# Patient Record
Sex: FEMALE | Race: WHITE | Employment: OTHER | URBAN - METROPOLITAN AREA
[De-identification: names, ages, dates, MRNs, and addresses within clinical notes are randomized per-mention and may not be internally consistent; named-entity substitution may affect disease eponyms.]

---

## 2017-01-03 RX ORDER — SIMVASTATIN 40 MG
TABLET ORAL
Qty: 90 TABLET | Refills: 0 | Status: SHIPPED | OUTPATIENT
Start: 2017-01-03 | End: 2017-04-09

## 2017-01-06 ENCOUNTER — TELEPHONE (OUTPATIENT)
Dept: INTERNAL MEDICINE CLINIC | Facility: CLINIC | Age: 72
End: 2017-01-06

## 2017-01-06 NOTE — TELEPHONE ENCOUNTER
Advised if acute sxs Er eval which pt refused , advised appt next week and pt states will call back for appt

## 2017-01-18 ENCOUNTER — OFFICE VISIT (OUTPATIENT)
Dept: INTERNAL MEDICINE CLINIC | Facility: CLINIC | Age: 72
End: 2017-01-18

## 2017-01-18 VITALS
TEMPERATURE: 98 F | BODY MASS INDEX: 22.71 KG/M2 | HEIGHT: 64 IN | DIASTOLIC BLOOD PRESSURE: 70 MMHG | SYSTOLIC BLOOD PRESSURE: 118 MMHG | WEIGHT: 133 LBS | OXYGEN SATURATION: 100 % | HEART RATE: 85 BPM | RESPIRATION RATE: 16 BRPM

## 2017-01-18 DIAGNOSIS — R42 DIZZINESS: ICD-10-CM

## 2017-01-18 DIAGNOSIS — R19.7 DIARRHEA, UNSPECIFIED TYPE: ICD-10-CM

## 2017-01-18 DIAGNOSIS — I10 ESSENTIAL HYPERTENSION, BENIGN: Primary | ICD-10-CM

## 2017-01-18 DIAGNOSIS — IMO0001 UNCONTROLLED TYPE 2 DIABETES MELLITUS WITHOUT COMPLICATION, WITHOUT LONG-TERM CURRENT USE OF INSULIN: ICD-10-CM

## 2017-01-18 PROCEDURE — 99214 OFFICE O/P EST MOD 30 MIN: CPT | Performed by: FAMILY MEDICINE

## 2017-01-18 NOTE — PROGRESS NOTES
HPI:    Patient ID: Eleni Verduzco is a 66year old female. HPI  Eleni Verduzco is a 66year old female.   HPI:   Here for med ck   (did get email from pts daughter re: pt w occ dizziness, having loose stools, not eating prpperley as a DM pt    Lawanda tenderness  EXTREMITIES: no edema    ASSESSMENT AND PLAN:   Diarrhea:    Will ck stool studies    Discussed metformin w pt  States only uses daily anyway   Not willing to lower it     Needs colonoscopy but not interested    Dizziness    D/w pt   It could be

## 2017-01-25 ENCOUNTER — LAB ENCOUNTER (OUTPATIENT)
Dept: LAB | Age: 72
End: 2017-01-25
Attending: FAMILY MEDICINE
Payer: MEDICARE

## 2017-01-25 ENCOUNTER — TELEPHONE (OUTPATIENT)
Dept: INTERNAL MEDICINE CLINIC | Facility: CLINIC | Age: 72
End: 2017-01-25

## 2017-01-25 DIAGNOSIS — R19.7 DIARRHEA, UNSPECIFIED TYPE: ICD-10-CM

## 2017-01-26 PROCEDURE — 87427 SHIGA-LIKE TOXIN AG IA: CPT | Performed by: FAMILY MEDICINE

## 2017-01-26 PROCEDURE — 87209 SMEAR COMPLEX STAIN: CPT | Performed by: FAMILY MEDICINE

## 2017-01-26 PROCEDURE — 87269 GIARDIA AG IF: CPT | Performed by: FAMILY MEDICINE

## 2017-01-26 PROCEDURE — 87177 OVA AND PARASITES SMEARS: CPT | Performed by: FAMILY MEDICINE

## 2017-01-26 PROCEDURE — 87045 FECES CULTURE AEROBIC BACT: CPT | Performed by: FAMILY MEDICINE

## 2017-01-26 PROCEDURE — 87046 STOOL CULTR AEROBIC BACT EA: CPT | Performed by: FAMILY MEDICINE

## 2017-01-26 PROCEDURE — 87015 SPECIMEN INFECT AGNT CONCNTJ: CPT | Performed by: FAMILY MEDICINE

## 2017-01-30 LAB
OVA AND PARASITE, TRICHROME STAIN: NEGATIVE
OVA AND PARASITE, WET MOUNT: NEGATIVE

## 2017-04-10 RX ORDER — SIMVASTATIN 40 MG
TABLET ORAL
Qty: 90 TABLET | Refills: 0 | Status: SHIPPED | OUTPATIENT
Start: 2017-04-10 | End: 2017-07-16

## 2017-04-10 RX ORDER — LOSARTAN POTASSIUM AND HYDROCHLOROTHIAZIDE 25; 100 MG/1; MG/1
TABLET ORAL
Qty: 90 TABLET | Refills: 0 | Status: SHIPPED | OUTPATIENT
Start: 2017-04-10 | End: 2017-07-16

## 2017-07-17 RX ORDER — SIMVASTATIN 40 MG
TABLET ORAL
Qty: 90 TABLET | Refills: 0 | Status: SHIPPED | OUTPATIENT
Start: 2017-07-17 | End: 2017-09-22 | Stop reason: SDUPTHER

## 2017-07-17 RX ORDER — LOSARTAN POTASSIUM AND HYDROCHLOROTHIAZIDE 25; 100 MG/1; MG/1
TABLET ORAL
Qty: 90 TABLET | Refills: 0 | Status: SHIPPED | OUTPATIENT
Start: 2017-07-17 | End: 2017-09-22 | Stop reason: SDUPTHER

## 2017-09-13 ENCOUNTER — TELEPHONE (OUTPATIENT)
Dept: INTERNAL MEDICINE CLINIC | Facility: CLINIC | Age: 72
End: 2017-09-13

## 2017-09-14 ENCOUNTER — TELEPHONE (OUTPATIENT)
Dept: INTERNAL MEDICINE CLINIC | Facility: CLINIC | Age: 72
End: 2017-09-14

## 2017-09-22 ENCOUNTER — HOSPITAL ENCOUNTER (OUTPATIENT)
Age: 72
Discharge: HOME | End: 2017-09-22
Payer: MEDICARE

## 2017-09-22 VITALS
DIASTOLIC BLOOD PRESSURE: 71 MMHG | OXYGEN SATURATION: 99 % | HEART RATE: 87 BPM | RESPIRATION RATE: 16 BRPM | TEMPERATURE: 98 F | SYSTOLIC BLOOD PRESSURE: 151 MMHG

## 2017-09-22 DIAGNOSIS — Z76.0 ENCOUNTER FOR MEDICATION REFILL: Primary | ICD-10-CM

## 2017-09-22 PROCEDURE — 99214 OFFICE O/P EST MOD 30 MIN: CPT

## 2017-09-22 PROCEDURE — 99213 OFFICE O/P EST LOW 20 MIN: CPT

## 2017-09-22 RX ORDER — LOSARTAN POTASSIUM AND HYDROCHLOROTHIAZIDE 25; 100 MG/1; MG/1
1 TABLET ORAL DAILY
Qty: 30 TABLET | Refills: 0 | Status: SHIPPED | OUTPATIENT
Start: 2017-09-22 | End: 2017-11-22

## 2017-09-22 RX ORDER — SIMVASTATIN 40 MG
40 TABLET ORAL NIGHTLY
Qty: 30 TABLET | Refills: 0 | Status: SHIPPED | OUTPATIENT
Start: 2017-09-22 | End: 2017-11-30

## 2017-09-22 NOTE — ED PROVIDER NOTES
Patient Seen in: [facility]    History   Patient presents with:  Medication Administration    Stated Complaint: PRESCRIPTION REQUEST    19-year-old female presents today with need for refill for metformin.   Patient states at dis Normocephalic. Eyes: Pupils are equal, round, and reactive to light. Neck: Normal range of motion. Neck supple. Cardiovascular: Normal rate and regular rhythm.     Pulmonary/Chest: Effort normal and breath sounds normal.   Neurological: She is alert a found. Please discuss with provider.

## 2017-09-22 NOTE — ED INITIAL ASSESSMENT (HPI)
Pt here because she has been unable to get into see her doctor, and will be changing doctors.   Pt is a diabetic and needs metformin

## 2017-09-27 ENCOUNTER — TELEPHONE (OUTPATIENT)
Dept: INTERNAL MEDICINE CLINIC | Facility: CLINIC | Age: 72
End: 2017-09-27

## 2017-10-25 ENCOUNTER — OFFICE VISIT (OUTPATIENT)
Dept: INTERNAL MEDICINE CLINIC | Facility: CLINIC | Age: 72
End: 2017-10-25

## 2017-10-25 VITALS
RESPIRATION RATE: 23 BRPM | BODY MASS INDEX: 23.99 KG/M2 | SYSTOLIC BLOOD PRESSURE: 140 MMHG | TEMPERATURE: 98 F | WEIGHT: 140.5 LBS | HEART RATE: 93 BPM | HEIGHT: 64 IN | DIASTOLIC BLOOD PRESSURE: 88 MMHG

## 2017-10-25 DIAGNOSIS — IMO0001 UNCONTROLLED TYPE 2 DIABETES MELLITUS WITHOUT COMPLICATION, WITHOUT LONG-TERM CURRENT USE OF INSULIN: ICD-10-CM

## 2017-10-25 DIAGNOSIS — E78.00 PURE HYPERCHOLESTEROLEMIA: ICD-10-CM

## 2017-10-25 DIAGNOSIS — Z12.31 ENCOUNTER FOR SCREENING MAMMOGRAM FOR BREAST CANCER: ICD-10-CM

## 2017-10-25 DIAGNOSIS — Z00.00 MEDICARE ANNUAL WELLNESS VISIT, SUBSEQUENT: Primary | ICD-10-CM

## 2017-10-25 DIAGNOSIS — I10 ESSENTIAL HYPERTENSION, BENIGN: ICD-10-CM

## 2017-10-25 PROCEDURE — G0444 DEPRESSION SCREEN ANNUAL: HCPCS | Performed by: INTERNAL MEDICINE

## 2017-10-25 PROCEDURE — 99214 OFFICE O/P EST MOD 30 MIN: CPT | Performed by: INTERNAL MEDICINE

## 2017-10-25 PROCEDURE — G0439 PPPS, SUBSEQ VISIT: HCPCS | Performed by: INTERNAL MEDICINE

## 2017-10-25 RX ORDER — SIMVASTATIN 40 MG
TABLET ORAL
Qty: 90 TABLET | OUTPATIENT
Start: 2017-10-25

## 2017-10-25 RX ORDER — LOSARTAN POTASSIUM AND HYDROCHLOROTHIAZIDE 25; 100 MG/1; MG/1
TABLET ORAL
Qty: 90 TABLET | OUTPATIENT
Start: 2017-10-25

## 2017-10-25 NOTE — PATIENT INSTRUCTIONS
- Get blood work done when fasting (8 hours, water and medications only)  - Schedule your mammogram.    Recommended Websites for Advanced Directives    [email or web address]  An information packet, including necessary

## 2017-10-25 NOTE — PROGRESS NOTES
HPI:   Jana Renner is a 67year old female who presents for a Medicare Subsequent Annual Wellness visit (Pt already had Initial Annual Wellness) and for follow up on her chronic medical issues.   Her last annual assessment has been over 1 year: Annual Her family history is not on file. SOCIAL HISTORY:   She  reports that she has quit smoking. She has never used smokeless tobacco. She reports that she does not drink alcohol or use drugs.      REVIEW OF SYSTEMS:   GENERAL: feels well otherwise  SKIN: d appearance is normal.  Bilateral monofilament/sensation of both feet is normal.  Pulsation pedal pulse exam of both lower legs/feet is normal as well.   MS: Full ROM, no joint pain  PSYCH: pleasant, appropriate mood and affect  SUGGESTED VACCINATIONS - Infl lipid panel. Continue simvastatin 40 mg qhs. Ms. Mary Jane Hannah does not currently take aspirin. We discussed the risks and benefits of aspirin therapy.    Diamond Brink is unable to use daily aspirin therapy For the following reasons:   Patient decided No    Vision Problems? : No    Difficulty walking?: No (balance )    Difficulty dressing or bathing?: No    Problems with daily activities? : No    Memory Problems?: No      Fall/Risk Assessment          Have you fallen in the last 12 months?: 0-No Flex Sigmoidoscopy Screen every 10 years No results found for this or any previous visit. No flowsheet data found. Fecal Occult Blood Annually No results found for: FOB No flowsheet data found.     Glaucoma Screening      Ophthalmology Visit Annually This may be covered with your pharmacy  prescription benefits        SPECIFIC DISEASE MONITORING Internal Lab or Procedure External Lab or Procedure   Annual Monitoring of Persistent     Medications (ACE/ARB, digoxin diuretics, anticonvulsants.)    Raza

## 2017-11-24 RX ORDER — LOSARTAN POTASSIUM AND HYDROCHLOROTHIAZIDE 25; 100 MG/1; MG/1
TABLET ORAL
Qty: 90 TABLET | Refills: 0 | OUTPATIENT
Start: 2017-11-24

## 2017-11-24 RX ORDER — LOSARTAN POTASSIUM AND HYDROCHLOROTHIAZIDE 25; 100 MG/1; MG/1
TABLET ORAL
Qty: 30 TABLET | Refills: 0 | Status: SHIPPED | OUTPATIENT
Start: 2017-11-24 | End: 2017-12-22

## 2017-11-24 NOTE — TELEPHONE ENCOUNTER
Losartan hctz 9-22-17 30 with 0 refills   LOV 10-25-17   Follow up in 6 months   Labs 10-3-16   Pt has not gone for testing

## 2017-12-01 RX ORDER — SIMVASTATIN 40 MG
TABLET ORAL
Qty: 90 TABLET | Refills: 0 | OUTPATIENT
Start: 2017-12-01

## 2017-12-01 RX ORDER — SIMVASTATIN 40 MG
40 TABLET ORAL NIGHTLY
Qty: 30 TABLET | Refills: 0 | Status: SHIPPED | OUTPATIENT
Start: 2017-12-01 | End: 2017-12-22

## 2017-12-04 ENCOUNTER — APPOINTMENT (OUTPATIENT)
Dept: LAB | Age: 72
End: 2017-12-04
Attending: INTERNAL MEDICINE
Payer: MEDICARE

## 2017-12-04 DIAGNOSIS — E78.00 PURE HYPERCHOLESTEROLEMIA: ICD-10-CM

## 2017-12-04 DIAGNOSIS — I10 ESSENTIAL HYPERTENSION, BENIGN: ICD-10-CM

## 2017-12-04 DIAGNOSIS — IMO0001 UNCONTROLLED TYPE 2 DIABETES MELLITUS WITHOUT COMPLICATION, WITHOUT LONG-TERM CURRENT USE OF INSULIN: ICD-10-CM

## 2017-12-04 DIAGNOSIS — Z00.00 MEDICARE ANNUAL WELLNESS VISIT, SUBSEQUENT: ICD-10-CM

## 2017-12-04 PROCEDURE — 83036 HEMOGLOBIN GLYCOSYLATED A1C: CPT

## 2017-12-04 PROCEDURE — 80061 LIPID PANEL: CPT

## 2017-12-04 PROCEDURE — 36415 COLL VENOUS BLD VENIPUNCTURE: CPT

## 2017-12-04 PROCEDURE — 80053 COMPREHEN METABOLIC PANEL: CPT

## 2017-12-22 RX ORDER — LOSARTAN POTASSIUM AND HYDROCHLOROTHIAZIDE 25; 100 MG/1; MG/1
TABLET ORAL
Qty: 30 TABLET | Refills: 0 | Status: SHIPPED | OUTPATIENT
Start: 2017-12-22 | End: 2018-01-20

## 2017-12-22 RX ORDER — SIMVASTATIN 40 MG
TABLET ORAL
Qty: 30 TABLET | Refills: 0 | Status: SHIPPED | OUTPATIENT
Start: 2017-12-22 | End: 2018-01-20

## 2018-01-22 RX ORDER — SIMVASTATIN 40 MG
TABLET ORAL
Qty: 30 TABLET | Refills: 0 | Status: SHIPPED | OUTPATIENT
Start: 2018-01-22 | End: 2018-02-02

## 2018-01-22 RX ORDER — SIMVASTATIN 40 MG
TABLET ORAL
Qty: 30 TABLET | Refills: 0 | Status: SHIPPED | OUTPATIENT
Start: 2018-01-22 | End: 2018-02-20

## 2018-01-22 RX ORDER — LOSARTAN POTASSIUM AND HYDROCHLOROTHIAZIDE 25; 100 MG/1; MG/1
TABLET ORAL
Qty: 30 TABLET | Refills: 0 | Status: SHIPPED | OUTPATIENT
Start: 2018-01-22 | End: 2018-02-20

## 2018-02-01 ENCOUNTER — OFFICE VISIT (OUTPATIENT)
Dept: INTERNAL MEDICINE CLINIC | Facility: CLINIC | Age: 73
End: 2018-02-01

## 2018-02-01 VITALS
DIASTOLIC BLOOD PRESSURE: 80 MMHG | TEMPERATURE: 99 F | WEIGHT: 140 LBS | BODY MASS INDEX: 23.9 KG/M2 | SYSTOLIC BLOOD PRESSURE: 120 MMHG | HEART RATE: 84 BPM | HEIGHT: 64 IN | RESPIRATION RATE: 12 BRPM

## 2018-02-01 DIAGNOSIS — E78.00 PURE HYPERCHOLESTEROLEMIA: Primary | ICD-10-CM

## 2018-02-01 DIAGNOSIS — I10 ESSENTIAL HYPERTENSION, BENIGN: ICD-10-CM

## 2018-02-01 DIAGNOSIS — IMO0001 UNCONTROLLED TYPE 2 DIABETES MELLITUS WITHOUT COMPLICATION, WITHOUT LONG-TERM CURRENT USE OF INSULIN: ICD-10-CM

## 2018-02-01 PROCEDURE — 99214 OFFICE O/P EST MOD 30 MIN: CPT | Performed by: INTERNAL MEDICINE

## 2018-02-01 NOTE — PROGRESS NOTES
Prerna Varela is a 67year old female. HPI:   Patient presents with: Follow - Up: lab tests  Patient presents for follow up on chronic medical issues. On statin for hyperlipidemia, no myalgias. Diabetes well controlled.     Blood pressure stabl normal lid and conjunctiva  LUNGS: clear to auscultation bilaterally, no wheezing/rubs  CARDIO: RRR without murmurs. No clubbing, cyanosis or edema.   GI: soft non tender nondistended no hepatosplenomegaly, bowel sounds throughout  PSYCH: pleasant, appropr

## 2018-02-21 RX ORDER — SIMVASTATIN 40 MG
TABLET ORAL
Qty: 90 TABLET | Refills: 1 | Status: SHIPPED | OUTPATIENT
Start: 2018-02-21 | End: 2018-08-13

## 2018-02-21 RX ORDER — LOSARTAN POTASSIUM AND HYDROCHLOROTHIAZIDE 25; 100 MG/1; MG/1
TABLET ORAL
Qty: 90 TABLET | Refills: 1 | Status: SHIPPED | OUTPATIENT
Start: 2018-02-21 | End: 2018-08-13

## 2018-02-21 NOTE — TELEPHONE ENCOUNTER
Losartan hctz 100-25 mg 1 tab daily filled 1-22-18 30 with 0 refills    Simvastatin 40 mg 1 tab nightly filled 1-22-18 30 with 0 refills     LOV 2-1-18     Continue simvastatin 40 mg qhs for now. Continue losartan-HCTZ 100-25 mg qd.     return to clini

## 2018-02-23 ENCOUNTER — OFFICE VISIT (OUTPATIENT)
Dept: INTERNAL MEDICINE CLINIC | Facility: CLINIC | Age: 73
End: 2018-02-23

## 2018-02-23 VITALS
WEIGHT: 140 LBS | HEART RATE: 80 BPM | SYSTOLIC BLOOD PRESSURE: 120 MMHG | TEMPERATURE: 98 F | BODY MASS INDEX: 23.9 KG/M2 | HEIGHT: 64 IN | RESPIRATION RATE: 12 BRPM | DIASTOLIC BLOOD PRESSURE: 80 MMHG

## 2018-02-23 DIAGNOSIS — S63.501A SPRAIN OF RIGHT WRIST, INITIAL ENCOUNTER: Primary | ICD-10-CM

## 2018-02-23 PROCEDURE — 99213 OFFICE O/P EST LOW 20 MIN: CPT | Performed by: INTERNAL MEDICINE

## 2018-02-23 NOTE — PROGRESS NOTES
Janie Guillen is a 67year old female. HPI:   Patient presents with:  Pain: pain in right wrist and forearm - symptoms started months ago  Patient presents with an acute musculoskeletal complaint.   Patient has been dealing with pain in the right arm well developed, well nourished,in no apparent distress  HEENT: atraumatic, PERRLA, EOMI, normal lid and conjunctiva  LUNGS: clear to auscultation bilaterally, no wheezing/rubs  CARDIO: RRR without murmurs. No clubbing, cyanosis or edema.   MS: Full ROM, mi

## 2018-02-23 NOTE — PATIENT INSTRUCTIONS
- Try and avoid too much heavy lifting with your right hand  - I don't think you have carpal tunnel. Main symptoms from carpal tunnel are numbness and tingling in your fingers. - Do the home stretching exercises.   - Follow up in October for chronic med

## 2018-08-13 ENCOUNTER — OFFICE VISIT (OUTPATIENT)
Dept: INTERNAL MEDICINE CLINIC | Facility: CLINIC | Age: 73
End: 2018-08-13
Payer: MEDICARE

## 2018-08-13 VITALS
WEIGHT: 142 LBS | RESPIRATION RATE: 18 BRPM | HEART RATE: 78 BPM | HEIGHT: 64 IN | OXYGEN SATURATION: 97 % | BODY MASS INDEX: 24.24 KG/M2 | TEMPERATURE: 98 F | DIASTOLIC BLOOD PRESSURE: 80 MMHG | SYSTOLIC BLOOD PRESSURE: 148 MMHG

## 2018-08-13 DIAGNOSIS — I10 ESSENTIAL HYPERTENSION, BENIGN: ICD-10-CM

## 2018-08-13 DIAGNOSIS — R25.2 LEG CRAMPS: ICD-10-CM

## 2018-08-13 DIAGNOSIS — M79.604 PAIN IN RIGHT LEG: Primary | ICD-10-CM

## 2018-08-13 DIAGNOSIS — Z86.2 HISTORY OF ANEMIA: ICD-10-CM

## 2018-08-13 PROCEDURE — 99214 OFFICE O/P EST MOD 30 MIN: CPT | Performed by: INTERNAL MEDICINE

## 2018-08-13 RX ORDER — SIMVASTATIN 40 MG
40 TABLET ORAL NIGHTLY
Qty: 90 TABLET | Refills: 1 | Status: SHIPPED | OUTPATIENT
Start: 2018-08-13 | End: 2019-03-05

## 2018-08-13 RX ORDER — LOSARTAN POTASSIUM AND HYDROCHLOROTHIAZIDE 25; 100 MG/1; MG/1
1 TABLET ORAL DAILY
Qty: 90 TABLET | Refills: 1 | Status: SHIPPED | OUTPATIENT
Start: 2018-08-13 | End: 2019-03-05

## 2018-08-13 NOTE — PROGRESS NOTES
Stanley Min is a 75year old female. HPI:   Patient presents with:  Leg Pain: right leg pain   Patient presents with an acute musculoskeletal complaint. Patient has been dealing with pain in right leg. It has been going on for 2-3 weeks.   It is a : 142 lb  02/23/18 : 140 lb  02/01/18 : 140 lb  10/25/17 : 140 lb 8 oz  01/18/17 : 133 lb  10/24/16 : 140 lb    EXAM:   /80 (BP Location: Left arm, Patient Position: Sitting, Cuff Size: adult)   Pulse 78   Temp 98.2 °F (36.8 °C) (Oral)   Resp 18   Ht

## 2018-08-13 NOTE — PATIENT INSTRUCTIONS
- Take Aleve once daily (with breakfast) for the next 2-3 weeks  - Get blood work done (do not have to fast) sometime in the next couple of weeks. - Follow up with us for your chronic issues/Medicare wellness visit in 2 months (October).       It was a

## 2018-08-16 ENCOUNTER — TELEPHONE (OUTPATIENT)
Dept: INTERNAL MEDICINE CLINIC | Facility: CLINIC | Age: 73
End: 2018-08-16

## 2018-08-16 DIAGNOSIS — M79.604 RIGHT LEG PAIN: Primary | ICD-10-CM

## 2018-08-17 ENCOUNTER — HOSPITAL ENCOUNTER (OUTPATIENT)
Dept: GENERAL RADIOLOGY | Age: 73
Discharge: HOME | End: 2018-08-17
Attending: INTERNAL MEDICINE
Payer: MEDICARE

## 2018-08-17 DIAGNOSIS — M79.604 RIGHT LEG PAIN: ICD-10-CM

## 2018-08-17 PROCEDURE — 73590 X-RAY EXAM OF LOWER LEG: CPT | Performed by: INTERNAL MEDICINE

## 2018-09-24 ENCOUNTER — TELEPHONE (OUTPATIENT)
Dept: INTERNAL MEDICINE CLINIC | Facility: CLINIC | Age: 73
End: 2018-09-24

## 2018-09-28 ENCOUNTER — OFFICE VISIT (OUTPATIENT)
Dept: INTERNAL MEDICINE CLINIC | Facility: CLINIC | Age: 73
End: 2018-09-28
Payer: MEDICARE

## 2018-09-28 VITALS
BODY MASS INDEX: 24 KG/M2 | TEMPERATURE: 98 F | RESPIRATION RATE: 18 BRPM | SYSTOLIC BLOOD PRESSURE: 136 MMHG | DIASTOLIC BLOOD PRESSURE: 70 MMHG | HEART RATE: 97 BPM | WEIGHT: 142 LBS | OXYGEN SATURATION: 96 %

## 2018-09-28 DIAGNOSIS — M79.604 RIGHT LEG PAIN: Primary | ICD-10-CM

## 2018-09-28 PROCEDURE — 99213 OFFICE O/P EST LOW 20 MIN: CPT | Performed by: INTERNAL MEDICINE

## 2018-09-28 NOTE — PROGRESS NOTES
Prerna Varela is a 75year old female. HPI:   Patient presents with: Follow - Up  Patient presents for follow up on right leg pain. She has actually been asymptomatic for the past three days.   We did an x-ray tib-fib at last visit, showed some mild BMI 24.37 kg/m²   GENERAL: Alert and oriented, well developed, well nourished,in no apparent distress  HEENT: atraumatic, PERRLA, EOMI, normal lid and conjunctiva  LUNGS: clear to auscultation bilaterally, no wheezing/rubs  CARDIO: RRR without murmurs.   [facility]

## 2018-09-28 NOTE — PATIENT INSTRUCTIONS
- follow up in 1 month for Medicare Wellness examination. Make sure you are fasting so we can do blood work. - follow up with physical therapy if your leg pain comes back. It was a pleasure seeing you in the clinic today.   Thank you for choosing the

## 2018-10-19 ENCOUNTER — TELEPHONE (OUTPATIENT)
Dept: INTERNAL MEDICINE CLINIC | Facility: CLINIC | Age: 73
End: 2018-10-19

## 2018-10-19 NOTE — TELEPHONE ENCOUNTER
Pt would like a call back states that she has like a stomach flu is throwing up would like a call asap

## 2018-10-22 NOTE — TELEPHONE ENCOUNTER
Pt contacted and stated symptoms now resolved. No c/o nausea, vomiting, diarrhea, constipation, fever or chills. Pt instructed is s/s return to contact office or proceed to UC. Pt verbalizes understanding.

## 2018-11-12 ENCOUNTER — LAB ENCOUNTER (OUTPATIENT)
Dept: LAB | Age: 73
End: 2018-11-12
Attending: INTERNAL MEDICINE
Payer: MEDICARE

## 2018-11-12 DIAGNOSIS — R25.2 LEG CRAMPS: ICD-10-CM

## 2018-11-12 DIAGNOSIS — M79.604 PAIN IN RIGHT LEG: ICD-10-CM

## 2018-11-12 DIAGNOSIS — Z86.2 HISTORY OF ANEMIA: ICD-10-CM

## 2018-11-12 DIAGNOSIS — I10 ESSENTIAL HYPERTENSION, BENIGN: ICD-10-CM

## 2018-11-12 PROCEDURE — 36415 COLL VENOUS BLD VENIPUNCTURE: CPT

## 2018-11-12 PROCEDURE — 80053 COMPREHEN METABOLIC PANEL: CPT

## 2018-11-12 PROCEDURE — 85025 COMPLETE CBC W/AUTO DIFF WBC: CPT

## 2018-11-19 ENCOUNTER — TELEPHONE (OUTPATIENT)
Dept: INTERNAL MEDICINE CLINIC | Facility: CLINIC | Age: 73
End: 2018-11-19

## 2018-11-19 NOTE — TELEPHONE ENCOUNTER
Patient is calling to speak with a nurse to go over her recent lab results. Patient is upset that no one has called her back yet. Please advise.

## 2018-11-29 ENCOUNTER — OFFICE VISIT (OUTPATIENT)
Dept: INTERNAL MEDICINE CLINIC | Facility: CLINIC | Age: 73
End: 2018-11-29
Payer: MEDICARE

## 2018-11-29 VITALS
OXYGEN SATURATION: 98 % | BODY MASS INDEX: 24.45 KG/M2 | HEIGHT: 63 IN | HEART RATE: 91 BPM | SYSTOLIC BLOOD PRESSURE: 138 MMHG | DIASTOLIC BLOOD PRESSURE: 80 MMHG | WEIGHT: 138 LBS | TEMPERATURE: 98 F | RESPIRATION RATE: 16 BRPM

## 2018-11-29 DIAGNOSIS — I10 ESSENTIAL HYPERTENSION, BENIGN: ICD-10-CM

## 2018-11-29 DIAGNOSIS — Z12.11 ENCOUNTER FOR SCREENING COLONOSCOPY: ICD-10-CM

## 2018-11-29 DIAGNOSIS — Z13.820 SCREENING FOR OSTEOPOROSIS: ICD-10-CM

## 2018-11-29 DIAGNOSIS — Z78.0 POST-MENOPAUSAL: ICD-10-CM

## 2018-11-29 DIAGNOSIS — E11.9 CONTROLLED TYPE 2 DIABETES MELLITUS WITHOUT COMPLICATION, WITHOUT LONG-TERM CURRENT USE OF INSULIN (HCC): ICD-10-CM

## 2018-11-29 DIAGNOSIS — E78.00 PURE HYPERCHOLESTEROLEMIA: ICD-10-CM

## 2018-11-29 DIAGNOSIS — Z00.00 MEDICARE ANNUAL WELLNESS VISIT, SUBSEQUENT: Primary | ICD-10-CM

## 2018-11-29 PROCEDURE — G0439 PPPS, SUBSEQ VISIT: HCPCS | Performed by: INTERNAL MEDICINE

## 2018-11-29 PROCEDURE — 99214 OFFICE O/P EST MOD 30 MIN: CPT | Performed by: INTERNAL MEDICINE

## 2018-11-29 NOTE — PATIENT INSTRUCTIONS
- Continue all your medications  - Schedule the DEXA scan (bone density scan). [phone].  - Follow up with Dr. Trevor Gill. It was a pleasure seeing you in the clinic today.   Thank you for choosing the [place] [facility]

## 2018-11-29 NOTE — PROGRESS NOTES
HPI:   Barrett Farris is a 75year old female who presents for a Medicare Subsequent Annual Wellness visit (Pt already had Initial Annual Wellness).   Her last annual assessment has been over 1 year: Annual Physical due on 10/25/2018    Preventative heal with patient and Family/surrogate (if present), and forms available to patient in AVS       She does NOT have a Power of  for [employer] on file in [facility]    Advance care planning including the explanation and discussion of advance directives standa daily.   MetFORMIN HCl 1000 MG Oral Tab Take 1 tablet (1,000 mg total) by mouth 2 (two) times daily with meals. simvastatin 40 MG Oral Tab Take 1 tablet (40 mg total) by mouth nightly.       MEDICAL INFORMATION:   She  has a past medical history of Diabet clubbing, cyanosis or edema.   GI: soft non tender nondistended no hepatosplenomegaly, bowel sounds throughout  NEURO: CN II-XII intact, 5/5 strength all extremities  MS: Full ROM, no joint pain  PSYCH: pleasant, appropriate mood and affect  Vaccination His Health     In the past six months, have you lost more than 10 pounds without trying?: 2 - No  Has your appetite been poor?: No  How does the patient maintain a good energy level?: Daily Walks  How would you describe your daily physical activity?: Heavy  Ho Maintenance if applicable    Chlamydia  Annually if high risk No results found for: CHLAMYDIA No flowsheet data found.     Screening Mammogram      Mammogram Annually to 75, then as discussed Mammogram due on 05/25/1945 Update Health Maintenance if applicab Annually HGBA1C (%)   Date Value   02/28/2014 7.7 (H)     HgbA1C (%)   Date Value   12/04/2017 6.8 (H)       No flowsheet data found.     Creat/alb ratio  Annually No results found for: MICROALBCREA, MALBCRECALC     LDL  Annually LDL CHOLESTROL (mg/dL)   Da

## 2019-03-05 DIAGNOSIS — E78.00 PURE HYPERCHOLESTEROLEMIA: Primary | ICD-10-CM

## 2019-03-05 DIAGNOSIS — E11.9 CONTROLLED TYPE 2 DIABETES MELLITUS WITHOUT COMPLICATION, WITHOUT LONG-TERM CURRENT USE OF INSULIN (HCC): ICD-10-CM

## 2019-03-06 RX ORDER — SIMVASTATIN 40 MG
TABLET ORAL
Qty: 90 TABLET | Refills: 0 | Status: SHIPPED | OUTPATIENT
Start: 2019-03-06 | End: 2019-06-12

## 2019-03-06 RX ORDER — LOSARTAN POTASSIUM AND HYDROCHLOROTHIAZIDE 25; 100 MG/1; MG/1
1 TABLET ORAL DAILY
Qty: 90 TABLET | Refills: 0 | Status: SHIPPED | OUTPATIENT
Start: 2019-03-06 | End: 2019-06-06

## 2019-03-06 NOTE — TELEPHONE ENCOUNTER
Refilled x 90 days - patient should follow up with me in 3 months, get fasting blood work done at least 1-2 days before appointment.

## 2019-03-06 NOTE — TELEPHONE ENCOUNTER
Refill requested:   Requested Prescriptions     Pending Prescriptions Disp Refills   • SIMVASTATIN 40 MG Oral Tab [Pharmacy Med Name: SIMVASTATIN 40MG TABLETS] 90 tablet 0     Sig: TAKE 1 TABLET(40 MG) BY MOUTH EVERY NIGHT   • METFORMIN HCL 1000 MG Oral Ta

## 2019-03-06 NOTE — TELEPHONE ENCOUNTER
Spoke to pt and informed. Pt states she will call back to schedule an appointment. Advised to make sure she calls within the 90 days as she will be out of meds if she waits too long. Pt verbalized understanding.

## 2019-03-25 DIAGNOSIS — E78.00 PURE HYPERCHOLESTEROLEMIA: ICD-10-CM

## 2019-03-26 RX ORDER — SIMVASTATIN 40 MG
TABLET ORAL
Qty: 90 TABLET | Refills: 0 | OUTPATIENT
Start: 2019-03-26

## 2019-06-06 DIAGNOSIS — E11.9 CONTROLLED TYPE 2 DIABETES MELLITUS WITHOUT COMPLICATION, WITHOUT LONG-TERM CURRENT USE OF INSULIN (HCC): ICD-10-CM

## 2019-06-06 RX ORDER — LOSARTAN POTASSIUM AND HYDROCHLOROTHIAZIDE 25; 100 MG/1; MG/1
1 TABLET ORAL DAILY
Qty: 90 TABLET | Refills: 1 | Status: SHIPPED | OUTPATIENT
Start: 2019-06-06 | End: 2019-09-12 | Stop reason: ALTCHOICE

## 2019-06-06 NOTE — TELEPHONE ENCOUNTER
Failed protocol     Last refill:  3/6/2019 Metformin 1000 mg #180 NR  3/6/2019 Losartan HCTZ 100-25 mg #90 NR    LOV:   11/29/2018 Dr Sal Caldwell RTC 6 months  5. Essential hypertension, benign  At goal blood pressure. Normal creatinine and electrolytes this month. Continue losartan-HCTZ 100-25 mg qd.     7.  Controlled type 2 diabetes mellitus without complication, without long-term current use of insulin ([address]  Well controlled, last A1c was 6.8. Fasting glucose of 130 this month. Continue metformin 1000 mg BID. Due for foot exam, microalbumin. No diabetic retinopathy on eye exam in September 2018.       No FOV scheduled

## 2019-06-12 ENCOUNTER — APPOINTMENT (OUTPATIENT)
Dept: LAB | Age: 74
End: 2019-06-12
Attending: INTERNAL MEDICINE
Payer: MEDICARE

## 2019-06-12 ENCOUNTER — OFFICE VISIT (OUTPATIENT)
Dept: INTERNAL MEDICINE CLINIC | Facility: CLINIC | Age: 74
End: 2019-06-12
Payer: MEDICARE

## 2019-06-12 VITALS
SYSTOLIC BLOOD PRESSURE: 138 MMHG | TEMPERATURE: 99 F | RESPIRATION RATE: 16 BRPM | DIASTOLIC BLOOD PRESSURE: 80 MMHG | HEIGHT: 63 IN | BODY MASS INDEX: 25.16 KG/M2 | HEART RATE: 100 BPM | WEIGHT: 142 LBS

## 2019-06-12 DIAGNOSIS — E78.00 PURE HYPERCHOLESTEROLEMIA: ICD-10-CM

## 2019-06-12 DIAGNOSIS — E11.9 CONTROLLED TYPE 2 DIABETES MELLITUS WITHOUT COMPLICATION, WITHOUT LONG-TERM CURRENT USE OF INSULIN (HCC): ICD-10-CM

## 2019-06-12 DIAGNOSIS — I10 ESSENTIAL HYPERTENSION, BENIGN: Chronic | ICD-10-CM

## 2019-06-12 DIAGNOSIS — E78.00 PURE HYPERCHOLESTEROLEMIA: Chronic | ICD-10-CM

## 2019-06-12 DIAGNOSIS — E11.9 CONTROLLED TYPE 2 DIABETES MELLITUS WITHOUT COMPLICATION, WITHOUT LONG-TERM CURRENT USE OF INSULIN (HCC): Primary | Chronic | ICD-10-CM

## 2019-06-12 PROCEDURE — 80061 LIPID PANEL: CPT

## 2019-06-12 PROCEDURE — 36415 COLL VENOUS BLD VENIPUNCTURE: CPT

## 2019-06-12 PROCEDURE — 82043 UR ALBUMIN QUANTITATIVE: CPT

## 2019-06-12 PROCEDURE — 83036 HEMOGLOBIN GLYCOSYLATED A1C: CPT

## 2019-06-12 PROCEDURE — 80053 COMPREHEN METABOLIC PANEL: CPT

## 2019-06-12 PROCEDURE — 82570 ASSAY OF URINE CREATININE: CPT

## 2019-06-12 PROCEDURE — 99214 OFFICE O/P EST MOD 30 MIN: CPT | Performed by: INTERNAL MEDICINE

## 2019-06-12 RX ORDER — SIMVASTATIN 40 MG
40 TABLET ORAL NIGHTLY
Qty: 90 TABLET | Refills: 1 | Status: SHIPPED | OUTPATIENT
Start: 2019-06-12 | End: 2019-09-16

## 2019-06-12 NOTE — PATIENT INSTRUCTIONS
- Get blood work done today  - Schedule DEXA scan (bone density scan). You can do it at [facility].   - We will let you know when you need to follow up once we get your blood work back. It was a pleasure seeing you in the clinic today.   Thank

## 2019-06-12 NOTE — PROGRESS NOTES
Carmen Basilio is a 75year old female. HPI:   Patient presents with: Follow - Up: 6 month follow up    Patient presents for follow up on chronic medical issues. Diabetes - not the best diet of late. Eating more sweets.   Hypercholesterolemia - just alcohol or use drugs.   Wt Readings from Last 6 Encounters:  06/12/19 : 142 lb  11/29/18 : 138 lb  09/28/18 : 142 lb  08/13/18 : 142 lb  02/23/18 : 140 lb  02/01/18 : 140 lb    EXAM:   /80   Pulse 100   Temp 98.5 °F (36.9 °C) (Oral)   Resp 16   Ht 63\

## 2019-06-24 ENCOUNTER — TELEPHONE (OUTPATIENT)
Dept: INTERNAL MEDICINE CLINIC | Facility: CLINIC | Age: 74
End: 2019-06-24

## 2019-07-05 ENCOUNTER — TELEPHONE (OUTPATIENT)
Dept: INTERNAL MEDICINE CLINIC | Facility: CLINIC | Age: 74
End: 2019-07-05

## 2019-08-01 NOTE — TELEPHONE ENCOUNTER
Per te from 6/24/19 pt completed Dexa at [facility]. Dr Matt Galeana did you receive the results?

## 2019-08-06 NOTE — TELEPHONE ENCOUNTER
Request for report faxed to THE [facility] medical records dept  # [phone]. Confirmation received.

## 2019-08-07 PROBLEM — M85.852 OSTEOPENIA OF NECKS OF BOTH FEMURS: Status: ACTIVE | Noted: 2019-08-07

## 2019-08-07 PROBLEM — M85.851 OSTEOPENIA OF NECKS OF BOTH FEMURS: Status: ACTIVE | Noted: 2019-08-07

## 2019-08-07 NOTE — TELEPHONE ENCOUNTER
Reviewed results. Please inform patient:   She has osteopenia (pre-osteoporosis) in her leg bones. Normal in spine. She does not require osteoporosis prescription medication at this time.   I would recommend daily over the counter Vitamin D (800-1000 uni

## 2019-09-12 RX ORDER — HYDROCHLOROTHIAZIDE 25 MG/1
25 TABLET ORAL DAILY
Qty: 90 TABLET | Refills: 0 | Status: SHIPPED | OUTPATIENT
Start: 2019-09-12 | End: 2019-10-31

## 2019-09-12 RX ORDER — LOSARTAN POTASSIUM 100 MG/1
100 TABLET ORAL DAILY
Qty: 90 TABLET | Refills: 0 | Status: SHIPPED | OUTPATIENT
Start: 2019-09-12 | End: 2019-10-31

## 2019-09-16 ENCOUNTER — LAB ENCOUNTER (OUTPATIENT)
Dept: LAB | Age: 74
End: 2019-09-16
Attending: INTERNAL MEDICINE
Payer: MEDICARE

## 2019-09-16 ENCOUNTER — OFFICE VISIT (OUTPATIENT)
Dept: INTERNAL MEDICINE CLINIC | Facility: CLINIC | Age: 74
End: 2019-09-16
Payer: MEDICARE

## 2019-09-16 VITALS
SYSTOLIC BLOOD PRESSURE: 152 MMHG | HEIGHT: 63 IN | BODY MASS INDEX: 25.16 KG/M2 | DIASTOLIC BLOOD PRESSURE: 80 MMHG | WEIGHT: 142 LBS | TEMPERATURE: 99 F | HEART RATE: 88 BPM | RESPIRATION RATE: 16 BRPM

## 2019-09-16 DIAGNOSIS — Z00.00 PREVENTATIVE HEALTH CARE: ICD-10-CM

## 2019-09-16 DIAGNOSIS — E11.65 UNCONTROLLED TYPE 2 DIABETES MELLITUS WITH HYPERGLYCEMIA (HCC): Primary | ICD-10-CM

## 2019-09-16 DIAGNOSIS — E78.00 PURE HYPERCHOLESTEROLEMIA: Chronic | ICD-10-CM

## 2019-09-16 DIAGNOSIS — E11.65 UNCONTROLLED TYPE 2 DIABETES MELLITUS WITH HYPERGLYCEMIA (HCC): ICD-10-CM

## 2019-09-16 DIAGNOSIS — M85.851 OSTEOPENIA OF NECKS OF BOTH FEMURS: ICD-10-CM

## 2019-09-16 DIAGNOSIS — Z63.4 BEREAVEMENT: ICD-10-CM

## 2019-09-16 DIAGNOSIS — I10 ESSENTIAL HYPERTENSION, BENIGN: Chronic | ICD-10-CM

## 2019-09-16 DIAGNOSIS — M85.852 OSTEOPENIA OF NECKS OF BOTH FEMURS: ICD-10-CM

## 2019-09-16 LAB
ANION GAP SERPL CALC-SCNC: 9 MMOL/L (ref 0–18)
BASOPHILS # BLD AUTO: 0.05 X10(3) UL (ref 0–0.2)
BASOPHILS NFR BLD AUTO: 0.9 %
BUN BLD-MCNC: 16 MG/DL (ref 7–18)
BUN/CREAT SERPL: 20 (ref 10–20)
CALCIUM BLD-MCNC: 9.6 MG/DL (ref 8.5–10.1)
CHLORIDE SERPL-SCNC: 102 MMOL/L (ref 98–112)
CHOLEST SMN-MCNC: 200 MG/DL (ref ?–200)
CO2 SERPL-SCNC: 28 MMOL/L (ref 21–32)
CREAT BLD-MCNC: 0.8 MG/DL (ref 0.55–1.02)
DEPRECATED RDW RBC AUTO: 41.4 FL (ref 35.1–46.3)
EOSINOPHIL # BLD AUTO: 0.08 X10(3) UL (ref 0–0.7)
EOSINOPHIL NFR BLD AUTO: 1.4 %
ERYTHROCYTE [DISTWIDTH] IN BLOOD BY AUTOMATED COUNT: 12.5 % (ref 11–15)
EST. AVERAGE GLUCOSE BLD GHB EST-MCNC: 163 MG/DL (ref 68–126)
GLUCOSE BLD-MCNC: 142 MG/DL (ref 70–99)
HBA1C MFR BLD HPLC: 7.3 % (ref ?–5.7)
HCT VFR BLD AUTO: 41.5 % (ref 35–48)
HDLC SERPL-MCNC: 58 MG/DL (ref 40–59)
HGB BLD-MCNC: 13.3 G/DL (ref 12–16)
IMM GRANULOCYTES # BLD AUTO: 0.01 X10(3) UL (ref 0–1)
IMM GRANULOCYTES NFR BLD: 0.2 %
LDLC SERPL CALC-MCNC: 84 MG/DL (ref ?–100)
LYMPHOCYTES # BLD AUTO: 1.45 X10(3) UL (ref 1–4)
LYMPHOCYTES NFR BLD AUTO: 25.8 %
MCH RBC QN AUTO: 28.9 PG (ref 26–34)
MCHC RBC AUTO-ENTMCNC: 32 G/DL (ref 31–37)
MCV RBC AUTO: 90.2 FL (ref 80–100)
MONOCYTES # BLD AUTO: 0.47 X10(3) UL (ref 0.1–1)
MONOCYTES NFR BLD AUTO: 8.4 %
NEUTROPHILS # BLD AUTO: 3.55 X10 (3) UL (ref 1.5–7.7)
NEUTROPHILS # BLD AUTO: 3.55 X10(3) UL (ref 1.5–7.7)
NEUTROPHILS NFR BLD AUTO: 63.3 %
NONHDLC SERPL-MCNC: 142 MG/DL (ref ?–130)
OSMOLALITY SERPL CALC.SUM OF ELEC: 292 MOSM/KG (ref 275–295)
PLATELET # BLD AUTO: 267 10(3)UL (ref 150–450)
POTASSIUM SERPL-SCNC: 3.8 MMOL/L (ref 3.5–5.1)
RBC # BLD AUTO: 4.6 X10(6)UL (ref 3.8–5.3)
SODIUM SERPL-SCNC: 139 MMOL/L (ref 136–145)
TRIGL SERPL-MCNC: 290 MG/DL (ref 30–149)
TSI SER-ACNC: 1.74 MIU/ML (ref 0.36–3.74)
VLDLC SERPL CALC-MCNC: 58 MG/DL (ref 0–30)
WBC # BLD AUTO: 5.6 X10(3) UL (ref 4–11)

## 2019-09-16 PROCEDURE — 85025 COMPLETE CBC W/AUTO DIFF WBC: CPT

## 2019-09-16 PROCEDURE — 80048 BASIC METABOLIC PNL TOTAL CA: CPT

## 2019-09-16 PROCEDURE — 80061 LIPID PANEL: CPT

## 2019-09-16 PROCEDURE — 36415 COLL VENOUS BLD VENIPUNCTURE: CPT

## 2019-09-16 PROCEDURE — 83036 HEMOGLOBIN GLYCOSYLATED A1C: CPT

## 2019-09-16 PROCEDURE — 84443 ASSAY THYROID STIM HORMONE: CPT

## 2019-09-16 PROCEDURE — 99214 OFFICE O/P EST MOD 30 MIN: CPT | Performed by: INTERNAL MEDICINE

## 2019-09-16 RX ORDER — SIMVASTATIN 40 MG
40 TABLET ORAL NIGHTLY
Qty: 90 TABLET | Refills: 1 | Status: SHIPPED | OUTPATIENT
Start: 2019-09-16 | End: 2019-12-18

## 2019-09-16 RX ORDER — HYDROCHLOROTHIAZIDE 25 MG/1
25 TABLET ORAL DAILY
Qty: 90 TABLET | Refills: 1 | Status: CANCELLED | OUTPATIENT
Start: 2019-09-16

## 2019-09-16 RX ORDER — LOSARTAN POTASSIUM 100 MG/1
100 TABLET ORAL DAILY
Qty: 90 TABLET | Refills: 1 | Status: CANCELLED | OUTPATIENT
Start: 2019-09-16

## 2019-09-16 SDOH — SOCIAL STABILITY - SOCIAL INSECURITY: DISSAPEARANCE AND DEATH OF FAMILY MEMBER: Z63.4

## 2019-09-16 NOTE — PATIENT INSTRUCTIONS
- We will check blood work today  - Continue current medications for now. Blood pressure medications are at the [facility] on [address]; diabetes and cholesterol medications were sent to the [facility] on [address].   We will send all futur

## 2019-09-16 NOTE — PROGRESS NOTES
Prerna Varela is a 75year old female. HPI:   Patient presents with: Follow - Up    Patient presents to discuss multiple issues. Her  passed away recently. She has been grieving.   Trying to get out of the house more, visit her friends in [place] use drugs.   Wt Readings from Last 6 Encounters:  09/16/19 : 142 lb  06/12/19 : 142 lb  11/29/18 : 138 lb  09/28/18 : 142 lb  08/13/18 : 142 lb  02/23/18 : 140 lb    EXAM:   /80 (BP Location: Left arm, Patient Position: Sitting, Cuff Size: adult)   Pu I referral if needed; patient declines at this time. 6. Preventative health care  Screening labs ordered. - CBC WITH DIFFERENTIAL WITH PLATELET;  Future  - TSH W REFLEX TO FREE T4; Future    Patient Care Team:  Alec Merida MD as PCP - General (In

## 2019-10-30 RX ORDER — LOSARTAN POTASSIUM AND HYDROCHLOROTHIAZIDE 25; 100 MG/1; MG/1
1 TABLET ORAL DAILY
Qty: 90 TABLET | Refills: 0 | OUTPATIENT
Start: 2019-10-30

## 2019-10-30 NOTE — TELEPHONE ENCOUNTER
Declined - rx was sent on 9/12/2019 for #90     Passed protocol     Last refill:  9/12/2019 Losartan 100 mg #90 NR    LOV:   9/16/2019 Dr Bhavya Mullins RTC 6 months  3. Essential hypertension, benign  Elevated systolic blood pressure today. Monitor for now.   A

## 2019-11-01 RX ORDER — HYDROCHLOROTHIAZIDE 25 MG/1
TABLET ORAL
Qty: 90 TABLET | Refills: 0 | Status: SHIPPED | OUTPATIENT
Start: 2019-11-01 | End: 2019-12-18

## 2019-11-01 RX ORDER — LOSARTAN POTASSIUM 100 MG/1
TABLET ORAL
Qty: 90 TABLET | Refills: 0 | Status: SHIPPED | OUTPATIENT
Start: 2019-11-01 | End: 2019-12-18

## 2019-12-14 ENCOUNTER — TELEPHONE (OUTPATIENT)
Dept: INTERNAL MEDICINE CLINIC | Facility: CLINIC | Age: 74
End: 2019-12-14

## 2019-12-14 DIAGNOSIS — E78.00 PURE HYPERCHOLESTEROLEMIA: Chronic | ICD-10-CM

## 2019-12-16 RX ORDER — SIMVASTATIN 40 MG
TABLET ORAL
Qty: 90 TABLET | Refills: 0 | Status: SHIPPED | OUTPATIENT
Start: 2019-12-16 | End: 2019-12-18

## 2019-12-16 NOTE — TELEPHONE ENCOUNTER
Passed protocol     Last refill:  9/16/2019 Simvastatin 40 mg #90 1R    LOV:   9/16/2019 Dr Hensley Son RTC 6 months  2. Pure hypercholesterolemia  Will check lipid panel. Continue simvastatin 40 mg qhs.  - simvastatin 40 MG Oral Tab;  Take 1 tablet (40 mg tot

## 2019-12-18 ENCOUNTER — OFFICE VISIT (OUTPATIENT)
Dept: INTERNAL MEDICINE CLINIC | Facility: CLINIC | Age: 74
End: 2019-12-18
Payer: MEDICARE

## 2019-12-18 VITALS
BODY MASS INDEX: 25.52 KG/M2 | RESPIRATION RATE: 16 BRPM | TEMPERATURE: 99 F | DIASTOLIC BLOOD PRESSURE: 90 MMHG | HEART RATE: 88 BPM | SYSTOLIC BLOOD PRESSURE: 174 MMHG | WEIGHT: 144 LBS | HEIGHT: 63 IN

## 2019-12-18 DIAGNOSIS — E11.65 UNCONTROLLED TYPE 2 DIABETES MELLITUS WITH HYPERGLYCEMIA (HCC): Chronic | ICD-10-CM

## 2019-12-18 DIAGNOSIS — M85.851 OSTEOPENIA OF NECKS OF BOTH FEMURS: ICD-10-CM

## 2019-12-18 DIAGNOSIS — Z23 NEED FOR VACCINATION AGAINST STREPTOCOCCUS PNEUMONIAE: ICD-10-CM

## 2019-12-18 DIAGNOSIS — E78.00 PURE HYPERCHOLESTEROLEMIA: Chronic | ICD-10-CM

## 2019-12-18 DIAGNOSIS — I10 ESSENTIAL HYPERTENSION, BENIGN: Chronic | ICD-10-CM

## 2019-12-18 DIAGNOSIS — Z23 NEED FOR INFLUENZA VACCINATION: ICD-10-CM

## 2019-12-18 DIAGNOSIS — Z00.00 MEDICARE ANNUAL WELLNESS VISIT, SUBSEQUENT: Primary | ICD-10-CM

## 2019-12-18 DIAGNOSIS — M85.852 OSTEOPENIA OF NECKS OF BOTH FEMURS: ICD-10-CM

## 2019-12-18 PROCEDURE — 99214 OFFICE O/P EST MOD 30 MIN: CPT | Performed by: INTERNAL MEDICINE

## 2019-12-18 PROCEDURE — G0439 PPPS, SUBSEQ VISIT: HCPCS | Performed by: INTERNAL MEDICINE

## 2019-12-18 RX ORDER — LOSARTAN POTASSIUM 100 MG/1
100 TABLET ORAL DAILY
Qty: 90 TABLET | Refills: 1 | Status: SHIPPED | OUTPATIENT
Start: 2019-12-18 | End: 2020-06-24

## 2019-12-18 RX ORDER — ATORVASTATIN CALCIUM 20 MG/1
20 TABLET, FILM COATED ORAL NIGHTLY
Qty: 90 TABLET | Refills: 1 | Status: SHIPPED | OUTPATIENT
Start: 2019-12-18 | End: 2020-06-24

## 2019-12-18 RX ORDER — AMLODIPINE BESYLATE 10 MG/1
10 TABLET ORAL DAILY
Qty: 90 TABLET | Refills: 1 | Status: SHIPPED | OUTPATIENT
Start: 2019-12-18 | End: 2020-08-13

## 2019-12-18 NOTE — PROGRESS NOTES
HPI:   Edith Calhoun is a 75year old female who presents for a Medicare Subsequent Annual Wellness visit (Pt already had Initial Annual Wellness) and follow up. Her last annual assessment has been over 1 year: Annual Physical due on 11/29/2019.   Prev abuse and had a score of 0 so is at low risk.     Patient Care Team: Patient Care Team:  Didier Jett MD as PCP - General (Internal Medicine)  Didier Jett MD as PCP - [facility]    Patient Active Problem List:     Uncontrolled type 2 diabetes mellitus wi reports that she does not drink alcohol or use drugs.      REVIEW OF SYSTEMS:   GENERAL: feels well otherwise  SKIN: denies any unusual skin lesions  EYES: denies blurred vision or double vision  HEENT: denies nasal congestion, sinus pain or ST  LUNGS: anuel 06/12/2019, 12/18/2019   • Pneumovax 23 06/12/2019        ASSESSMENT AND OTHER RELEVANT CHRONIC CONDITIONS:   Barrett Farris is a 75year old female who presents for a Medicare Assessment.      PLAN SUMMARY:   1. Medicare annual wellness visit, everardo No  Has your appetite been poor?: No  How does the patient maintain a good energy level?: Daily Walks  How would you describe your daily physical activity?: Moderate  How would you describe your current health state?: Fair  How do you maintain positive men CHLAMYDIA No flowsheet data found. Screening Mammogram      Mammogram Annually to 75, then as discussed There are no preventive care reminders to display for this patient.  Update Health Maintenance if applicable     Immunizations (Update Immunization Ac (H)     HgbA1C (%)   Date Value   09/16/2019 7.3 (H)       No flowsheet data found.     Creat/alb ratio  Annually Malb/Cre Calc (ug/mg)   Date Value   06/12/2019 49.1 (H)        LDL  Annually LDL Cholesterol (mg/dL)   Date Value   09/16/2019 84     LDL CHOL

## 2019-12-18 NOTE — PATIENT INSTRUCTIONS
- Continue metformin for diabetes and losartan for blood pressure. - Stop hydrochlorothiazide (water pill).   - We will switch you to a different cholesterol medication (atorvastatin) so that it does not interact with other medications  - We will start you

## 2019-12-19 ENCOUNTER — TELEPHONE (OUTPATIENT)
Dept: INTERNAL MEDICINE CLINIC | Facility: CLINIC | Age: 74
End: 2019-12-19

## 2019-12-19 NOTE — TELEPHONE ENCOUNTER
Received fax from [facility] in [place]. Pharmacy states taking Amlodipine and Simvastatin 40 mg can increase plasma concentrations of Simvastatin. Doses should not exceed 20 mg of Simvastatin per package labeling. Please advise?

## 2019-12-22 PROBLEM — M85.851 OSTEOPENIA OF NECKS OF BOTH FEMURS: Chronic | Status: ACTIVE | Noted: 2019-08-07

## 2019-12-22 PROBLEM — M85.852 OSTEOPENIA OF NECKS OF BOTH FEMURS: Chronic | Status: ACTIVE | Noted: 2019-08-07

## 2019-12-23 NOTE — TELEPHONE ENCOUNTER
Pharmacist calling because patient is also on Simvastatin; is doctor aware when sending amlodipine?   Please call to clarify

## 2019-12-26 NOTE — TELEPHONE ENCOUNTER
Per Dr. Whaley Bias Patient was switched to ATORVASTATIN. This information was faxed to [address] [place] [address] ([facility] #[phone]).

## 2020-03-18 DIAGNOSIS — E11.65 UNCONTROLLED TYPE 2 DIABETES MELLITUS WITH HYPERGLYCEMIA (HCC): Chronic | ICD-10-CM

## 2020-03-18 NOTE — TELEPHONE ENCOUNTER
Failed protocol     Last refill:  12/18/2019 Metformin 1000 mg #180 1R    Last A1C - 9/16/2019     LOV:   12/18/2019 Dr Sandi Perez RTC 6 months  6. Uncontrolled type 2 diabetes mellitus with hyperglycemia (HCC)  Reasonable control, A1c of 7.3 in September.   C

## 2020-06-23 DIAGNOSIS — E78.00 PURE HYPERCHOLESTEROLEMIA: Chronic | ICD-10-CM

## 2020-06-23 DIAGNOSIS — I10 ESSENTIAL HYPERTENSION, BENIGN: Chronic | ICD-10-CM

## 2020-06-24 RX ORDER — LOSARTAN POTASSIUM 100 MG/1
100 TABLET ORAL DAILY
Qty: 90 TABLET | Refills: 1 | Status: SHIPPED | OUTPATIENT
Start: 2020-06-24

## 2020-06-24 RX ORDER — LOSARTAN POTASSIUM AND HYDROCHLOROTHIAZIDE 25; 100 MG/1; MG/1
1 TABLET ORAL DAILY
Qty: 90 TABLET | Refills: 1 | OUTPATIENT
Start: 2020-06-24

## 2020-06-24 RX ORDER — ATORVASTATIN CALCIUM 20 MG/1
TABLET, FILM COATED ORAL
Qty: 90 TABLET | Refills: 1 | Status: SHIPPED | OUTPATIENT
Start: 2020-06-24

## 2020-06-24 NOTE — TELEPHONE ENCOUNTER
Failed protocol     Last refill:  12/18/2019 Losartan 100 mg #90 1R  12/18/2019 Atorvastatin 20 mg #90 1R    LOV:   12/18/2019 Dr Eula Lowery RTC 6 months  - Stop hydrochlorothiazide (water pill).   No FOV scheduled

## 2020-08-10 ENCOUNTER — TELEPHONE (OUTPATIENT)
Dept: INTERNAL MEDICINE CLINIC | Facility: CLINIC | Age: 75
End: 2020-08-10

## 2020-08-10 RX ORDER — METRONIDAZOLE 500 MG/1
500 TABLET ORAL 2 TIMES DAILY
Qty: 14 TABLET | Refills: 0 | Status: SHIPPED | OUTPATIENT
Start: 2020-08-10 | End: 2020-08-17

## 2020-08-10 NOTE — TELEPHONE ENCOUNTER
If she is having more vaginal symptoms rather than UTI/urinary I would recommend metronidazole instead. Prescription sent to [facility].   If her symptoms are only urinary then it's ok to take cephalexin 500 mg BID (I can send in a formal prescription for

## 2020-08-10 NOTE — TELEPHONE ENCOUNTER
Pt c/o vaginal irritation, some burning with urination and urinary frequency. Tried otc bladder medication 1 week ago but relief was temporary. Pt states she thinks symptoms may be vaginal related.   No c/o abdominal pain, fever, chills, hematuria or vagi

## 2020-08-10 NOTE — TELEPHONE ENCOUNTER
Patient calling in requesting to speak with the Nurse. Pt stated she is having \"female issues\". Please call pt to discuss further.

## 2020-08-12 NOTE — TELEPHONE ENCOUNTER
Patient's Daughter calling in stated the home phone has not been working, as to why patient was not answering her home phone. She asked not to contact the Son, since he does not answer his phone.     Pt Daughter insisted on her Mother, Harsha Rausch, to come int

## 2020-08-13 ENCOUNTER — OFFICE VISIT (OUTPATIENT)
Dept: INTERNAL MEDICINE CLINIC | Facility: CLINIC | Age: 75
End: 2020-08-13
Payer: MEDICARE

## 2020-08-13 VITALS
TEMPERATURE: 99 F | OXYGEN SATURATION: 98 % | DIASTOLIC BLOOD PRESSURE: 92 MMHG | BODY MASS INDEX: 24.1 KG/M2 | RESPIRATION RATE: 16 BRPM | HEIGHT: 63 IN | HEART RATE: 91 BPM | SYSTOLIC BLOOD PRESSURE: 160 MMHG | WEIGHT: 136 LBS

## 2020-08-13 DIAGNOSIS — I10 ESSENTIAL HYPERTENSION, BENIGN: Chronic | ICD-10-CM

## 2020-08-13 DIAGNOSIS — Z00.00 PREVENTATIVE HEALTH CARE: ICD-10-CM

## 2020-08-13 DIAGNOSIS — R30.0 DYSURIA: Primary | ICD-10-CM

## 2020-08-13 DIAGNOSIS — E11.65 UNCONTROLLED TYPE 2 DIABETES MELLITUS WITH HYPERGLYCEMIA (HCC): Chronic | ICD-10-CM

## 2020-08-13 DIAGNOSIS — E78.00 PURE HYPERCHOLESTEROLEMIA: Chronic | ICD-10-CM

## 2020-08-13 LAB
BILIRUBIN: NEGATIVE
GLUCOSE (URINE DIPSTICK): NEGATIVE MG/DL
KETONES (URINE DIPSTICK): NEGATIVE MG/DL
MULTISTIX LOT#: ABNORMAL NUMERIC
NITRITE, URINE: NEGATIVE
OCCULT BLOOD: NEGATIVE
PH, URINE: 7 (ref 4.5–8)
SPECIFIC GRAVITY: 1.02 (ref 1–1.03)
URINE-COLOR: YELLOW
UROBILINOGEN,SEMI-QN: 1 MG/DL (ref 0–1.9)

## 2020-08-13 PROCEDURE — 81003 URINALYSIS AUTO W/O SCOPE: CPT | Performed by: INTERNAL MEDICINE

## 2020-08-13 PROCEDURE — 99214 OFFICE O/P EST MOD 30 MIN: CPT | Performed by: INTERNAL MEDICINE

## 2020-08-13 PROCEDURE — 87086 URINE CULTURE/COLONY COUNT: CPT | Performed by: INTERNAL MEDICINE

## 2020-08-13 RX ORDER — AMLODIPINE BESYLATE 10 MG/1
10 TABLET ORAL DAILY
Qty: 90 TABLET | Refills: 1 | Status: SHIPPED | OUTPATIENT
Start: 2020-08-13

## 2020-08-13 NOTE — PROGRESS NOTES
Galina Cates is a 67year old female. HPI:   Patient presents with:  UTI    Patient presents to discuss several issues. She has been dealing with some dysuria, polyuria. Going on for 3-5 days. No fevers/chills/night sweats.   No hematuria, no CVA quit smoking about 32 years ago. Her smoking use included cigarettes. She has never used smokeless tobacco. She reports that she does not drink alcohol or use drugs.   Wt Readings from Last 6 Encounters:  08/13/20 : 136 lb (61.7 kg)  12/18/19 : 144 lb (65.3 mg BID.  - COMP METABOLIC PANEL (14); Future  - HEMOGLOBIN A1C; Future    5. Preventative health care  Screening labs ordered. - CBC WITH DIFFERENTIAL WITH PLATELET;  Future  - TSH W REFLEX TO FREE T4; Future    Patient Care Team:  Didier Jett MD as Ofelia Caba

## 2020-08-13 NOTE — PATIENT INSTRUCTIONS
- We will check your urine tests to see if you have an infection.   The initial urine test in the office did not show an infection, but we will send out the the urine culture to the lab to make sure you have no infection.  - Get blood tests done when fastin

## 2020-09-04 ENCOUNTER — TELEPHONE (OUTPATIENT)
Dept: INTERNAL MEDICINE CLINIC | Facility: CLINIC | Age: 75
End: 2020-09-04

## 2020-09-04 NOTE — TELEPHONE ENCOUNTER
Pt's daughter Karl Everett in [facility]) called to inform pt currently admitted at [address] [place] [address] Rd., [address] since Monday due to \"severe uti\"    Pt's daughter requested for urine cx results from 8/13/20, results given.      Pt's daughter also requested the name of abx jaymie

## 2020-09-14 ENCOUNTER — TELEPHONE (OUTPATIENT)
Dept: INTERNAL MEDICINE CLINIC | Facility: CLINIC | Age: 75
End: 2020-09-14

## 2020-09-14 NOTE — TELEPHONE ENCOUNTER
Call placed to pt per daughter's request.  Pt answered and stated she is in need of no help today and will call the office back if she does. Pt then hung up. Call placed to pt's daughter Christel Fitch and informed her of pt's response.    She is requesting our

## 2020-09-14 NOTE — TELEPHONE ENCOUNTER
Pt daughter, Phuong London, called again with update and concerns for visit with RP today    Pt d/c from [facility] 9/11/20. Started Risperdal from hospital.  Pt with bilateral leg pain, describes as \"constantly aching\".   Daughter reports pt is barely able to walk d/

## 2020-09-14 NOTE — TELEPHONE ENCOUNTER
Ok, noted. Can see her in the office some time in next few weeks.   We requested records from [facility] but they stated they could not send any further records to us due to sensitivity of information (I am assuming this is in regards to her psych

## 2020-09-14 NOTE — TELEPHONE ENCOUNTER
Daughter Klarissa Agusto) called and cancelled her mom's appointment for today for a follow up. She stated that her brother went to go and pick her up and she refused to go to the appointment.  Daughter is concerned about her and would like a nurse to give her a

## 2020-10-17 ENCOUNTER — APPOINTMENT (OUTPATIENT)
Dept: CT IMAGING | Age: 75
End: 2020-10-17
Attending: EMERGENCY MEDICINE

## 2020-10-17 ENCOUNTER — HOSPITAL ENCOUNTER (EMERGENCY)
Age: 75
Discharge: NURSING, REHAB OR LONG-TERM CARE | End: 2020-10-17
Attending: EMERGENCY MEDICINE

## 2020-10-17 VITALS
TEMPERATURE: 98.2 F | BODY MASS INDEX: 23.26 KG/M2 | DIASTOLIC BLOOD PRESSURE: 72 MMHG | SYSTOLIC BLOOD PRESSURE: 138 MMHG | HEART RATE: 99 BPM | OXYGEN SATURATION: 97 % | RESPIRATION RATE: 16 BRPM | HEIGHT: 64 IN | WEIGHT: 136.24 LBS

## 2020-10-17 DIAGNOSIS — T83.511A URINARY TRACT INFECTION ASSOCIATED WITH INDWELLING URETHRAL CATHETER, INITIAL ENCOUNTER (CMD): Primary | ICD-10-CM

## 2020-10-17 DIAGNOSIS — S01.01XA LACERATION OF SCALP, INITIAL ENCOUNTER: ICD-10-CM

## 2020-10-17 DIAGNOSIS — N39.0 URINARY TRACT INFECTION ASSOCIATED WITH INDWELLING URETHRAL CATHETER, INITIAL ENCOUNTER (CMD): Primary | ICD-10-CM

## 2020-10-17 DIAGNOSIS — S09.8XXA BLUNT HEAD TRAUMA, INITIAL ENCOUNTER: ICD-10-CM

## 2020-10-17 LAB
APPEARANCE UR: ABNORMAL
BACTERIA #/AREA URNS HPF: ABNORMAL /HPF
BILIRUB UR QL STRIP: NEGATIVE
COLOR UR: YELLOW
GLUCOSE UR STRIP-MCNC: >500 MG/DL
HGB UR QL STRIP: ABNORMAL
HYALINE CASTS #/AREA URNS LPF: ABNORMAL /LPF (ref 0–5)
KETONES UR STRIP-MCNC: NEGATIVE MG/DL
LEUKOCYTE ESTERASE UR QL STRIP: ABNORMAL
MUCOUS THREADS URNS QL MICRO: PRESENT
NITRITE UR QL STRIP: NEGATIVE
PH UR STRIP: 5 UNITS (ref 5–7)
PROT UR STRIP-MCNC: 100 MG/DL
RBC #/AREA URNS HPF: >100 /HPF (ref 0–2)
SP GR UR STRIP: 1.03 (ref 1–1.03)
SPECIMEN SOURCE: ABNORMAL
SQUAMOUS #/AREA URNS HPF: ABNORMAL /HPF (ref 0–5)
UROBILINOGEN UR STRIP-MCNC: 0.2 MG/DL (ref 0–1)
WBC #/AREA URNS HPF: >100 /HPF (ref 0–5)

## 2020-10-17 PROCEDURE — 12001 RPR S/N/AX/GEN/TRNK 2.5CM/<: CPT | Performed by: EMERGENCY MEDICINE

## 2020-10-17 PROCEDURE — 10002803 HB RX 637: Performed by: EMERGENCY MEDICINE

## 2020-10-17 PROCEDURE — 81001 URINALYSIS AUTO W/SCOPE: CPT

## 2020-10-17 PROCEDURE — 10002800 HB RX 250 W HCPCS: Performed by: EMERGENCY MEDICINE

## 2020-10-17 PROCEDURE — 70450 CT HEAD/BRAIN W/O DYE: CPT

## 2020-10-17 PROCEDURE — 51702 INSERT TEMP BLADDER CATH: CPT

## 2020-10-17 PROCEDURE — 87186 SC STD MICRODIL/AGAR DIL: CPT

## 2020-10-17 PROCEDURE — 87088 URINE BACTERIA CULTURE: CPT

## 2020-10-17 PROCEDURE — 12002 RPR S/N/AX/GEN/TRNK2.6-7.5CM: CPT

## 2020-10-17 PROCEDURE — 90715 TDAP VACCINE 7 YRS/> IM: CPT | Performed by: EMERGENCY MEDICINE

## 2020-10-17 PROCEDURE — 99285 EMERGENCY DEPT VISIT HI MDM: CPT

## 2020-10-17 PROCEDURE — 90471 IMMUNIZATION ADMIN: CPT | Performed by: EMERGENCY MEDICINE

## 2020-10-17 PROCEDURE — 87086 URINE CULTURE/COLONY COUNT: CPT

## 2020-10-17 RX ORDER — CEFAZOLIN SODIUM/WATER 1 G/10 ML
1000 SYRINGE (ML) INTRAVENOUS ONCE
Status: DISCONTINUED | OUTPATIENT
Start: 2020-10-17 | End: 2020-10-17

## 2020-10-17 RX ORDER — CEPHALEXIN 500 MG/1
500 CAPSULE ORAL 2 TIMES DAILY
Qty: 14 CAPSULE | Refills: 0 | Status: SHIPPED | OUTPATIENT
Start: 2020-10-17 | End: 2020-10-24

## 2020-10-17 RX ORDER — CEPHALEXIN 250 MG/1
500 CAPSULE ORAL ONCE
Status: COMPLETED | OUTPATIENT
Start: 2020-10-17 | End: 2020-10-17

## 2020-10-17 RX ORDER — LIDOCAINE HYDROCHLORIDE 20 MG/ML
10 JELLY TOPICAL
Status: DISCONTINUED | OUTPATIENT
Start: 2020-10-17 | End: 2020-10-17

## 2020-10-17 RX ADMIN — CEPHALEXIN 500 MG: 250 CAPSULE ORAL at 21:09

## 2020-10-17 RX ADMIN — TETANUS TOXOID, REDUCED DIPHTHERIA TOXOID AND ACELLULAR PERTUSSIS VACCINE, ADSORBED 0.5 ML: 5; 2.5; 8; 8; 2.5 SUSPENSION INTRAMUSCULAR at 20:28

## 2020-10-17 ASSESSMENT — PAIN SCALES - GENERAL: PAINLEVEL_OUTOF10: 0

## 2020-10-19 LAB
BACTERIA UR CULT: ABNORMAL
BACTERIA UR CULT: ABNORMAL
ORGANISM: ABNORMAL
REPORT STATUS (RPT): ABNORMAL
SPECIMEN SOURCE: ABNORMAL

## 2020-10-26 NOTE — PROGRESS NOTES
Records reviewed for [facility].  She presented to the ED on 8/31/20 with hallucinations, delusions.   Admitted to the hospital.  CT head done, showed lacunar infarct in right basal ganglion, also patient status post left temporal craniotomy, like

## 2021-03-05 ENCOUNTER — HOSPITAL ENCOUNTER (OUTPATIENT)
Dept: CT IMAGING | Age: 76
Discharge: HOME | End: 2021-03-05
Attending: NURSE PRACTITIONER

## 2021-03-05 DIAGNOSIS — Z23 NEED FOR VACCINATION: ICD-10-CM

## 2021-03-05 DIAGNOSIS — I73.9 PERIPHERAL VASCULAR DISEASE, UNSPECIFIED (CMD): ICD-10-CM

## 2021-03-05 DIAGNOSIS — I71.40 ABDOMINAL AORTIC ANEURYSM WITHOUT RUPTURE (CMD): ICD-10-CM

## 2021-03-05 PROCEDURE — 10002805 HB CONTRAST AGENT

## 2021-03-05 PROCEDURE — 74174 CTA ABD&PLVS W/CONTRAST: CPT

## 2021-03-05 RX ADMIN — IOHEXOL 75 ML: 350 INJECTION, SOLUTION INTRAVENOUS at 11:45

## 2021-03-15 ENCOUNTER — APPOINTMENT (OUTPATIENT)
Dept: CT IMAGING | Age: 76
End: 2021-03-15
Attending: NURSE PRACTITIONER

## 2021-05-12 ENCOUNTER — TELEPHONE (OUTPATIENT)
Dept: INTERNAL MEDICINE CLINIC | Facility: CLINIC | Age: 76
End: 2021-05-12

## 2021-12-27 ENCOUNTER — APPOINTMENT (OUTPATIENT)
Dept: CT IMAGING | Age: 76
End: 2021-12-27
Attending: EMERGENCY MEDICINE

## 2021-12-27 ENCOUNTER — HOSPITAL ENCOUNTER (EMERGENCY)
Age: 76
Discharge: NURSING, REHAB OR LONG-TERM CARE | End: 2021-12-27
Attending: EMERGENCY MEDICINE

## 2021-12-27 VITALS
SYSTOLIC BLOOD PRESSURE: 135 MMHG | BODY MASS INDEX: 28.87 KG/M2 | RESPIRATION RATE: 18 BRPM | TEMPERATURE: 98.2 F | HEART RATE: 74 BPM | DIASTOLIC BLOOD PRESSURE: 74 MMHG | WEIGHT: 168.21 LBS | OXYGEN SATURATION: 98 %
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
## 2021-12-27 LAB
ATRIAL RATE (BPM): 72
GLUCOSE BLDC GLUCOMTR-MCNC: 233 MG/DL (ref 70–99)
P AXIS (DEGREES): 71
PR-INTERVAL (MSEC): 230
QRS-INTERVAL (MSEC): 88
QT-INTERVAL (MSEC): 402
QTC: 440
R AXIS (DEGREES): 58
REPORT TEXT: NORMAL
T AXIS (DEGREES): 70
VENTRICULAR RATE EKG/MIN (BPM): 72

## 2021-12-27 PROCEDURE — 93005 ELECTROCARDIOGRAM TRACING: CPT | Performed by: EMERGENCY MEDICINE

## 2021-12-27 PROCEDURE — 72125 CT NECK SPINE W/O DYE: CPT

## 2021-12-27 PROCEDURE — 99284 EMERGENCY DEPT VISIT MOD MDM: CPT

## 2021-12-27 PROCEDURE — 82962 GLUCOSE BLOOD TEST: CPT

## 2021-12-27 PROCEDURE — 70450 CT HEAD/BRAIN W/O DYE: CPT

## 2021-12-27 RX ORDER — TRAMADOL HYDROCHLORIDE 50 MG/1
TABLET ORAL
COMMUNITY
Start: 2021-12-12

## 2021-12-27 RX ORDER — FUROSEMIDE 20 MG/1
TABLET ORAL
COMMUNITY
Start: 2021-11-24

## 2021-12-27 RX ORDER — GLIPIZIDE 5 MG/1
TABLET ORAL
COMMUNITY
Start: 2021-12-06

## 2021-12-27 RX ORDER — LISINOPRIL 10 MG/1
TABLET ORAL
COMMUNITY
Start: 2021-12-17

## 2021-12-27 RX ORDER — RISPERIDONE 0.5 MG/1
TABLET ORAL
COMMUNITY
Start: 2021-12-20

## 2021-12-27 ASSESSMENT — ENCOUNTER SYMPTOMS
BACK PAIN: 0
DIARRHEA: 0
FEVER: 0
SHORTNESS OF BREATH: 0
ABDOMINAL PAIN: 0
NUMBNESS: 0
VOMITING: 0
DIZZINESS: 0
WOUND: 0
EYE PAIN: 0
SINUS PAIN: 0
CONFUSION: 0
COUGH: 0
WEAKNESS: 0
CHEST TIGHTNESS: 0

## 2021-12-27 ASSESSMENT — PAIN SCALES - GENERAL: PAINLEVEL_OUTOF10: 0

## 2022-04-03 ENCOUNTER — APPOINTMENT (OUTPATIENT)
Dept: CT IMAGING | Age: 77
End: 2022-04-03
Attending: EMERGENCY MEDICINE

## 2022-04-03 ENCOUNTER — HOSPITAL ENCOUNTER (EMERGENCY)
Age: 77
Discharge: NURSING, REHAB OR LONG-TERM CARE | End: 2022-04-03
Attending: EMERGENCY MEDICINE

## 2022-04-03 VITALS
RESPIRATION RATE: 16 BRPM | OXYGEN SATURATION: 96 % | WEIGHT: 149.47 LBS | TEMPERATURE: 97.7 F | HEART RATE: 72 BPM | DIASTOLIC BLOOD PRESSURE: 67 MMHG | BODY MASS INDEX: 25.66 KG/M2 | SYSTOLIC BLOOD PRESSURE: 142 MMHG
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
## 2022-04-03 LAB
ANION GAP SERPL CALC-SCNC: 11 MMOL/L (ref 10–20)
BASOPHILS # BLD: 0.1 K/MCL (ref 0–0.3)
BASOPHILS NFR BLD: 1 %
BUN SERPL-MCNC: 20 MG/DL (ref 6–20)
BUN/CREAT SERPL: 34 (ref 7–25)
CALCIUM SERPL-MCNC: 10 MG/DL (ref 8.4–10.2)
CHLORIDE SERPL-SCNC: 106 MMOL/L (ref 98–107)
CO2 SERPL-SCNC: 26 MMOL/L (ref 21–32)
CREAT SERPL-MCNC: 0.58 MG/DL (ref 0.51–0.95)
DEPRECATED RDW RBC: 44.1 FL (ref 39–50)
EOSINOPHIL # BLD: 0.1 K/MCL (ref 0–0.5)
EOSINOPHIL NFR BLD: 1 %
ERYTHROCYTE [DISTWIDTH] IN BLOOD: 13.5 % (ref 11–15)
FASTING DURATION TIME PATIENT: ABNORMAL H
GFR SERPLBLD BASED ON 1.73 SQ M-ARVRAT: 89 ML/MIN
GLUCOSE SERPL-MCNC: 137 MG/DL (ref 70–99)
HCT VFR BLD CALC: 44.8 % (ref 36–46.5)
HGB BLD-MCNC: 14 G/DL (ref 12–15.5)
IMM GRANULOCYTES # BLD AUTO: 0 K/MCL (ref 0–0.2)
IMM GRANULOCYTES # BLD: 0 %
LYMPHOCYTES # BLD: 1.2 K/MCL (ref 1–4)
LYMPHOCYTES NFR BLD: 19 %
MCH RBC QN AUTO: 28 PG (ref 26–34)
MCHC RBC AUTO-ENTMCNC: 31.3 G/DL (ref 32–36.5)
MCV RBC AUTO: 89.6 FL (ref 78–100)
MONOCYTES # BLD: 0.4 K/MCL (ref 0.3–0.9)
MONOCYTES NFR BLD: 7 %
NEUTROPHILS # BLD: 4.5 K/MCL (ref 1.8–7.7)
NEUTROPHILS NFR BLD: 72 %
NRBC BLD MANUAL-RTO: 0 /100 WBC
PLATELET # BLD AUTO: 187 K/MCL (ref 140–450)
POTASSIUM SERPL-SCNC: 4.6 MMOL/L (ref 3.4–5.1)
RAINBOW EXTRA TUBES HOLD SPECIMEN: NORMAL
RAINBOW EXTRA TUBES HOLD SPECIMEN: NORMAL
RBC # BLD: 5 MIL/MCL (ref 4–5.2)
SODIUM SERPL-SCNC: 138 MMOL/L (ref 135–145)
WBC # BLD: 6.3 K/MCL (ref 4.2–11)

## 2022-04-03 PROCEDURE — 72125 CT NECK SPINE W/O DYE: CPT

## 2022-04-03 PROCEDURE — 72128 CT CHEST SPINE W/O DYE: CPT

## 2022-04-03 PROCEDURE — 99284 EMERGENCY DEPT VISIT MOD MDM: CPT

## 2022-04-03 PROCEDURE — 70450 CT HEAD/BRAIN W/O DYE: CPT

## 2022-04-03 PROCEDURE — 93005 ELECTROCARDIOGRAM TRACING: CPT | Performed by: EMERGENCY MEDICINE

## 2022-04-03 PROCEDURE — 85025 COMPLETE CBC W/AUTO DIFF WBC: CPT | Performed by: EMERGENCY MEDICINE

## 2022-04-03 PROCEDURE — 80048 BASIC METABOLIC PNL TOTAL CA: CPT | Performed by: EMERGENCY MEDICINE

## 2022-04-03 PROCEDURE — 36415 COLL VENOUS BLD VENIPUNCTURE: CPT

## 2022-04-03 RX ORDER — MECLIZINE HYDROCHLORIDE 25 MG/1
25 TABLET ORAL 3 TIMES DAILY PRN
COMMUNITY

## 2022-04-03 RX ORDER — RISPERIDONE 0.25 MG/1
0.25 TABLET ORAL 2 TIMES DAILY PRN
COMMUNITY

## 2022-04-03 RX ORDER — ACETAMINOPHEN 325 MG/1
650 TABLET ORAL 3 TIMES DAILY PRN
COMMUNITY

## 2022-04-03 RX ORDER — GLIPIZIDE 2.5 MG/1
2.5 TABLET, EXTENDED RELEASE ORAL DAILY
COMMUNITY

## 2022-04-03 ASSESSMENT — ENCOUNTER SYMPTOMS
NUMBNESS: 0
VOMITING: 0
CHEST TIGHTNESS: 0
CONFUSION: 0
SINUS PAIN: 0
WEAKNESS: 0
ABDOMINAL PAIN: 0
FEVER: 0
HEADACHES: 1
SHORTNESS OF BREATH: 0
DIARRHEA: 0
WOUND: 0
COUGH: 0
EYE PAIN: 0
DIZZINESS: 0
BACK PAIN: 0

## 2022-04-04 LAB
ATRIAL RATE (BPM): 75
P AXIS (DEGREES): 75
PR-INTERVAL (MSEC): 246
QRS-INTERVAL (MSEC): 86
QT-INTERVAL (MSEC): 370
QTC: 413
R AXIS (DEGREES): 58
REPORT TEXT: NORMAL
T AXIS (DEGREES): 69
VENTRICULAR RATE EKG/MIN (BPM): 75

## 2022-04-05 ENCOUNTER — ADVANCED DIRECTIVES (OUTPATIENT)
Dept: HEALTH INFORMATION MANAGEMENT | Age: 77
End: 2022-04-05

## 2022-04-21 NOTE — LETTER
[address]  [address]  Authorization for Surgical Operation and Procedure     Date:___________                                                                                                         Time:__________  I hereby authorize Surgeon(s):  Librado Main MD, my physician and his/her assistants (if applicable), which may include medical students, residents, and/or fellows, to perform the following surgical operation/ procedure and administer such anesthesia as may be determined necessary by my physician:  Operation/Procedure name (s) Procedure(s):  FLEXIBLE SIGMOIDOSCOPY on Kierra A Gombash   2.   I recognize that during the surgical operation/procedure, unforeseen conditions may necessitate additional or different procedures than those listed above.  I, therefore, further authorize and request that the above-named surgeon, assistants, or designees perform such procedures as are, in their judgment, necessary and desirable.    3.   My surgeon/physician has discussed prior to my surgery the potential benefits, risks and side effects of this procedure; the likelihood of achieving goals; and potential problems that might occur during recuperation.  They also discussed reasonable alternatives to the procedure, including risks, benefits, and side effects related to the alternatives and risks related to not receiving this procedure.  I have had all my questions answered and I acknowledge that no guarantee has been made as to the result that may be obtained.    4.   Should the need arise during my operation/procedure, which includes change of level of care prior to discharge, I also consent to the administration of blood and/or blood products.  Further, I understand that despite careful testing and screening of blood or blood products by collecting agencies, I may still be subject to ill effects as a result of receiving a blood transfusion and/or blood products.  The following  are some, but not all, of the potential risks that can occur: fever and allergic reactions, hemolytic reactions, transmission of diseases such as Hepatitis, AIDS and Cytomegalovirus (CMV) and fluid overload.  In the event that I wish to have an autologous transfusion of my own blood, or a directed donor transfusion, I will discuss this with my physician.  Check only if Refusing Blood or Blood Products  I understand refusal of blood or blood products as deemed necessary by my physician may have serious consequences to my condition to include possible death. I hereby assume responsibility for my refusal and release the hospital, its personnel, and my physicians from any responsibility for the consequences of my refusal.          o  Refuse      5.   I authorize the use of any specimen, organs, tissues, body parts or foreign objects that may be removed from my body during the operation/procedure for diagnosis, research or teaching purposes and their subsequent disposal by hospital authorities.  I also authorize the release of specimen test results and/or written reports to my treating physician on the hospital medical staff or other referring or consulting physicians involved in my care, at the discretion of the Pathologist or my treating physician.    6.   I consent to the photographing or videotaping of the operations or procedures to be performed, including appropriate portions of my body for medical, scientific, or educational purposes, provided my identity is not revealed by the pictures or by descriptive texts accompanying them.  If the procedure has been photographed/videotaped, the surgeon will obtain the original picture, image, videotape or CD.  The hospital will not be responsible for storage, release or maintenance of the picture, image, tape or CD.    7.   I consent to the presence of a  or observers in the operating room as deemed necessary by my physician or their designees.    8.   I  recognize that in the event my procedure results in extended X-Ray/fluoroscopy time, I may develop a skin reaction.    9. If I have a Do Not Attempt Resuscitation (DNAR) order in place, that status will be suspended while in the operating room, procedural suite, and during the recovery period unless otherwise explicitly stated by me (or a person authorized to consent on my behalf). The surgeon or my attending physician will determine when the applicable recovery period ends for purposes of reinstating the DNAR order.  10. Patients having a sterilization procedure: I understand that if the procedure is successful the results will be permanent and it will therefore be impossible for me to inseminate, conceive, or bear children.  I also understand that the procedure is intended to result in sterility, although the result has not been guaranteed.   11. I acknowledge that my physician has explained sedation/analgesia administration to me including the risk and benefits I consent to the administration of sedation/analgesia as may be necessary or desirable in the judgment of my physician.    I CERTIFY THAT I HAVE READ AND FULLY UNDERSTAND THE ABOVE CONSENT TO OPERATION and/or OTHER PROCEDURE.    _________________________________________  __________________________________  Signature of Patient     Signature of Responsible Person         ___________________________________         Printed Name of Responsible Person           _________________________________                 Relationship to Patient  _________________________________________  ______________________________  Signature of Witness          Date  Time      Patient Name: Kierra Joshua     : 1945                 Printed: 2025     Medical Record #: [id number]                     Page 1 of [address] [place], [place] [address], [place]  [address]    Consent for Anesthesia    I, Kierra Joshua agree to be  cared for by an anesthesiologist, who is specially trained to monitor me and give me medicine to put me to sleep or keep me comfortable during my procedure    I understand that my anesthesiologist is not an employee or agent of [facility] or [employer]. He or she works for [email or web address].    As the patient asking for anesthesia services, I agree to:  Allow the anesthesiologist (anesthesia doctor) to give me medicine and do additional procedures as necessary. Some examples are: Starting or using an “IV” to give me medicine, fluids or blood during my procedure, and having a breathing tube placed to help me breathe when I’m asleep (intubation). In the event that my heart stops working properly, I understand that my anesthesiologist will make every effort to sustain my life, unless otherwise directed by [facility] Do Not Resuscitate documents.  Tell my anesthesia doctor before my procedure:  If I am pregnant.  The last time that I ate or drank.  All of the medicines I take (including prescriptions, herbal supplements, and pills I can buy without a prescription (including street drugs/illegal medications). Failure to inform my anesthesiologist about these medicines may increase my risk of anesthetic complications.  If I am allergic to anything or have had a reaction to anesthesia before.  I understand how the anesthesia medicine will help me (benefits).  I understand that with any type of anesthesia medicine there are risks:  The most common risks are: nausea, vomiting, sore throat, muscle soreness, damage to my eyes, mouth, or teeth (from breathing tube placement).  Rare risks include: remembering what happened during my procedure, allergic reactions to medications, injury to my airway, heart, lungs, vision, nerves, or muscles and in extremely rare instances death.  My doctor has explained to me other choices available to me for my care (alternatives).  Pregnant Patients  (“epidural”):  I understand that the risks of having an epidural (medicine given into my back to help control pain during labor), include itching, low blood pressure, difficulty urinating, headache or slowing of the baby’s heart. Very rare risks include infection, bleeding, seizure, irregular heart rhythms and nerve injury.  Regional Anesthesia (“spinal”, “epidural”, & “nerve blocks”):  I understand that rare but potential complications include headache, bleeding, infection, seizure, irregular heart rhythms, and nerve injury.    I can change my mind about having anesthesia services at any time before I get the medicine.    _____________________________________________________________________________  Patient (or Representative) Signature/Relationship to Patient  Date   Time    _____________________________________________________________________________   Name (if used)    Language/Organization   Time    _____________________________________________________________________________  Anesthesiologist Signature     Date   Time  I have discussed the procedure and information above with the patient (or patient’s representative) and answered their questions. The patient or their representative has agreed to have anesthesia services.    _____________________________________________________________________________  Witness        Date   Time  I have verified that the signature is that of the patient or patient’s representative, and that it was signed before the procedure  Patient Name: Kierra Joshua     : 1945                 Printed: 2025     Medical Record #: [id number]                     Page 2 of 2   Normal rate, regular rhythm.  Heart sounds S1, S2 Normal rate, regular rhythm.  Heart sounds S1, S2. midline LUE. IV LLE

## 2022-06-22 ENCOUNTER — TELEPHONE (OUTPATIENT)
Dept: INTERNAL MEDICINE CLINIC | Facility: CLINIC | Age: 77
End: 2022-06-22

## 2022-10-29 ENCOUNTER — APPOINTMENT (OUTPATIENT)
Dept: CT IMAGING | Facility: HOSPITAL | Age: 77
End: 2022-10-29
Attending: EMERGENCY MEDICINE
Payer: MEDICARE

## 2022-10-29 ENCOUNTER — HOSPITAL ENCOUNTER (EMERGENCY)
Facility: HOSPITAL | Age: 77
Discharge: HOME | End: 2022-10-29
Attending: EMERGENCY MEDICINE
Payer: MEDICARE

## 2022-10-29 VITALS
HEART RATE: 77 BPM | OXYGEN SATURATION: 99 % | BODY MASS INDEX: 27 KG/M2 | SYSTOLIC BLOOD PRESSURE: 151 MMHG | RESPIRATION RATE: 15 BRPM | DIASTOLIC BLOOD PRESSURE: 70 MMHG | TEMPERATURE: 98 F | WEIGHT: 152.63 LBS
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
## 2022-10-29 LAB
ALBUMIN SERPL-MCNC: 3.5 G/DL (ref 3.4–5)
ALBUMIN/GLOB SERPL: 1 {RATIO} (ref 1–2)
ALP LIVER SERPL-CCNC: 78 U/L
ALT SERPL-CCNC: 19 U/L
ANION GAP SERPL CALC-SCNC: 5 MMOL/L (ref 0–18)
AST SERPL-CCNC: 17 U/L (ref 15–37)
BASOPHILS # BLD AUTO: 0.04 X10(3) UL (ref 0–0.2)
BASOPHILS NFR BLD AUTO: 0.5 %
BILIRUB SERPL-MCNC: 0.3 MG/DL (ref 0.1–2)
BILIRUB UR QL STRIP.AUTO: NEGATIVE
BUN BLD-MCNC: 17 MG/DL (ref 7–18)
CALCIUM BLD-MCNC: 9.1 MG/DL (ref 8.5–10.1)
CHLORIDE SERPL-SCNC: 106 MMOL/L (ref 98–112)
CLARITY UR REFRACT.AUTO: CLEAR
CO2 SERPL-SCNC: 27 MMOL/L (ref 21–32)
COLOR UR AUTO: YELLOW
CREAT BLD-MCNC: 0.85 MG/DL
EOSINOPHIL # BLD AUTO: 0.05 X10(3) UL (ref 0–0.7)
EOSINOPHIL NFR BLD AUTO: 0.6 %
ERYTHROCYTE [DISTWIDTH] IN BLOOD BY AUTOMATED COUNT: 13.5 %
GFR SERPLBLD BASED ON 1.73 SQ M-ARVRAT: 70 ML/MIN/1.73M2 (ref 60–?)
GLOBULIN PLAS-MCNC: 3.5 G/DL (ref 2.8–4.4)
GLUCOSE BLD-MCNC: 179 MG/DL (ref 70–99)
GLUCOSE UR STRIP.AUTO-MCNC: NEGATIVE MG/DL
HCT VFR BLD AUTO: 41 %
HGB BLD-MCNC: 12.8 G/DL
IMM GRANULOCYTES # BLD AUTO: 0.05 X10(3) UL (ref 0–1)
IMM GRANULOCYTES NFR BLD: 0.6 %
KETONES UR STRIP.AUTO-MCNC: NEGATIVE MG/DL
LEUKOCYTE ESTERASE UR QL STRIP.AUTO: NEGATIVE
LYMPHOCYTES # BLD AUTO: 1.3 X10(3) UL (ref 1–4)
LYMPHOCYTES NFR BLD AUTO: 14.7 %
MCH RBC QN AUTO: 27.9 PG (ref 26–34)
MCHC RBC AUTO-ENTMCNC: 31.2 G/DL (ref 31–37)
MCV RBC AUTO: 89.3 FL
MONOCYTES # BLD AUTO: 0.54 X10(3) UL (ref 0.1–1)
MONOCYTES NFR BLD AUTO: 6.1 %
NEUTROPHILS # BLD AUTO: 6.88 X10 (3) UL (ref 1.5–7.7)
NEUTROPHILS # BLD AUTO: 6.88 X10(3) UL (ref 1.5–7.7)
NEUTROPHILS NFR BLD AUTO: 77.5 %
NITRITE UR QL STRIP.AUTO: NEGATIVE
OSMOLALITY SERPL CALC.SUM OF ELEC: 292 MOSM/KG (ref 275–295)
PH UR STRIP.AUTO: 6 [PH] (ref 5–8)
PLATELET # BLD AUTO: 256 10(3)UL (ref 150–450)
POTASSIUM SERPL-SCNC: 3.9 MMOL/L (ref 3.5–5.1)
PROT SERPL-MCNC: 7 G/DL (ref 6.4–8.2)
PROT UR STRIP.AUTO-MCNC: NEGATIVE MG/DL
RBC # BLD AUTO: 4.59 X10(6)UL
RBC UR QL AUTO: NEGATIVE
SODIUM SERPL-SCNC: 138 MMOL/L (ref 136–145)
SP GR UR STRIP.AUTO: 1.01 (ref 1–1.03)
UROBILINOGEN UR STRIP.AUTO-MCNC: <2 MG/DL
WBC # BLD AUTO: 8.9 X10(3) UL (ref 4–11)

## 2022-10-29 PROCEDURE — 70450 CT HEAD/BRAIN W/O DYE: CPT | Performed by: EMERGENCY MEDICINE

## 2022-10-29 PROCEDURE — 36415 COLL VENOUS BLD VENIPUNCTURE: CPT

## 2022-10-29 PROCEDURE — 85025 COMPLETE CBC W/AUTO DIFF WBC: CPT | Performed by: EMERGENCY MEDICINE

## 2022-10-29 PROCEDURE — 80053 COMPREHEN METABOLIC PANEL: CPT | Performed by: EMERGENCY MEDICINE

## 2022-10-29 PROCEDURE — 81003 URINALYSIS AUTO W/O SCOPE: CPT | Performed by: EMERGENCY MEDICINE

## 2022-10-29 PROCEDURE — 99284 EMERGENCY DEPT VISIT MOD MDM: CPT

## 2022-10-29 RX ORDER — TRAMADOL HYDROCHLORIDE 50 MG/1
TABLET ORAL
COMMUNITY
Start: 2022-06-02

## 2022-10-29 RX ORDER — GLIPIZIDE 5 MG/1
TABLET ORAL
COMMUNITY
Start: 2022-06-06

## 2022-10-29 RX ORDER — LISINOPRIL 10 MG/1
TABLET ORAL
COMMUNITY
Start: 2022-06-02

## 2022-10-29 RX ORDER — RISPERIDONE 0.25 MG/1
TABLET, FILM COATED ORAL
COMMUNITY
Start: 2022-06-16

## 2022-10-29 RX ORDER — POTASSIUM CHLORIDE 750 MG/1
TABLET, EXTENDED RELEASE ORAL
COMMUNITY
Start: 2022-05-30

## 2022-10-29 RX ORDER — FUROSEMIDE 20 MG/1
TABLET ORAL
COMMUNITY
Start: 2022-05-23

## 2022-10-29 NOTE — DISCHARGE INSTRUCTIONS
Follow-up with your primary care doctor within the next week for reassessment. Return for any concerning symptoms.

## 2022-10-29 NOTE — ED INITIAL ASSESSMENT (HPI)
Patient from SNF, hx of dementia, per EMS, staff at the [facility] reported an unwitnessed fall at approximately 46. Patient currently AOX 1-2. Hematoma to scalp. Unknown If patient is on blood thinners, EMS reported that staff noted patient may have recently been taking off blood thinners due to recent multiple falls.  Patient offers no c/o at this time

## 2022-11-11 ENCOUNTER — APPOINTMENT (OUTPATIENT)
Dept: CT IMAGING | Facility: HOSPITAL | Age: 77
End: 2022-11-11
Attending: EMERGENCY MEDICINE
Payer: MEDICARE

## 2022-11-11 ENCOUNTER — APPOINTMENT (OUTPATIENT)
Dept: GENERAL RADIOLOGY | Facility: HOSPITAL | Age: 77
End: 2022-11-11
Attending: EMERGENCY MEDICINE
Payer: MEDICARE

## 2022-11-11 ENCOUNTER — HOSPITAL ENCOUNTER (EMERGENCY)
Facility: HOSPITAL | Age: 77
Discharge: HOME | End: 2022-11-11
Attending: EMERGENCY MEDICINE
Payer: MEDICARE

## 2022-11-11 VITALS
TEMPERATURE: 97 F | RESPIRATION RATE: 12 BRPM | DIASTOLIC BLOOD PRESSURE: 54 MMHG | SYSTOLIC BLOOD PRESSURE: 146 MMHG | HEART RATE: 61 BPM | OXYGEN SATURATION: 97 %

## 2022-11-11 DIAGNOSIS — R29.6 UNWITNESSED FALL: Primary | ICD-10-CM

## 2022-11-11 DIAGNOSIS — F03.90 DEMENTIA, UNSPECIFIED DEMENTIA SEVERITY, UNSPECIFIED DEMENTIA TYPE, UNSPECIFIED WHETHER BEHAVIORAL, PSYCHOTIC, OR MOOD DISTURBANCE OR ANXIETY (HCC): ICD-10-CM

## 2022-11-11 LAB
ALBUMIN SERPL-MCNC: 3.4 G/DL (ref 3.4–5)
ALBUMIN/GLOB SERPL: 1 {RATIO} (ref 1–2)
ALP LIVER SERPL-CCNC: 88 U/L
ALT SERPL-CCNC: 16 U/L
ANION GAP SERPL CALC-SCNC: 2 MMOL/L (ref 0–18)
AST SERPL-CCNC: 17 U/L (ref 15–37)
ATRIAL RATE: 60 BPM
BASOPHILS # BLD AUTO: 0.04 X10(3) UL (ref 0–0.2)
BASOPHILS NFR BLD AUTO: 0.9 %
BILIRUB SERPL-MCNC: 0.5 MG/DL (ref 0.1–2)
BILIRUB UR QL STRIP.AUTO: NEGATIVE
BUN BLD-MCNC: 13 MG/DL (ref 7–18)
CALCIUM BLD-MCNC: 8.9 MG/DL (ref 8.5–10.1)
CHLORIDE SERPL-SCNC: 110 MMOL/L (ref 98–112)
CK SERPL-CCNC: 79 U/L
CLARITY UR REFRACT.AUTO: CLEAR
CO2 SERPL-SCNC: 26 MMOL/L (ref 21–32)
CREAT BLD-MCNC: 0.71 MG/DL
EOSINOPHIL # BLD AUTO: 0.05 X10(3) UL (ref 0–0.7)
EOSINOPHIL NFR BLD AUTO: 1.1 %
ERYTHROCYTE [DISTWIDTH] IN BLOOD BY AUTOMATED COUNT: 13.6 %
GFR SERPLBLD BASED ON 1.73 SQ M-ARVRAT: 87 ML/MIN/1.73M2 (ref 60–?)
GLOBULIN PLAS-MCNC: 3.3 G/DL (ref 2.8–4.4)
GLUCOSE BLD-MCNC: 95 MG/DL (ref 70–99)
GLUCOSE UR STRIP.AUTO-MCNC: NEGATIVE MG/DL
HCT VFR BLD AUTO: 40 %
HGB BLD-MCNC: 12.8 G/DL
IMM GRANULOCYTES # BLD AUTO: 0.01 X10(3) UL (ref 0–1)
IMM GRANULOCYTES NFR BLD: 0.2 %
KETONES UR STRIP.AUTO-MCNC: NEGATIVE MG/DL
LEUKOCYTE ESTERASE UR QL STRIP.AUTO: NEGATIVE
LYMPHOCYTES # BLD AUTO: 0.98 X10(3) UL (ref 1–4)
LYMPHOCYTES NFR BLD AUTO: 22.3 %
MCH RBC QN AUTO: 28.3 PG (ref 26–34)
MCHC RBC AUTO-ENTMCNC: 32 G/DL (ref 31–37)
MCV RBC AUTO: 88.5 FL
MONOCYTES # BLD AUTO: 0.38 X10(3) UL (ref 0.1–1)
MONOCYTES NFR BLD AUTO: 8.7 %
NEUTROPHILS # BLD AUTO: 2.93 X10 (3) UL (ref 1.5–7.7)
NEUTROPHILS # BLD AUTO: 2.93 X10(3) UL (ref 1.5–7.7)
NEUTROPHILS NFR BLD AUTO: 66.8 %
NITRITE UR QL STRIP.AUTO: NEGATIVE
OSMOLALITY SERPL CALC.SUM OF ELEC: 286 MOSM/KG (ref 275–295)
P AXIS: 59 DEGREES
P-R INTERVAL: 270 MS
PH UR STRIP.AUTO: 8 [PH] (ref 5–8)
PLATELET # BLD AUTO: 241 10(3)UL (ref 150–450)
POTASSIUM SERPL-SCNC: 4.3 MMOL/L (ref 3.5–5.1)
PROT SERPL-MCNC: 6.7 G/DL (ref 6.4–8.2)
PROT UR STRIP.AUTO-MCNC: NEGATIVE MG/DL
Q-T INTERVAL: 418 MS
QRS DURATION: 90 MS
QTC CALCULATION (BEZET): 418 MS
R AXIS: 30 DEGREES
RBC # BLD AUTO: 4.52 X10(6)UL
SARS-COV-2 RNA RESP QL NAA+PROBE: NOT DETECTED
SODIUM SERPL-SCNC: 138 MMOL/L (ref 136–145)
SP GR UR STRIP.AUTO: 1.01 (ref 1–1.03)
T AXIS: 66 DEGREES
UROBILINOGEN UR STRIP.AUTO-MCNC: <2 MG/DL
VENTRICULAR RATE: 60 BPM
WBC # BLD AUTO: 4.4 X10(3) UL (ref 4–11)

## 2022-11-11 PROCEDURE — 72125 CT NECK SPINE W/O DYE: CPT | Performed by: EMERGENCY MEDICINE

## 2022-11-11 PROCEDURE — 96361 HYDRATE IV INFUSION ADD-ON: CPT

## 2022-11-11 PROCEDURE — 99285 EMERGENCY DEPT VISIT HI MDM: CPT

## 2022-11-11 PROCEDURE — 81001 URINALYSIS AUTO W/SCOPE: CPT | Performed by: EMERGENCY MEDICINE

## 2022-11-11 PROCEDURE — 96360 HYDRATION IV INFUSION INIT: CPT

## 2022-11-11 PROCEDURE — 93005 ELECTROCARDIOGRAM TRACING: CPT

## 2022-11-11 PROCEDURE — 93010 ELECTROCARDIOGRAM REPORT: CPT

## 2022-11-11 PROCEDURE — 80053 COMPREHEN METABOLIC PANEL: CPT | Performed by: EMERGENCY MEDICINE

## 2022-11-11 PROCEDURE — 71045 X-RAY EXAM CHEST 1 VIEW: CPT | Performed by: EMERGENCY MEDICINE

## 2022-11-11 PROCEDURE — 82550 ASSAY OF CK (CPK): CPT | Performed by: EMERGENCY MEDICINE

## 2022-11-11 PROCEDURE — 70450 CT HEAD/BRAIN W/O DYE: CPT | Performed by: EMERGENCY MEDICINE

## 2022-11-11 PROCEDURE — 85025 COMPLETE CBC W/AUTO DIFF WBC: CPT | Performed by: EMERGENCY MEDICINE

## 2022-11-11 RX ORDER — GLIPIZIDE 5 MG/1
2.5 TABLET ORAL
COMMUNITY

## 2022-11-11 RX ORDER — RISPERIDONE 0.5 MG/1
1 TABLET, FILM COATED ORAL
COMMUNITY
Start: 2022-11-03

## 2022-11-11 RX ORDER — DOCUSATE SODIUM 100 MG/1
100 CAPSULE, LIQUID FILLED ORAL 2 TIMES DAILY
COMMUNITY
End: 2022-11-11

## 2022-11-11 RX ORDER — MECLIZINE HYDROCHLORIDE 25 MG/1
25 TABLET ORAL 3 TIMES DAILY PRN
COMMUNITY

## 2022-11-11 RX ORDER — RISPERIDONE 1 MG/1
1 TABLET, FILM COATED ORAL
Status: DISCONTINUED | OUTPATIENT
Start: 2022-11-11 | End: 2022-11-11

## 2022-11-11 RX ORDER — DOCUSATE SODIUM 100 MG/1
100 CAPSULE, LIQUID FILLED ORAL DAILY
COMMUNITY

## 2022-11-11 RX ORDER — POTASSIUM CHLORIDE 750 MG/1
10 TABLET, FILM COATED, EXTENDED RELEASE ORAL
COMMUNITY
Start: 2022-10-25

## 2022-11-11 RX ORDER — SODIUM CHLORIDE 9 MG/ML
125 INJECTION, SOLUTION INTRAVENOUS CONTINUOUS
Status: DISCONTINUED | OUTPATIENT
Start: 2022-11-11 | End: 2022-11-11

## 2022-11-11 RX ORDER — ACETAMINOPHEN 325 MG/1
650 TABLET ORAL EVERY 8 HOURS PRN
COMMUNITY

## 2022-11-11 NOTE — ED INITIAL ASSESSMENT (HPI)
PT PRESENTS TO ED AFTER SHE WAS FOUND ON THE FLOOR AT [address] [facility], STATES SHE THINKS SHE FELL. PT STATES SHE REMEMBERS FALLING, STATES SHE SLIPPED AND FELL.   PT DENIES PAIN, IN C COLLAR PLACED PTA, PT IS A&OX1, BASELINE HX OF DEMENTIA

## 2022-11-11 NOTE — ED QUICK NOTES
Pt's daughter called concerned about pt receiving her tramadol and risperidone. Per daughter, she was told that the pt did not receive her morning dose. Pharmacy med tech to review meds and update. [facility] aware.

## 2022-12-08 ENCOUNTER — APPOINTMENT (OUTPATIENT)
Dept: CT IMAGING | Facility: HOSPITAL | Age: 77
End: 2022-12-08
Attending: EMERGENCY MEDICINE
Payer: MEDICARE

## 2022-12-08 ENCOUNTER — HOSPITAL ENCOUNTER (EMERGENCY)
Facility: HOSPITAL | Age: 77
Discharge: HOME | End: 2022-12-08
Attending: EMERGENCY MEDICINE
Payer: MEDICARE

## 2022-12-08 VITALS
OXYGEN SATURATION: 96 % | SYSTOLIC BLOOD PRESSURE: 126 MMHG | TEMPERATURE: 97 F | BODY MASS INDEX: 27 KG/M2 | DIASTOLIC BLOOD PRESSURE: 77 MMHG | WEIGHT: 154.31 LBS | RESPIRATION RATE: 18 BRPM | HEART RATE: 66 BPM
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
## 2022-12-08 DIAGNOSIS — S09.90XA INJURY OF HEAD, INITIAL ENCOUNTER: ICD-10-CM

## 2022-12-08 DIAGNOSIS — S00.01XA ABRASION OF SCALP, INITIAL ENCOUNTER: ICD-10-CM

## 2022-12-08 LAB — GLUCOSE BLD-MCNC: 109 MG/DL (ref 70–99)

## 2022-12-08 PROCEDURE — 99284 EMERGENCY DEPT VISIT MOD MDM: CPT

## 2022-12-08 PROCEDURE — 72125 CT NECK SPINE W/O DYE: CPT | Performed by: EMERGENCY MEDICINE

## 2022-12-08 PROCEDURE — 70450 CT HEAD/BRAIN W/O DYE: CPT | Performed by: EMERGENCY MEDICINE

## 2022-12-08 PROCEDURE — 90471 IMMUNIZATION ADMIN: CPT

## 2022-12-08 PROCEDURE — 82962 GLUCOSE BLOOD TEST: CPT

## 2022-12-08 NOTE — ED INITIAL ASSESSMENT (HPI)
Pt presents to ed via ems from the [address] [place] [address] following an unwitnessed fall this evening. Per ems pt was found laying on her stomach in her room with laceration to back of head. Pt is a&ox2 per normal as stated by ems.  Pt in hard cervical collar on arrival.

## 2022-12-15 PROBLEM — F02.818 ALZHEIMER'S DEMENTIA WITH BEHAVIORAL DISTURBANCE (HCC): Status: ACTIVE | Noted: 2022-12-15

## 2022-12-15 PROBLEM — G30.9 ALZHEIMER'S DEMENTIA WITH BEHAVIORAL DISTURBANCE (HCC): Status: ACTIVE | Noted: 2022-12-15

## 2022-12-15 PROBLEM — F29 PSYCHOSIS (HCC): Status: ACTIVE | Noted: 2022-12-15

## 2023-01-02 ENCOUNTER — APPOINTMENT (OUTPATIENT)
Dept: ULTRASOUND IMAGING | Facility: HOSPITAL | Age: 78
End: 2023-01-02
Attending: EMERGENCY MEDICINE
Payer: MEDICARE

## 2023-01-02 ENCOUNTER — HOSPITAL ENCOUNTER (OUTPATIENT)
Facility: HOSPITAL | Age: 78
Setting detail: OBSERVATION
LOS: 1 days | Discharge: NURSING, REHAB OR LONG-TERM CARE | End: 2023-01-03
Attending: EMERGENCY MEDICINE | Admitting: HOSPITALIST
Payer: MEDICARE

## 2023-01-02 DIAGNOSIS — K80.20 GALLSTONES: Primary | ICD-10-CM

## 2023-01-02 DIAGNOSIS — K83.8 DILATED CBD, ACQUIRED: ICD-10-CM

## 2023-01-02 LAB
ALBUMIN SERPL-MCNC: 2.9 G/DL (ref 3.4–5)
ALBUMIN/GLOB SERPL: 0.9 {RATIO} (ref 1–2)
ALP LIVER SERPL-CCNC: 83 U/L
ALT SERPL-CCNC: 14 U/L
ANION GAP SERPL CALC-SCNC: 8 MMOL/L (ref 0–18)
AST SERPL-CCNC: 26 U/L (ref 15–37)
BASOPHILS # BLD AUTO: 0.06 X10(3) UL (ref 0–0.2)
BASOPHILS NFR BLD AUTO: 0.9 %
BILIRUB SERPL-MCNC: 0.3 MG/DL (ref 0.1–2)
BUN BLD-MCNC: 15 MG/DL (ref 7–18)
CALCIUM BLD-MCNC: 8.9 MG/DL (ref 8.5–10.1)
CHLORIDE SERPL-SCNC: 105 MMOL/L (ref 98–112)
CO2 SERPL-SCNC: 25 MMOL/L (ref 21–32)
CREAT BLD-MCNC: 0.72 MG/DL
EOSINOPHIL # BLD AUTO: 0.14 X10(3) UL (ref 0–0.7)
EOSINOPHIL NFR BLD AUTO: 2.2 %
ERYTHROCYTE [DISTWIDTH] IN BLOOD BY AUTOMATED COUNT: 13.7 %
GFR SERPLBLD BASED ON 1.73 SQ M-ARVRAT: 86 ML/MIN/1.73M2 (ref 60–?)
GLOBULIN PLAS-MCNC: 3.3 G/DL (ref 2.8–4.4)
GLUCOSE BLD-MCNC: 161 MG/DL (ref 70–99)
HCT VFR BLD AUTO: 36.7 %
HGB BLD-MCNC: 11.8 G/DL
IMM GRANULOCYTES # BLD AUTO: 0.02 X10(3) UL (ref 0–1)
IMM GRANULOCYTES NFR BLD: 0.3 %
LIPASE SERPL-CCNC: 117 U/L (ref 73–393)
LYMPHOCYTES # BLD AUTO: 1.4 X10(3) UL (ref 1–4)
LYMPHOCYTES NFR BLD AUTO: 21.7 %
MCH RBC QN AUTO: 28.5 PG (ref 26–34)
MCHC RBC AUTO-ENTMCNC: 32.2 G/DL (ref 31–37)
MCV RBC AUTO: 88.6 FL
MONOCYTES # BLD AUTO: 0.45 X10(3) UL (ref 0.1–1)
MONOCYTES NFR BLD AUTO: 7 %
NEUTROPHILS # BLD AUTO: 4.38 X10 (3) UL (ref 1.5–7.7)
NEUTROPHILS # BLD AUTO: 4.38 X10(3) UL (ref 1.5–7.7)
NEUTROPHILS NFR BLD AUTO: 67.9 %
OSMOLALITY SERPL CALC.SUM OF ELEC: 290 MOSM/KG (ref 275–295)
PLATELET # BLD AUTO: 286 10(3)UL (ref 150–450)
POTASSIUM SERPL-SCNC: 3.9 MMOL/L (ref 3.5–5.1)
PROT SERPL-MCNC: 6.2 G/DL (ref 6.4–8.2)
RBC # BLD AUTO: 4.14 X10(6)UL
SARS-COV-2 RNA RESP QL NAA+PROBE: NOT DETECTED
SODIUM SERPL-SCNC: 138 MMOL/L (ref 136–145)
WBC # BLD AUTO: 6.5 X10(3) UL (ref 4–11)

## 2023-01-02 PROCEDURE — 76700 US EXAM ABDOM COMPLETE: CPT | Performed by: EMERGENCY MEDICINE

## 2023-01-02 PROCEDURE — 99223 1ST HOSP IP/OBS HIGH 75: CPT | Performed by: HOSPITALIST

## 2023-01-02 RX ORDER — SENNOSIDES 8.6 MG
17.2 TABLET ORAL NIGHTLY PRN
Status: DISCONTINUED | OUTPATIENT
Start: 2023-01-02 | End: 2023-01-03

## 2023-01-02 RX ORDER — RISPERIDONE 0.25 MG/1
0.5 TABLET ORAL 2 TIMES DAILY
Status: DISCONTINUED | OUTPATIENT
Start: 2023-01-02 | End: 2023-01-03

## 2023-01-02 RX ORDER — ACETAMINOPHEN 325 MG/1
650 TABLET ORAL EVERY 8 HOURS PRN
Status: DISCONTINUED | OUTPATIENT
Start: 2023-01-02 | End: 2023-01-02

## 2023-01-02 RX ORDER — DOCUSATE SODIUM 100 MG/1
100 CAPSULE, LIQUID FILLED ORAL DAILY
Status: DISCONTINUED | OUTPATIENT
Start: 2023-01-03 | End: 2023-01-03

## 2023-01-02 RX ORDER — POLYETHYLENE GLYCOL 3350 17 G/17G
17 POWDER, FOR SOLUTION ORAL DAILY PRN
Status: DISCONTINUED | OUTPATIENT
Start: 2023-01-02 | End: 2023-01-03

## 2023-01-02 RX ORDER — SODIUM CHLORIDE 9 MG/ML
INJECTION, SOLUTION INTRAVENOUS CONTINUOUS
Status: DISCONTINUED | OUTPATIENT
Start: 2023-01-02 | End: 2023-01-03

## 2023-01-02 RX ORDER — BISACODYL 10 MG
10 SUPPOSITORY, RECTAL RECTAL
Status: DISCONTINUED | OUTPATIENT
Start: 2023-01-02 | End: 2023-01-03

## 2023-01-02 RX ORDER — RISPERIDONE 0.25 MG/1
1 TABLET ORAL
Status: DISCONTINUED | OUTPATIENT
Start: 2023-01-03 | End: 2023-01-03

## 2023-01-02 RX ORDER — ONDANSETRON 2 MG/ML
4 INJECTION INTRAMUSCULAR; INTRAVENOUS EVERY 6 HOURS PRN
Status: DISCONTINUED | OUTPATIENT
Start: 2023-01-02 | End: 2023-01-03

## 2023-01-02 RX ORDER — MORPHINE SULFATE 4 MG/ML
4 INJECTION, SOLUTION INTRAMUSCULAR; INTRAVENOUS EVERY 2 HOUR PRN
Status: DISCONTINUED | OUTPATIENT
Start: 2023-01-02 | End: 2023-01-03

## 2023-01-02 RX ORDER — MORPHINE SULFATE 2 MG/ML
1 INJECTION, SOLUTION INTRAMUSCULAR; INTRAVENOUS EVERY 2 HOUR PRN
Status: DISCONTINUED | OUTPATIENT
Start: 2023-01-02 | End: 2023-01-03

## 2023-01-02 RX ORDER — TRAMADOL HYDROCHLORIDE 50 MG/1
50 TABLET ORAL 2 TIMES DAILY
Status: DISCONTINUED | OUTPATIENT
Start: 2023-01-02 | End: 2023-01-03

## 2023-01-02 RX ORDER — LISINOPRIL 10 MG/1
10 TABLET ORAL DAILY
Status: DISCONTINUED | OUTPATIENT
Start: 2023-01-03 | End: 2023-01-03

## 2023-01-02 RX ORDER — METOCLOPRAMIDE HYDROCHLORIDE 5 MG/ML
10 INJECTION INTRAMUSCULAR; INTRAVENOUS EVERY 8 HOURS PRN
Status: DISCONTINUED | OUTPATIENT
Start: 2023-01-02 | End: 2023-01-02

## 2023-01-02 RX ORDER — MECLIZINE HCL 12.5 MG/1
25 TABLET ORAL 3 TIMES DAILY PRN
Status: DISCONTINUED | OUTPATIENT
Start: 2023-01-02 | End: 2023-01-03

## 2023-01-02 RX ORDER — SODIUM PHOSPHATE, DIBASIC AND SODIUM PHOSPHATE, MONOBASIC 7; 19 G/133ML; G/133ML
1 ENEMA RECTAL ONCE AS NEEDED
Status: DISCONTINUED | OUTPATIENT
Start: 2023-01-02 | End: 2023-01-03

## 2023-01-02 RX ORDER — ACETAMINOPHEN 500 MG
500 TABLET ORAL EVERY 4 HOURS PRN
Status: DISCONTINUED | OUTPATIENT
Start: 2023-01-02 | End: 2023-01-03

## 2023-01-02 RX ORDER — MORPHINE SULFATE 2 MG/ML
2 INJECTION, SOLUTION INTRAMUSCULAR; INTRAVENOUS EVERY 2 HOUR PRN
Status: DISCONTINUED | OUTPATIENT
Start: 2023-01-02 | End: 2023-01-03

## 2023-01-02 NOTE — ED INITIAL ASSESSMENT (HPI)
Patient received via [address] from The [address] [place] [address] for r/o gallstones. Per EMS patient had ultrasound of abdomen on 12/28/2022 which showed Cholelithiasis with dilated CBD. Patient denies pain upon arrival. Patient ate a couple bites of chicken sandwich prior to arrival to ED.

## 2023-01-03 VITALS
BODY MASS INDEX: 25.57 KG/M2 | OXYGEN SATURATION: 97 % | DIASTOLIC BLOOD PRESSURE: 76 MMHG | RESPIRATION RATE: 16 BRPM | TEMPERATURE: 98 F | WEIGHT: 144.31 LBS | HEIGHT: 63 IN | HEART RATE: 66 BPM | SYSTOLIC BLOOD PRESSURE: 160 MMHG

## 2023-01-03 LAB
GLUCOSE BLD-MCNC: 106 MG/DL (ref 70–99)
GLUCOSE BLD-MCNC: 99 MG/DL (ref 70–99)

## 2023-01-03 PROCEDURE — 99239 HOSP IP/OBS DSCHRG MGMT >30: CPT | Performed by: INTERNAL MEDICINE

## 2023-01-03 RX ORDER — NICOTINE POLACRILEX 4 MG
15 LOZENGE BUCCAL
Status: DISCONTINUED | OUTPATIENT
Start: 2023-01-03 | End: 2023-01-03

## 2023-01-03 RX ORDER — FLUTICASONE PROPIONATE 50 MCG
2 SPRAY, SUSPENSION (ML) NASAL DAILY
COMMUNITY

## 2023-01-03 RX ORDER — BISACODYL 10 MG
10 SUPPOSITORY, RECTAL RECTAL
COMMUNITY

## 2023-01-03 RX ORDER — NICOTINE POLACRILEX 4 MG
30 LOZENGE BUCCAL
Status: DISCONTINUED | OUTPATIENT
Start: 2023-01-03 | End: 2023-01-03

## 2023-01-03 RX ORDER — POLYETHYLENE GLYCOL 3350 17 G/17G
17 POWDER, FOR SOLUTION ORAL DAILY
Qty: 30 EACH | Refills: 0 | Status: SHIPPED | OUTPATIENT
Start: 2023-01-03

## 2023-01-03 RX ORDER — TRAMADOL HYDROCHLORIDE 50 MG/1
50 TABLET ORAL 2 TIMES DAILY
Qty: 15 TABLET | Refills: 0 | Status: SHIPPED | OUTPATIENT
Start: 2023-01-03

## 2023-01-03 RX ORDER — DEXTROSE MONOHYDRATE 25 G/50ML
50 INJECTION, SOLUTION INTRAVENOUS
Status: DISCONTINUED | OUTPATIENT
Start: 2023-01-03 | End: 2023-01-03

## 2023-01-03 RX ORDER — L.ACID,PARA/B.BIFIDUM/S.THERM 8B CELL
1 CAPSULE ORAL
COMMUNITY
Start: 2023-01-20

## 2023-01-03 NOTE — CM/SW NOTE
Patient failed inpatient criteria. Second level of review completed and supports observation. UR committee in agreement. Tracey COVINGTON discussed with Dr. Nathan Soulier who approves observation status. Observation letter given to the patient and order written.     Marci Felix RN, [address] [place] [address]  Extension [phone]

## 2023-01-03 NOTE — PROGRESS NOTES
NURSING DISCHARGE NOTE    Discharged Nursing home via Ambulance. Accompanied by Support staff  Belongings Taken by patient/family report called to [facility] . Answered all questions . Scripts given to patient's family .

## 2023-01-03 NOTE — PLAN OF CARE
A&Ox1. VSS. RA. . Denies chest pain and SOB. GI: Abdomen soft, nondistended. Passing gas. Denies nausea. : Voids, purewick in place to suction. Denies pain. Resting in bed. Diet: NPO after 0000. IVF running per order. All appropriate safety measures in place. All questions and concerns addressed. Call light in reach, bed in lowest position. GI to evaluate in the morning. Pts daughter was here to help with the admission. Problem: RISK FOR INFECTION - ADULT  Goal: Absence of fever/infection during anticipated neutropenic period  Description: INTERVENTIONS  - Monitor WBC  - Administer growth factors as ordered  - Implement neutropenic guidelines  Outcome: Progressing     Problem: SAFETY ADULT - FALL  Goal: Free from fall injury  Description: INTERVENTIONS:  - Assess pt frequently for physical needs  - Identify cognitive and physical deficits and behaviors that affect risk of falls.   - Levels fall precautions as indicated by assessment.  - Educate pt/family on patient safety including physical limitations  - Instruct pt to call for assistance with activity based on assessment  - Modify environment to reduce risk of injury  - Provide assistive devices as appropriate  - Consider OT/PT consult to assist with strengthening/mobility  - Encourage toileting schedule  Outcome: Progressing     Problem: DISCHARGE PLANNING  Goal: Discharge to home or other facility with appropriate resources  Description: INTERVENTIONS:  - Identify barriers to discharge w/pt and caregiver  - Include patient/family/discharge partner in discharge planning  - Arrange for needed discharge resources and transportation as appropriate  - Identify discharge learning needs (meds, wound care, etc)  - Arrange for interpreters to assist at discharge as needed  - Consider post-discharge preferences of patient/family/discharge partner  - Complete POLST form as appropriate  - Assess patient's ability to be responsible for managing their own health  - Refer to Case Management Department for coordinating discharge planning if the patient needs post-hospital services based on physician/LIP order or complex needs related to functional status, cognitive ability or social support system  Outcome: Progressing

## 2023-01-03 NOTE — ED QUICK NOTES
Orders for admission, patient is aware of plan and ready to go upstairs. Any questions, please call ED RN Ike Rojas at extension [phone].      Patient Covid vaccination status: Fully vaccinated     COVID Test Ordered in ED: Rapid SARS-CoV-2 by PCR    COVID Suspicion at Admission: Low clinical suspicion for COVID    Running Infusions:  None    Mental Status/LOC at time of transport: A&O x 2    Other pertinent information:   CIWA score: N/A   NIH score:  N/A

## 2023-01-03 NOTE — PLAN OF CARE
Problem: RISK FOR INFECTION - ADULT  Goal: Absence of fever/infection during anticipated neutropenic period  Description: INTERVENTIONS  - Monitor WBC  - Administer growth factors as ordered  - Implement neutropenic guidelines  Outcome: Adequate for Discharge     Problem: SAFETY ADULT - FALL  Goal: Free from fall injury  Description: INTERVENTIONS:  - Assess pt frequently for physical needs  - Identify cognitive and physical deficits and behaviors that affect risk of falls.   - [place] fall precautions as indicated by assessment.  - Educate pt/family on patient safety including physical limitations  - Instruct pt to call for assistance with activity based on assessment  - Modify environment to reduce risk of injury  - Provide assistive devices as appropriate  - Consider OT/PT consult to assist with strengthening/mobility  - Encourage toileting schedule  Outcome: Adequate for Discharge     Problem: DISCHARGE PLANNING  Goal: Discharge to home or other facility with appropriate resources  Description: INTERVENTIONS:  - Identify barriers to discharge w/pt and caregiver  - Include patient/family/discharge partner in discharge planning  - Arrange for needed discharge resources and transportation as appropriate  - Identify discharge learning needs (meds, wound care, etc)  - Arrange for interpreters to assist at discharge as needed  - Consider post-discharge preferences of patient/family/discharge partner  - Complete POLST form as appropriate  - Assess patient's ability to be responsible for managing their own health  - Refer to Case Management Department for coordinating discharge planning if the patient needs post-hospital services based on physician/LIP order or complex needs related to functional status, cognitive ability or social support system  Outcome: Adequate for Discharge   Alert to person , very sleepy , disoriented to place and time , follows command , started  regular diet , tolerated well , up to commode , voided x 1 , abdomen soft , bs present , passing gas . Plan of care discussed with family . answered all questions . Verbalized understanding .  Will continue to monitor

## 2023-01-03 NOTE — CM/SW NOTE
Expected Discharge Plan:    Destination: Subacute Rehab: [place] [address]  Call for report to: [phone]  Transportation provider-[facility]     DC Time: 3pm  Pt/Family aware of plan: Rn updated family who were at bedside.  MSW also spoke to family at bedside  [facility] aware of dc plan: Pt discussed with Rn

## 2023-03-09 NOTE — TELEPHONE ENCOUNTER
Passed protocol     Last refill:  9/12/2019 Losartan #90 NR  9/12/2019 HCTZ 25 mg #90 NR    LOV:   9/16/2019 Dr Anand Haider RTC 6 months  3. Essential hypertension, benign  Elevated systolic blood pressure today. Monitor for now. A lot of life stressors.   Co 58

## 2023-03-12 ENCOUNTER — HOSPITAL ENCOUNTER (INPATIENT)
Facility: HOSPITAL | Age: 78
LOS: 2 days | Discharge: HOME | End: 2023-03-15
Attending: EMERGENCY MEDICINE | Admitting: HOSPITALIST
Payer: MEDICARE

## 2023-03-12 ENCOUNTER — HOSPITAL ENCOUNTER (INPATIENT)
Facility: HOSPITAL | Age: 78
LOS: 2 days | Discharge: NURSING, REHAB OR LONG-TERM CARE | End: 2023-03-15
Attending: EMERGENCY MEDICINE | Admitting: HOSPITALIST
Payer: MEDICARE

## 2023-03-12 ENCOUNTER — APPOINTMENT (OUTPATIENT)
Dept: CT IMAGING | Facility: HOSPITAL | Age: 78
End: 2023-03-12
Attending: EMERGENCY MEDICINE
Payer: MEDICARE

## 2023-03-12 ENCOUNTER — APPOINTMENT (OUTPATIENT)
Dept: GENERAL RADIOLOGY | Facility: HOSPITAL | Age: 78
End: 2023-03-12
Attending: EMERGENCY MEDICINE
Payer: MEDICARE

## 2023-03-12 DIAGNOSIS — U07.1 COVID-19: Primary | ICD-10-CM

## 2023-03-12 LAB
ALBUMIN SERPL-MCNC: 3.3 G/DL (ref 3.4–5)
ALBUMIN/GLOB SERPL: 1 {RATIO} (ref 1–2)
ALP LIVER SERPL-CCNC: 76 U/L
ALT SERPL-CCNC: 16 U/L
ANION GAP SERPL CALC-SCNC: 7 MMOL/L (ref 0–18)
AST SERPL-CCNC: 14 U/L (ref 15–37)
BASOPHILS # BLD AUTO: 0.03 X10(3) UL (ref 0–0.2)
BASOPHILS NFR BLD AUTO: 0.4 %
BILIRUB SERPL-MCNC: 0.3 MG/DL (ref 0.1–2)
BILIRUB UR QL STRIP.AUTO: NEGATIVE
BUN BLD-MCNC: 17 MG/DL (ref 7–18)
CALCIUM BLD-MCNC: 9.1 MG/DL (ref 8.5–10.1)
CHLORIDE SERPL-SCNC: 106 MMOL/L (ref 98–112)
CK SERPL-CCNC: 84 U/L
CLARITY UR REFRACT.AUTO: CLEAR
CO2 SERPL-SCNC: 23 MMOL/L (ref 21–32)
COLOR UR AUTO: YELLOW
CREAT BLD-MCNC: 0.78 MG/DL
EOSINOPHIL # BLD AUTO: 0.01 X10(3) UL (ref 0–0.7)
EOSINOPHIL NFR BLD AUTO: 0.1 %
ERYTHROCYTE [DISTWIDTH] IN BLOOD BY AUTOMATED COUNT: 14 %
EST. AVERAGE GLUCOSE BLD GHB EST-MCNC: 123 MG/DL (ref 68–126)
GFR SERPLBLD BASED ON 1.73 SQ M-ARVRAT: 78 ML/MIN/1.73M2 (ref 60–?)
GLOBULIN PLAS-MCNC: 3.2 G/DL (ref 2.8–4.4)
GLUCOSE BLD-MCNC: 169 MG/DL (ref 70–99)
GLUCOSE BLD-MCNC: 207 MG/DL (ref 70–99)
GLUCOSE UR STRIP.AUTO-MCNC: NEGATIVE MG/DL
HBA1C MFR BLD: 5.9 % (ref ?–5.7)
HCT VFR BLD AUTO: 39.3 %
HGB BLD-MCNC: 12.3 G/DL
IMM GRANULOCYTES # BLD AUTO: 0.03 X10(3) UL (ref 0–1)
IMM GRANULOCYTES NFR BLD: 0.4 %
LYMPHOCYTES # BLD AUTO: 0.41 X10(3) UL (ref 1–4)
LYMPHOCYTES NFR BLD AUTO: 5 %
MCH RBC QN AUTO: 27.6 PG (ref 26–34)
MCHC RBC AUTO-ENTMCNC: 31.3 G/DL (ref 31–37)
MCV RBC AUTO: 88.1 FL
MONOCYTES # BLD AUTO: 0.56 X10(3) UL (ref 0.1–1)
MONOCYTES NFR BLD AUTO: 6.8 %
NEUTROPHILS # BLD AUTO: 7.19 X10 (3) UL (ref 1.5–7.7)
NEUTROPHILS # BLD AUTO: 7.19 X10(3) UL (ref 1.5–7.7)
NEUTROPHILS NFR BLD AUTO: 87.3 %
NITRITE UR QL STRIP.AUTO: NEGATIVE
NT-PROBNP SERPL-MCNC: 290 PG/ML (ref ?–450)
OSMOLALITY SERPL CALC.SUM OF ELEC: 290 MOSM/KG (ref 275–295)
PH UR STRIP.AUTO: 6 [PH] (ref 5–8)
PLATELET # BLD AUTO: 212 10(3)UL (ref 150–450)
POTASSIUM SERPL-SCNC: 4.1 MMOL/L (ref 3.5–5.1)
PROCALCITONIN SERPL-MCNC: <0.05 NG/ML (ref ?–0.16)
PROT SERPL-MCNC: 6.5 G/DL (ref 6.4–8.2)
PROT UR STRIP.AUTO-MCNC: NEGATIVE MG/DL
RBC # BLD AUTO: 4.46 X10(6)UL
RBC UR QL AUTO: NEGATIVE
SARS-COV-2 RNA RESP QL NAA+PROBE: DETECTED
SODIUM SERPL-SCNC: 136 MMOL/L (ref 136–145)
SP GR UR STRIP.AUTO: 1.01 (ref 1–1.03)
UROBILINOGEN UR STRIP.AUTO-MCNC: <2 MG/DL
WBC # BLD AUTO: 8.2 X10(3) UL (ref 4–11)

## 2023-03-12 PROCEDURE — XW033E5 INTRODUCTION OF REMDESIVIR ANTI-INFECTIVE INTO PERIPHERAL VEIN, PERCUTANEOUS APPROACH, NEW TECHNOLOGY GROUP 5: ICD-10-PCS | Performed by: EMERGENCY MEDICINE

## 2023-03-12 PROCEDURE — 71045 X-RAY EXAM CHEST 1 VIEW: CPT | Performed by: EMERGENCY MEDICINE

## 2023-03-12 PROCEDURE — 70450 CT HEAD/BRAIN W/O DYE: CPT | Performed by: EMERGENCY MEDICINE

## 2023-03-12 PROCEDURE — 99223 1ST HOSP IP/OBS HIGH 75: CPT | Performed by: HOSPITALIST

## 2023-03-12 RX ORDER — DEXTROSE MONOHYDRATE 25 G/50ML
50 INJECTION, SOLUTION INTRAVENOUS
Status: DISCONTINUED | OUTPATIENT
Start: 2023-03-12 | End: 2023-03-15

## 2023-03-12 RX ORDER — LISINOPRIL 10 MG/1
10 TABLET ORAL DAILY
Status: DISCONTINUED | OUTPATIENT
Start: 2023-03-13 | End: 2023-03-15

## 2023-03-12 RX ORDER — ENOXAPARIN SODIUM 100 MG/ML
40 INJECTION SUBCUTANEOUS DAILY
Status: DISCONTINUED | OUTPATIENT
Start: 2023-03-13 | End: 2023-03-15

## 2023-03-12 RX ORDER — FLUTICASONE PROPIONATE 50 MCG
2 SPRAY, SUSPENSION (ML) NASAL DAILY
Status: DISCONTINUED | OUTPATIENT
Start: 2023-03-12 | End: 2023-03-15

## 2023-03-12 RX ORDER — MECLIZINE HYDROCHLORIDE 25 MG/1
25 TABLET ORAL EVERY 8 HOURS PRN
Status: DISCONTINUED | OUTPATIENT
Start: 2023-03-12 | End: 2023-03-15

## 2023-03-12 RX ORDER — TRAMADOL HYDROCHLORIDE 50 MG/1
50 TABLET ORAL 2 TIMES DAILY
Status: DISCONTINUED | OUTPATIENT
Start: 2023-03-12 | End: 2023-03-15

## 2023-03-12 RX ORDER — ACETAMINOPHEN 500 MG
500 TABLET ORAL EVERY 4 HOURS PRN
Status: DISCONTINUED | OUTPATIENT
Start: 2023-03-12 | End: 2023-03-15

## 2023-03-12 RX ORDER — FUROSEMIDE 20 MG/1
20 TABLET ORAL
Status: DISCONTINUED | OUTPATIENT
Start: 2023-03-13 | End: 2023-03-15

## 2023-03-12 RX ORDER — RISPERIDONE 0.5 MG/1
0.5 TABLET ORAL
Status: DISCONTINUED | OUTPATIENT
Start: 2023-03-13 | End: 2023-03-12

## 2023-03-12 RX ORDER — NICOTINE POLACRILEX 4 MG
15 LOZENGE BUCCAL
Status: DISCONTINUED | OUTPATIENT
Start: 2023-03-12 | End: 2023-03-15

## 2023-03-12 RX ORDER — POLYETHYLENE GLYCOL 3350 17 G/17G
17 POWDER, FOR SOLUTION ORAL DAILY
Status: DISCONTINUED | OUTPATIENT
Start: 2023-03-12 | End: 2023-03-15

## 2023-03-12 RX ORDER — SODIUM CHLORIDE 9 MG/ML
125 INJECTION, SOLUTION INTRAVENOUS CONTINUOUS
Status: DISCONTINUED | OUTPATIENT
Start: 2023-03-12 | End: 2023-03-13

## 2023-03-12 RX ORDER — ONDANSETRON 2 MG/ML
4 INJECTION INTRAMUSCULAR; INTRAVENOUS EVERY 4 HOURS PRN
Status: DISCONTINUED | OUTPATIENT
Start: 2023-03-12 | End: 2023-03-12

## 2023-03-12 RX ORDER — GUAIFENESIN 600 MG/1
600 TABLET, EXTENDED RELEASE ORAL 2 TIMES DAILY
Status: DISCONTINUED | OUTPATIENT
Start: 2023-03-12 | End: 2023-03-15

## 2023-03-12 RX ORDER — ONDANSETRON 2 MG/ML
4 INJECTION INTRAMUSCULAR; INTRAVENOUS EVERY 6 HOURS PRN
Status: DISCONTINUED | OUTPATIENT
Start: 2023-03-12 | End: 2023-03-15

## 2023-03-12 RX ORDER — NICOTINE POLACRILEX 4 MG
30 LOZENGE BUCCAL
Status: DISCONTINUED | OUTPATIENT
Start: 2023-03-12 | End: 2023-03-15

## 2023-03-12 RX ORDER — BISACODYL 10 MG
10 SUPPOSITORY, RECTAL RECTAL
Status: DISCONTINUED | OUTPATIENT
Start: 2023-03-12 | End: 2023-03-15

## 2023-03-12 RX ORDER — METOCLOPRAMIDE HYDROCHLORIDE 5 MG/ML
10 INJECTION INTRAMUSCULAR; INTRAVENOUS EVERY 8 HOURS PRN
Status: DISCONTINUED | OUTPATIENT
Start: 2023-03-12 | End: 2023-03-15

## 2023-03-12 RX ORDER — ALBUTEROL SULFATE 90 UG/1
4 AEROSOL, METERED RESPIRATORY (INHALATION) EVERY 4 HOURS PRN
Status: DISCONTINUED | OUTPATIENT
Start: 2023-03-12 | End: 2023-03-15

## 2023-03-12 RX ORDER — ACETAMINOPHEN 500 MG
1000 TABLET ORAL ONCE
Status: DISCONTINUED | OUTPATIENT
Start: 2023-03-12 | End: 2023-03-15

## 2023-03-12 NOTE — PLAN OF CARE
Problem: Diabetes/Glucose Control  Goal: Glucose maintained within prescribed range  Description: INTERVENTIONS:  - Monitor Blood Glucose as ordered  - Assess for signs and symptoms of hyperglycemia and hypoglycemia  - Administer ordered medications to maintain glucose within target range  - Assess barriers to adequate nutritional intake and initiate nutrition consult as needed  - Instruct patient on self management of diabetes  Outcome: Progressing     Problem: Patient/Family Goals  Goal: Patient/Family Long Term Goal  Description: Patient's Long Term Goal: Discharge with adequate resources    Interventions:  - Identify barriers to discharge w/pt and caregiver  - Include patient/family/discharge partner in discharge planning  - Arrange for needed discharge resources and transportation as appropriate  - Identify discharge learning needs   - Consider post-discharge preferences of patient/family/discharge partner  - Assess patient's ability to be responsible for managing their own health  - Refer to Case Management Department for coordinating discharge planning if the patient needs post-hospital services    - See additional Care Plan goals for specific interventions  Outcome: Progressing  Goal: Patient/Family Short Term Goal  Description: Patient's Short Term Goal:   3/12: remain safe, maintain adequate oxygenation    Interventions:   - medication per [facility]  - video monitoring  - frequent rounding  - See additional Care Plan goals for specific interventions  Outcome: Progressing

## 2023-03-12 NOTE — ED INITIAL ASSESSMENT (HPI)
Pt to ED via ambulance from [address] with reports of increased weakness and AMS. Per EMS, daughter reported pt seeming not like herself, concerned for possible UTI. Pt also tested covid positive as of today per report. Hx of dementia.

## 2023-03-12 NOTE — PROGRESS NOTES
NURSING ADMISSION NOTE      Patient admitted via Cart  Oriented to room. Safety precautions initiated. Bed in low position. Call light in reach. Pt is alert to self. Room air, continuous pulse oxygen, O2 sating WNL. Tele-NSR. Redness on bilateral toes, heels, and leonor area. Mepilex on L heel, dressing C/D/I. Daughter reports that pt eats bite sized foods at nursing home. Dry cough. Daughter reports that pt is very anxious, impulsive, and has fallen several times at other  facilities. Purewick in place. Briefed. Admission navigator complete. MD paged. Pt and daughter updated on POC, all questions and concerns addressed, daughter verbalized understanding. Safety precautions in place, no further needs at this time.

## 2023-03-13 ENCOUNTER — APPOINTMENT (OUTPATIENT)
Dept: GENERAL RADIOLOGY | Facility: HOSPITAL | Age: 78
End: 2023-03-13
Attending: HOSPITALIST
Payer: MEDICARE

## 2023-03-13 LAB
CRP SERPL-MCNC: 1.63 MG/DL (ref ?–0.3)
D DIMER PPP FEU-MCNC: 1.34 UG/ML FEU (ref ?–0.78)
DEPRECATED HBV CORE AB SER IA-ACNC: 57 NG/ML
GLUCOSE BLD-MCNC: 102 MG/DL (ref 70–99)
GLUCOSE BLD-MCNC: 153 MG/DL (ref 70–99)
GLUCOSE BLD-MCNC: 204 MG/DL (ref 70–99)
GLUCOSE BLD-MCNC: 95 MG/DL (ref 70–99)
LDH SERPL L TO P-CCNC: 232 U/L

## 2023-03-13 PROCEDURE — 99233 SBSQ HOSP IP/OBS HIGH 50: CPT | Performed by: HOSPITALIST

## 2023-03-13 PROCEDURE — 74018 RADEX ABDOMEN 1 VIEW: CPT | Performed by: HOSPITALIST

## 2023-03-13 RX ORDER — BENZONATATE 100 MG/1
100 CAPSULE ORAL 3 TIMES DAILY PRN
Status: DISCONTINUED | OUTPATIENT
Start: 2023-03-13 | End: 2023-03-15

## 2023-03-13 NOTE — CM/SW NOTE
Department  notified of request for talia MCLEOD referrals started. Assigned CM/SW to follow up with pt/family on further discharge planning.     Radha Zavala  [facility]

## 2023-03-13 NOTE — CM/SW NOTE
Pt is a 66 yo female admitted for +Covid on 3/12. Pt is a long-term resident at The [place] [address]. Updates sent to [facility]. Pt will return to [facility] upon dc.   SW following.    03/13/23 1200   CM/SW Referral Data   Referral Source Physician   Reason for Referral Discharge planning   Patient [id number] [facility] Acute Care Provider Upon Admission   (The [address] [place] [address] [address])   Discharge Needs   Anticipated D/C needs Long term care facility

## 2023-03-13 NOTE — PLAN OF CARE
Pt is aox1-2, very drowsy. VSS, afebrile. RA, tele NSR. Pt is wheelchair bound. Pt had large BM this AM. Pt is incontinent, purewick in place. Caryn FRANCO with abd xray results. Lovenox. QID accucheck. IV Saline locked. Carb controlled diet 1:1 feed. Daughter has been updated on POC.        Problem: RESPIRATORY - ADULT  Goal: Achieves optimal ventilation and oxygenation  Description: INTERVENTIONS:  - Assess for changes in respiratory status  - Assess for changes in mentation and behavior  - Position to facilitate oxygenation and minimize respiratory effort  - Oxygen supplementation based on oxygen saturation or ABGs  - Provide Smoking Cessation handout, if applicable  - Encourage broncho-pulmonary hygiene including cough, deep breathe, Incentive Spirometry  - Assess the need for suctioning and perform as needed  - Assess and instruct to report SOB or any respiratory difficulty  - Respiratory Therapy support as indicated  - Manage/alleviate anxiety  - Monitor for signs/symptoms of CO2 retention  Outcome: Progressing     Problem: Diabetes/Glucose Control  Goal: Glucose maintained within prescribed range  Description: INTERVENTIONS:  - Monitor Blood Glucose as ordered  - Assess for signs and symptoms of hyperglycemia and hypoglycemia  - Administer ordered medications to maintain glucose within target range  - Assess barriers to adequate nutritional intake and initiate nutrition consult as needed  - Instruct patient on self management of diabetes  Outcome: Progressing

## 2023-03-14 LAB
CRP SERPL-MCNC: 6.19 MG/DL (ref ?–0.3)
D DIMER PPP FEU-MCNC: 1.16 UG/ML FEU (ref ?–0.78)
DEPRECATED HBV CORE AB SER IA-ACNC: 117.9 NG/ML
GLUCOSE BLD-MCNC: 102 MG/DL (ref 70–99)
GLUCOSE BLD-MCNC: 113 MG/DL (ref 70–99)
GLUCOSE BLD-MCNC: 181 MG/DL (ref 70–99)
GLUCOSE BLD-MCNC: 183 MG/DL (ref 70–99)
GLUCOSE BLD-MCNC: 200 MG/DL (ref 70–99)
LDH SERPL L TO P-CCNC: 249 U/L

## 2023-03-14 PROCEDURE — 99233 SBSQ HOSP IP/OBS HIGH 50: CPT | Performed by: HOSPITALIST

## 2023-03-14 RX ORDER — RISPERIDONE 0.5 MG/1
0.5 TABLET ORAL
Status: DISCONTINUED | OUTPATIENT
Start: 2023-03-15 | End: 2023-03-15

## 2023-03-14 NOTE — OCCUPATIONAL THERAPY NOTE
OT orders received and pt chart reviewed. Att'd to see pt this AM. Pt refusing therapy. Per conversation with [facility] staff, pt is total assist at baseline for all ADLs and requires some verbal cues during self feeding. Pt transfers from bed <> chair at baseline, but required MAX A for transfers at baseline. PT is following pt for transfers. OT will sign off at this time and defer to PT for transfer training. Please re-order OT as needed should pt express specific self-care concerns or experience change in functional status.

## 2023-03-14 NOTE — PLAN OF CARE
Pt is a&ox2, forgetful. Lethargic. RA sating >92%. Tele NSR. Hypotensive in the low 90's- MD aware. EP (cardiac). Lovenox. [place] monitor in place. Remdesivir day 3. Briefed/incontinent, utilizing purewick. WC bound is baseline. Tolerating a diabetic diet w/ QID accuchecks. Takes pills crushed in applesauce. No c/o pain, n/v/d. Safety precautions in place. Pt updated on poc. No further needs at this time.     Problem: Diabetes/Glucose Control  Goal: Glucose maintained within prescribed range  Description: INTERVENTIONS:  - Monitor Blood Glucose as ordered  - Assess for signs and symptoms of hyperglycemia and hypoglycemia  - Administer ordered medications to maintain glucose within target range  - Assess barriers to adequate nutritional intake and initiate nutrition consult as needed  - Instruct patient on self management of diabetes  Outcome: Progressing

## 2023-03-14 NOTE — PLAN OF CARE
Alert x1-2. Room air. NSR on tele. Voids per purewick. Denies any c/o pain. Wheelchair bound per baseline. Takes pills crushed with applesauce. No complaints at this time. All needs met. Call light within pt reach. Bed alarm on. Fall precautions in place. Problem: Diabetes/Glucose Control  Goal: Glucose maintained within prescribed range  Description: INTERVENTIONS:  - Monitor Blood Glucose as ordered  - Assess for signs and symptoms of hyperglycemia and hypoglycemia  - Administer ordered medications to maintain glucose within target range  - Assess barriers to adequate nutritional intake and initiate nutrition consult as needed  - Instruct patient on self management of diabetes  Outcome: Progressing     Problem: Patient/Family Goals  Goal: Patient/Family Long Term Goal  Description: Patient's Long Term Goal: Discharge with adequate resources    Interventions:  - Identify barriers to discharge w/pt and caregiver  - Include patient/family/discharge partner in discharge planning  - Arrange for needed discharge resources and transportation as appropriate  - Identify discharge learning needs   - Consider post-discharge preferences of patient/family/discharge partner  - Assess patient's ability to be responsible for managing their own health  - Refer to Case Management Department for coordinating discharge planning if the patient needs post-hospital services    - See additional Care Plan goals for specific interventions  Outcome: Progressing  Goal: Patient/Family Short Term Goal  Description: Patient's Short Term Goal:   3/12: remain safe, maintain adequate oxygenation  3/12 noc: Be afebrile.    3/13 noc: sleep through the night, more awake during daytime    Interventions:   - cluster care  - medication per [facility]  - video monitoring  - frequent rounding  - See additional Care Plan goals for specific interventions  Outcome: Progressing     Problem: RESPIRATORY - ADULT  Goal: Achieves optimal ventilation and oxygenation  Description: INTERVENTIONS:  - Assess for changes in respiratory status  - Assess for changes in mentation and behavior  - Position to facilitate oxygenation and minimize respiratory effort  - Oxygen supplementation based on oxygen saturation or ABGs  - Provide Smoking Cessation handout, if applicable  - Encourage broncho-pulmonary hygiene including cough, deep breathe, Incentive Spirometry  - Assess the need for suctioning and perform as needed  - Assess and instruct to report SOB or any respiratory difficulty  - Respiratory Therapy support as indicated  - Manage/alleviate anxiety  - Monitor for signs/symptoms of CO2 retention  Outcome: Progressing

## 2023-03-15 VITALS
HEART RATE: 64 BPM | SYSTOLIC BLOOD PRESSURE: 126 MMHG | DIASTOLIC BLOOD PRESSURE: 76 MMHG | WEIGHT: 145.5 LBS | OXYGEN SATURATION: 96 % | TEMPERATURE: 98 F | BODY MASS INDEX: 24.84 KG/M2 | HEIGHT: 64 IN | RESPIRATION RATE: 20 BRPM

## 2023-03-15 LAB
CRP SERPL-MCNC: 3.43 MG/DL (ref ?–0.3)
D DIMER PPP FEU-MCNC: 0.69 UG/ML FEU (ref ?–0.78)
DEPRECATED HBV CORE AB SER IA-ACNC: 94.1 NG/ML
GLUCOSE BLD-MCNC: 117 MG/DL (ref 70–99)
GLUCOSE BLD-MCNC: 147 MG/DL (ref 70–99)
GLUCOSE BLD-MCNC: 180 MG/DL (ref 70–99)
LDH SERPL L TO P-CCNC: 207 U/L

## 2023-03-15 PROCEDURE — 99239 HOSP IP/OBS DSCHRG MGMT >30: CPT | Performed by: HOSPITALIST

## 2023-03-15 RX ORDER — TRAMADOL HYDROCHLORIDE 50 MG/1
50 TABLET ORAL 2 TIMES DAILY
Qty: 15 TABLET | Refills: 0 | Status: SHIPPED | OUTPATIENT
Start: 2023-03-15

## 2023-03-15 NOTE — PROGRESS NOTES
NURSING DISCHARGE NOTE     Discharged to [facility] via ambulance. Accompanied by Support staff  Belongings Taken by patient/family. Patient is stable to discharge to The [address] [place] [address]. Medically cleared by all consults and hospitalist. Reviewed discharge instructions about medications and follow-up appointments with the patient. Patient verbalized understanding. Questions and concerns addressed. IV line dc'ed. Pressure and dressing applied. Clean/Dry/Intact. Discharge navigator completed. Tele box removed,cleaned and returned to [facility]. All belongings sent with patient. 1630: Called to give report- was told the RN would call back for report. Awaiting call back. 1700: Report given to [facility]. Per RN Gio French they do not carry tramadol- MD notified, rx printed and given to pt's dtr.

## 2023-03-15 NOTE — CM/SW NOTE
Per RN, pt cleared to dc to [facility]. Updates sent- message left- await return call on plan for return today.     Esmer Ritchie, ROLA  /Discharge Planner

## 2023-03-15 NOTE — CM/SW NOTE
03/15/23 1503   Discharge disposition   Expected discharge disposition [address] [facility] Provider   (The [facility])   Discharge transportation [address]       Pt cleared for [employer] today - [facility] accept . [facility] to transport at [facility]. RN to call report to [phone]. Daughter updated on dc plans.     Mely Ortiz LCSW  /Discharge Planner

## 2023-03-15 NOTE — PLAN OF CARE
Alert x2. RA sats WNL. NSR on tele. Briefed, Althia Bolster in place. Denies any c/o pain. Wheelchair bound per baseline. Takes pills crushed with applesauce. 3/3 Remdesivir given tonight. All needs met. Call light within pt reach. Bed alarm on. Fall precautions in place. Problem: Diabetes/Glucose Control  Goal: Glucose maintained within prescribed range  Description: INTERVENTIONS:  - Monitor Blood Glucose as ordered  - Assess for signs and symptoms of hyperglycemia and hypoglycemia  - Administer ordered medications to maintain glucose within target range  - Assess barriers to adequate nutritional intake and initiate nutrition consult as needed  - Instruct patient on self management of diabetes  Outcome: Progressing     Problem: Patient/Family Goals  Goal: Patient/Family Long Term Goal  Description: Patient's Long Term Goal: Discharge with adequate resources    Interventions:  - Identify barriers to discharge w/pt and caregiver  - Include patient/family/discharge partner in discharge planning  - Arrange for needed discharge resources and transportation as appropriate  - Identify discharge learning needs   - Consider post-discharge preferences of patient/family/discharge partner  - Assess patient's ability to be responsible for managing their own health  - Refer to Case Management Department for coordinating discharge planning if the patient needs post-hospital services    - See additional Care Plan goals for specific interventions  Outcome: Progressing  Goal: Patient/Family Short Term Goal  Description: Patient's Short Term Goal:   3/12: remain safe, maintain adequate oxygenation  3/12 noc: Be afebrile.    3/13 noc: sleep through the night, more awake during daytime  3/14 noc: remain safe    Interventions:   - cluster care  - fall precautions  - bed alarm  - medication per [facility]  - video monitoring  - frequent rounding  - See additional Care Plan goals for specific interventions  Outcome: Progressing     Problem: RESPIRATORY - ADULT  Goal: Achieves optimal ventilation and oxygenation  Description: INTERVENTIONS:  - Assess for changes in respiratory status  - Assess for changes in mentation and behavior  - Position to facilitate oxygenation and minimize respiratory effort  - Oxygen supplementation based on oxygen saturation or ABGs  - Provide Smoking Cessation handout, if applicable  - Encourage broncho-pulmonary hygiene including cough, deep breathe, Incentive Spirometry  - Assess the need for suctioning and perform as needed  - Assess and instruct to report SOB or any respiratory difficulty  - Respiratory Therapy support as indicated  - Manage/alleviate anxiety  - Monitor for signs/symptoms of CO2 retention  Outcome: Progressing

## 2023-06-12 ENCOUNTER — APPOINTMENT (OUTPATIENT)
Dept: CT IMAGING | Facility: HOSPITAL | Age: 78
End: 2023-06-12
Attending: EMERGENCY MEDICINE
Payer: MEDICARE

## 2023-06-12 ENCOUNTER — HOSPITAL ENCOUNTER (EMERGENCY)
Facility: HOSPITAL | Age: 78
Discharge: HOME | End: 2023-06-12
Attending: EMERGENCY MEDICINE
Payer: MEDICARE

## 2023-06-12 ENCOUNTER — HOSPITAL ENCOUNTER (EMERGENCY)
Facility: HOSPITAL | Age: 78
Discharge: STILL ADMITTED OR ADMINISTRATIVE | End: 2023-06-13
Payer: MEDICARE

## 2023-06-12 VITALS
RESPIRATION RATE: 18 BRPM | TEMPERATURE: 98 F | DIASTOLIC BLOOD PRESSURE: 64 MMHG | HEART RATE: 62 BPM | WEIGHT: 145.5 LBS | HEIGHT: 65.75 IN | OXYGEN SATURATION: 99 % | BODY MASS INDEX: 23.67 KG/M2 | SYSTOLIC BLOOD PRESSURE: 104 MMHG

## 2023-06-12 DIAGNOSIS — S09.8XXA BLUNT HEAD INJURY, INITIAL ENCOUNTER: Primary | ICD-10-CM

## 2023-06-12 DIAGNOSIS — S00.03XA CONTUSION OF SCALP, INITIAL ENCOUNTER: ICD-10-CM

## 2023-06-12 PROCEDURE — 99284 EMERGENCY DEPT VISIT MOD MDM: CPT

## 2023-06-12 PROCEDURE — 70450 CT HEAD/BRAIN W/O DYE: CPT | Performed by: EMERGENCY MEDICINE

## 2023-06-12 PROCEDURE — 72125 CT NECK SPINE W/O DYE: CPT | Performed by: EMERGENCY MEDICINE

## 2023-06-12 NOTE — ED INITIAL ASSESSMENT (HPI)
Pt was brought in by EMS from [facility] for an unwitnessed fall. Pt fell out of bed. Pt's baseline is A&Ox1. C-collar placed by medics. Pt has a hematoma to the posterior of her head. No blood thinners.

## 2023-12-27 ENCOUNTER — APPOINTMENT (OUTPATIENT)
Dept: GENERAL RADIOLOGY | Facility: HOSPITAL | Age: 78
DRG: 981 | End: 2023-12-27
Attending: STUDENT IN AN ORGANIZED HEALTH CARE EDUCATION/TRAINING PROGRAM
Payer: MEDICARE

## 2023-12-27 ENCOUNTER — HOSPITAL ENCOUNTER (INPATIENT)
Facility: HOSPITAL | Age: 78
LOS: 4 days | Discharge: NURSING, REHAB OR LONG-TERM CARE | DRG: 981 | End: 2023-12-31
Attending: STUDENT IN AN ORGANIZED HEALTH CARE EDUCATION/TRAINING PROGRAM | Admitting: INTERNAL MEDICINE
Payer: MEDICARE

## 2023-12-27 DIAGNOSIS — U07.1 COVID-19 VIRUS INFECTION: Primary | ICD-10-CM

## 2023-12-27 DIAGNOSIS — K92.2 GI BLEED: ICD-10-CM

## 2023-12-27 PROBLEM — J96.01 ACUTE RESPIRATORY FAILURE WITH HYPOXIA (HCC): Status: ACTIVE | Noted: 2023-12-27

## 2023-12-27 LAB
ALBUMIN SERPL-MCNC: 3 G/DL (ref 3.4–5)
ALBUMIN/GLOB SERPL: 0.9 {RATIO} (ref 1–2)
ALP LIVER SERPL-CCNC: 58 U/L
ALT SERPL-CCNC: 29 U/L
ANION GAP SERPL CALC-SCNC: 5 MMOL/L (ref 0–18)
AST SERPL-CCNC: 31 U/L (ref 15–37)
BASOPHILS # BLD AUTO: 0.01 X10(3) UL (ref 0–0.2)
BASOPHILS NFR BLD AUTO: 0.1 %
BILIRUB SERPL-MCNC: 0.6 MG/DL (ref 0.1–2)
BUN BLD-MCNC: 16 MG/DL (ref 9–23)
CALCIUM BLD-MCNC: 8.5 MG/DL (ref 8.5–10.1)
CHLORIDE SERPL-SCNC: 108 MMOL/L (ref 98–112)
CO2 SERPL-SCNC: 26 MMOL/L (ref 21–32)
CREAT BLD-MCNC: 0.62 MG/DL
EGFRCR SERPLBLD CKD-EPI 2021: 91 ML/MIN/1.73M2 (ref 60–?)
EOSINOPHIL # BLD AUTO: 0 X10(3) UL (ref 0–0.7)
EOSINOPHIL NFR BLD AUTO: 0 %
ERYTHROCYTE [DISTWIDTH] IN BLOOD BY AUTOMATED COUNT: 13.2 %
EST. AVERAGE GLUCOSE BLD GHB EST-MCNC: 114 MG/DL (ref 68–126)
GLOBULIN PLAS-MCNC: 3.2 G/DL (ref 2.8–4.4)
GLUCOSE BLD-MCNC: 133 MG/DL (ref 70–99)
GLUCOSE BLD-MCNC: 151 MG/DL (ref 70–99)
GLUCOSE BLD-MCNC: 156 MG/DL (ref 70–99)
GLUCOSE BLD-MCNC: 202 MG/DL (ref 70–99)
HBA1C MFR BLD: 5.6 % (ref ?–5.7)
HCT VFR BLD AUTO: 38.5 %
HGB BLD-MCNC: 12.7 G/DL
IMM GRANULOCYTES # BLD AUTO: 0.06 X10(3) UL (ref 0–1)
IMM GRANULOCYTES NFR BLD: 0.5 %
LYMPHOCYTES # BLD AUTO: 0.71 X10(3) UL (ref 1–4)
LYMPHOCYTES NFR BLD AUTO: 5.5 %
MCH RBC QN AUTO: 28.7 PG (ref 26–34)
MCHC RBC AUTO-ENTMCNC: 33 G/DL (ref 31–37)
MCV RBC AUTO: 86.9 FL
MONOCYTES # BLD AUTO: 0.76 X10(3) UL (ref 0.1–1)
MONOCYTES NFR BLD AUTO: 5.9 %
NEUTROPHILS # BLD AUTO: 11.37 X10 (3) UL (ref 1.5–7.7)
NEUTROPHILS # BLD AUTO: 11.37 X10(3) UL (ref 1.5–7.7)
NEUTROPHILS NFR BLD AUTO: 88 %
OSMOLALITY SERPL CALC.SUM OF ELEC: 292 MOSM/KG (ref 275–295)
PLATELET # BLD AUTO: 186 10(3)UL (ref 150–450)
POTASSIUM SERPL-SCNC: 3.1 MMOL/L (ref 3.5–5.1)
PROT SERPL-MCNC: 6.2 G/DL (ref 6.4–8.2)
RBC # BLD AUTO: 4.43 X10(6)UL
SARS-COV-2 RNA RESP QL NAA+PROBE: DETECTED
SODIUM SERPL-SCNC: 139 MMOL/L (ref 136–145)
WBC # BLD AUTO: 12.9 X10(3) UL (ref 4–11)

## 2023-12-27 PROCEDURE — 71045 X-RAY EXAM CHEST 1 VIEW: CPT | Performed by: STUDENT IN AN ORGANIZED HEALTH CARE EDUCATION/TRAINING PROGRAM

## 2023-12-27 PROCEDURE — XW033E5 INTRODUCTION OF REMDESIVIR ANTI-INFECTIVE INTO PERIPHERAL VEIN, PERCUTANEOUS APPROACH, NEW TECHNOLOGY GROUP 5: ICD-10-PCS | Performed by: INTERNAL MEDICINE

## 2023-12-27 PROCEDURE — 99223 1ST HOSP IP/OBS HIGH 75: CPT | Performed by: STUDENT IN AN ORGANIZED HEALTH CARE EDUCATION/TRAINING PROGRAM

## 2023-12-27 RX ORDER — NICOTINE POLACRILEX 4 MG
30 LOZENGE BUCCAL
Status: DISCONTINUED | OUTPATIENT
Start: 2023-12-27 | End: 2023-12-31

## 2023-12-27 RX ORDER — TRAMADOL HYDROCHLORIDE 50 MG/1
50 TABLET ORAL EVERY 6 HOURS PRN
Status: DISCONTINUED | OUTPATIENT
Start: 2023-12-27 | End: 2023-12-31

## 2023-12-27 RX ORDER — SENNOSIDES 8.6 MG
8.6 TABLET ORAL 2 TIMES DAILY PRN
Status: DISCONTINUED | OUTPATIENT
Start: 2023-12-27 | End: 2023-12-31

## 2023-12-27 RX ORDER — LISINOPRIL 10 MG/1
10 TABLET ORAL DAILY
Status: DISCONTINUED | OUTPATIENT
Start: 2023-12-28 | End: 2023-12-31

## 2023-12-27 RX ORDER — GUAIFENESIN 600 MG/1
600 TABLET, EXTENDED RELEASE ORAL 2 TIMES DAILY PRN
Status: DISCONTINUED | OUTPATIENT
Start: 2023-12-27 | End: 2023-12-31

## 2023-12-27 RX ORDER — POLYETHYLENE GLYCOL 3350 17 G/17G
17 POWDER, FOR SOLUTION ORAL DAILY PRN
Status: DISCONTINUED | OUTPATIENT
Start: 2023-12-27 | End: 2023-12-31

## 2023-12-27 RX ORDER — ECHINACEA PURPUREA EXTRACT 125 MG
1 TABLET ORAL
Status: DISCONTINUED | OUTPATIENT
Start: 2023-12-27 | End: 2023-12-31

## 2023-12-27 RX ORDER — MELATONIN
3 NIGHTLY PRN
Status: DISCONTINUED | OUTPATIENT
Start: 2023-12-27 | End: 2023-12-31

## 2023-12-27 RX ORDER — FUROSEMIDE 20 MG/1
20 TABLET ORAL
Status: DISCONTINUED | OUTPATIENT
Start: 2023-12-29 | End: 2023-12-31

## 2023-12-27 RX ORDER — ACETAMINOPHEN 500 MG
500 TABLET ORAL EVERY 4 HOURS PRN
Status: DISCONTINUED | OUTPATIENT
Start: 2023-12-27 | End: 2023-12-31

## 2023-12-27 RX ORDER — METOCLOPRAMIDE HYDROCHLORIDE 5 MG/ML
10 INJECTION INTRAMUSCULAR; INTRAVENOUS EVERY 8 HOURS PRN
Status: DISCONTINUED | OUTPATIENT
Start: 2023-12-27 | End: 2023-12-31

## 2023-12-27 RX ORDER — ENEMA 19; 7 G/133ML; G/133ML
1 ENEMA RECTAL ONCE AS NEEDED
Status: DISCONTINUED | OUTPATIENT
Start: 2023-12-27 | End: 2023-12-31

## 2023-12-27 RX ORDER — NICOTINE POLACRILEX 4 MG
15 LOZENGE BUCCAL
Status: DISCONTINUED | OUTPATIENT
Start: 2023-12-27 | End: 2023-12-31

## 2023-12-27 RX ORDER — BENZONATATE 200 MG/1
200 CAPSULE ORAL 3 TIMES DAILY PRN
Status: DISCONTINUED | OUTPATIENT
Start: 2023-12-27 | End: 2023-12-31

## 2023-12-27 RX ORDER — DEXTROSE MONOHYDRATE 25 G/50ML
50 INJECTION, SOLUTION INTRAVENOUS
Status: DISCONTINUED | OUTPATIENT
Start: 2023-12-27 | End: 2023-12-31

## 2023-12-27 RX ORDER — ENOXAPARIN SODIUM 100 MG/ML
40 INJECTION SUBCUTANEOUS DAILY
Status: DISCONTINUED | OUTPATIENT
Start: 2023-12-27 | End: 2023-12-31

## 2023-12-27 RX ORDER — BISACODYL 10 MG
10 SUPPOSITORY, RECTAL RECTAL
Status: DISCONTINUED | OUTPATIENT
Start: 2023-12-27 | End: 2023-12-31

## 2023-12-27 RX ORDER — ONDANSETRON 2 MG/ML
4 INJECTION INTRAMUSCULAR; INTRAVENOUS EVERY 6 HOURS PRN
Status: DISCONTINUED | OUTPATIENT
Start: 2023-12-27 | End: 2023-12-31

## 2023-12-27 RX ORDER — SENNOSIDES 8.6 MG
17.2 TABLET ORAL NIGHTLY PRN
Status: DISCONTINUED | OUTPATIENT
Start: 2023-12-27 | End: 2023-12-31

## 2023-12-27 RX ORDER — RISPERIDONE 0.5 MG/1
0.5 TABLET ORAL
Status: DISCONTINUED | OUTPATIENT
Start: 2023-12-28 | End: 2023-12-31

## 2023-12-27 NOTE — ED QUICK NOTES
Orders for admission, patient is aware of plan and ready to go upstairs. Any questions, please call ED RN es at extension [phone].      Patient Covid vaccination status: Fully vaccinated     COVID Test Ordered in ED: Rapid SARS-CoV-2 by PCR    COVID Suspicion at Admission: covid +    Running Infusions:  fluids/ K+    Mental Status/LOC at time of transport: a&ox1    Other pertinent information:   CIWA score: N/A   NIH score:  N/A

## 2023-12-27 NOTE — ED INITIAL ASSESSMENT (HPI)
Pt to ED from [facility], covid +, increased fatigue and fevers starting yesterday, last dose of tylenol 11 am, baseline A&Ox1-2, rousable to voice

## 2023-12-28 LAB
ALBUMIN SERPL-MCNC: 2.9 G/DL (ref 3.4–5)
ALBUMIN/GLOB SERPL: 0.9 {RATIO} (ref 1–2)
ALP LIVER SERPL-CCNC: 56 U/L
ALT SERPL-CCNC: 22 U/L
ANION GAP SERPL CALC-SCNC: 4 MMOL/L (ref 0–18)
AST SERPL-CCNC: 21 U/L (ref 15–37)
BASOPHILS # BLD AUTO: 0.03 X10(3) UL (ref 0–0.2)
BASOPHILS NFR BLD AUTO: 0.4 %
BILIRUB SERPL-MCNC: 0.3 MG/DL (ref 0.1–2)
BUN BLD-MCNC: 20 MG/DL (ref 9–23)
CALCIUM BLD-MCNC: 8.8 MG/DL (ref 8.5–10.1)
CHLORIDE SERPL-SCNC: 108 MMOL/L (ref 98–112)
CO2 SERPL-SCNC: 26 MMOL/L (ref 21–32)
CREAT BLD-MCNC: 0.59 MG/DL
EGFRCR SERPLBLD CKD-EPI 2021: 92 ML/MIN/1.73M2 (ref 60–?)
EOSINOPHIL # BLD AUTO: 0.03 X10(3) UL (ref 0–0.7)
EOSINOPHIL NFR BLD AUTO: 0.4 %
ERYTHROCYTE [DISTWIDTH] IN BLOOD BY AUTOMATED COUNT: 13.3 %
GLOBULIN PLAS-MCNC: 3.3 G/DL (ref 2.8–4.4)
GLUCOSE BLD-MCNC: 128 MG/DL (ref 70–99)
GLUCOSE BLD-MCNC: 141 MG/DL (ref 70–99)
GLUCOSE BLD-MCNC: 141 MG/DL (ref 70–99)
GLUCOSE BLD-MCNC: 161 MG/DL (ref 70–99)
GLUCOSE BLD-MCNC: 89 MG/DL (ref 70–99)
HCT VFR BLD AUTO: 36.8 %
HGB BLD-MCNC: 11.5 G/DL
IMM GRANULOCYTES # BLD AUTO: 0.01 X10(3) UL (ref 0–1)
IMM GRANULOCYTES NFR BLD: 0.1 %
LYMPHOCYTES # BLD AUTO: 0.74 X10(3) UL (ref 1–4)
LYMPHOCYTES NFR BLD AUTO: 10.3 %
MCH RBC QN AUTO: 28.3 PG (ref 26–34)
MCHC RBC AUTO-ENTMCNC: 31.3 G/DL (ref 31–37)
MCV RBC AUTO: 90.4 FL
MONOCYTES # BLD AUTO: 0.43 X10(3) UL (ref 0.1–1)
MONOCYTES NFR BLD AUTO: 6 %
NEUTROPHILS # BLD AUTO: 5.95 X10 (3) UL (ref 1.5–7.7)
NEUTROPHILS # BLD AUTO: 5.95 X10(3) UL (ref 1.5–7.7)
NEUTROPHILS NFR BLD AUTO: 82.8 %
OSMOLALITY SERPL CALC.SUM OF ELEC: 292 MOSM/KG (ref 275–295)
PLATELET # BLD AUTO: 160 10(3)UL (ref 150–450)
POTASSIUM SERPL-SCNC: 3.9 MMOL/L (ref 3.5–5.1)
PROT SERPL-MCNC: 6.2 G/DL (ref 6.4–8.2)
RBC # BLD AUTO: 4.07 X10(6)UL
SODIUM SERPL-SCNC: 138 MMOL/L (ref 136–145)
WBC # BLD AUTO: 7.2 X10(3) UL (ref 4–11)

## 2023-12-28 PROCEDURE — 99232 SBSQ HOSP IP/OBS MODERATE 35: CPT | Performed by: INTERNAL MEDICINE

## 2023-12-28 NOTE — PROGRESS NOTES
AO x1/2 which is bl. Hand tremors. Hx Dementia. RA. Tele NSR. Lovenox. Purewick. Incontinent. Brirefed. No reports of pain. Up x1 walker. QID accuchecks. IV SL. Regular diet 1 to 1 order set up. Takes pills crushed with applesauce. Pt resting in bed with call light in reach. No further needs at this moment.

## 2023-12-28 NOTE — PLAN OF CARE
Pt is a&ox2, forgetful. HEATHER braun WNL. NSR on tele. Remdesivir. Lovenox. Briefed/incont, utilizing purewick. Up x1 w/ walker. Tolerating a diabetic diet w/ QID accuchecks. Order setup, 1:1 feed. Safety precautions in place. No further needs at this time.     Problem: Diabetes/Glucose Control  Goal: Glucose maintained within prescribed range  Description: INTERVENTIONS:  - Monitor Blood Glucose as ordered  - Assess for signs and symptoms of hyperglycemia and hypoglycemia  - Administer ordered medications to maintain glucose within target range  - Assess barriers to adequate nutritional intake and initiate nutrition consult as needed  - Instruct patient on self management of diabetes  Outcome: Progressing

## 2023-12-28 NOTE — PROGRESS NOTES
NURSING ADMISSION NOTE      Patient admitted via Ambulance  Oriented to room. Safety precautions initiated. Bed in low position. Call light in reach. 12/27 AM: Pt is A/O x 1-2, alert to self and family, from the [facility]. Lung sounds are diminished, on room air. BP and HR are WNL, NSR on tele. BM yesterday, voids with purewick. Baseline: 1 with walker. Pills crushed with applesauce, 1 to 1 feed. QID Accucheck. Tramadol in AM and in Evening, with [place].  Remdesivir Day 1

## 2023-12-29 ENCOUNTER — APPOINTMENT (OUTPATIENT)
Dept: CT IMAGING | Facility: HOSPITAL | Age: 78
DRG: 981 | End: 2023-12-29
Attending: INTERNAL MEDICINE
Payer: MEDICARE

## 2023-12-29 ENCOUNTER — ANESTHESIA EVENT (OUTPATIENT)
Dept: ENDOSCOPY | Facility: HOSPITAL | Age: 78
DRG: 981 | End: 2023-12-29
Payer: MEDICARE

## 2023-12-29 ENCOUNTER — ANESTHESIA (OUTPATIENT)
Dept: ENDOSCOPY | Facility: HOSPITAL | Age: 78
DRG: 981 | End: 2023-12-29
Payer: MEDICARE

## 2023-12-29 ENCOUNTER — APPOINTMENT (OUTPATIENT)
Dept: GENERAL RADIOLOGY | Facility: HOSPITAL | Age: 78
DRG: 981 | End: 2023-12-29
Attending: INTERNAL MEDICINE
Payer: MEDICARE

## 2023-12-29 PROBLEM — J96.01 ACUTE RESPIRATORY FAILURE WITH HYPOXIA (HCC): Status: RESOLVED | Noted: 2023-12-27 | Resolved: 2023-12-29

## 2023-12-29 LAB
ANION GAP SERPL CALC-SCNC: 8 MMOL/L (ref 0–18)
BUN BLD-MCNC: 15 MG/DL (ref 9–23)
C DIFF TOX B STL QL: NEGATIVE
CALCIUM BLD-MCNC: 8.8 MG/DL (ref 8.5–10.1)
CHLORIDE SERPL-SCNC: 109 MMOL/L (ref 98–112)
CO2 SERPL-SCNC: 22 MMOL/L (ref 21–32)
CREAT BLD-MCNC: 0.44 MG/DL
EGFRCR SERPLBLD CKD-EPI 2021: 98 ML/MIN/1.73M2 (ref 60–?)
GLUCOSE BLD-MCNC: 179 MG/DL (ref 70–99)
GLUCOSE BLD-MCNC: 188 MG/DL (ref 70–99)
GLUCOSE BLD-MCNC: 190 MG/DL (ref 70–99)
GLUCOSE BLD-MCNC: 196 MG/DL (ref 70–99)
GLUCOSE BLD-MCNC: 218 MG/DL (ref 70–99)
OSMOLALITY SERPL CALC.SUM OF ELEC: 293 MOSM/KG (ref 275–295)
POTASSIUM SERPL-SCNC: 3.5 MMOL/L (ref 3.5–5.1)
SODIUM SERPL-SCNC: 139 MMOL/L (ref 136–145)

## 2023-12-29 PROCEDURE — 74177 CT ABD & PELVIS W/CONTRAST: CPT | Performed by: INTERNAL MEDICINE

## 2023-12-29 PROCEDURE — 74018 RADEX ABDOMEN 1 VIEW: CPT | Performed by: INTERNAL MEDICINE

## 2023-12-29 PROCEDURE — 0D9M8ZZ DRAINAGE OF DESCENDING COLON, VIA NATURAL OR ARTIFICIAL OPENING ENDOSCOPIC: ICD-10-PCS | Performed by: STUDENT IN AN ORGANIZED HEALTH CARE EDUCATION/TRAINING PROGRAM

## 2023-12-29 PROCEDURE — 99233 SBSQ HOSP IP/OBS HIGH 50: CPT | Performed by: INTERNAL MEDICINE

## 2023-12-29 RX ORDER — NALOXONE HYDROCHLORIDE 0.4 MG/ML
0.08 INJECTION, SOLUTION INTRAMUSCULAR; INTRAVENOUS; SUBCUTANEOUS AS NEEDED
Status: DISCONTINUED | OUTPATIENT
Start: 2023-12-29 | End: 2023-12-29 | Stop reason: HOSPADM

## 2023-12-29 RX ORDER — DEXAMETHASONE SODIUM PHOSPHATE 4 MG/ML
VIAL (ML) INJECTION AS NEEDED
Status: DISCONTINUED | OUTPATIENT
Start: 2023-12-29 | End: 2023-12-29 | Stop reason: SURG

## 2023-12-29 RX ORDER — SIMETHICONE 80 MG
160 TABLET,CHEWABLE ORAL 4 TIMES DAILY PRN
Status: DISCONTINUED | OUTPATIENT
Start: 2023-12-29 | End: 2023-12-31

## 2023-12-29 RX ORDER — SODIUM CHLORIDE 9 MG/ML
INJECTION, SOLUTION INTRAVENOUS CONTINUOUS PRN
Status: DISCONTINUED | OUTPATIENT
Start: 2023-12-29 | End: 2023-12-29 | Stop reason: SURG

## 2023-12-29 RX ORDER — ONDANSETRON 2 MG/ML
INJECTION INTRAMUSCULAR; INTRAVENOUS AS NEEDED
Status: DISCONTINUED | OUTPATIENT
Start: 2023-12-29 | End: 2023-12-29 | Stop reason: SURG

## 2023-12-29 RX ORDER — ROCURONIUM BROMIDE 10 MG/ML
INJECTION, SOLUTION INTRAVENOUS AS NEEDED
Status: DISCONTINUED | OUTPATIENT
Start: 2023-12-29 | End: 2023-12-29 | Stop reason: SURG

## 2023-12-29 RX ORDER — HYDRALAZINE HYDROCHLORIDE 20 MG/ML
10 INJECTION INTRAMUSCULAR; INTRAVENOUS EVERY 6 HOURS PRN
Status: DISCONTINUED | OUTPATIENT
Start: 2023-12-29 | End: 2023-12-31

## 2023-12-29 RX ORDER — SODIUM CHLORIDE, SODIUM LACTATE, POTASSIUM CHLORIDE, CALCIUM CHLORIDE 600; 310; 30; 20 MG/100ML; MG/100ML; MG/100ML; MG/100ML
INJECTION, SOLUTION INTRAVENOUS CONTINUOUS
Status: DISCONTINUED | OUTPATIENT
Start: 2023-12-29 | End: 2023-12-29 | Stop reason: HOSPADM

## 2023-12-29 RX ORDER — EPHEDRINE SULFATE 50 MG/ML
INJECTION INTRAVENOUS AS NEEDED
Status: DISCONTINUED | OUTPATIENT
Start: 2023-12-29 | End: 2023-12-29 | Stop reason: SURG

## 2023-12-29 RX ORDER — LIDOCAINE HYDROCHLORIDE 10 MG/ML
INJECTION, SOLUTION EPIDURAL; INFILTRATION; INTRACAUDAL; PERINEURAL AS NEEDED
Status: DISCONTINUED | OUTPATIENT
Start: 2023-12-29 | End: 2023-12-29 | Stop reason: SURG

## 2023-12-29 RX ADMIN — ONDANSETRON 4 MG: 2 INJECTION INTRAMUSCULAR; INTRAVENOUS at 20:15:00

## 2023-12-29 RX ADMIN — LIDOCAINE HYDROCHLORIDE 50 MG: 10 INJECTION, SOLUTION EPIDURAL; INFILTRATION; INTRACAUDAL; PERINEURAL at 20:10:00

## 2023-12-29 RX ADMIN — SODIUM CHLORIDE: 9 INJECTION, SOLUTION INTRAVENOUS at 20:07:00

## 2023-12-29 RX ADMIN — DEXAMETHASONE SODIUM PHOSPHATE 4 MG: 4 MG/ML VIAL (ML) INJECTION at 20:15:00

## 2023-12-29 RX ADMIN — ROCURONIUM BROMIDE 10 MG: 10 INJECTION, SOLUTION INTRAVENOUS at 20:15:00

## 2023-12-29 RX ADMIN — EPHEDRINE SULFATE 10 MG: 50 INJECTION INTRAVENOUS at 20:28:00

## 2023-12-29 NOTE — PLAN OF CARE
Received pt A&Ox1. Confused.  on RA. Receiving remdesivir for covid treatment. NSR on tele. HTN noted throughout shift, MD notified - PRN hydralazine ordered with parameter. Lovenox for VTE prophylaxis. Tolerating diet, accuchecks QID. Voids via purwick. Up with assistance and walker. Mepilex to sacrum - blanchable redness. Denies Pain. Plan of care continues, no further needs at this time.      Problem: Diabetes/Glucose Control  Goal: Glucose maintained within prescribed range  Description: INTERVENTIONS:  - Monitor Blood Glucose as ordered  - Assess for signs and symptoms of hyperglycemia and hypoglycemia  - Administer ordered medications to maintain glucose within target range  - Assess barriers to adequate nutritional intake and initiate nutrition consult as needed  - Instruct patient on self management of diabetes  Outcome: Progressing     Problem: Patient/Family Goals  Goal: Patient/Family Long Term Goal  Description: Patient's Long Term Goal: discharge with appropriate resources    Interventions:  - See additional Care Plan goals for specific interventions  Outcome: Progressing  Goal: Patient/Family Short Term Goal  Description: Patient's Short Term Goal: 12/28 NOC: remain comfortable, sleep    Interventions:   - See additional Care Plan goals for specific interventions  Outcome: Progressing

## 2023-12-29 NOTE — PLAN OF CARE
Pt is a&ox2, forgetful at times. HEATHER braun WNL. NSR on tele. Remdesivir. Lovenox. Briefed/incont, utilizing purewick. Up x1 w/ walker. Tolerating a diabetic diet w/ QID accuchecks. Order setup, 1:1 feed. Safety precautions in place. No further needs at this time. Gi consulted. Pt will be NPO for possible scope pending CT results.     Problem: Diabetes/Glucose Control  Goal: Glucose maintained within prescribed range  Description: INTERVENTIONS:  - Monitor Blood Glucose as ordered  - Assess for signs and symptoms of hyperglycemia and hypoglycemia  - Administer ordered medications to maintain glucose within target range  - Assess barriers to adequate nutritional intake and initiate nutrition consult as needed  - Instruct patient on self management of diabetes  Outcome: Progressing

## 2023-12-30 ENCOUNTER — APPOINTMENT (OUTPATIENT)
Dept: GENERAL RADIOLOGY | Facility: HOSPITAL | Age: 78
DRG: 981 | End: 2023-12-30
Attending: STUDENT IN AN ORGANIZED HEALTH CARE EDUCATION/TRAINING PROGRAM
Payer: MEDICARE

## 2023-12-30 LAB
GLUCOSE BLD-MCNC: 184 MG/DL (ref 70–99)
GLUCOSE BLD-MCNC: 197 MG/DL (ref 70–99)
GLUCOSE BLD-MCNC: 202 MG/DL (ref 70–99)
GLUCOSE BLD-MCNC: 203 MG/DL (ref 70–99)

## 2023-12-30 PROCEDURE — 74019 RADEX ABDOMEN 2 VIEWS: CPT | Performed by: STUDENT IN AN ORGANIZED HEALTH CARE EDUCATION/TRAINING PROGRAM

## 2023-12-30 PROCEDURE — 99232 SBSQ HOSP IP/OBS MODERATE 35: CPT | Performed by: INTERNAL MEDICINE

## 2023-12-30 NOTE — PLAN OF CARE
Pt A&Ox2, hx dementia. Aspiration precautions in place. Room air, . Covid+, finished 3 doses Remdesivir. Tele, NSR. Afebrile Lovenox. Voids, up x1 w/ walker. Purewick and briefed. Emergent colonoscopy w/ decompression tonight. Abdomen is round, non-tender and soft after procedure with bowel sounds present. Cdiff(-). No c/o of pain, sob, n/v/d. PT to eval. NPO for abdominal XR w/ obstructive series in am. QID accuchecks, 1 to1 feed. Pt and pt's daughter updated on poc. No further needs at this time. Safety/fall precautions in place. Call light within reach.        Problem: Diabetes/Glucose Control  Goal: Glucose maintained within prescribed range  Description: INTERVENTIONS:  - Monitor Blood Glucose as ordered  - Assess for signs and symptoms of hyperglycemia and hypoglycemia  - Administer ordered medications to maintain glucose within target range  - Assess barriers to adequate nutritional intake and initiate nutrition consult as needed  - Instruct patient on self management of diabetes  Outcome: Progressing     Problem: Patient/Family Goals  Goal: Patient/Family Long Term Goal  Description: Patient's Long Term Goal: discharge with appropriate resources    Interventions:  - See additional Care Plan goals for specific interventions  Outcome: Progressing  Goal: Patient/Family Short Term Goal  Description: Patient's Short Term Goal: 12/28 NOC: remain comfortable, sleep  12/29 NOC: sleep well, remain free of abdominal pain/distension     Interventions:   - See additional Care Plan goals for specific interventions  Outcome: Progressing     Problem: PAIN - ADULT  Goal: Verbalizes/displays adequate comfort level or patient's stated pain goal  Description: INTERVENTIONS:  - Encourage pt to monitor pain and request assistance  - Assess pain using appropriate pain scale  - Administer analgesics based on type and severity of pain and evaluate response  - Implement non-pharmacological measures as appropriate and evaluate response  - Consider cultural and social influences on pain and pain management  - Manage/alleviate anxiety  - Utilize distraction and/or relaxation techniques  - Monitor for opioid side effects  - Notify MD/LIP if interventions unsuccessful or patient reports new pain  - Anticipate increased pain with activity and pre-medicate as appropriate  Outcome: Progressing     Problem: RISK FOR INFECTION - ADULT  Goal: Absence of fever/infection during anticipated neutropenic period  Description: INTERVENTIONS  - Monitor WBC  - Administer growth factors as ordered  - Implement neutropenic guidelines  Outcome: Progressing     Problem: SAFETY ADULT - FALL  Goal: Free from fall injury  Description: INTERVENTIONS:  - Assess pt frequently for physical needs  - Identify cognitive and physical deficits and behaviors that affect risk of falls.   - [place] fall precautions as indicated by assessment.  - Educate pt/family on patient safety including physical limitations  - Instruct pt to call for assistance with activity based on assessment  - Modify environment to reduce risk of injury  - Provide assistive devices as appropriate  - Consider OT/PT consult to assist with strengthening/mobility  - Encourage toileting schedule  Outcome: Progressing     Problem: DISCHARGE PLANNING  Goal: Discharge to home or other facility with appropriate resources  Description: INTERVENTIONS:  - Identify barriers to discharge w/pt and caregiver  - Include patient/family/discharge partner in discharge planning  - Arrange for needed discharge resources and transportation as appropriate  - Identify discharge learning needs (meds, wound care, etc)  - Arrange for interpreters to assist at discharge as needed  - Consider post-discharge preferences of patient/family/discharge partner  - Complete POLST form as appropriate  - Assess patient's ability to be responsible for managing their own health  - Refer to Case Management Department for coordinating discharge planning if the patient needs post-hospital services based on physician/LIP order or complex needs related to functional status, cognitive ability or social support system  Outcome: Progressing     Problem: GASTROINTESTINAL - ADULT  Goal: Maintains or returns to baseline bowel function  Description: INTERVENTIONS:  - Assess bowel function  - Maintain adequate hydration with IV or PO as ordered and tolerated  - Evaluate effectiveness of GI medications  - Encourage mobilization and activity  - Obtain nutritional consult as needed  - Establish a toileting routine/schedule  - Consider collaborating with pharmacy to review patient's medication profile  Outcome: Progressing

## 2023-12-30 NOTE — ANESTHESIA POSTPROCEDURE EVALUATION
[facility] Patient Status:  Inpatient   Age/Gender 77year old female MRN [id number]   Location [address] Attending Grace Jama MD   [facility] Day # 2 PCP Samreen Dominique       Anesthesia Post-op Note    UNPREPPED  COLONOSCOPY    Procedure Summary       Date: 12/29/23 Room / Location: [facility] ENDOSCOPY 02 / [facility] ENDOSCOPY    Anesthesia Start: 2007 Anesthesia Stop: 2059    Procedure: UNPREPPED  COLONOSCOPY Diagnosis:       GI bleed      (colonic pseudo obstruction)    Surgeons: Vasile Jules MD Anesthesiologist: Babar Fry MD    Anesthesia Type: general ASA Status: 2            Anesthesia Type: general    Vitals Value Taken Time   /61 12/29/23 2059   Temp  12/29/23 2059   Pulse 81 12/29/23 2059   Resp 20 12/29/23 2059   SpO2 99 12/29/23 2059       Patient Location: PACU    Anesthesia Type: general    Airway Patency: patent    Postop Pain Control: adequate    Mental Status: preanesthetic baseline    Nausea/Vomiting: none    Cardiopulmonary/Hydration status: stable euvolemic    Complications: no apparent anesthesia related complications    Postop vital signs: stable    Dental Exam: Unchanged from Preop    Patient to be discharged from PACU when criteria met.

## 2023-12-30 NOTE — OPERATIVE REPORT
Colon operative report  Patient Name: Ava Sunshine  Procedure: Colonoscopy with decompression  Indication: pseudo-obstruction  Post-Op Dx: pseudo-obstruction  Attending: Brent Barrientos M.D. Consent: The risks, benefits, and alternatives were discussed with the patient / POA. Risks included, but were not limited to, bleeding, perforation, medication effects, cardiac arrhythmias, missed polyps, and aspiration. After all questions were answered to their satisfaction, a signed, informed, and witnessed consent was obtained. Sedation: general anesthesia   Monitoring: Pulsoximetry, pulse, respirations, and blood pressure were monitored throughout the entire procedure    Preparation Quality: unprepped. Procedure: After achieving adequate sedation, and placing the patient in the left lateral decubitus position, a digital rectal examination was performed. The lubricated tip of the pediatric colonoscope was then introduced into the rectum and advanced to the right colon. Decompression performed (see below). The endoscope was then removed from the patient. The patient tolerated the procedure without apparent procedural complications. The patient left the procedure room in stable condition for recovery. Findings:    Dilated rectum with copious amounts of solid stool scattered throughout. Suction performed to decompress and the scope was advanced further. Solid stool was noted in the sigmoid colon and the scope was carefully advanced into the descending colon, where more distention of the colonic lumen was identified. The air was suctioned and removed and the scope was advanced further into the right colon, though no clear landmark (IC valve or appendiceal orifice) were seen. The air was suctioned to the point that only solid stool was encountered. The scope was then removed from the colon. Impression: Findings as above.     Recommendations:   NPO  Repeat XR abdomen obs series in AM  Up to chair and ambulate around the room with assistance as much as possible  Call GI service with any clinical updates or worsening of symptoms    Whitney Hartley MD

## 2023-12-30 NOTE — PLAN OF CARE
12/30 very sleppy, easily arousable , kept npo except meds. For abdominal series today. Abdomen soft. SR on tele. On RA sat=96%. Call light within reach.      Problem: Diabetes/Glucose Control  Goal: Glucose maintained within prescribed range  Description: INTERVENTIONS:  - Monitor Blood Glucose as ordered  - Assess for signs and symptoms of hyperglycemia and hypoglycemia  - Administer ordered medications to maintain glucose within target range  - Assess barriers to adequate nutritional intake and initiate nutrition consult as needed  - Instruct patient on self management of diabetes  Outcome: Progressing     Problem: Patient/Family Goals  Goal: Patient/Family Long Term Goal  Description: Patient's Long Term Goal: discharge with appropriate resources    Interventions:  - See additional Care Plan goals for specific interventions  Outcome: Progressing  Goal: Patient/Family Short Term Goal  Description: Patient's Short Term Goal: 12/28 NOC: remain comfortable, sleep  12/29 NOC: sleep well, remain free of abdominal pain/distension     Interventions:   - See additional Care Plan goals for specific interventions  Outcome: Progressing     Problem: PAIN - ADULT  Goal: Verbalizes/displays adequate comfort level or patient's stated pain goal  Description: INTERVENTIONS:  - Encourage pt to monitor pain and request assistance  - Assess pain using appropriate pain scale  - Administer analgesics based on type and severity of pain and evaluate response  - Implement non-pharmacological measures as appropriate and evaluate response  - Consider cultural and social influences on pain and pain management  - Manage/alleviate anxiety  - Utilize distraction and/or relaxation techniques  - Monitor for opioid side effects  - Notify MD/LIP if interventions unsuccessful or patient reports new pain  - Anticipate increased pain with activity and pre-medicate as appropriate  Outcome: Progressing     Problem: RISK FOR INFECTION - ADULT  Goal: Absence of fever/infection during anticipated neutropenic period  Description: INTERVENTIONS  - Monitor WBC  - Administer growth factors as ordered  - Implement neutropenic guidelines  Outcome: Progressing     Problem: SAFETY ADULT - FALL  Goal: Free from fall injury  Description: INTERVENTIONS:  - Assess pt frequently for physical needs  - Identify cognitive and physical deficits and behaviors that affect risk of falls.   - [place] fall precautions as indicated by assessment.  - Educate pt/family on patient safety including physical limitations  - Instruct pt to call for assistance with activity based on assessment  - Modify environment to reduce risk of injury  - Provide assistive devices as appropriate  - Consider OT/PT consult to assist with strengthening/mobility  - Encourage toileting schedule  Outcome: Progressing     Problem: DISCHARGE PLANNING  Goal: Discharge to home or other facility with appropriate resources  Description: INTERVENTIONS:  - Identify barriers to discharge w/pt and caregiver  - Include patient/family/discharge partner in discharge planning  - Arrange for needed discharge resources and transportation as appropriate  - Identify discharge learning needs (meds, wound care, etc)  - Arrange for interpreters to assist at discharge as needed  - Consider post-discharge preferences of patient/family/discharge partner  - Complete POLST form as appropriate  - Assess patient's ability to be responsible for managing their own health  - Refer to Case Management Department for coordinating discharge planning if the patient needs post-hospital services based on physician/LIP order or complex needs related to functional status, cognitive ability or social support system  Outcome: Progressing     Problem: GASTROINTESTINAL - ADULT  Goal: Maintains or returns to baseline bowel function  Description: INTERVENTIONS:  - Assess bowel function  - Maintain adequate hydration with IV or PO as ordered and tolerated  - Evaluate effectiveness of GI medications  - Encourage mobilization and activity  - Obtain nutritional consult as needed  - Establish a toileting routine/schedule  - Consider collaborating with pharmacy to review patient's medication profile  Outcome: Progressing

## 2023-12-30 NOTE — PHYSICAL THERAPY NOTE
PHYSICAL THERAPY EVALUATION - INPATIENT     Room Number: 524/524-A  Evaluation Date: 12/30/2023  Type of Evaluation: Initial  Physician Order: PT Eval and Treat    Presenting Problem: + COVID 19  Co-Morbidities : LTC resident at the [facility], [id number] 19 3/23, CVA, dementia  Reason for Therapy: Mobility Dysfunction and Discharge Planning    History related to current admission: Patient is a 77year old female admitted on 12/27/2023 from [address] [place] [address] as a LTC resident for lethargy and weakness. Pt diagnosed with + COVID 19. ASSESSMENT   In this PT evaluation, the patient presents with the following impairments . Decrease cognition due to dementia and overall, decrease in functional skills and task tolerance affecting her bed mobilities, transfers and gt requiring increase time, assistance and use of an A.D. for safety and task completion. Pt is a high risk of fall. Requires significant amount of cueing with task as well as redirection for completion  The patient is below baseline and would benefit from skilled inpatient PT to address the above deficits to assist patient in returning to prior to level of function. Functional outcome measures completed include [facility]. The -PAC '6-Clicks' Inpatient Basic Mobility Short Form was completed and this patient is demonstrating a Approx Degree of Impairment: 76.75%  degree of impairment in mobility. Research supports that patients with this level of impairment may benefit from PT eval and treat at [facility] family that pt is a resident. DISCHARGE RECOMMENDATIONS  PT Discharge Recommendations:  (PT eval and treat the SNF)    PLAN  PT Treatment Plan: Bed mobility; Body mechanics; Endurance; Energy conservation;Patient education; Family education;Gait training;Range of motion;Strengthening;Transfer training;Balance training  Rehab Potential : Good  Frequency (Obs): 3-5x/week  Number of Visits to Meet Established Goals: 5      CURRENT GOALS    Goal #1 Patient is able to demonstrate supine - sit EOB @ level: moderate assistance     Goal #2 Patient is able to demonstrate transfers Sit to/from Stand at assistance level: minimum assistance     Goal #3 Patient is able to ambulate 50 feet with assist device: walker - rolling at assistance level: minimum assistance     Goal #4    Goal #5    Goal #6    Goal Comments: Goals established on 2023    HOME SITUATION  Type of Home: Skilled nursing facility                    Lives With: Staff 24 hours     Patient Owned Equipment: Rolling walker (w/c)       Prior Level of Comfort: per daughter pt was amb (with her assistance) with RW as support. She follows pt with the w/c as the pat walks in the NH halls. Daughter comes on a daily basis to assist mother    SUBJECTIVE  I visit her at least 30 hrs /wk      OBJECTIVE     Fall Risk: High fall risk    WEIGHT BEARING RESTRICTION  Weight Bearing Restriction: None                PAIN ASSESSMENT  Ratin          COGNITION  Orientation Level:  oriented to person  Following Commands:  follows one step commands with repetition and follows multistep commands with repetition  Motor Planning: decrease    RANGE OF MOTION AND STRENGTH ASSESSMENT  Upper extremity ROM and strength are within functional limits LOM on B shoulder, does not want to be touich in the shoulder area    Lower extremity ROM is within functional limits     Lower extremity strength is within functional limits       BALANCE  Static Sitting: Fair  Dynamic Sitting: Fair -  Static Standing: Fair -  Dynamic Standing: Poor      ACTIVITY TOLERANCE           AM-PAC '6-Clicks' INPATIENT SHORT FORM - BASIC MOBILITY  How much difficulty does the patient currently have. ..   Patient Difficulty: Turning over in bed (including adjusting bedclothes, sheets and blankets)?: A Lot   Patient Difficulty: Sitting down on and standing up from a chair with arms (e.g., wheelchair, bedside commode, etc.): A Lot   Patient Difficulty: Moving from lying on back to sitting on the side of the bed?: A Lot   How much help from another person does the patient currently need. .. Help from Another: Moving to and from a bed to a chair (including a wheelchair)?: A Lot   Help from Another: Need to walk in hospital room?: Total   Help from Another: Climbing 3-5 steps with a railing?: Total       AM-PAC Score:  Raw Score: 10   Approx Degree of Impairment: 76.75%   Standardized Score (AM-PAC Scale): 32.29   CMS Modifier (G-Code): CL    FUNCTIONAL ABILITY STATUS  Gait Assessment   Functional Mobility/Gait Assessment  Gait Assistance: Maximum assistance  Distance (ft): 3  Assistive Device: Rolling walker  Pattern:  (posterior lean and difficulty initiang steps pinky on the R LE, limited by c/o fatigue)    Skilled Therapy Provided     Bed Mobility:  Rolling: NT  Supine to sit: NT   Sit to supine: NT     Transfer Mobility:  Sit to stand: mod A   Stand to sit: mod A  Gait = initial posterior lean with difficulty in initiation on the R LE. Gt is limited with c/o fatigue and weakness     Therapist's Comments:   Daughter at   Stated that pt walks prior to admission and has been in bed for 3 days and is concerned of weakness  Pt is able to follow simple commands and need verba, contact as well as visual cueing for task. Difficulty stepping backwards thus asked daughter to bring recliner closer to pt so she can rest  Discussed with daughter the role of PT while in hosp setting   Plan to return to [facility]  Left on the recliner and assisted nursing in checking diaper while standing RW as support while working on balance and posturing        Exercise/Education Provided: Body mechanics  Functional activity tolerated  Gait training  Neuromuscular re-educate  Posture  ROM  Transfer training    Patient End of Session: Up in chair;Needs met;Call light within reach;RN aware of session/findings;Bracing education provided per handout; All patient questions and concerns addressed; Alarm set; Family present      Patient Evaluation Complexity Level:  History Moderate - 1 or 2 personal factors and/or co-morbidities   Examination of body systems Moderate - addressing a total of 3 or more elements   Clinical Presentation Moderate - Evolving   Clinical Decision Making Moderate - Evolving       PT Session Time: 30 minutes  Gait Trainin minutes  Therapeutic Activity: 10 minutes  Neuromuscular Re-education: 5 minutes  Therapeutic Exercise: 5 minutes

## 2023-12-30 NOTE — PROGRESS NOTES
Result of abdominal xray in, dr. Loja Parents aware. [id number] [address] for sips of clear liquid. Need to increase activity. PT notified. , pt. Up on chair at this  time . Mod. Assist with gait belt and walker. Needs a lot of cuing . Chair alarm on. Daughter at bedside.

## 2023-12-30 NOTE — ANESTHESIA PROCEDURE NOTES
Airway  Date/Time: 12/29/2023 8:10 PM  Urgency: elective      General Information and Staff    Patient location during procedure: OR  Anesthesiologist: Andreina Marcus MD  Performed: anesthesiologist   Performed by: Andreina Marcus MD  Authorized by: Andreina Marcus MD      Indications and Patient Condition  Indications for airway management: anesthesia  Sedation level: deep  Preoxygenated: yes  Patient position: sniffing  Mask difficulty assessment: 0 - not attempted    Final Airway Details  Final airway type: endotracheal airway      Successful airway: ETT  Cuffed: yes   Successful intubation technique: direct laryngoscopy  Facilitating devices/methods: rapid sequence intubation  Endotracheal tube insertion site: oral  Blade: GlideScope  Blade size: #3  ETT size (mm): 7.0    Cormack-Lehane Classification: grade I - full view of glottis  Placement verified by: capnometry   Measured from: lips  ETT to lips (cm): 22  Number of attempts at approach: 1

## 2023-12-31 VITALS
RESPIRATION RATE: 21 BRPM | OXYGEN SATURATION: 96 % | WEIGHT: 145 LBS | TEMPERATURE: 98 F | DIASTOLIC BLOOD PRESSURE: 64 MMHG | BODY MASS INDEX: 24 KG/M2 | SYSTOLIC BLOOD PRESSURE: 157 MMHG | HEART RATE: 79 BPM

## 2023-12-31 LAB
ANION GAP SERPL CALC-SCNC: 6 MMOL/L (ref 0–18)
BUN BLD-MCNC: 13 MG/DL (ref 9–23)
CALCIUM BLD-MCNC: 8.9 MG/DL (ref 8.5–10.1)
CHLORIDE SERPL-SCNC: 106 MMOL/L (ref 98–112)
CO2 SERPL-SCNC: 28 MMOL/L (ref 21–32)
CREAT BLD-MCNC: 0.48 MG/DL
EGFRCR SERPLBLD CKD-EPI 2021: 96 ML/MIN/1.73M2 (ref 60–?)
ERYTHROCYTE [DISTWIDTH] IN BLOOD BY AUTOMATED COUNT: 13.1 %
GLUCOSE BLD-MCNC: 188 MG/DL (ref 70–99)
GLUCOSE BLD-MCNC: 191 MG/DL (ref 70–99)
GLUCOSE BLD-MCNC: 194 MG/DL (ref 70–99)
HCT VFR BLD AUTO: 36.7 %
HGB BLD-MCNC: 11.6 G/DL
MCH RBC QN AUTO: 27.9 PG (ref 26–34)
MCHC RBC AUTO-ENTMCNC: 31.6 G/DL (ref 31–37)
MCV RBC AUTO: 88.2 FL
OSMOLALITY SERPL CALC.SUM OF ELEC: 295 MOSM/KG (ref 275–295)
PLATELET # BLD AUTO: 211 10(3)UL (ref 150–450)
POTASSIUM SERPL-SCNC: 3.6 MMOL/L (ref 3.5–5.1)
RBC # BLD AUTO: 4.16 X10(6)UL
SODIUM SERPL-SCNC: 140 MMOL/L (ref 136–145)
WBC # BLD AUTO: 5.6 X10(3) UL (ref 4–11)

## 2023-12-31 PROCEDURE — 99239 HOSP IP/OBS DSCHRG MGMT >30: CPT | Performed by: INTERNAL MEDICINE

## 2023-12-31 NOTE — CM/SW NOTE
12/28/23 1000   CM/SW Referral Data   Referral Source Social Work (self-referral)   Reason for Referral Discharge planning   Informant EMR   [facility] [address] [facility] [facility]   Post Acute Care Provider Upon Admission   (The [address])   Discharge Needs   Anticipated D/C needs Long term care facility;Transportation services     Patient is a 76 y/o female who admitted with COVID-19 virus infection, with history of Alzheimer's Dementia. Per chart review, patient resides at the [facility] for LTC. Sent referral in aidin. SW/CM to remain available for support and/or discharge planning.     [facility], [facility]  Discharge Planner  [phone]
12/29/23 1222   Discharge disposition   Expected discharge disposition [facility] [address] Provider   (The [facility])   Discharge transportation [employer]     Informed by RN that patient is medically cleared for discharge. Spoke with Mateo Maher from the [address] [place] [address] who confirmed patient able to return today. Spoke with [facility] ambulance and scheduled  for 6:30pm today. PCS form completed and available for RN to print. Spoke with Lovely Carvalho who is agreeable with discharge back to the [facility]. Informed her of tentative ambulance scheduled for 6:30pm. SW will remain available.       [facility] [place] [facility]    [facility]  [phone] or [facility]  Discharge Planner  [phone]
12/31/23 1300   Discharge disposition   Expected discharge disposition [facility] [address] Provider   ([address])   Discharge transportation [facility] by RN pt is medically cleared to discharge. [facility] arranged for 3pm. Updated RN and Jerry Lan at the [address] RN to update pt's dtr/guardian. PCS updated.      The [place] [facility]  [facility]  Discharge Planner
Chart reviewed and noted patient went for emergent colonoscopy with decompression last night therefore discharge was cancelled yesterday. Sent op report and updates to [address]. Await patient to be medically cleared for discharge. KINGA/KEV to remain available for support and/or discharge planning.     LASHAY Flores  Discharge Planner  [phone]
runs approx 2.5 miles/day

## 2023-12-31 NOTE — PLAN OF CARE
Pt A&Ox2, hx dementia. Aspiration precautions in place. Room air, . Covid+, finished 3 doses Remdesivir. Tele, NSR. Afebrile Lovenox. Voids, up x1,2 w/ walker. Purewick and briefed. Abdomen is round/soft with slight tenderness to RLQ. Pt is passing flatulence. Cdiff(-). No c/o of sob, n/v/d. Clear liquid diet per GI. QID accuchecks, 1 to1 feed. Pt updated on poc. No further needs at this time. Safety/fall precautions in place. Call light within reach.      Problem: Diabetes/Glucose Control  Goal: Glucose maintained within prescribed range  Description: INTERVENTIONS:  - Monitor Blood Glucose as ordered  - Assess for signs and symptoms of hyperglycemia and hypoglycemia  - Administer ordered medications to maintain glucose within target range  - Assess barriers to adequate nutritional intake and initiate nutrition consult as needed  - Instruct patient on self management of diabetes  Outcome: Progressing     Problem: Patient/Family Goals  Goal: Patient/Family Long Term Goal  Description: Patient's Long Term Goal: discharge with appropriate resources    Interventions:  - See additional Care Plan goals for specific interventions  Outcome: Progressing  Goal: Patient/Family Short Term Goal  Description: Patient's Short Term Goal: 12/28 NOC: remain comfortable, sleep  12/29 NOC: sleep well, remain free of abdominal pain/distension   12/30 NOC: sleep well   Interventions:   - See additional Care Plan goals for specific interventions  Outcome: Progressing     Problem: PAIN - ADULT  Goal: Verbalizes/displays adequate comfort level or patient's stated pain goal  Description: INTERVENTIONS:  - Encourage pt to monitor pain and request assistance  - Assess pain using appropriate pain scale  - Administer analgesics based on type and severity of pain and evaluate response  - Implement non-pharmacological measures as appropriate and evaluate response  - Consider cultural and social influences on pain and pain management  - Manage/alleviate anxiety  - Utilize distraction and/or relaxation techniques  - Monitor for opioid side effects  - Notify MD/LIP if interventions unsuccessful or patient reports new pain  - Anticipate increased pain with activity and pre-medicate as appropriate  Outcome: Progressing     Problem: RISK FOR INFECTION - ADULT  Goal: Absence of fever/infection during anticipated neutropenic period  Description: INTERVENTIONS  - Monitor WBC  - Administer growth factors as ordered  - Implement neutropenic guidelines  Outcome: Progressing     Problem: SAFETY ADULT - FALL  Goal: Free from fall injury  Description: INTERVENTIONS:  - Assess pt frequently for physical needs  - Identify cognitive and physical deficits and behaviors that affect risk of falls.   - [place] fall precautions as indicated by assessment.  - Educate pt/family on patient safety including physical limitations  - Instruct pt to call for assistance with activity based on assessment  - Modify environment to reduce risk of injury  - Provide assistive devices as appropriate  - Consider OT/PT consult to assist with strengthening/mobility  - Encourage toileting schedule  Outcome: Progressing     Problem: DISCHARGE PLANNING  Goal: Discharge to home or other facility with appropriate resources  Description: INTERVENTIONS:  - Identify barriers to discharge w/pt and caregiver  - Include patient/family/discharge partner in discharge planning  - Arrange for needed discharge resources and transportation as appropriate  - Identify discharge learning needs (meds, wound care, etc)  - Arrange for interpreters to assist at discharge as needed  - Consider post-discharge preferences of patient/family/discharge partner  - Complete POLST form as appropriate  - Assess patient's ability to be responsible for managing their own health  - Refer to Case Management Department for coordinating discharge planning if the patient needs post-hospital services based on physician/LIP order or complex needs related to functional status, cognitive ability or social support system  Outcome: Progressing     Problem: GASTROINTESTINAL - ADULT  Goal: Maintains or returns to baseline bowel function  Description: INTERVENTIONS:  - Assess bowel function  - Maintain adequate hydration with IV or PO as ordered and tolerated  - Evaluate effectiveness of GI medications  - Encourage mobilization and activity  - Obtain nutritional consult as needed  - Establish a toileting routine/schedule  - Consider collaborating with pharmacy to review patient's medication profile  Outcome: Progressing

## 2023-12-31 NOTE — PROGRESS NOTES
12/31 report given to rn at [facility], ambulance here to transport pt. Daughter was notified, left meassage to her phone voice mail . All belongings taken by ambulance crew with her.

## 2023-12-31 NOTE — PLAN OF CARE
12/31 alert, oriented to person, slow to respond. With periods of agitation. Up on chair for breakfast and lunch. Mod assist x2 to get up, stand pivot to chair. Bowel sounds positive, able to pass gas. 1;1 feed, fed by this rn for breakfast, unable to swallow soft easy chew diet, pockets food to right cheek. SLP consulted. Able to swallow thin liquid with straw. Tolerated eating soft pureed diet. Daughter at bedside. Seen by dr. Sami Rodriguez ok to HI back to [place]. GI ok to HI .     Problem: Diabetes/Glucose Control  Goal: Glucose maintained within prescribed range  Description: INTERVENTIONS:  - Monitor Blood Glucose as ordered  - Assess for signs and symptoms of hyperglycemia and hypoglycemia  - Administer ordered medications to maintain glucose within target range  - Assess barriers to adequate nutritional intake and initiate nutrition consult as needed  - Instruct patient on self management of diabetes  Outcome: Adequate for Discharge     Problem: Patient/Family Goals  Goal: Patient/Family Long Term Goal  Description: Patient's Long Term Goal: discharge with appropriate resources    Interventions:  - See additional Care Plan goals for specific interventions  Outcome: Adequate for Discharge  Goal: Patient/Family Short Term Goal  Description: Patient's Short Term Goal: 12/28 NOC: remain comfortable, sleep  12/29 NOC: sleep well, remain free of abdominal pain/distension   12/30 NOC: sleep well   Interventions:   - See additional Care Plan goals for specific interventions  Outcome: Adequate for Discharge     Problem: PAIN - ADULT  Goal: Verbalizes/displays adequate comfort level or patient's stated pain goal  Description: INTERVENTIONS:  - Encourage pt to monitor pain and request assistance  - Assess pain using appropriate pain scale  - Administer analgesics based on type and severity of pain and evaluate response  - Implement non-pharmacological measures as appropriate and evaluate response  - Consider cultural and social influences on pain and pain management  - Manage/alleviate anxiety  - Utilize distraction and/or relaxation techniques  - Monitor for opioid side effects  - Notify MD/LIP if interventions unsuccessful or patient reports new pain  - Anticipate increased pain with activity and pre-medicate as appropriate  Outcome: Adequate for Discharge     Problem: RISK FOR INFECTION - ADULT  Goal: Absence of fever/infection during anticipated neutropenic period  Description: INTERVENTIONS  - Monitor WBC  - Administer growth factors as ordered  - Implement neutropenic guidelines  Outcome: Adequate for Discharge     Problem: SAFETY ADULT - FALL  Goal: Free from fall injury  Description: INTERVENTIONS:  - Assess pt frequently for physical needs  - Identify cognitive and physical deficits and behaviors that affect risk of falls.   - [place] fall precautions as indicated by assessment.  - Educate pt/family on patient safety including physical limitations  - Instruct pt to call for assistance with activity based on assessment  - Modify environment to reduce risk of injury  - Provide assistive devices as appropriate  - Consider OT/PT consult to assist with strengthening/mobility  - Encourage toileting schedule  Outcome: Adequate for Discharge

## 2024-02-20 ENCOUNTER — ANESTHESIA (OUTPATIENT)
Dept: EMERGENCY DEPT | Facility: HOSPITAL | Age: 79
End: 2024-02-20
Payer: MEDICARE

## 2024-02-20 ENCOUNTER — HOSPITAL ENCOUNTER (INPATIENT)
Facility: HOSPITAL | Age: 79
LOS: 8 days | Discharge: NURSING, REHAB OR LONG-TERM CARE | End: 2024-02-28
Attending: EMERGENCY MEDICINE | Admitting: HOSPITALIST
Payer: MEDICARE

## 2024-02-20 ENCOUNTER — APPOINTMENT (OUTPATIENT)
Dept: GENERAL RADIOLOGY | Facility: HOSPITAL | Age: 79
End: 2024-02-20
Payer: MEDICARE

## 2024-02-20 ENCOUNTER — APPOINTMENT (OUTPATIENT)
Dept: GENERAL RADIOLOGY | Facility: HOSPITAL | Age: 79
End: 2024-02-20
Attending: EMERGENCY MEDICINE
Payer: MEDICARE

## 2024-02-20 ENCOUNTER — ANESTHESIA EVENT (OUTPATIENT)
Dept: EMERGENCY DEPT | Facility: HOSPITAL | Age: 79
End: 2024-02-20
Payer: MEDICARE

## 2024-02-20 ENCOUNTER — APPOINTMENT (OUTPATIENT)
Dept: CT IMAGING | Facility: HOSPITAL | Age: 79
End: 2024-02-20
Attending: EMERGENCY MEDICINE
Payer: MEDICARE

## 2024-02-20 DIAGNOSIS — S72.001A CLOSED FRACTURE OF RIGHT HIP, INITIAL ENCOUNTER (HCC): Primary | ICD-10-CM

## 2024-02-20 LAB
ALBUMIN SERPL-MCNC: 3 G/DL (ref 3.4–5)
ALBUMIN/GLOB SERPL: 1.1 {RATIO} (ref 1–2)
ALP LIVER SERPL-CCNC: 60 U/L
ALT SERPL-CCNC: 14 U/L
ANION GAP SERPL CALC-SCNC: 7 MMOL/L (ref 0–18)
APTT PPP: 23.9 SECONDS (ref 23.3–35.6)
AST SERPL-CCNC: 16 U/L (ref 15–37)
BASOPHILS # BLD AUTO: 0.02 X10(3) UL (ref 0–0.2)
BASOPHILS NFR BLD AUTO: 0.2 %
BILIRUB SERPL-MCNC: 0.3 MG/DL (ref 0.1–2)
BUN BLD-MCNC: 23 MG/DL (ref 9–23)
CALCIUM BLD-MCNC: 9 MG/DL (ref 8.5–10.1)
CHLORIDE SERPL-SCNC: 105 MMOL/L (ref 98–112)
CO2 SERPL-SCNC: 24 MMOL/L (ref 21–32)
CREAT BLD-MCNC: 0.68 MG/DL
EGFRCR SERPLBLD CKD-EPI 2021: 89 ML/MIN/1.73M2 (ref 60–?)
EOSINOPHIL # BLD AUTO: 0.01 X10(3) UL (ref 0–0.7)
EOSINOPHIL NFR BLD AUTO: 0.1 %
ERYTHROCYTE [DISTWIDTH] IN BLOOD BY AUTOMATED COUNT: 13.7 %
GLOBULIN PLAS-MCNC: 2.7 G/DL (ref 2.8–4.4)
GLUCOSE BLD-MCNC: 262 MG/DL (ref 70–99)
GLUCOSE BLD-MCNC: 282 MG/DL (ref 70–99)
HCT VFR BLD AUTO: 34 %
HGB BLD-MCNC: 10.7 G/DL
IMM GRANULOCYTES # BLD AUTO: 0.04 X10(3) UL (ref 0–1)
IMM GRANULOCYTES NFR BLD: 0.4 %
INR BLD: 1.06 (ref 0.8–1.2)
LYMPHOCYTES # BLD AUTO: 0.48 X10(3) UL (ref 1–4)
LYMPHOCYTES NFR BLD AUTO: 4.6 %
MCH RBC QN AUTO: 28.4 PG (ref 26–34)
MCHC RBC AUTO-ENTMCNC: 31.5 G/DL (ref 31–37)
MCV RBC AUTO: 90.2 FL
MONOCYTES # BLD AUTO: 0.54 X10(3) UL (ref 0.1–1)
MONOCYTES NFR BLD AUTO: 5.1 %
NEUTROPHILS # BLD AUTO: 9.43 X10 (3) UL (ref 1.5–7.7)
NEUTROPHILS # BLD AUTO: 9.43 X10(3) UL (ref 1.5–7.7)
NEUTROPHILS NFR BLD AUTO: 89.6 %
OSMOLALITY SERPL CALC.SUM OF ELEC: 295 MOSM/KG (ref 275–295)
PLATELET # BLD AUTO: 189 10(3)UL (ref 150–450)
POTASSIUM SERPL-SCNC: 4.4 MMOL/L (ref 3.5–5.1)
PROT SERPL-MCNC: 5.7 G/DL (ref 6.4–8.2)
PROTHROMBIN TIME: 13.8 SECONDS (ref 11.6–14.8)
RBC # BLD AUTO: 3.77 X10(6)UL
SODIUM SERPL-SCNC: 136 MMOL/L (ref 136–145)
WBC # BLD AUTO: 10.5 X10(3) UL (ref 4–11)

## 2024-02-20 PROCEDURE — 3E0T3BZ INTRODUCTION OF ANESTHETIC AGENT INTO PERIPHERAL NERVES AND PLEXI, PERCUTANEOUS APPROACH: ICD-10-PCS | Performed by: STUDENT IN AN ORGANIZED HEALTH CARE EDUCATION/TRAINING PROGRAM

## 2024-02-20 PROCEDURE — 76942 ECHO GUIDE FOR BIOPSY: CPT | Performed by: STUDENT IN AN ORGANIZED HEALTH CARE EDUCATION/TRAINING PROGRAM

## 2024-02-20 PROCEDURE — 73502 X-RAY EXAM HIP UNI 2-3 VIEWS: CPT | Performed by: EMERGENCY MEDICINE

## 2024-02-20 PROCEDURE — 73552 X-RAY EXAM OF FEMUR 2/>: CPT | Performed by: EMERGENCY MEDICINE

## 2024-02-20 PROCEDURE — 71045 X-RAY EXAM CHEST 1 VIEW: CPT | Performed by: EMERGENCY MEDICINE

## 2024-02-20 PROCEDURE — 99223 1ST HOSP IP/OBS HIGH 75: CPT | Performed by: INTERNAL MEDICINE

## 2024-02-20 PROCEDURE — 70450 CT HEAD/BRAIN W/O DYE: CPT | Performed by: EMERGENCY MEDICINE

## 2024-02-20 RX ORDER — NICOTINE POLACRILEX 4 MG
30 LOZENGE BUCCAL
Status: DISCONTINUED | OUTPATIENT
Start: 2024-02-20 | End: 2024-02-28

## 2024-02-20 RX ORDER — MORPHINE SULFATE 2 MG/ML
1 INJECTION, SOLUTION INTRAMUSCULAR; INTRAVENOUS EVERY 2 HOUR PRN
Status: DISCONTINUED | OUTPATIENT
Start: 2024-02-20 | End: 2024-02-28

## 2024-02-20 RX ORDER — SENNOSIDES 8.6 MG
17.2 TABLET ORAL NIGHTLY PRN
Status: DISCONTINUED | OUTPATIENT
Start: 2024-02-20 | End: 2024-02-28

## 2024-02-20 RX ORDER — TRAMADOL HYDROCHLORIDE 50 MG/1
50 TABLET ORAL 2 TIMES DAILY
Status: DISCONTINUED | OUTPATIENT
Start: 2024-02-20 | End: 2024-02-24

## 2024-02-20 RX ORDER — MORPHINE SULFATE 2 MG/ML
2 INJECTION, SOLUTION INTRAMUSCULAR; INTRAVENOUS EVERY 2 HOUR PRN
Status: DISCONTINUED | OUTPATIENT
Start: 2024-02-20 | End: 2024-02-28

## 2024-02-20 RX ORDER — MULTIVIT-MIN/IRON FUM/FOLIC AC 7.5 MG-4
1 TABLET ORAL DAILY
COMMUNITY

## 2024-02-20 RX ORDER — SODIUM PHOSPHATE,MONO-DIBASIC 19G-7G/118
ENEMA (ML) RECTAL
COMMUNITY
Start: 2023-11-17

## 2024-02-20 RX ORDER — NICOTINE POLACRILEX 4 MG
15 LOZENGE BUCCAL
Status: DISCONTINUED | OUTPATIENT
Start: 2024-02-20 | End: 2024-02-28

## 2024-02-20 RX ORDER — ONDANSETRON 2 MG/ML
4 INJECTION INTRAMUSCULAR; INTRAVENOUS EVERY 4 HOURS PRN
Status: DISCONTINUED | OUTPATIENT
Start: 2024-02-20 | End: 2024-02-20

## 2024-02-20 RX ORDER — ROPIVACAINE HYDROCHLORIDE 5 MG/ML
INJECTION, SOLUTION EPIDURAL; INFILTRATION; PERINEURAL AS NEEDED
Status: DISCONTINUED | OUTPATIENT
Start: 2024-02-20 | End: 2024-02-20

## 2024-02-20 RX ORDER — ACETAMINOPHEN 500 MG
500 TABLET ORAL EVERY 4 HOURS PRN
Status: DISCONTINUED | OUTPATIENT
Start: 2024-02-20 | End: 2024-02-24

## 2024-02-20 RX ORDER — MELATONIN
3 NIGHTLY PRN
Status: DISCONTINUED | OUTPATIENT
Start: 2024-02-20 | End: 2024-02-28

## 2024-02-20 RX ORDER — ONDANSETRON 2 MG/ML
4 INJECTION INTRAMUSCULAR; INTRAVENOUS EVERY 6 HOURS PRN
Status: DISCONTINUED | OUTPATIENT
Start: 2024-02-20 | End: 2024-02-28

## 2024-02-20 RX ORDER — RISPERIDONE 0.25 MG/1
0.75 TABLET ORAL 2 TIMES DAILY
Status: DISCONTINUED | OUTPATIENT
Start: 2024-02-20 | End: 2024-02-26

## 2024-02-20 RX ORDER — RISPERIDONE 0.25 MG/1
0.5 TABLET ORAL
Status: DISCONTINUED | OUTPATIENT
Start: 2024-02-21 | End: 2024-02-26

## 2024-02-20 RX ORDER — DEXTROSE MONOHYDRATE 25 G/50ML
50 INJECTION, SOLUTION INTRAVENOUS
Status: DISCONTINUED | OUTPATIENT
Start: 2024-02-20 | End: 2024-02-28

## 2024-02-20 RX ORDER — ENEMA 19; 7 G/133ML; G/133ML
1 ENEMA RECTAL ONCE AS NEEDED
Status: DISCONTINUED | OUTPATIENT
Start: 2024-02-20 | End: 2024-02-28

## 2024-02-20 RX ORDER — ECHINACEA PURPUREA EXTRACT 125 MG
1 TABLET ORAL
Status: DISCONTINUED | OUTPATIENT
Start: 2024-02-20 | End: 2024-02-28

## 2024-02-20 RX ORDER — BENZONATATE 100 MG/1
200 CAPSULE ORAL 3 TIMES DAILY PRN
Status: DISCONTINUED | OUTPATIENT
Start: 2024-02-20 | End: 2024-02-28

## 2024-02-20 RX ORDER — CRANBERRY FRUIT EXTRACT 425 MG
CAPSULE ORAL
COMMUNITY

## 2024-02-20 RX ORDER — BISACODYL 10 MG
10 SUPPOSITORY, RECTAL RECTAL
Status: DISCONTINUED | OUTPATIENT
Start: 2024-02-20 | End: 2024-02-28

## 2024-02-20 RX ORDER — MORPHINE SULFATE 2 MG/ML
0.5 INJECTION, SOLUTION INTRAMUSCULAR; INTRAVENOUS EVERY 2 HOUR PRN
Status: DISCONTINUED | OUTPATIENT
Start: 2024-02-20 | End: 2024-02-28

## 2024-02-20 RX ORDER — POLYETHYLENE GLYCOL 3350 17 G/17G
17 POWDER, FOR SOLUTION ORAL DAILY PRN
Status: DISCONTINUED | OUTPATIENT
Start: 2024-02-20 | End: 2024-02-21

## 2024-02-20 RX ORDER — LIDOCAINE HYDROCHLORIDE 10 MG/ML
INJECTION, SOLUTION EPIDURAL; INFILTRATION; INTRACAUDAL; PERINEURAL AS NEEDED
Status: DISCONTINUED | OUTPATIENT
Start: 2024-02-20 | End: 2024-02-20

## 2024-02-20 RX ADMIN — ROPIVACAINE HYDROCHLORIDE 20 ML: 5 INJECTION, SOLUTION EPIDURAL; INFILTRATION; PERINEURAL at 21:15:00

## 2024-02-20 RX ADMIN — LIDOCAINE HYDROCHLORIDE 3 ML: 10 INJECTION, SOLUTION EPIDURAL; INFILTRATION; INTRACAUDAL; PERINEURAL at 21:13:00

## 2024-02-21 ENCOUNTER — ANESTHESIA EVENT (OUTPATIENT)
Dept: SURGERY | Facility: HOSPITAL | Age: 79
End: 2024-02-21
Payer: MEDICARE

## 2024-02-21 ENCOUNTER — ANESTHESIA (OUTPATIENT)
Dept: SURGERY | Facility: HOSPITAL | Age: 79
End: 2024-02-21
Payer: MEDICARE

## 2024-02-21 ENCOUNTER — APPOINTMENT (OUTPATIENT)
Dept: GENERAL RADIOLOGY | Facility: HOSPITAL | Age: 79
End: 2024-02-21
Attending: ORTHOPAEDIC SURGERY
Payer: MEDICARE

## 2024-02-21 LAB
ANION GAP SERPL CALC-SCNC: 6 MMOL/L (ref 0–18)
BASOPHILS # BLD AUTO: 0.02 X10(3) UL (ref 0–0.2)
BASOPHILS NFR BLD AUTO: 0.2 %
BUN BLD-MCNC: 23 MG/DL (ref 9–23)
CALCIUM BLD-MCNC: 8.9 MG/DL (ref 8.5–10.1)
CHLORIDE SERPL-SCNC: 108 MMOL/L (ref 98–112)
CO2 SERPL-SCNC: 24 MMOL/L (ref 21–32)
CREAT BLD-MCNC: 0.97 MG/DL
EGFRCR SERPLBLD CKD-EPI 2021: 59 ML/MIN/1.73M2 (ref 60–?)
EOSINOPHIL # BLD AUTO: 0.02 X10(3) UL (ref 0–0.7)
EOSINOPHIL NFR BLD AUTO: 0.2 %
ERYTHROCYTE [DISTWIDTH] IN BLOOD BY AUTOMATED COUNT: 14 %
GLUCOSE BLD-MCNC: 150 MG/DL (ref 70–99)
GLUCOSE BLD-MCNC: 159 MG/DL (ref 70–99)
GLUCOSE BLD-MCNC: 159 MG/DL (ref 70–99)
GLUCOSE BLD-MCNC: 194 MG/DL (ref 70–99)
GLUCOSE BLD-MCNC: 222 MG/DL (ref 70–99)
GLUCOSE BLD-MCNC: 280 MG/DL (ref 70–99)
HCT VFR BLD AUTO: 30.2 %
HGB BLD-MCNC: 10 G/DL
IMM GRANULOCYTES # BLD AUTO: 0.03 X10(3) UL (ref 0–1)
IMM GRANULOCYTES NFR BLD: 0.4 %
LYMPHOCYTES # BLD AUTO: 1.14 X10(3) UL (ref 1–4)
LYMPHOCYTES NFR BLD AUTO: 13.4 %
MCH RBC QN AUTO: 29.2 PG (ref 26–34)
MCHC RBC AUTO-ENTMCNC: 33.1 G/DL (ref 31–37)
MCV RBC AUTO: 88 FL
MONOCYTES # BLD AUTO: 0.85 X10(3) UL (ref 0.1–1)
MONOCYTES NFR BLD AUTO: 10 %
NEUTROPHILS # BLD AUTO: 6.44 X10 (3) UL (ref 1.5–7.7)
NEUTROPHILS # BLD AUTO: 6.44 X10(3) UL (ref 1.5–7.7)
NEUTROPHILS NFR BLD AUTO: 75.8 %
OSMOLALITY SERPL CALC.SUM OF ELEC: 293 MOSM/KG (ref 275–295)
PLATELET # BLD AUTO: 204 10(3)UL (ref 150–450)
POTASSIUM SERPL-SCNC: 4.5 MMOL/L (ref 3.5–5.1)
RBC # BLD AUTO: 3.43 X10(6)UL
SODIUM SERPL-SCNC: 138 MMOL/L (ref 136–145)
WBC # BLD AUTO: 8.5 X10(3) UL (ref 4–11)

## 2024-02-21 PROCEDURE — 99233 SBSQ HOSP IP/OBS HIGH 50: CPT | Performed by: HOSPITALIST

## 2024-02-21 PROCEDURE — 73552 X-RAY EXAM OF FEMUR 2/>: CPT | Performed by: ORTHOPAEDIC SURGERY

## 2024-02-21 PROCEDURE — 0QS636Z REPOSITION RIGHT UPPER FEMUR WITH INTRAMEDULLARY INTERNAL FIXATION DEVICE, PERCUTANEOUS APPROACH: ICD-10-PCS | Performed by: ORTHOPAEDIC SURGERY

## 2024-02-21 PROCEDURE — 76000 FLUOROSCOPY <1 HR PHYS/QHP: CPT | Performed by: ORTHOPAEDIC SURGERY

## 2024-02-21 DEVICE — IMPLANTABLE DEVICE: Type: IMPLANTABLE DEVICE | Site: HIP | Status: FUNCTIONAL

## 2024-02-21 RX ORDER — NALOXONE HYDROCHLORIDE 0.4 MG/ML
80 INJECTION, SOLUTION INTRAMUSCULAR; INTRAVENOUS; SUBCUTANEOUS AS NEEDED
Status: DISCONTINUED | OUTPATIENT
Start: 2024-02-21 | End: 2024-02-21 | Stop reason: HOSPADM

## 2024-02-21 RX ORDER — SODIUM CHLORIDE 9 MG/ML
INJECTION, SOLUTION INTRAVENOUS CONTINUOUS
Status: DISCONTINUED | OUTPATIENT
Start: 2024-02-21 | End: 2024-02-23

## 2024-02-21 RX ORDER — TRAMADOL HYDROCHLORIDE 50 MG/1
50 TABLET ORAL EVERY 6 HOURS PRN
Status: DISCONTINUED | OUTPATIENT
Start: 2024-02-21 | End: 2024-02-28

## 2024-02-21 RX ORDER — PHENYLEPHRINE HCL 10 MG/ML
VIAL (ML) INJECTION AS NEEDED
Status: DISCONTINUED | OUTPATIENT
Start: 2024-02-21 | End: 2024-02-21 | Stop reason: SURG

## 2024-02-21 RX ORDER — OXYCODONE HYDROCHLORIDE 5 MG/1
2.5 TABLET ORAL EVERY 4 HOURS PRN
Status: DISCONTINUED | OUTPATIENT
Start: 2024-02-21 | End: 2024-02-28

## 2024-02-21 RX ORDER — LIDOCAINE HYDROCHLORIDE 10 MG/ML
INJECTION, SOLUTION EPIDURAL; INFILTRATION; INTRACAUDAL; PERINEURAL AS NEEDED
Status: DISCONTINUED | OUTPATIENT
Start: 2024-02-21 | End: 2024-02-21 | Stop reason: SURG

## 2024-02-21 RX ORDER — INSULIN ASPART 100 [IU]/ML
2 INJECTION, SOLUTION INTRAVENOUS; SUBCUTANEOUS ONCE
Status: COMPLETED | OUTPATIENT
Start: 2024-02-21 | End: 2024-02-21

## 2024-02-21 RX ORDER — SODIUM CHLORIDE, SODIUM LACTATE, POTASSIUM CHLORIDE, CALCIUM CHLORIDE 600; 310; 30; 20 MG/100ML; MG/100ML; MG/100ML; MG/100ML
INJECTION, SOLUTION INTRAVENOUS CONTINUOUS PRN
Status: DISCONTINUED | OUTPATIENT
Start: 2024-02-21 | End: 2024-02-21 | Stop reason: SURG

## 2024-02-21 RX ORDER — ACETAMINOPHEN 500 MG
1000 TABLET ORAL ONCE AS NEEDED
Status: DISCONTINUED | OUTPATIENT
Start: 2024-02-21 | End: 2024-02-21 | Stop reason: HOSPADM

## 2024-02-21 RX ORDER — ROCURONIUM BROMIDE 10 MG/ML
INJECTION, SOLUTION INTRAVENOUS AS NEEDED
Status: DISCONTINUED | OUTPATIENT
Start: 2024-02-21 | End: 2024-02-21 | Stop reason: SURG

## 2024-02-21 RX ORDER — ENOXAPARIN SODIUM 100 MG/ML
40 INJECTION SUBCUTANEOUS DAILY
Status: DISCONTINUED | OUTPATIENT
Start: 2024-02-22 | End: 2024-02-28

## 2024-02-21 RX ORDER — DOCUSATE SODIUM 100 MG/1
100 CAPSULE, LIQUID FILLED ORAL 2 TIMES DAILY
Status: DISCONTINUED | OUTPATIENT
Start: 2024-02-21 | End: 2024-02-28

## 2024-02-21 RX ORDER — KETOROLAC TROMETHAMINE 30 MG/ML
INJECTION, SOLUTION INTRAMUSCULAR; INTRAVENOUS AS NEEDED
Status: DISCONTINUED | OUTPATIENT
Start: 2024-02-21 | End: 2024-02-21 | Stop reason: SURG

## 2024-02-21 RX ORDER — ACETAMINOPHEN 325 MG/1
650 TABLET ORAL
Status: DISCONTINUED | OUTPATIENT
Start: 2024-02-21 | End: 2024-02-24

## 2024-02-21 RX ORDER — HYDROMORPHONE HYDROCHLORIDE 1 MG/ML
0.4 INJECTION, SOLUTION INTRAMUSCULAR; INTRAVENOUS; SUBCUTANEOUS EVERY 5 MIN PRN
Status: DISCONTINUED | OUTPATIENT
Start: 2024-02-21 | End: 2024-02-21 | Stop reason: HOSPADM

## 2024-02-21 RX ORDER — CEFAZOLIN SODIUM 1 G/3ML
INJECTION, POWDER, FOR SOLUTION INTRAMUSCULAR; INTRAVENOUS AS NEEDED
Status: DISCONTINUED | OUTPATIENT
Start: 2024-02-21 | End: 2024-02-21 | Stop reason: SURG

## 2024-02-21 RX ORDER — CEFAZOLIN SODIUM/WATER 2 G/20 ML
2 SYRINGE (ML) INTRAVENOUS EVERY 8 HOURS
Qty: 40 ML | Refills: 0 | Status: COMPLETED | OUTPATIENT
Start: 2024-02-22 | End: 2024-02-22

## 2024-02-21 RX ORDER — HYDROMORPHONE HYDROCHLORIDE 1 MG/ML
0.2 INJECTION, SOLUTION INTRAMUSCULAR; INTRAVENOUS; SUBCUTANEOUS EVERY 5 MIN PRN
Status: DISCONTINUED | OUTPATIENT
Start: 2024-02-21 | End: 2024-02-21 | Stop reason: HOSPADM

## 2024-02-21 RX ORDER — SODIUM CHLORIDE, SODIUM LACTATE, POTASSIUM CHLORIDE, CALCIUM CHLORIDE 600; 310; 30; 20 MG/100ML; MG/100ML; MG/100ML; MG/100ML
INJECTION, SOLUTION INTRAVENOUS CONTINUOUS
Status: DISCONTINUED | OUTPATIENT
Start: 2024-02-21 | End: 2024-02-21 | Stop reason: HOSPADM

## 2024-02-21 RX ORDER — HYDROMORPHONE HYDROCHLORIDE 1 MG/ML
0.6 INJECTION, SOLUTION INTRAMUSCULAR; INTRAVENOUS; SUBCUTANEOUS EVERY 5 MIN PRN
Status: DISCONTINUED | OUTPATIENT
Start: 2024-02-21 | End: 2024-02-21 | Stop reason: HOSPADM

## 2024-02-21 RX ORDER — POLYETHYLENE GLYCOL 3350 17 G/17G
17 POWDER, FOR SOLUTION ORAL DAILY
Status: DISCONTINUED | OUTPATIENT
Start: 2024-02-22 | End: 2024-02-28

## 2024-02-21 RX ORDER — HYDROMORPHONE HYDROCHLORIDE 1 MG/ML
0.2 INJECTION, SOLUTION INTRAMUSCULAR; INTRAVENOUS; SUBCUTANEOUS EVERY 2 HOUR PRN
Status: DISCONTINUED | OUTPATIENT
Start: 2024-02-21 | End: 2024-02-28

## 2024-02-21 RX ORDER — OXYCODONE HYDROCHLORIDE 5 MG/1
5 TABLET ORAL EVERY 4 HOURS PRN
Status: DISCONTINUED | OUTPATIENT
Start: 2024-02-21 | End: 2024-02-28

## 2024-02-21 RX ORDER — HYDROMORPHONE HYDROCHLORIDE 1 MG/ML
0.4 INJECTION, SOLUTION INTRAMUSCULAR; INTRAVENOUS; SUBCUTANEOUS EVERY 2 HOUR PRN
Status: DISCONTINUED | OUTPATIENT
Start: 2024-02-21 | End: 2024-02-28

## 2024-02-21 RX ADMIN — ROCURONIUM BROMIDE 40 MG: 10 INJECTION, SOLUTION INTRAVENOUS at 18:50:00

## 2024-02-21 RX ADMIN — CEFAZOLIN SODIUM 2 G: 1 INJECTION, POWDER, FOR SOLUTION INTRAMUSCULAR; INTRAVENOUS at 18:50:00

## 2024-02-21 RX ADMIN — PHENYLEPHRINE HCL 50 MCG: 10 MG/ML VIAL (ML) INJECTION at 19:13:00

## 2024-02-21 RX ADMIN — KETOROLAC TROMETHAMINE 15 MG: 30 INJECTION, SOLUTION INTRAMUSCULAR; INTRAVENOUS at 20:40:00

## 2024-02-21 RX ADMIN — SODIUM CHLORIDE, SODIUM LACTATE, POTASSIUM CHLORIDE, CALCIUM CHLORIDE: 600; 310; 30; 20 INJECTION, SOLUTION INTRAVENOUS at 21:16:00

## 2024-02-21 RX ADMIN — PHENYLEPHRINE HCL 50 MCG: 10 MG/ML VIAL (ML) INJECTION at 20:02:00

## 2024-02-21 RX ADMIN — SODIUM CHLORIDE, SODIUM LACTATE, POTASSIUM CHLORIDE, CALCIUM CHLORIDE: 600; 310; 30; 20 INJECTION, SOLUTION INTRAVENOUS at 18:43:00

## 2024-02-21 RX ADMIN — LIDOCAINE HYDROCHLORIDE 50 MG: 10 INJECTION, SOLUTION EPIDURAL; INFILTRATION; INTRACAUDAL; PERINEURAL at 18:48:00

## 2024-02-21 RX ADMIN — PHENYLEPHRINE HCL 50 MCG: 10 MG/ML VIAL (ML) INJECTION at 19:06:00

## 2024-02-21 NOTE — CM/SW NOTE
02/21/24 1700   CM/SW Referral Data   Referral Source Physician   Reason for Referral Discharge planning   Informant EMR;Clinical Staff Member     Pt admitted from [facility])  following a fall.  Will need hip surgery.  DSC sent updates in aidin.    Marie Lackey LCSW  /Discharge Planner

## 2024-02-21 NOTE — PROGRESS NOTES
[facility]     Hospitalist Progress Note     Kierra Joshua Patient Status:  Inpatient    1945 MRN [id number]   Location [facility] 3SW-A Attending Shree Bowman MD   Hosp Day # 1 PCP Tate Tovar     Chief Complaint: Fx    Subjective:   Patient admits to leg pain. No chest pain or shortness of breath.    Current medications:   traMADol  50 mg Oral BID    risperiDONE  0.5 mg Oral Noon    risperiDONE  0.75 mg Oral BID    insulin aspart  1-10 Units Subcutaneous TID AC and HS       Objective:    Review of Systems:   Limited d/t patient fact\ors.    Vital signs:  Temp:  [97.6 °F (36.4 °C)-99.6 °F (37.6 °C)] 98.1 °F (36.7 °C)  Pulse:  [68-87] 68  Resp:  [12-18] 16  BP: ()/(41-60) 110/41  SpO2:  [97 %-100 %] 97 %  Patient Weight for the past 72 hrs:   Weight   24 1817 143 lb 4.8 oz (65 kg)   24 2247 143 lb (64.9 kg)     Physical Exam:    General: No acute distress.   Respiratory: Clear to auscultation bilaterally.  Cardiovascular: S1, S2. Regular rate and rhythm.   Abdomen: Soft, nontender, nondistended.  Positive bowel sounds.   Extremities: No edema.    Diagnostic Data:    Labs:  Recent Labs   Lab 24  0518   WBC 10.5 8.5   HGB 10.7* 10.0*   MCV 90.2 88.0   .0 204.0   INR 1.06  --        Recent Labs   Lab 24  0518   * 159*   BUN 23 23   CREATSERUM 0.68 0.97   CA 9.0 8.9   ALB 3.0*  --     138   K 4.4 4.5    108   CO2 24.0 24.0   ALKPHO 60  --    AST 16  --    ALT 14  --    BILT 0.3  --    TP 5.7*  --        Estimated Creatinine Clearance: 43.6 mL/min (based on SCr of 0.97 mg/dL).    Recent Labs   Lab 24   PTP 13.8   INR 1.06            COVID-19 Lab Results    COVID-19  Lab Results   Component Value Date    COVID19 Detected (A) 2023    COVID19 Detected (A) 2023    COVID19 Not Detected 2023       Pro-Calcitonin  No results for input(s): \"PCT\" in the last 168 hours.    Cardiac  No  results for input(s): \"TROP\", \"PBNP\" in the last 168 hours.    Creatinine Kinase  No results for input(s): \"CK\" in the last 168 hours.    Inflammatory Markers  No results for input(s): \"CRP\", \"SHAHIDA\", \"LDH\", \"DDIMER\" in the last 168 hours.    No results for input(s): \"TROP\", \"TROPHS\", \"CK\" in the last 168 hours.    Imaging: Imaging data reviewed in Epic.    Medications:    traMADol  50 mg Oral BID    risperiDONE  0.5 mg Oral Noon    risperiDONE  0.75 mg Oral BID    insulin aspart  1-10 Units Subcutaneous TID AC and HS       Assessment & Plan:    Complete impacted fracture right proximal femoral shaft  NPO  IVF  Pain control  Ortho consult  Constipation  Bowel care   Dementia  Risperidone  Essential hypertension  Dyslipidemia  PTA meds on hold  Monitor hemodynamics  Diabetes mellitus  Correctional scale 1:40    Supplementary Documentation:   Quality:  DVT Prophylaxis: SCDs    At this point Ms. Joshua is expected to be discharge to: TBD    Plan of care discussed with patient and RN.    Shree Bowman MD

## 2024-02-21 NOTE — OCCUPATIONAL THERAPY NOTE
OT evaluation order - Ortho consultation pending. Possible surgery for hip fracture.  If surgery, please place new order post-op.

## 2024-02-21 NOTE — PROGRESS NOTES
Anesthesia Pain Service    POD# 1 s/p Other: Femoral block pain control hip fracture.    Block type: Lower extremity: Femoral      Post op review: No evidence of immediate block related complications    Daughter at bedside.  Block has worn off and requriring pain medications.  Surgical care to be scheduled.    Kashif Muñoz MD [facility]  Mobile   Pager [phone]  long range page

## 2024-02-21 NOTE — SLP NOTE
Order received. Note patient NPO for possible surgery later today. Will hold and continue to follow, completing evaluation as clinically appropriate.    Contreras Dos Santos MS CCC-SLP  Pager [phone]

## 2024-02-21 NOTE — PHYSICAL THERAPY NOTE
PT order received and chart reviewed. Spoke with RN. Ortho consult pending. If surgery, please place new orders post-op. Thank you.

## 2024-02-21 NOTE — H&P
[facility]     Kierra Joshua Patient Status:  Inpatient    1945 MRN [id number]   Location [facility] 3SW-A Attending Nathaniel Cardenas,    Hosp Day # 0 PCP Tate Tovar     Chief Complaint: Right hip pain    Subjective:    History of Present Illness:   Kierra Joshua is a 78 year old female with PMHx Alzheimer's dementia with behavioral disturbances, anxiety, hypertension, diabetes mellitus type 2, dyslipidemia who presents to the hospital for evaluation of right hip pain.  Patient lives at the [facility] and previously was at memory care assisted living.  Daughter is at bedside assisting with history and visits the patient 7 times a week and is with her approximately 30 hours every week.  She walked the patient  night as well as Monday night and she was at her baseline.  No fever, chills, chest pain, shortness of breath, nausea, vomiting, diarrhea, dysuria or abdominal pain.  Today patient complained of pain in her right hip.  Daughter talked to the overnight tech and there were no issues.  Her leg was noted to be shorter and externally rotated compared to her left.  There was no witnessed fall or no idea how this came to be.  Daughter reports an investigation is ongoing at the nursing home.  X-ray in the ER shows complete impacted fracture of the right proximal femoral shaft.  She received nerve block in the ER and is now more comfortable.    History/Other:    Past Medical History:  Past Medical History:   Diagnosis Date    Alzheimer's dementia with behavioral disturbance (HCC)     Anxiety state     Bipolar affective (HCC)     Cataract     Dementia (HCC)     Diabetes (HCC)     Elevated fasting lipid profile 2012    Essential hypertension     Essential hypertension, benign 2013    Falls     Gallstones and inflammation of gallbladder without obstruction     High blood pressure     High cholesterol     Muscle weakness     Neuropathy      Osteoarthritis     Psychosis, unspecified psychosis type (HCC)     Shortness of breath      Past Surgical History:   Past Surgical History:   Procedure Laterality Date    BIOPSY OF BREAST, INCISIONAL      left breast bx during surgery    COLONOSCOPY N/A 12/29/2023    Procedure: UNPREPPED  COLONOSCOPY;  Surgeon: Fabrice Trejo MD;  Location:  ENDOSCOPY    OTHER SURGICAL HISTORY  2001    aneurysm clipped x2      Family History:   No family history on file.  Social History:    reports that she quit smoking about 23 years ago. Her smoking use included cigarettes. She smoked an average of 1 pack per day. She has never used smokeless tobacco. She reports that she does not drink alcohol and does not use drugs.     Allergies: No Active Allergies    Medications:    Current Facility-Administered Medications on File Prior to Encounter   Medication Dose Route Frequency Provider Last Rate Last Admin    [COMPLETED] iopamidol 76% (ISOVUE-370) injection for power injector  80 mL Intravenous ONCE PRN Jesica Dahl MD   80 mL at 12/29/23 1814    [COMPLETED] sodium chloride 0.9 % IV bolus 1,000 mL  1,000 mL Intravenous Once Heike Monique MD 1,000 mL/hr at 12/27/23 1345 1,000 mL at 12/27/23 1345    [COMPLETED] potassium chloride 40 mEq in 250mL sodium chloride 0.9% IVPB premix  40 mEq Intravenous Once Heike Monique MD 62.5 mL/hr at 12/27/23 1403 40 mEq at 12/27/23 1403    [COMPLETED] remdesivir (Veklury) 200 mg in sodium chloride 0.9% 100 mL IVPB  200 mg Intravenous Once Duglas Cardenas  mL/hr at 12/27/23 1921 200 mg at 12/27/23 1921     Current Outpatient Medications on File Prior to Encounter   Medication Sig Dispense Refill    FLEET ENEMA 7-19 GM/118ML Rectal Enema       Cranberry 425 MG Oral Cap Take by mouth.      Melatonin 3 MG Oral Cap Take by mouth.      magnesium hydroxide (MILK OF MAGNESIA) 400 MG/5ML Oral Suspension Take by mouth daily as needed for constipation.      Multiple Vitamins-Minerals  (MULTI-VITAMIN/MINERALS) Oral Tab Take 1 tablet by mouth daily.      sennosides 17.2 MG Oral Tab Take 1 tablet (17.2 mg total) by mouth daily. 30 tablet 0    traMADol 50 MG Oral Tab Take 1 tablet (50 mg total) by mouth in the morning and 1 tablet (50 mg total) before bedtime. 15 tablet 0    bisacodyl 10 MG Rectal Suppos Place 1 suppository (10 mg total) rectally Q24H PRN.      polyethylene glycol, PEG 3350, 17 g Oral Powd Pack Take 17 g by mouth daily. 30 each 0    glipiZIDE 5 MG Oral Tab Take 0.5 tablets (2.5 mg total) by mouth daily with lunch.      Potassium Chloride ER 10 MEQ Oral Tab CR Take 1 tablet (10 mEq total) by mouth Every Monday, Wednesday, and Friday.      risperiDONE 0.25 MG Oral Tab Take 2 tablets (0.5 mg total) by mouth Noon.      furosemide 20 MG Oral Tab Take 1 tablet (20 mg total) by mouth Every Monday, Wednesday, and Friday.      glipiZIDE 5 MG Oral Tab Take 1 tablet (5 mg total) by mouth every morning before breakfast.      lisinopril 10 MG Oral Tab Take 1 tablet (10 mg total) by mouth daily.      risperiDONE 0.25 MG Oral Tab Take 3 tablets (0.75 mg total) by mouth 2 (two) times daily.      fluticasone propionate 50 MCG/ACT Nasal Suspension 2 sprays by Each Nare route daily.      bismuth subsalicylate (PEPTO-BISMOL) 262 MG/15ML Oral Suspension Take 30 mL (524 mg total) by mouth every 6 (six) hours as needed for Indigestion.      meclizine 25 MG Oral Tab Take 1 tablet (25 mg total) by mouth every 8 (eight) hours as needed.       Review of Systems:   A comprehensive review of systems was completed.    Pertinent positives and negatives noted in the HPI.    Objective:   Physical Exam:    /49   Pulse 87   Temp 99.6 °F (37.6 °C) (Temporal)   Resp 17   Wt 143 lb 4.8 oz (65 kg)   SpO2 100%   BMI 23.31 kg/m²   General: No acute distress, awake and alert but with underlying cognitive impairment  Respiratory: No rhonchi, no wheezes  Cardiovascular: S1, S2. Regular rate and rhythm  Abdomen:  Soft, Non-tender, non-distended  Extremities: No LE pitting edema.  RLE shorter and externally rotated.    Results:    Labs:      Labs Last 24 Hours:    Recent Labs   Lab 02/20/24 1930   RBC 3.77*   HGB 10.7*   HCT 34.0*   MCV 90.2   MCH 28.4   MCHC 31.5   RDW 13.7   NEPRELIM 9.43*   WBC 10.5   .0     Recent Labs   Lab 02/20/24 1930   *   BUN 23   CREATSERUM 0.68   EGFRCR 89   CA 9.0   ALB 3.0*      K 4.4      CO2 24.0   ALKPHO 60   AST 16   ALT 14   BILT 0.3   TP 5.7*     Lab Results   Component Value Date    INR 1.06 02/20/2024     No results for input(s): \"TROP\", \"TROPHS\", \"CK\" in the last 168 hours.    No results for input(s): \"TROP\", \"PBNP\" in the last 168 hours.    No results for input(s): \"PCT\" in the last 168 hours.    Imaging: Imaging data reviewed in Epic.    Assessment & Plan:      #Right proximal femoral shaft fracture  -Nerve block done in ER  -Pain control  -Orthopedic surgery consult  -PT/OT    #Hypertension  -Hold lasix and lisinopril    #Diabetes mellitus type 2 with A1c 5.6  -ISS    #Dyslipidemia  -Not on medications    #Alzheimer's dementia with behavioral disturbances  #Anxiety  -Continue risperidone    #Chronic normocytic anemia  -Near baseline      Plan of care discussed with patient and daughter.    Nathaniel Cardenas DO    Supplementary Documentation:     The 21st Century Cures Act makes medical notes like these available to patients in the interest of transparency. Please be advised this is a medical document. Medical documents are intended to carry relevant information, facts as evident, and the clinical opinion of the practitioner. The medical note is intended as peer to peer communication and may appear blunt or direct. It is written in medical language and may contain abbreviations or verbiage that are unfamiliar.               **Certification      PHYSICIAN Certification of Need for Inpatient Hospitalization - Initial Certification    Patient will require  inpatient services that will reasonably be expected to span two midnight's based on the clinical documentation in H+P.   Based on patients current state of illness, I anticipate that, after discharge, patient will require TBD.

## 2024-02-21 NOTE — ED PROVIDER NOTES
Patient Seen in: [facility] Emergency Department      History     Chief Complaint   Patient presents with    Pain     Right leg, shortened rotated out and thigh is hard to the touch     Stated Complaint: right leg pain    Subjective:   HPI    78-year-old female presents today for evaluation.  Patient's daughter visits her every day.  She was with her yesterday evening around 6 PM and the patient was ambulating with a walker.  Daughter received a call from the facility today around 1:00 this afternoon that the patient was complaining of right leg pain.  She went to go check on her mother several hours later and noted the swelling in her right thigh.  She also noted a skin tear in her right wrist.  There is no report of any falls or known trauma.  Patient is unable to provide any history.    Objective:   Past Medical History:   Diagnosis Date    Alzheimer's dementia with behavioral disturbance (HCC)     Anxiety state     Bipolar affective (HCC)     Cataract     Dementia (HCC)     Diabetes (HCC)     Elevated fasting lipid profile 2012    Essential hypertension     Essential hypertension, benign 2013    Falls     Gallstones and inflammation of gallbladder without obstruction     High blood pressure     High cholesterol     Muscle weakness     Neuropathy     Osteoarthritis     Psychosis, unspecified psychosis type (HCC)     Shortness of breath               Past Surgical History:   Procedure Laterality Date    BIOPSY OF BREAST, INCISIONAL      left breast bx during surgery    COLONOSCOPY N/A 2023    Procedure: UNPREPPED  COLONOSCOPY;  Surgeon: Fabrice Trejo MD;  Location:  ENDOSCOPY    OTHER SURGICAL HISTORY      aneurysm clipped x2                Social History     Socioeconomic History    Marital status:    Tobacco Use    Smoking status: Former     Packs/day: 1     Types: Cigarettes     Quit date:      Years since quittin.1    Smokeless tobacco: Never   Vaping Use     Vaping Use: Never used   Substance and Sexual Activity    Alcohol use: No     Alcohol/week: 0.0 standard drinks of alcohol    Drug use: No   Other Topics Concern    Caffeine Concern Yes    Exercise No     Social Determinants of Health     Food Insecurity: No Food Insecurity (2/20/2024)    Food Insecurity     Food Insecurity: Never true   Transportation Needs: No Transportation Needs (2/20/2024)    Transportation Needs     Lack of Transportation: No   Housing Stability: Low Risk  (2/20/2024)    Housing Stability     Housing Instability: No              Review of Systems    Positive for stated complaint: right leg pain  Other systems are as noted in HPI.  Constitutional and vital signs reviewed.      All other systems reviewed and negative except as noted above.    Physical Exam     ED Triage Vitals [02/20/24 1817]   /57   Pulse 69   Resp 18   Temp 99.6 °F (37.6 °C)   Temp src Temporal   SpO2 97 %   O2 Device None (Room air)       Current:/41 (BP Location: Right arm)   Pulse 75   Temp 98.6 °F (37 °C) (Oral)   Resp 18   Wt 64.9 kg   SpO2 97%   BMI 23.26 kg/m²         Physical Exam  Vitals and nursing note reviewed.   Constitutional:       Appearance: Normal appearance.   HENT:      Head: Normocephalic.      Nose: Nose normal.      Mouth/Throat:      Mouth: Mucous membranes are moist.   Eyes:      Extraocular Movements: Extraocular movements intact.   Cardiovascular:      Rate and Rhythm: Normal rate and regular rhythm.   Pulmonary:      Effort: Pulmonary effort is normal.      Breath sounds: Normal breath sounds.   Abdominal:      General: Abdomen is flat.      Tenderness: There is no abdominal tenderness. There is no guarding or rebound.   Musculoskeletal:         General: Normal range of motion.      Comments: Swelling noted to the right thigh.  Skin is intact.  Leg is rotated.   Skin:     General: Skin is warm.      Comments: Skin tear noted to the right distal wrist   Neurological:      General:  No focal deficit present.      Mental Status: She is alert.      Cranial Nerves: No cranial nerve deficit.   Psychiatric:         Mood and Affect: Mood normal.           ED Course     Labs Reviewed   COMP METABOLIC PANEL (14) - Abnormal; Notable for the following components:       Result Value    Glucose 262 (*)     Total Protein 5.7 (*)     Albumin 3.0 (*)     Globulin  2.7 (*)     All other components within normal limits   POCT GLUCOSE - Abnormal; Notable for the following components:    POC Glucose 282 (*)     All other components within normal limits   CBC W/ DIFFERENTIAL - Abnormal; Notable for the following components:    RBC 3.77 (*)     HGB 10.7 (*)     HCT 34.0 (*)     Neutrophil Absolute Prelim 9.43 (*)     Neutrophil Absolute 9.43 (*)     Lymphocyte Absolute 0.48 (*)     All other components within normal limits   PTT, ACTIVATED - Normal   PROTHROMBIN TIME (PT) - Normal   CBC WITH DIFFERENTIAL WITH PLATELET    Narrative:     The following orders were created for panel order CBC With Differential With Platelet.  Procedure                               Abnormality         Status                     ---------                               -----------         ------                     CBC W/ DIFFERENTIAL[[id number]]          Abnormal            Final result                 Please view results for these tests on the individual orders.   URINALYSIS, ROUTINE   BASIC METABOLIC PANEL (8)   CBC WITH DIFFERENTIAL WITH PLATELET   MRSA/SA SCRN BY PCR:[facility] ORTHO SURG ONLY             CT BRAIN OR HEAD ([id number])    Result Date: 2/20/2024  PROCEDURE:  CT BRAIN OR HEAD (48856)  COMPARISON:  [facility] , CT, CT BRAIN OR HEAD ([id number]), 3/12/2023, 3:32 PM.  [facility] , CT, CT BRAIN OR HEAD ([id number]), 12/08/2022, 1:46 AM.  [facility] , CT, CT BRAIN OR HEAD ([id number]), 11/11/2022, 9:10 AM.  [facility] , CT, CT BRAIN OR HEAD ([id number]), 6/12/2023, 9:05 AM.  INDICATIONS:  right leg pain, weakness  TECHNIQUE:  Noncontrast CT scanning is performed through  the brain. Dose reduction techniques were used. Dose information is transmitted to the ACR (American College of Radiology) NRDR (National Radiology Data Registry) which includes the Dose Index Registry.  PATIENT STATED HISTORY: (As transcribed by Technologist)  Fall with right leg pain and weakness.    FINDINGS:  VENTRICLES/SULCI:  Stable mild to moderate atrophy without overt hydronephrosis. 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  These findings are unchanged.  There are superimposed moderate chronic deep white matter ischemic changes in the cerebrum which are stable.  The basal cisterns are patent.  There are punctate calcifications within the basal ganglia unchanged.  No extra-axial fluid collections.  No midline shift. SINUSES:           No sign of acute sinusitis.  MASTOIDS:          No sign of acute inflammation. SKULL:             No fracture.  Status post left-sided craniotomy unchanged. OTHER:             None.            CONCLUSION:  Stable chronic changes.  No acute disease.    LOCATION:  [facility]   Dictated by (CST): Geovany Snowden MD on 2/20/2024 at 9:28 PM     Finalized by (CST): Geovany Snowden MD on 2/20/2024 at 9:32 PM       XR CHEST AP/PA (1 VIEW) (CPT=71045)    Result Date: 2/20/2024  PROCEDURE:  XR CHEST AP/PA (1 VIEW) (CPT=71045)  TECHNIQUE:  AP chest radiograph was obtained.  COMPARISON:  [facility] , XR, XR CHEST AP PORTABLE  (CPT=71045), 12/27/2023, 1:35 PM.  INDICATIONS:  right leg pain  PATIENT STATED HISTORY: (As transcribed by Technologist)  Pt. with right hip  fx.    FINDINGS:  No focal consolidation, pleural effusion, or pneumothorax.            CONCLUSION:  No focal consolidation.   LOCATION:  [facility]      Dictated by (CST):  Lawanda Thrasher MD on 2/20/2024 at 8:37 PM     Finalized by (CST): Lawanda Thrasher MD on 2/20/2024 at 8:39 PM       XR FEMUR MIN 2 VIEWS RIGHT (CPT=73552)    Result Date: 2/20/2024  PROCEDURE:  XR FEMUR MIN 2 VIEWS RIGHT (CPT=73552)  TECHNIQUE:  AP and lateral views were obtained.  COMPARISON:  [facility] , XR, XR HIP W OR WO PELVIS 2 OR 3 VIEWS, RIGHT (CPT=73502), 2/20/2024, 6:32 PM.  INDICATIONS:  right leg pain  PATIENT STATED HISTORY: (As transcribed by Technologist)  Patient offered no additional history at this time.    FINDINGS:  BONES:  Complete fracture involves the right proximal femoral shaft with approximately 10 cm of overlap of the fracture fragments.  No dislocation of the right hip.  Mild degenerative change involves the right knee.  Marked atherosclerosis is present.            CONCLUSION:  1. Complete impacted fracture involves the right proximal femoral shaft.   LOCATION:  [facility]   Dictated by (CST): Lawanda Thrasher MD on 2/20/2024 at 8:34 PM     Finalized by (CST): Lawanda Thrasher MD on 2/20/2024 at 8:35 PM       XR HIP W OR WO PELVIS 2 OR 3 VIEWS, RIGHT (CPT=73502)    Result Date: 2/20/2024  PROCEDURE:  XR HIP W OR WO PELVIS 2 OR 3 VIEWS, RIGHT ( CPT=73502)  TECHNIQUE:  Unilateral 2 to 3 views of the hip and pelvis if performed.  COMPARISON:  [facility] , XR, XR FEMUR MIN 2 VIEWS RIGHT (CPT=73552), 2/20/2024, 6:33 PM.  INDICATIONS:  right leg pain  PATIENT STATED HISTORY: (As transcribed by Technologist)  Patient offered no additional history at this time.    FINDINGS:  BONES:  Complete fracture involves the right proximal femoral shaft with approximately 10 cm of impaction.  No dislocation of the right hip.  Degenerative change involves the hips bilaterally as well as visualized lower lumbar sacral spine.            CONCLUSION:  1. Complete impacted fracture involves the right proximal femoral shaft.   LOCATION:  [facility]   Dictated by (CST): Lawanda Thrasher MD on 2/20/2024 at 8:32 PM     Finalized by (CST): Lawanda Thrasher MD on  2/20/2024 at 8:34 PM               [facility]      Differential Diagnosis  78-year-old female presents today for evaluation of right leg pain.  There is no report of any injury.  Her leg does appear shortened and rotated.  High concern for hip fracture.  The extremity is neurovascularly intact.  Contusion is within the differential as well.  Plan for x-ray.  Will observe.    7:34 pm  Patient's x-ray demonstrates a right hip fracture.  There is no report of any trauma so is unclear how the hip fracture was obtained.  Will add CT of the head to assess for intracranial hemorrhage.  She has no upper extremity discomfort with range of motion.  She has no left lower extremity discomfort with range of motion.  She has no bruising on her abdominal wall or chest wall.  She has no neck pain or tenderness.  Will observe.    9:42 pm  CT head negative, will admit to the hospital for further care.  I spoke with the hospitalist in regards admission.    History from [facility]  Daughter provides history    Discussions of Management  I spoke with the hospitalist in regards to admission    Independent Interpretation  I independently interpreted the hip x-ray, fracture is noted  Admission disposition: 2/20/2024  9:42 PM                                     Medical Decision Making      Disposition and Plan     Clinical Impression:  1. Closed fracture of right hip, initial encounter ([facility])         Disposition:  Admit  2/20/2024  9:42 pm    Follow-up:  No follow-up provider specified.        Medications Prescribed:  Current Discharge Medication List                            Hospital Problems       Present on Admission  Date Reviewed: 3/12/2023            ICD-10-CM Noted POA    * (Principal) Closed fracture of right hip, initial encounter ([facility]) S72.001A 2/20/2024 Unknown

## 2024-02-21 NOTE — PROGRESS NOTES
Ortho Brief Note    Called regarding patient with right proximal femur/hip fracture. Images reviewed and confirm.       Admit to hospitalist, appreciate risk stratification and medical optimization  Patient would benefit from operative intervention  NWB RLE  Npo, hold anticoagulation for possible OR today   Plan for right hip ORIF and IMN  Will see and assess today and discuss treatment plan.    Electronically Signed By: Brian Guidry MD, 2/21/2024, 3:00 PM

## 2024-02-21 NOTE — ED QUICK NOTES
Anesthesia at bedside to perform femoral block. Daughter at bedside consenting for patient to have procedure done.

## 2024-02-21 NOTE — ED QUICK NOTES
Orders for admission, patient is aware of plan and ready to go upstairs. Any questions, please call ED RN Angela at extension [phone].     Patient Covid vaccination status: Fully vaccinated     COVID Test Ordered in ED: None    COVID Suspicion at Admission: N/A    Running Infusions:  None    Mental Status/LOC at time of transport: a&o X1    Other pertinent information: FEM BLOCK PERFORMED IN ER. NPO after midnight per er md BOATENG score: N/A   NIH score:  N/A

## 2024-02-21 NOTE — PROGRESS NOTES
NURSING ADMISSION NOTE      Patient admitted via Cart  Oriented to room.  Safety precautions initiated.  Bed in low position.  Call light in reach.    Pt A&Ox1-2 on room air, received a femoral nerve block in the ED, pt is from [facility]. Daughter helped with admission questions, is her POA. Daughter states that pt normally is on a mechanical soft diet, has trouble swallowing sometimes. Takes pills crushed in apple sauce. SLP ordered per protocol. Pt npo at midnight for possible surgery tomorrow. Daughter requesting to be called when plan is established for patient. VSS. Call light within reach, frequent checks made, needs met.

## 2024-02-21 NOTE — CM/SW NOTE
Department  notified of request for talia [facility] referrals started. Assigned CM/SW to follow up with pt/family on further discharge planning.     Janet Canales, DSC

## 2024-02-21 NOTE — ED INITIAL ASSESSMENT (HPI)
Patient brought in by Helder from [facility] for suspected right hip fx. Patient ambulatory last night with her daughter. This morning around 11a the staff noticed right leg was shorter than the left leg, rotated outward and thigh was hard. No reports of trauma. Unrelated patient has not had a bowel movement for four days.

## 2024-02-21 NOTE — ANESTHESIA PROCEDURE NOTES
Regional Block    Date/Time: 2/20/2024 9:10 PM    Performed by: Seferino Garcia MD  Authorized by: Seferino Garcia MD      General Information and Staff    Start Time:  2/20/2024 9:10 PM  End Time:  2/20/2024 9:20 PM  Anesthesiologist:  Seferino Garcia MD  Performed by:  Anesthesiologist  Patient Location:  ED      Site Identification: real time ultrasound guided and image stored and retrievable    Block site/laterality marked before start: site marked  Reason for Block: procedure for pain and at request of ED Physician    Preanesthetic Checklist: 2 patient identifers, IV checked, site marked, risks and benefits discussed, monitors and equipment checked, pre-op evaluation, timeout performed, anesthesia consent, sterile technique used, no prohibitive neurological deficits and no local skin infection at insertion site      Procedure Details    Patient Position:  Supine  Prep: ChloraPrep    Monitoring:  Cardiac monitor, continuous pulse ox and blood pressure cuff  Block Type:  Femoral  Laterality:  Right  Injection Technique:  Single-shot    Needle    Needle Type:  Short-bevel and echogenic  Needle Gauge:  21 G  Needle Length:  100 mm  Needle Localization:  Ultrasound guidance  Reason for Ultrasound Use: appropriate spread of the medication was noted in real time and no ultrasound evidence of intravascular and/or intraneural injection            Assessment    Injection Assessment:  Good spread noted, negative aspiration for heme, negative resistance, no pain on injection, incremental injection, local visualized surrounding nerve on ultrasound and low pressure  Paresthesia Pain:  None  Heart Rate Change: No    - Patient tolerated block procedure well without evidence of immediate block related complications.     Medications  2/20/2024 9:10 PM      Additional Comments    Medication:  Ropivacaine 0.5% 20mL

## 2024-02-22 LAB
ANION GAP SERPL CALC-SCNC: 3 MMOL/L (ref 0–18)
ANTIBODY SCREEN: NEGATIVE
BASOPHILS # BLD AUTO: 0.01 X10(3) UL (ref 0–0.2)
BASOPHILS NFR BLD AUTO: 0.1 %
BUN BLD-MCNC: 23 MG/DL (ref 9–23)
CALCIUM BLD-MCNC: 8.4 MG/DL (ref 8.5–10.1)
CHLORIDE SERPL-SCNC: 108 MMOL/L (ref 98–112)
CO2 SERPL-SCNC: 25 MMOL/L (ref 21–32)
CREAT BLD-MCNC: 0.96 MG/DL
EGFRCR SERPLBLD CKD-EPI 2021: 60 ML/MIN/1.73M2 (ref 60–?)
EOSINOPHIL # BLD AUTO: 0 X10(3) UL (ref 0–0.7)
EOSINOPHIL NFR BLD AUTO: 0 %
ERYTHROCYTE [DISTWIDTH] IN BLOOD BY AUTOMATED COUNT: 13.9 %
GLUCOSE BLD-MCNC: 184 MG/DL (ref 70–99)
GLUCOSE BLD-MCNC: 191 MG/DL (ref 70–99)
GLUCOSE BLD-MCNC: 204 MG/DL (ref 70–99)
GLUCOSE BLD-MCNC: 223 MG/DL (ref 70–99)
GLUCOSE BLD-MCNC: 231 MG/DL (ref 70–99)
HCT VFR BLD AUTO: 22.2 %
HGB BLD-MCNC: 6.2 G/DL
HGB BLD-MCNC: 7 G/DL
HGB BLD-MCNC: 7.1 G/DL
IMM GRANULOCYTES # BLD AUTO: 0.02 X10(3) UL (ref 0–1)
IMM GRANULOCYTES NFR BLD: 0.2 %
LYMPHOCYTES # BLD AUTO: 0.68 X10(3) UL (ref 1–4)
LYMPHOCYTES NFR BLD AUTO: 8.4 %
MCH RBC QN AUTO: 28.6 PG (ref 26–34)
MCHC RBC AUTO-ENTMCNC: 32 G/DL (ref 31–37)
MCV RBC AUTO: 89.5 FL
MONOCYTES # BLD AUTO: 0.84 X10(3) UL (ref 0.1–1)
MONOCYTES NFR BLD AUTO: 10.4 %
NEUTROPHILS # BLD AUTO: 6.51 X10 (3) UL (ref 1.5–7.7)
NEUTROPHILS # BLD AUTO: 6.51 X10(3) UL (ref 1.5–7.7)
NEUTROPHILS NFR BLD AUTO: 80.9 %
OSMOLALITY SERPL CALC.SUM OF ELEC: 292 MOSM/KG (ref 275–295)
PLATELET # BLD AUTO: 158 10(3)UL (ref 150–450)
POTASSIUM SERPL-SCNC: 4.9 MMOL/L (ref 3.5–5.1)
RBC # BLD AUTO: 2.48 X10(6)UL
RH BLOOD TYPE: POSITIVE
SODIUM SERPL-SCNC: 136 MMOL/L (ref 136–145)
WBC # BLD AUTO: 8.1 X10(3) UL (ref 4–11)

## 2024-02-22 PROCEDURE — 99233 SBSQ HOSP IP/OBS HIGH 50: CPT | Performed by: HOSPITALIST

## 2024-02-22 PROCEDURE — 30233N1 TRANSFUSION OF NONAUTOLOGOUS RED BLOOD CELLS INTO PERIPHERAL VEIN, PERCUTANEOUS APPROACH: ICD-10-PCS | Performed by: HOSPITALIST

## 2024-02-22 RX ORDER — SODIUM CHLORIDE 9 MG/ML
INJECTION, SOLUTION INTRAVENOUS ONCE
Status: COMPLETED | OUTPATIENT
Start: 2024-02-22 | End: 2024-02-22

## 2024-02-22 NOTE — OPERATIVE REPORT
OPERATIVE REPORT    Facility:  [facility]    Patient Name:  Kierra Joshua    Age/Gender:  78 year old female  :  1945    MRN:  [id number]    Date of Operation:  2024    Preoperative Diagnosis:  RIGHT SUBTROCHANTERIC FEMUR FRACTURE    Postoperative Diagnosis:  RIGHT SUBTROCHANTERIC FEMUR FRACTURE    Procedure Performed:  RIGHT FEMUR INTRAMEDULLARY NAILING    Surgeon:  RADHA THOMAS M.D.    Anesthesia:  GENERAL    Estimated Blood Loss:  150cc    Drain:  NONE    Complications:  NONE    Implants:   1.  [place] Natural cephalomedullary nail, 130 degree, 10 mm x 36 cm   2.  Lag screw 10.5 mm x 90 mm    Indications for Procedure:  Kierra Joshua is a 78 year old female with a right Subtrochanteric femur fracture.  Medical clearance was obtained and surgery was scheduled.    Description of Procedure:  The patient was taken to the operating room after the correct surgical site was marked in the preop holding area.  The patient was placed under anesthesia and placed in a supine position on the [place] orthopaedic table.  Preoperative antibiotics were given.  All bony prominences were padded.  A reduction was obtained on the orthopaedic table verified by flouroscopy.  The right hip was prepped and draped in usual sterile surgical fashion.  An incision was made proximal to the greater trochanter and an awl was used to locate the tip of the trochanter. A hole was created and a ball tipped guidewire was passed across the fracture site and down the intramedullary canal. A lateral approach to the femur was made to help maintain more adequate reduction at the fracture site. With and anterior and posterior fredy holding the fracture reduced a colinear reduction clamp was placed to maintain reduction throughout reaming and placement of the nail. The opening reamer was used to open the proximal femur and flexible reamers were used to ream the femoral medullary canal.  The measuring tool was used to determine  length and the canal reamed to 1.5 mm greater than proposed nail diameter.  The nail was passed over the guidewire and the guidewire was removed.  A guidepin was placed into the femoral head using the insertion handle guide and verified with flouroscopy.  Measurements were taken from the guidewire and a lag screw was placed after reaming. Position of the screw was verified on AP ans lateral views.  The locking screw was placed into the top of the nail and the insertion handle removed.  Using fluoroscopy and perfect [place] technique the distal locking screw was placed. Flouroscopy verified reduction and position of the implants.  Wounds were irrigated and closed with 0 vicryl, 2-0 vicryl and staples. No drain used.    The patient left the operating room in stable condition.  There were no complications.    mobilize with PT, WBAT on operative extremity with walker  pain controlled with meds  Lovenox for DVT prophylaxis for 2 weeks, then transition to ECASA 81mg BID for 4 weeks   24 hours abx    RADHA THOMAS M.D.

## 2024-02-22 NOTE — ANESTHESIA PREPROCEDURE EVALUATION
PRE-OP EVALUATION    Patient Name: Kierra Joshua    Admit Diagnosis: Closed fracture of right hip, initial encounter ([facility]) [S72.001A]    Pre-op Diagnosis: S/P right hip fracture [Z87.81]    RIGHT HIP INTRAMEDULLARY NAIL    Anesthesia Procedure: RIGHT HIP INTRAMEDULLARY NAIL    Surgeon(s) and Role:     * Brian Guidry MD - Primary    Pre-op vitals reviewed.  Temp: 98.5 °F (36.9 °C)  Pulse: 84  Resp: 16  BP: 122/51  SpO2: 98 %  Body mass index is 23.26 kg/m².    Current medications reviewed.  Hospital Medications:   [MAR Hold] docusate sodium (Colace) cap 100 mg  100 mg Oral BID    [MAR Hold] polyethylene glycol (PEG 3350) (Miralax) 17 g oral packet 17 g  17 g Oral Daily    sodium chloride 0.9% infusion   Intravenous Continuous    [START ON 2/22/2024] clonidine/epinephrine/ropivacaine/ketorolac in 0.9% NaCl 60 mL pain cocktail syringe for hip arthroplasty   Infiltration Once (Intra-Op)    [MAR Hold] traMADol (Ultram) tab 50 mg  50 mg Oral BID    [MAR Hold] risperiDONE (RisperDAL) tab 0.5 mg  0.5 mg Oral Noon    [MAR Hold] risperiDONE (RisperDAL) tab 0.75 mg  0.75 mg Oral BID    [MAR Hold] acetaminophen (Tylenol Extra Strength) tab 500 mg  500 mg Oral Q4H PRN    [MAR Hold] morphINE PF 2 MG/ML injection 0.5 mg  0.5 mg Intravenous Q2H PRN    Or    [MAR Hold] morphINE PF 2 MG/ML injection 1 mg  1 mg Intravenous Q2H PRN    Or    [MAR Hold] morphINE PF 2 MG/ML injection 2 mg  2 mg Intravenous Q2H PRN    [MAR Hold] melatonin tab 3 mg  3 mg Oral Nightly PRN    [MAR Hold] sennosides (Senokot) tab 17.2 mg  17.2 mg Oral Nightly PRN    [MAR Hold] bisacodyl (Dulcolax) 10 MG rectal suppository 10 mg  10 mg Rectal Daily PRN    [MAR Hold] fleet enema (Fleet) 7-19 GM/118ML rectal enema 133 mL  1 enema Rectal Once PRN    [MAR Hold] ondansetron (Zofran) 4 MG/2ML injection 4 mg  4 mg Intravenous Q6H PRN    [MAR Hold] benzonatate (Tessalon) cap 200 mg  200 mg Oral TID PRN    [MAR Hold] guaiFENesin (Robitussin) 100 MG/5 ML oral  liquid 200 mg  200 mg Oral Q4H PRN    [MAR Hold] glycerin-hypromellose- (Artifical Tears) 0.2-0.2-1 % ophthalmic solution 1 drop  1 drop Both Eyes QID PRN    [MAR Hold] sodium chloride (Saline Mist) 0.65 % nasal solution 1 spray  1 spray Each Nare Q3H PRN    [MAR Hold] glucose (Dex4) 15 GM/59ML oral liquid 15 g  15 g Oral Q15 Min PRN    Or    [MAR Hold] glucose (Glutose) 40% oral gel 15 g  15 g Oral Q15 Min PRN    Or    [MAR Hold] glucose-vitamin C (Dex-4) chewable tab 4 tablet  4 tablet Oral Q15 Min PRN    Or    [MAR Hold] dextrose 50% injection 50 mL  50 mL Intravenous Q15 Min PRN    Or    [MAR Hold] glucose (Dex4) 15 GM/59ML oral liquid 30 g  30 g Oral Q15 Min PRN    Or    [MAR Hold] glucose (Glutose) 40% oral gel 30 g  30 g Oral Q15 Min PRN    Or    [MAR Hold] glucose-vitamin C (Dex-4) chewable tab 8 tablet  8 tablet Oral Q15 Min PRN    [MAR Hold] insulin aspart (NovoLOG) 100 Units/mL FlexPen 1-10 Units  1-10 Units Subcutaneous TID AC and HS       Outpatient Medications:     Medications Prior to Admission   Medication Sig Dispense Refill Last Dose    FLEET ENEMA 7-19 GM/118ML Rectal Enema    Past Month    Cranberry 425 MG Oral Cap Take by mouth.   2/20/2024    Melatonin 3 MG Oral Cap Take by mouth.   2/19/2024    magnesium hydroxide (MILK OF MAGNESIA) 400 MG/5ML Oral Suspension Take by mouth daily as needed for constipation.   Past Month    Multiple Vitamins-Minerals (MULTI-VITAMIN/MINERALS) Oral Tab Take 1 tablet by mouth daily.   2/20/2024    sennosides 17.2 MG Oral Tab Take 1 tablet (17.2 mg total) by mouth daily. 30 tablet 0 2/20/2024    traMADol 50 MG Oral Tab Take 1 tablet (50 mg total) by mouth in the morning and 1 tablet (50 mg total) before bedtime. 15 tablet 0 2/20/2024    bisacodyl 10 MG Rectal Suppos Place 1 suppository (10 mg total) rectally Q24H PRN.   Past Month    polyethylene glycol, PEG 3350, 17 g Oral Powd Pack Take 17 g by mouth daily. 30 each 0 2/20/2024    glipiZIDE 5 MG Oral Tab  Take 0.5 tablets (2.5 mg total) by mouth daily with lunch.   2/20/2024    Potassium Chloride ER 10 MEQ Oral Tab CR Take 1 tablet (10 mEq total) by mouth Every Monday, Wednesday, and Friday.   2/20/2024    risperiDONE 0.25 MG Oral Tab Take 2 tablets (0.5 mg total) by mouth Noon.   2/20/2024    furosemide 20 MG Oral Tab Take 1 tablet (20 mg total) by mouth Every Monday, Wednesday, and Friday.   Past Week    glipiZIDE 5 MG Oral Tab Take 1 tablet (5 mg total) by mouth every morning before breakfast.   2/20/2024    lisinopril 10 MG Oral Tab Take 1 tablet (10 mg total) by mouth daily.   2/20/2024    risperiDONE 0.25 MG Oral Tab Take 3 tablets (0.75 mg total) by mouth 2 (two) times daily.   2/20/2024    fluticasone propionate 50 MCG/ACT Nasal Suspension 2 sprays by Each Nare route daily.   Unknown    bismuth subsalicylate (PEPTO-BISMOL) 262 MG/15ML Oral Suspension Take 30 mL (524 mg total) by mouth every 6 (six) hours as needed for Indigestion.   Unknown    meclizine 25 MG Oral Tab Take 1 tablet (25 mg total) by mouth every 8 (eight) hours as needed.   Unknown       Allergies: Patient has no active allergies.      Anesthesia Evaluation    Patient summary reviewed.    Anesthetic Complications  (-) history of anesthetic complications         GI/Hepatic/Renal  Comment: Sigmoid volvulus                                Cardiovascular                  (+) hypertension                                     Endo/Other      (+) diabetes                            Pulmonary  Comment: COVID +               (+) recent URI          Neuro/Psych  Comment: alzheimers                                    Past Surgical History:   Procedure Laterality Date    BIOPSY OF BREAST, INCISIONAL      left breast bx during surgery    COLONOSCOPY N/A 12/29/2023    Procedure: UNPREPPED  COLONOSCOPY;  Surgeon: Fabrice Trejo MD;  Location:  ENDOSCOPY    OTHER SURGICAL HISTORY  2001    aneurysm clipped x2     Social History     Socioeconomic History     Marital status:    Tobacco Use    Smoking status: Former     Packs/day: 1     Types: Cigarettes     Quit date:      Years since quittin.1    Smokeless tobacco: Never   Vaping Use    Vaping Use: Never used   Substance and Sexual Activity    Alcohol use: No     Alcohol/week: 0.0 standard drinks of alcohol    Drug use: No   Other Topics Concern    Caffeine Concern Yes    Exercise No     History   Drug Use No     Available pre-op labs reviewed.  Lab Results   Component Value Date    WBC 8.5 2024    RBC 3.43 (L) 2024    HGB 10.0 (L) 2024    HCT 30.2 (L) 2024    MCV 88.0 2024    MCH 29.2 2024    MCHC 33.1 2024    RDW 14.0 2024    .0 2024     Lab Results   Component Value Date     2024    K 4.5 2024     2024    CO2 24.0 2024    BUN 23 2024    CREATSERUM 0.97 2024     (H) 2024    CA 8.9 2024     Lab Results   Component Value Date    INR 1.06 2024         Airway      Mallampati: III  Mouth opening: <3 FB  TM distance: 4 - 6 cm  Neck ROM: full Cardiovascular    Cardiovascular exam normal.         Dental             Pulmonary    Pulmonary exam normal.                 Other findings              ASA: 3   Plan: general  NPO status verified and patient meets guidelines.        Comment: We discussed general anesthesia as the primary form of anesthesia for this procedure. General anesthesia involves the use a breathing tube to help the patient breathe and deliver anesthetic gasses. Several intravenous medications will be used to help keep the patient safe and comfortable.  Common risks with general anesthesia include nausea, vomiting, scratched eye, sore throat and dental damage. Rare but serious risks can occur. Some of these serious complications include brain injury, nerve injury, blindness, and death. Risks of these serious injury are small, but never zero. I asked the patient if  they had any questions about the consent form and what was written on it. I talked with the patient and her daughter.                  Present on Admission:  **None**

## 2024-02-22 NOTE — PLAN OF CARE
Patient is drowsy after surgery. Alert & oriented to self. Vitals stable on room air. SCD's on BLE. Aquacel x3 c/d/I. Patient denies numbness & tingling. Patient denies pain. Purewick in place, DTV 0430. IV zofran given for nausea with relief. Patient on mechanical soft/puree diet. Speech eval to see. PT/OT. Plan of care discussed with family.

## 2024-02-22 NOTE — PROGRESS NOTES
Patient admitted via bed from PACU. Oriented to room. Safety precautions initiated. Call light in reach.

## 2024-02-22 NOTE — ANESTHESIA PROCEDURE NOTES
Airway  Date/Time: 2/21/2024 6:51 PM  Urgency: elective      General Information and Staff    Patient location during procedure: OR  Anesthesiologist: Hanny Hayden MD  Performed: anesthesiologist   Performed by: Hanny Hayden MD  Authorized by: Hanny Hayden MD      Indications and Patient Condition  Indications for airway management: anesthesia  Sedation level: deep  Preoxygenated: yes  Patient position: sniffing  Mask difficulty assessment: 1 - vent by mask    Final Airway Details  Final airway type: endotracheal airway      Successful airway: ETT  Cuffed: yes   Successful intubation technique: direct laryngoscopy  Facilitating devices/methods: intubating stylet  Endotracheal tube insertion site: oral  Blade: GlideScope  Blade size: #3  ETT size (mm): 7.5    Cormack-Lehane Classification: grade IIA - partial view of glottis  Placement verified by: capnometry   Measured from: lips  Number of attempts at approach: 1

## 2024-02-22 NOTE — SLP NOTE
ADULT SWALLOWING EVALUATION    ASSESSMENT    ASSESSMENT/OVERALL IMPRESSION:  Patient seen for swallowing evaluation per protocol as patient has history of modified diet consistency. Per paperwork from transferring facility, order for diet written as follows \"mechanical soft texture, Regular (Thin) consistency, PUREE SIDES; patient allowed to have grilled cheese, hamburgers, and hot dogs IF fully alert and cut into small pieces; 1:1 feeding (Encourage patient to feed self however offer assistance when needed)\". Patient contacted up in bed, reports she is not interested in eating but denied any pain or nausea. She was agreeable to po trials for purposes of this assessment.    Oral mechanism exam unremarkable.  Patient with intact oral retrieval and containment with SLP presented solid and thin liquid trials by straw. Patient accepted thin liquids in single sips and consecutive swallows. Oral prep and transit functional with thin liquids. Mastication of solids was prolonged and inefficient. Bolus formation and propulsion appeared impaired though may have been at least partially related to dry nature of solid trial. Liquid wash was effective in clearing oral residue. Laryngeal excursion judged to be of adequate strength and timeliness to palpation. There were no overt signs of aspiration.    Given history and above performance, recommend advance to soft and bite size solids and thin liquids with observation of aspiration precautions as well as 1:1 assistance/encouragement to self feed and providing assistance as needed consistent with order from transferring facility.          RECOMMENDATIONS   Diet Recommendations - Solids: Mechanical soft chopped/ Soft & Bite Sized (1:1 feeding assist per previous order from SNF)  Diet Recommendations - Liquids: Thin Liquids                        Compensatory Strategies Recommended: Slow rate;Small bites and sips;Alternate consistencies (1:1 feeding assist and encouragement per  documentation from SNF)  Aspiration Precautions: Upright position;Slow rate;Small bites and sips  Medication Administration Recommendations: One pill at a time (as tolerated with thin liquid vs puree, whole)  Treatment Plan/Recommendations: Aspiration precautions;Dysphagia therapy    HISTORY   MEDICAL HISTORY  Reason for Referral: Altered diet consistency    Problem List  Principal Problem:    Closed fracture of right hip, initial encounter ([facility])      Past Medical History  Past Medical History:   Diagnosis Date    Alzheimer's dementia with behavioral disturbance ([facility])     Anxiety state     Bipolar affective ([facility])     Cataract     Dementia ([facility])     Diabetes ([facility])     Elevated fasting lipid profile 02/09/2012    Essential hypertension     Essential hypertension, benign 05/24/2013    Falls     Gallstones and inflammation of gallbladder without obstruction     High blood pressure     High cholesterol     Muscle weakness     Neuropathy     Osteoarthritis     Psychosis, unspecified psychosis type ([facility])     Shortness of breath        Prior Living Situation: Skilled nursing facility  Diet Prior to Admission: Thin liquids (mechanical soft with puree sides but may have grilled cheese, hot dogs and hamburgers IF fully alert and cut into small pieces with 1:1 feeding assistance)  Precautions: Aspiration    Patient/Family Goals: none stated    SWALLOWING HISTORY  Current Diet Consistency: Puree;Thin liquids  Dysphagia History: as above  Imaging Results:   CXR from 2/20/24 revealed:  CONCLUSION:  No focal consolidation.        LOCATION:  [facility]                 Dictated by (CST): Lawanda Thrasher MD on 2/20/2024 at 8:37 PM      Finalized by (CST): Lawanda Thrasher MD on 2/20/2024 at 8:39 PM      Brain CT from 2/20/24 revealed:  Impression   CONCLUSION:  Stable chronic changes.  No acute disease.           LOCATION:  [facility]        Dictated by (CST): Geovany Snowden MD on 2/20/2024 at 9:28 PM      Finalized by (CST): Geovany Snowden MD on  2/20/2024 at 9:32 PM       SUBJECTIVE       OBJECTIVE   ORAL MOTOR EXAMINATION  Dentition: Functional  Symmetry: Within Functional Limits  Strength: Within Functional Limits  Tone: Within Functional Limits  Range of Motion: Within Functional Limits  Rate of Motion: Within Functional Limits    Voice Quality: Clear  Respiratory Status: Unlabored  Consistencies Trialed: Thin liquids;Hard solid  Method of Presentation: Staff/Clinician assistance;Straw;Single sips;Consecutive swallows  Patient Positioning: Midline (slightly reclined in bed)    Oral Phase of Swallow: Impaired  Bolus Retrieval: Intact  Bilabial Seal: Intact  Bolus Formation: Impaired  Bolus Propulsion: Impaired  Mastication:  (prolonged and inefficient)  Retention: Impaired (cleared with liquid wash)    Pharyngeal Phase of Swallow: Within Functional Limits           (Please note: Silent aspiration cannot be evaluated clinically. Videofluoroscopic Swallow Study is required to rule-out silent aspiration.)    Esophageal Phase of Swallow: No complaints consistent with possible esophageal involvement              GOALS  Goal #1 The patient will tolerate soft and bite size solid consistency and thin liquids without overt signs or symptoms of aspiration with 95 % accuracy over 1-2 session(s).  In Progress   Goal #2 The patient/family/caregiver will demonstrate understanding and implementation of aspiration precautions and swallow strategies independently over 1-2 session(s).    In Progress     FOLLOW UP  Treatment Plan/Recommendations: Aspiration precautions;Dysphagia therapy  Number of Visits to Meet Established Goals: 1  Follow Up Needed (Documentation Required): Yes  SLP Follow-up Date: 02/23/24    Thank you for your referral.   If you have any questions, please contact Contreras Dos Santos MS CCC-SLP  Pager [phone]

## 2024-02-22 NOTE — PROGRESS NOTES
Recent Labs   Lab 02/20/24  1930 02/21/24  0518 02/22/24  0535 02/22/24  1249   RBC 3.77* 3.43* 2.48*  --    HGB 10.7* 10.0* 7.1* 6.2*   HCT 34.0* 30.2* 22.2*  --    MCV 90.2 88.0 89.5  --    MCH 28.4 29.2 28.6  --    MCHC 31.5 33.1 32.0  --    RDW 13.7 14.0 13.9  --    NEPRELIM 9.43* 6.44 6.51  --    WBC 10.5 8.5 8.1  --    .0 204.0 158.0  --          Plan:  PRBC x 1  Monitor H&H  Elisa FRANCO

## 2024-02-22 NOTE — PROGRESS NOTES
Progress Note    Patient: Kierra Joshua  Medical record #: [id number]    Kierra Joshua is a 78 year old y/o female who is POD #1 IM nailing for right subtrochanteric femur fracture.  The patient's main complaint today is surgical pain at the operative site.  The pain is controlled.  The patient denies chest pain or shortness of breath.    Hospital Problem List:  Principal Problem:    Closed fracture of right hip, initial encounter ([facility])      Current Medications:   docusate sodium (Colace) cap 100 mg  100 mg Oral BID    polyethylene glycol (PEG 3350) (Miralax) 17 g oral packet 17 g  17 g Oral Daily    sodium chloride 0.9% infusion   Intravenous Continuous    [COMPLETED] clonidine/epinephrine/ropivacaine/ketorolac in 0.9% NaCl 60 mL pain cocktail syringe for hip arthroplasty   Infiltration Once (Intra-Op)    [] sodium chloride 0.9 % IV bolus 500 mL  500 mL Intravenous Once PRN    enoxaparin (Lovenox) 40 MG/0.4ML SUBQ injection 40 mg  40 mg Subcutaneous Daily    oxyCODONE immediate release tab 2.5 mg  2.5 mg Oral Q4H PRN    Or    oxyCODONE immediate release tab 5 mg  5 mg Oral Q4H PRN    HYDROmorphone (Dilaudid) 1 MG/ML injection 0.2 mg  0.2 mg Intravenous Q2H PRN    Or    HYDROmorphone (Dilaudid) 1 MG/ML injection 0.4 mg  0.4 mg Intravenous Q2H PRN    traMADol (Ultram) tab 50 mg  50 mg Oral Q6H PRN    acetaminophen (Tylenol) tab 650 mg  650 mg Oral Q4H While awake    ceFAZolin (Ancef) 2 g in 20mL IV syringe premix  2 g Intravenous Q8H    [COMPLETED] insulin aspart (NovoLOG) 100 Units/mL vial 2 Units  2 Units Subcutaneous Once    traMADol (Ultram) tab 50 mg  50 mg Oral BID    risperiDONE (RisperDAL) tab 0.5 mg  0.5 mg Oral Noon    risperiDONE (RisperDAL) tab 0.75 mg  0.75 mg Oral BID    acetaminophen (Tylenol Extra Strength) tab 500 mg  500 mg Oral Q4H PRN    morphINE PF 2 MG/ML injection 0.5 mg  0.5 mg Intravenous Q2H PRN    Or    morphINE PF 2 MG/ML injection 1 mg  1 mg Intravenous Q2H PRN    Or     morphINE PF 2 MG/ML injection 2 mg  2 mg Intravenous Q2H PRN    melatonin tab 3 mg  3 mg Oral Nightly PRN    sennosides (Senokot) tab 17.2 mg  17.2 mg Oral Nightly PRN    bisacodyl (Dulcolax) 10 MG rectal suppository 10 mg  10 mg Rectal Daily PRN    fleet enema (Fleet) 7-19 GM/118ML rectal enema 133 mL  1 enema Rectal Once PRN    ondansetron (Zofran) 4 MG/2ML injection 4 mg  4 mg Intravenous Q6H PRN    benzonatate (Tessalon) cap 200 mg  200 mg Oral TID PRN    guaiFENesin (Robitussin) 100 MG/5 ML oral liquid 200 mg  200 mg Oral Q4H PRN    glycerin-hypromellose- (Artifical Tears) 0.2-0.2-1 % ophthalmic solution 1 drop  1 drop Both Eyes QID PRN    sodium chloride (Saline Mist) 0.65 % nasal solution 1 spray  1 spray Each Nare Q3H PRN    glucose (Dex4) 15 GM/59ML oral liquid 15 g  15 g Oral Q15 Min PRN    Or    glucose (Glutose) 40% oral gel 15 g  15 g Oral Q15 Min PRN    Or    glucose-vitamin C (Dex-4) chewable tab 4 tablet  4 tablet Oral Q15 Min PRN    Or    dextrose 50% injection 50 mL  50 mL Intravenous Q15 Min PRN    Or    glucose (Dex4) 15 GM/59ML oral liquid 30 g  30 g Oral Q15 Min PRN    Or    glucose (Glutose) 40% oral gel 30 g  30 g Oral Q15 Min PRN    Or    glucose-vitamin C (Dex-4) chewable tab 8 tablet  8 tablet Oral Q15 Min PRN    insulin aspart (NovoLOG) 100 Units/mL FlexPen 1-10 Units  1-10 Units Subcutaneous TID AC and HS         Input/Output:    Intake/Output Summary (Last 24 hours) at 2/22/2024 1213  Last data filed at 2/21/2024 2116  Gross per 24 hour   Intake 1000 ml   Output 250 ml   Net 750 ml       Vital signs:  Blood pressure 96/58, pulse 87, temperature 97.8 °F (36.6 °C), temperature source Axillary, resp. rate 19, weight 143 lb (64.9 kg), SpO2 100%, not currently breastfeeding.    Physical Exam:     right lower extremity:  Dressing: clean and dry  Able to dorsiflex ankle and move toes  Sensation grossly intact to light touch throughout  Distal pulses intact  No calf swelling  Darcie's  sign negative  Compartments soft  No signs or symptoms of DVT or compartment syndrome  Leg lengths equal    Labs:   Lab Results   Component Value Date    WBC 8.1 02/22/2024    HGB 7.1 02/22/2024    HCT 22.2 02/22/2024    .0 02/22/2024    CREATSERUM 0.96 02/22/2024    BUN 23 02/22/2024     02/22/2024    K 4.9 02/22/2024     02/22/2024    CO2 25.0 02/22/2024     02/22/2024    CA 8.4 02/22/2024         Assessment: 78 year old  female POD #1 IM nailing for right subtrochanteric femur fracture    Plan:    Hgb - 7.1 - acute blood loss anemia, consistent with post op changes, stable/asymptomatic  Mobilize with PT, WBAT on operative extremity with walker  Pain controlled with meds  Lovenox for DVT prophylaxis for 2 weeks, then transition to ECASA 81mg BID daily 4 weeks  TEDs, SCDs, IS  Ice and elevation  [place]/sutures out in 14 days  Disposition: plan transfer to rehab when stable vs home with home health    Follow up with Dr. Guidry - 2 weeks     Followed by [facility] for medical conditions to include Principal Problem:    Closed fracture of right hip, initial encounter ([facility])          Signed:  Brian Guidry MD  2/22/2024  12:13 PM

## 2024-02-22 NOTE — CONSULTS
ORTHO CONSULT NOTE    Patient Identification:        Name: Kierra Joshua  Age: 78 year old  Sex: female  :  1945  MRN: [id number]                                                  HPI:        Kierra Joshua is a 78 year old year old woman who presented to the ED with right hip pain. History of dementia and lives in nursing facility and no witnessed fall.  She was unable to bear weight on the hip.   The patient was evaulated in the ER and was found to have a hip fracture. The patient was admitted to the hospitalist service and orthopaedics was consulted for management of the fracture. The patient currently complains of pain in the hip.  No numbness or tingling.  No chest pain or shortness of breath.      Problem List:  Patient Active Problem List   Diagnosis    Uncontrolled type 2 diabetes mellitus with hyperglycemia (HCC)    Pure hypercholesterolemia    Essential hypertension, benign    Osteopenia of necks of both femurs    Psychosis (HCC)    Alzheimer's dementia with behavioral disturbance (HCC)    Gallstones    Dilated cbd, acquired    COVID-19    COVID-19 virus infection    Closed fracture of right hip, initial encounter ([facility])         Past Medical History:  Past Medical History:   Diagnosis Date    Alzheimer's dementia with behavioral disturbance (HCC)     Anxiety state     Bipolar affective (HCC)     Cataract     Dementia (HCC)     Diabetes (HCC)     Elevated fasting lipid profile 2012    Essential hypertension     Essential hypertension, benign 2013    Falls     Gallstones and inflammation of gallbladder without obstruction     High blood pressure     High cholesterol     Muscle weakness     Neuropathy     Osteoarthritis     Psychosis, unspecified psychosis type (HCC)     Shortness of breath        Past Surgical History:  Past Surgical History:   Procedure Laterality Date    BIOPSY OF BREAST, INCISIONAL      left breast bx during surgery    COLONOSCOPY N/A 2023    Procedure:  UNPREPPED  COLONOSCOPY;  Surgeon: Fabrice Trejo MD;  Location:  ENDOSCOPY    OTHER SURGICAL HISTORY  2001    aneurysm clipped x2       Home Meds:  Medications Prior to Admission   Medication Sig Dispense Refill Last Dose    FLEET ENEMA 7-19 GM/118ML Rectal Enema    Past Month    Cranberry 425 MG Oral Cap Take by mouth.   2/20/2024    Melatonin 3 MG Oral Cap Take by mouth.   2/19/2024    magnesium hydroxide (MILK OF MAGNESIA) 400 MG/5ML Oral Suspension Take by mouth daily as needed for constipation.   Past Month    Multiple Vitamins-Minerals (MULTI-VITAMIN/MINERALS) Oral Tab Take 1 tablet by mouth daily.   2/20/2024    sennosides 17.2 MG Oral Tab Take 1 tablet (17.2 mg total) by mouth daily. 30 tablet 0 2/20/2024    traMADol 50 MG Oral Tab Take 1 tablet (50 mg total) by mouth in the morning and 1 tablet (50 mg total) before bedtime. 15 tablet 0 2/20/2024    bisacodyl 10 MG Rectal Suppos Place 1 suppository (10 mg total) rectally Q24H PRN.   Past Month    polyethylene glycol, PEG 3350, 17 g Oral Powd Pack Take 17 g by mouth daily. 30 each 0 2/20/2024    glipiZIDE 5 MG Oral Tab Take 0.5 tablets (2.5 mg total) by mouth daily with lunch.   2/20/2024    Potassium Chloride ER 10 MEQ Oral Tab CR Take 1 tablet (10 mEq total) by mouth Every Monday, Wednesday, and Friday.   2/20/2024    risperiDONE 0.25 MG Oral Tab Take 2 tablets (0.5 mg total) by mouth Noon.   2/20/2024    furosemide 20 MG Oral Tab Take 1 tablet (20 mg total) by mouth Every Monday, Wednesday, and Friday.   Past Week    glipiZIDE 5 MG Oral Tab Take 1 tablet (5 mg total) by mouth every morning before breakfast.   2/20/2024    lisinopril 10 MG Oral Tab Take 1 tablet (10 mg total) by mouth daily.   2/20/2024    risperiDONE 0.25 MG Oral Tab Take 3 tablets (0.75 mg total) by mouth 2 (two) times daily.   2/20/2024    fluticasone propionate 50 MCG/ACT Nasal Suspension 2 sprays by Each Nare route daily.   Unknown    bismuth subsalicylate (PEPTO-BISMOL) 262  MG/15ML Oral Suspension Take 30 mL (524 mg total) by mouth every 6 (six) hours as needed for Indigestion.   Unknown    meclizine 25 MG Oral Tab Take 1 tablet (25 mg total) by mouth every 8 (eight) hours as needed.   Unknown       Current Meds:    [MAR Hold] docusate sodium (Colace) cap 100 mg  100 mg Oral BID    [MAR Hold] polyethylene glycol (PEG 3350) (Miralax) 17 g oral packet 17 g  17 g Oral Daily    sodium chloride 0.9% infusion   Intravenous Continuous    [START ON 2/22/2024] clonidine/epinephrine/ropivacaine/ketorolac in 0.9% NaCl 60 mL pain cocktail syringe for hip arthroplasty   Infiltration Once (Intra-Op)    [MAR Hold] traMADol (Ultram) tab 50 mg  50 mg Oral BID    [MAR Hold] risperiDONE (RisperDAL) tab 0.5 mg  0.5 mg Oral Noon    [MAR Hold] risperiDONE (RisperDAL) tab 0.75 mg  0.75 mg Oral BID    [MAR Hold] acetaminophen (Tylenol Extra Strength) tab 500 mg  500 mg Oral Q4H PRN    [MAR Hold] morphINE PF 2 MG/ML injection 0.5 mg  0.5 mg Intravenous Q2H PRN    Or    [MAR Hold] morphINE PF 2 MG/ML injection 1 mg  1 mg Intravenous Q2H PRN    Or    [MAR Hold] morphINE PF 2 MG/ML injection 2 mg  2 mg Intravenous Q2H PRN    [MAR Hold] melatonin tab 3 mg  3 mg Oral Nightly PRN    [MAR Hold] sennosides (Senokot) tab 17.2 mg  17.2 mg Oral Nightly PRN    [MAR Hold] bisacodyl (Dulcolax) 10 MG rectal suppository 10 mg  10 mg Rectal Daily PRN    [MAR Hold] fleet enema (Fleet) 7-19 GM/118ML rectal enema 133 mL  1 enema Rectal Once PRN    [MAR Hold] ondansetron (Zofran) 4 MG/2ML injection 4 mg  4 mg Intravenous Q6H PRN    [MAR Hold] benzonatate (Tessalon) cap 200 mg  200 mg Oral TID PRN    [MAR Hold] guaiFENesin (Robitussin) 100 MG/5 ML oral liquid 200 mg  200 mg Oral Q4H PRN    [MAR Hold] glycerin-hypromellose- (Artifical Tears) 0.2-0.2-1 % ophthalmic solution 1 drop  1 drop Both Eyes QID PRN    [MAR Hold] sodium chloride (Saline Mist) 0.65 % nasal solution 1 spray  1 spray Each Nare Q3H PRN    [MAR Hold] glucose  (Dex4) 15 GM/59ML oral liquid 15 g  15 g Oral Q15 Min PRN    Or    [MAR Hold] glucose (Glutose) 40% oral gel 15 g  15 g Oral Q15 Min PRN    Or    [MAR Hold] glucose-vitamin C (Dex-4) chewable tab 4 tablet  4 tablet Oral Q15 Min PRN    Or    [MAR Hold] dextrose 50% injection 50 mL  50 mL Intravenous Q15 Min PRN    Or    [MAR Hold] glucose (Dex4) 15 GM/59ML oral liquid 30 g  30 g Oral Q15 Min PRN    Or    [MAR Hold] glucose (Glutose) 40% oral gel 30 g  30 g Oral Q15 Min PRN    Or    [MAR Hold] glucose-vitamin C (Dex-4) chewable tab 8 tablet  8 tablet Oral Q15 Min PRN    [MAR Hold] insulin aspart (NovoLOG) 100 Units/mL FlexPen 1-10 Units  1-10 Units Subcutaneous TID AC and HS         Allergies:  No Active Allergies    Social History:   Social History     Tobacco Use    Smoking status: Former     Packs/day: 1     Types: Cigarettes     Quit date:      Years since quittin.1    Smokeless tobacco: Never   Substance Use Topics    Alcohol use: No     Alcohol/week: 0.0 standard drinks of alcohol       Family History:  No family history on file.    Review of Systems:      Negative for fever, chills, nausea, vomiting, chest pain, shortness of breath, headache, dizziness, vision changes, or slurred speech.  All other pertinent positives and negatives as noted above in HPI.      Vitals:   Blood pressure 122/51, pulse 84, temperature 98.5 °F (36.9 °C), temperature source Oral, resp. rate 16, weight 143 lb (64.9 kg), SpO2 98%, not currently breastfeeding.    I/O:     Intake/Output Summary (Last 24 hours) at 2024 1806  Last data filed at 2024 1217  Gross per 24 hour   Intake 0 ml   Output 200 ml   Net -200 ml       Physical Exam:        General - awake, alert, and oriented x 1, answers questions     right lower extremity:  Extremity held in externally rotated position  Slightly shortened compared to contralateral extremity  Tender to palpation over greater trochanter  Able to dorsiflex ankle and move toes  Pain  with any IR/ER of hip  Unable to test ROM due to pain  Sensation grossly intact to light touch throughout  Distal pulses intact  No calf swelling  Darcie's sign negative  Compartments soft  No signs or symptoms of DVT or compartment syndrome    Exam of the contralateral hip is normal:  No atrophy, erythema, ecchymosis, or gross deformity noted  No tenderness to palpation  Full active range of motion  5/5 strength in hip flexion, abduction, and adduction  Stinchfield and straight leg raise negative  BHUPENDRA negative  Sensation grossly intact to light tough throughout  Skin and distal pulses intact      Labs:      Lab Results   Component Value Date    WBC 8.5 02/21/2024    HGB 10.0 02/21/2024    HCT 30.2 02/21/2024    .0 02/21/2024    CREATSERUM 0.97 02/21/2024    BUN 23 02/21/2024     02/21/2024    K 4.5 02/21/2024     02/21/2024    CO2 24.0 02/21/2024     02/21/2024    CA 8.9 02/21/2024    ALB 3.0 02/20/2024    ALKPHO 60 02/20/2024    BILT 0.3 02/20/2024    TP 5.7 02/20/2024    AST 16 02/20/2024    ALT 14 02/20/2024    PTT 23.9 02/20/2024    INR 1.06 02/20/2024         Diagnostic Studies:      AP of the pelvis and lateral of the right hip were reviewed from the ED.  There is an subtrochanteric fracture of the hip with displacement at the fracture site.  There are no periosteal reactions or medullary lesions seen.      Assessment:     right hip subtrochanteric femur fracture     Plan:      I have discussed the nature of intertrochanteric fractures with the patient and family.  This fracture will benefit from intramedullary fixation.  The surgical procedure will be scheduled once all medical and any necessary cardiac clearances have been obtained.    The risks, benefits, and alternatives of hip fracture surgery were discussed extensively.  The risks include but are not limited to infection, DVT, PE, bleeding and blood loss, damage to nerves or blood vessels, malunion or nonunion, dislocation,  continued pain, hip stiffness/loss of motion, hardware irritation, and the possibility of future arthrosis of the hip.  The risks of anesthesia including heart attack, stroke, and even death were discussed.  The typical post-operative course and rehab plan were discussed as well.  Activity restrictions following the surgery were emphasized and the patient expressed understanding.  All the patient's questions were answered.  A consent form was signed and placed on the chart.  The patient will receive perioperative antibiotics.  The patient will be placed on DVT prophylaxis after surgery.    Per the Secondary Fracture Prevention: Consensus Clinical Recommendations from a Multistakeholder Coalition, it was communicated that the patient's fracture likely means they have osteoporosis and are at high risk for breaking more bones, especially over the next 1 to 2 years. Additionally, it was expressed that their injury means they may suffer declines in mobility or independence--for example, have to use a walker, cane, or wheelchair, or move from their home to a residential facility, or stop participating in favorite activities (two thirds to one half do not regain prior ambulatory function with over 10% not being to ambulate at all). The odds of dying prematurely was again discussed. Emphasized the need to treat the scope of this problem through medical management.    Minimize use of medications associated w falls while in house and after discharge.    Patient will need close follow up w pcp and endocrinologist within a week of discharge given this fragility fracture. Patients who have had repair of a hip fracture or are hospitalized for a vertebral fracture can begin taking oral anti-osteoporosis pharmacotherapy in the hospital.    Initiate a daily supplement of at least 800 IU vitamin D per day for people aged 65 years or older with a hip or vertebral fracture. Initiate a daily calcium supplement for people aged 65 years  or older with a hip or vertebral fracture who are unable to achieve an intake of 1200 mg/d of calcium from food sources.      Admit to hospitalist, appreciate risk stratification and medical optimization  Discussed conservative vs operative treatment, patient would benefit from operative intervention  NWB RLE  Npo, hold anticoagulation for possible OR today   Plan for right hip IMN   Discussed risks, benefits and alternatives to treatment  Pt marked, consented and all questions answered   Proceed with surgery as planned      Electronically Signed By: Brian Guidry MD, 2/21/2024, 6:06 PM            Brian Guidry MD  2/21/2024

## 2024-02-22 NOTE — OCCUPATIONAL THERAPY NOTE
OCCUPATIONAL THERAPY EVALUATION - INPATIENT     Room Number: 381/381-A  Evaluation Date: 2/22/2024  Type of Evaluation: Initial  Presenting Problem: RIGHT SUBTROCHANTERIC FEMUR FRACTURE, 2/21 s/p RIGHT FEMUR INTRAMEDULLARY NAILING    Physician Order: IP Consult to Occupational Therapy  Reason for Therapy: ADL/IADL Dysfunction and Discharge Planning    OCCUPATIONAL THERAPY ASSESSMENT   Patient is currently functioning below baseline with toileting, bathing, lower body dressing, bed mobility, transfers, and dynamic standing balance. Prior to admission, patient's baseline is per daughter, min A toilet transfer using rw and grab bar, mod A LB dressing, max A showering, and1-person assistance to ambulate in hallway with RW.  Pt has been a long term resident at [facility] for about 16 months, per daughter. Daughter visits her daily and spends time with her.  Patient is requiring  min A UB dressing, min A grooming seated, max A LB ADL, max A showering, mod A x 2 sit to stand simulated toilet transfer, and max A x 2 bed mobility  as a result of the following impairments: decreased functional strength, decreased endurance, impaired standing balance, and limited R LE ROM. Occupational Therapy will continue to follow for duration of hospitalization.    Patient will benefit from continued skilled OT Services to promote return to prior level of function and safety with continuous assistance and gradual rehabilitative therapy       History Related to Current Admission: Patient is a 78 year old female admitted on 2/20/2024 with Presenting Problem: RIGHT SUBTROCHANTERIC FEMUR FRACTURE, 2/21 s/p RIGHT FEMUR INTRAMEDULLARY NAILING. Co-Morbidities : DM, HTN, bipolar, Alzheimer's, CVA. Pt was admitted from long-term care [facility] on 2/20 with R hip pain.  Per daughter, she received a call from the facility about pt's R hip pain. Per daughter, limited information from the facility about any event.  Admitted for R subtrochanteric femur  fracture.  2/21 s/p R femur IM nailing.     Recent admissions:  12/27 to 12/31/23 for COVID -> back to long term care with in-house therapy.    Surgery 2/21/24  Preoperative Diagnosis:  RIGHT SUBTROCHANTERIC FEMUR FRACTURE     Postoperative Diagnosis:  RIGHT SUBTROCHANTERIC FEMUR FRACTURE     Procedure Performed:  RIGHT FEMUR INTRAMEDULLARY NAILING      WEIGHT BEARING RESTRICTION  Weight Bearing Restriction: R lower extremity        R Lower Extremity: Weight Bearing as Tolerated       Recommendations for nursing staff:   Transfers: total lift  Toileting location: bedpan    EVALUATION SESSION:  Patient Start of Session: supine  FUNCTIONAL TRANSFER ASSESSMENT  Sit to Stand: Edge of Bed  Edge of Bed: Dependent (mod A x 2)    BED MOBILITY  Supine to Sit : Dependent (max A x 2)  Sit to Supine (OT): Dependent (max A x 2)    COGNITION  Following Commands:  follows one step commands with increased time and follows one step commands with repetition  Initiation: hand over hand to initiate tasks  Safety Judgement:  decreased awareness of need for assistance  Awareness of Errors:  assistance required to identify errors made and assistance required to correct errors made  Awareness of Deficits:  decreased awareness of deficits    Upper Extremity   ROM: within functional limits   Strength: within functional limits       EDUCATION PROVIDED  Patient: Role of Occupational Therapy; Plan of Care; Functional Transfer Techniques; Surgical Precautions; Posture/Positioning  Patient's Response to Education: Requires Further Education  Family/Caregiver: Role of Occupational Therapy; Plan of Care  Family/Caregiver's Response to Education: Verbalized Understanding    Equipment used: rw     Therapist comments: Daughter present.  Supine to sit max A x 2 using linens.  Posterior lean noted as pt reported pain R LE.   Once pt placed her hands on the RW, CGA static sitting balance.  Sitting BP 96/58, no dizziness. Per daughter. Pt can stand and  walk better if she has both hands on the RW when standing up.   OT and PT blocked pt's feet from sliding. Cueing for hand placement. Mod A x 2 with RW to stand.  Needed to sit back down. Attempted standing twice more. This transfer was completed in preparation for bedside commode transfer. Max A x 2 back to supine.  Bed alarm on.       Patient End of Session: In bed;Needs met;Call light within reach;RN aware of session/findings;All patient questions and concerns addressed;SCDs in place;Family present (pt did not want ice)    OCCUPATIONAL PROFILE    HOME SITUATION  Type of Home: Skilled nursing facility  Home Layout: One level  Lives With: Staff 24 hours    Toilet and Equipment: Grab bar;Comfort height toilet  Shower/Tub and Equipment: Walk-in shower;Shower chair  Other Equipment:  (rw, wheelchair)          Drives: No       Prior Level of Function: see Assessment section      PAIN ASSESSMENT  Rating: Unable to rate  Location: reporting pain after supine to sit, R LE  Management Techniques: Repositioning    OBJECTIVE  Precautions: Bed/chair alarm  Fall Risk: High fall risk      ASSESSMENTS    AM-PAC ‘6-Clicks’ Inpatient Daily Activity Short Form  -   Putting on and taking off regular lower body clothing?: A Lot  -   Bathing (including washing, rinsing, drying)?: A Lot  -   Toileting, which includes using toilet, bedpan or urinal? : A Lot  -   Putting on and taking off regular upper body clothing?: A Little  -   Taking care of personal grooming such as brushing teeth?: A Little  -   Eating meals?: A Little    AM-PAC Score:  Score: 15  Approx Degree of Impairment: 56.46%  Standardized Score (AM-PAC Scale): 34.69    ADDITIONAL TESTS     NEUROLOGICAL FINDINGS      COGNITION ASSESSMENTS       PLAN  OT Treatment Plan: Balance activities;Energy conservation/work simplification techniques;ADL training;Functional transfer training;UE strengthening/ROM;Patient/Family education;Patient/Family training;Equipment  eval/education  Rehab Potential : Fair  Frequency: 3x/week  Number of Visits to Meet Established Goals: 5    ADL Goals   Patient will perform grooming: with setup  Patient will perform upper body dressing:  with setup  Patient will perform lower body dressing:  with mod assist  Patient will perform toileting: with mod assist    Functional Transfer Goals  Patient will transfer from supine to sit:  with mod assist  Patient will transfer to bedside commode:  with mod assist    UE Exercise Program Goal  Patient will be supervision with bilateral AROM HEP (home exercise program).    Patient Evaluation Complexity Level:   Occupational Profile/Medical History LOW - Brief history including review of medical or therapy records    Specific performance deficits impacting engagement in ADL/IADL LOW  1 - 3 performance deficits    Client Assessment/Performance Deficits MODERATE - Comorbidities and min to mod modifications of tasks    Clinical Decision Making LOW - Analysis of occupational profile, problem-focused assessments, limited treatment options    Overall Complexity LOW     OT Session Time: 20 minutes  Self-Care Home Management:  minutes  Therapeutic Activity: 10 minutes

## 2024-02-22 NOTE — PHYSICAL THERAPY NOTE
PHYSICAL THERAPY EVALUATION - INPATIENT     Room Number: 381/381-A  Evaluation Date: 2/22/2024  Type of Evaluation: Initial  Physician Order: PT Eval and Treat    Presenting Problem: R hip fracture  Co-Morbidities : DM, HTN, bipolar, Alzheimer's, CVA  Reason for Therapy: Mobility Dysfunction and Discharge Planning    Procedure Performed 2/21/24 Dr. Guidry:    RIGHT FEMUR INTRAMEDULLARY NAILING     PHYSICAL THERAPY ASSESSMENT   Patient is currently functioning below baseline with bed mobility, transfers, and gait.  Prior to admission, patient's baseline is Gustavo to Alpa.  Patient is requiring maximum assist as a result of the following impairments: decreased functional strength, decreased endurance/aerobic capacity, pain, impaired statc and dyamic sitting and standing balance, impaired coordination, impaired motor planning, decreased muscular endurance, cognitive deficits (decr awareness of need for assistance and safety, decr command following), and limited RLE ROM.  Physical Therapy will continue to follow for duration of hospitalization.    Patient will benefit from continued skilled PT Services to promote return to prior level of function and safety with continuous assistance and gradual rehabilitative therapy .    PLAN  PT Treatment Plan: Bed mobility;Body mechanics;Coordination;Endurance;Energy conservation;Patient education;Family education;Gait training;Neuromuscular re-educate;Range of motion;Strengthening;Transfer training;Balance training  Rehab Potential : Guarded  Frequency (Obs): 3-5x/week  Number of Visits to Meet Established Goals: 5      CURRENT GOALS    Goal #1 Patient is able to demonstrate supine - sit EOB @ level: minimum assistance     Goal #2 Patient is able to demonstrate transfers Sit to/from Stand at assistance level: minimum assistance     Goal #3 Patient is able to ambulate 50 feet with assist device: walker - rolling at assistance level: supervision     Goal #4    Goal #5    Goal #6     Goal Comments: Goals established on 2/22/2024      PHYSICAL THERAPY MEDICAL/SOCIAL HISTORY  History related to current admission: Patient is a 78 year old female admitted on 2/20/2024 from home at [facility] for increased R hip pain.  Pt diagnosed with Right subtrochanteric femur fracture. Now s/ IM nailing.   Daughter received call from [facility] on 2/20 about her mother complaining of R hip pain. Per dtr facility providing very limited information in regards to what happened- per facility there was no fall, unsure of how she could have fractured her hip. Pt's daughter expressing significant frustration about event.       HOME SITUATION  Type of Home: Skilled nursing facility   Home Layout: One level                Lives With: Staff 24 hours  Drives: No  Patient Owned Equipment: Rolling walker       Prior Level of [place]: Per pt's daughter- been at [facility] for about 16 months. Daughter visits 7 days per week (total of about 30 hours per week). Daughter takes patient on walk. Pt requires Alpa to stand, but ambulates SBA with RW.     SUBJECTIVE  \"Ouch!\"      OBJECTIVE  Precautions: Bed/chair alarm  Fall Risk: High fall risk    WEIGHT BEARING RESTRICTION  Weight Bearing Restriction: R lower extremity        R Lower Extremity: Weight Bearing as Tolerated       PAIN ASSESSMENT  Rating: Unable to rate  Location: right hip  Management Techniques: Activity promotion;Body mechanics;Repositioning    COGNITION  Overall Cognitive Status:  Impaired  Following Commands:  follows one-step commands inconsistently  Initiation: cues to initiate tasks and hand over hand to initiate tasks  Motor Planning: impaired  Safety Judgement:  decreased awareness of need for assistance and decreased awareness of need for safety  Awareness of Errors:  assistance required to identify errors made, assistance required to correct errors made, and decreased awareness of errors   Awareness of Deficits:  decreased awareness of  deficits    RANGE OF MOTION AND STRENGTH ASSESSMENT  Upper extremity ROM and strength are within functional limits     Lower extremity ROM - RLE limited due to pain    Lower extremity strength- RLE limited due to pain      BALANCE  Static Sitting: Fair -  Dynamic Sitting: Poor +  Static Standing: Poor  Dynamic Standing: Poor -    ADDITIONAL TESTS                                    ACTIVITY TOLERANCE                         O2 WALK       NEUROLOGICAL FINDINGS                        AM-PAC '6-Clicks' INPATIENT SHORT FORM - BASIC MOBILITY  How much difficulty does the patient currently have...  Patient Difficulty: Turning over in bed (including adjusting bedclothes, sheets and blankets)?: A Lot   Patient Difficulty: Sitting down on and standing up from a chair with arms (e.g., wheelchair, bedside commode, etc.): A Lot   Patient Difficulty: Moving from lying on back to sitting on the side of the bed?: A Lot   How much help from another person does the patient currently need...   Help from Another: Moving to and from a bed to a chair (including a wheelchair)?: Total   Help from Another: Need to walk in hospital room?: Total   Help from Another: Climbing 3-5 steps with a railing?: Total       AM-PAC Score:  Raw Score: 9   Approx Degree of Impairment: 81.38%   Standardized Score (AM-PAC Scale): 30.55   CMS Modifier (G-Code): CM    FUNCTIONAL ABILITY STATUS  Gait Assessment   Functional Mobility/Gait Assessment  Gait Assistance: Not tested    Skilled Therapy Provided     Bed Mobility:  Rolling: NT   Supine to sit: w/ HOB significantly elevated- maxA x 2 to reach sitting EOB   Sit to supine: maxA x 2      Transfer Mobility:  Sit to stand: modA x 2 to RW- per pt's daughter, pt pulls up via BUE on RW and one person assist - unable to reach standing erect posture even with significant assistance and tactile cueing for hip extension  Stand to sit: modA x 2  Gait = NT    Therapist's Comments:    Patient presents sitting up in  bed. Daughter present in room. Discussed role and goal of physical therapy in hospital setting. Pt in agreement to work with therapy- very cooperative and pleasant throughout session. Does not provide pain number, but with movement and weight bearing does yell out in pain and says my leg hurts.   Bed mobility maxA x 2. Once sitting EOB, posterior lean observed requiring intermittent Alpa to maxA for static sitting balance. Once UE propped to support self, able to maintain static sitting with close stand by. Sit to stand to RW at modA x 2 - 3 trials attempted unable to achieve full erect posture on any attempt. Pt returned supine in bed maxA x 2.     Exercise/Education Provided:  Bed mobility  Body mechanics  Energy conservation  Functional activity tolerated  Posture  Transfer training    Patient End of Session: Needs met;In bed;Call light within reach;RN aware of session/findings;All patient questions and concerns addressed;Alarm set;Family present;Discussed recommendations with /    Patient Evaluation Complexity Level:  History Moderate - 1 or 2 personal factors and/or co-morbidities   Examination of body systems Low - addressing 1-2 elements   Clinical Presentation Low - Stable   Clinical Decision Making Low - Stable     PT Session Time: 20 minutes  Therapeutic Activity: 10 minutes

## 2024-02-22 NOTE — ANESTHESIA POSTPROCEDURE EVALUATION
[facility] Patient Status:  Inpatient   Age/Gender 78 year old female MRN [id number]   Location [facility] POST ANESTHESIA CARE UNIT Attending Shree Bowman MD   Hosp Day # 1 PCP Tate Tovar       Anesthesia Post-op Note    RIGHT HIP INTRAMEDULLARY NAIL    Procedure Summary       Date: 02/21/24 Room / Location:  MAIN OR 12 / [facility] MAIN OR    Anesthesia Start: 1843 Anesthesia Stop: 2116    Procedure: RIGHT HIP INTRAMEDULLARY NAIL (Hip) Diagnosis:       S/P right hip fracture      (S/P right hip fracture [Z87.81])    Surgeons: Brian Guidry MD Anesthesiologist: Hanny Hayden MD    Anesthesia Type: general ASA Status: 3            Anesthesia Type: general    Vitals Value Taken Time   /64 02/21/24 2116   Temp 97.8 °F (36.6 °C) 02/21/24 2113   Pulse 87 02/21/24 2119   Resp 18 02/21/24 2119   SpO2 93 % 02/21/24 2114   Vitals shown include unfiled device data.    Patient Location: PACU    Anesthesia Type: general    Airway Patency: patent and extubated    Postop Pain Control: adequate    Mental Status: mildly sedated but able to meaningfully participate in the post-anesthesia evaluation    Nausea/Vomiting: none    Cardiopulmonary/Hydration status: stable euvolemic    Complications: no apparent anesthesia related complications    Postop vital signs: stable    Dental Exam: Unchanged from Preop

## 2024-02-22 NOTE — PLAN OF CARE
Pt A&Ox1-2. VSS on room air. Telemetry monitoring - NSR. SCDs to BLE. Tolerating puree diet with thin liquids. Speech consult complete this AM. QID accu-checks. Insulin given as ordered. Incision to R hip with aquacel drsg x3 C/D/I. Last BM 2/18. Voiding via purewick. Pain controlled with scheduled medications. Fall precautions in place and bed alarm on. Pt's daughter at bedside. Plan for PT/OT evaluations today and repeat Hgb level this afternoon. POC discussed with daughter.

## 2024-02-22 NOTE — PROGRESS NOTES
[facility]     Hospitalist Progress Note     Kierra Joshua Patient Status:  Inpatient    1945 MRN [id number]   Location [facility] 3SW-A Attending Shree Bowman MD   Hosp Day # 2 PCP Tate Tovar     Chief Complaint: Fx    Subjective:   Patient asleep at time of visit.     Current medications:   docusate sodium  100 mg Oral BID    polyethylene glycol (PEG 3350)  17 g Oral Daily    enoxaparin  40 mg Subcutaneous Daily    acetaminophen  650 mg Oral Q4H While awake    ceFAZolin  2 g Intravenous Q8H    traMADol  50 mg Oral BID    risperiDONE  0.5 mg Oral Noon    risperiDONE  0.75 mg Oral BID    insulin aspart  1-10 Units Subcutaneous TID AC and HS       Objective:    Review of Systems:   Limited d/t patient factors.    Vital signs:  Temp:  [97.4 °F (36.3 °C)-98.5 °F (36.9 °C)] 98.1 °F (36.7 °C)  Pulse:  [] 90  Resp:  [15-18] 16  BP: ()/(44-80) 116/48  SpO2:  [93 %-100 %] 97 %  Patient Weight for the past 72 hrs:   Weight   24 1817 143 lb 4.8 oz (65 kg)   24 2247 143 lb (64.9 kg)     Physical Exam:    General: No acute distress.   Respiratory: Diminished   Cardiovascular: S1, S2. Regular rate and rhythm.   Abdomen: Soft, nontender, nondistended.  Positive bowel sounds.   Extremities: Right thigh soft    Diagnostic Data:    Labs:  Recent Labs   Lab 2418 24  0535   WBC 10.5 8.5 8.1   HGB 10.7* 10.0* 7.1*   MCV 90.2 88.0 89.5   .0 204.0 158.0   INR 1.06  --   --        Recent Labs   Lab 2418 24  0535   * 159* 204*   BUN 23 23 23   CREATSERUM 0.68 0.97 0.96   CA 9.0 8.9 8.4*   ALB 3.0*  --   --     138 136   K 4.4 4.5 4.9    108 108   CO2 24.0 24.0 25.0   ALKPHO 60  --   --    AST 16  --   --    ALT 14  --   --    BILT 0.3  --   --    TP 5.7*  --   --        Estimated Creatinine Clearance: 44 mL/min (based on SCr of 0.96 mg/dL).    Recent Labs   Lab 24  1930   PTP 13.8   INR 1.06             COVID-19 Lab Results    COVID-19  Lab Results   Component Value Date    COVID19 Detected (A) 12/27/2023    COVID19 Detected (A) 03/12/2023    COVID19 Not Detected 01/02/2023       Pro-Calcitonin  No results for input(s): \"PCT\" in the last 168 hours.    Cardiac  No results for input(s): \"TROP\", \"PBNP\" in the last 168 hours.    Creatinine Kinase  No results for input(s): \"CK\" in the last 168 hours.    Inflammatory Markers  No results for input(s): \"CRP\", \"SHAHIDA\", \"LDH\", \"DDIMER\" in the last 168 hours.    No results for input(s): \"TROP\", \"TROPHS\", \"CK\" in the last 168 hours.    Imaging: Imaging data reviewed in Epic.    Medications:    docusate sodium  100 mg Oral BID    polyethylene glycol (PEG 3350)  17 g Oral Daily    enoxaparin  40 mg Subcutaneous Daily    acetaminophen  650 mg Oral Q4H While awake    ceFAZolin  2 g Intravenous Q8H    traMADol  50 mg Oral BID    risperiDONE  0.5 mg Oral Noon    risperiDONE  0.75 mg Oral BID    insulin aspart  1-10 Units Subcutaneous TID AC and HS       Assessment & Plan:    Complete impacted fracture right proximal femoral shaft sp right femur IM nail 2/21/2024  Pain control  PT/OT  Ortho consult  Anemia  Monitor H&H - repeat at noon today  Constipation  Bowel care   Dementia  Risperidone  Essential hypertension  Dyslipidemia  PTA meds on hold  Monitor hemodynamics  Diabetes mellitus  Correctional scale 1:40    Supplementary Documentation:   Quality:  DVT Prophylaxis: Lovenox    At this point Ms. Joshua is expected to be discharge to: TBD    Plan of care discussed with ROLANDO.    Shree Bowman MD

## 2024-02-23 LAB
BASOPHILS # BLD AUTO: 0.03 X10(3) UL (ref 0–0.2)
BASOPHILS NFR BLD AUTO: 0.4 %
BLOOD TYPE BARCODE: 7300
EOSINOPHIL # BLD AUTO: 0.08 X10(3) UL (ref 0–0.7)
EOSINOPHIL NFR BLD AUTO: 1 %
ERYTHROCYTE [DISTWIDTH] IN BLOOD BY AUTOMATED COUNT: 16.4 %
GLUCOSE BLD-MCNC: 160 MG/DL (ref 70–99)
GLUCOSE BLD-MCNC: 181 MG/DL (ref 70–99)
GLUCOSE BLD-MCNC: 184 MG/DL (ref 70–99)
HCT VFR BLD AUTO: 27.6 %
HGB BLD-MCNC: 6.8 G/DL
HGB BLD-MCNC: 8.3 G/DL
HGB BLD-MCNC: 9.1 G/DL
HGB BLD-MCNC: 9.1 G/DL
IMM GRANULOCYTES # BLD AUTO: 0.03 X10(3) UL (ref 0–1)
IMM GRANULOCYTES NFR BLD: 0.4 %
LYMPHOCYTES # BLD AUTO: 0.92 X10(3) UL (ref 1–4)
LYMPHOCYTES NFR BLD AUTO: 11.3 %
MCH RBC QN AUTO: 27.7 PG (ref 26–34)
MCHC RBC AUTO-ENTMCNC: 33 G/DL (ref 31–37)
MCV RBC AUTO: 84.1 FL
MONOCYTES # BLD AUTO: 0.8 X10(3) UL (ref 0.1–1)
MONOCYTES NFR BLD AUTO: 9.8 %
NEUTROPHILS # BLD AUTO: 6.27 X10 (3) UL (ref 1.5–7.7)
NEUTROPHILS # BLD AUTO: 6.27 X10(3) UL (ref 1.5–7.7)
NEUTROPHILS NFR BLD AUTO: 77.1 %
PLATELET # BLD AUTO: 116 10(3)UL (ref 150–450)
RBC # BLD AUTO: 3.28 X10(6)UL
UNIT VOLUME: 350 ML
WBC # BLD AUTO: 8.1 X10(3) UL (ref 4–11)

## 2024-02-23 PROCEDURE — 99233 SBSQ HOSP IP/OBS HIGH 50: CPT | Performed by: HOSPITALIST

## 2024-02-23 RX ORDER — SODIUM CHLORIDE 9 MG/ML
INJECTION, SOLUTION INTRAVENOUS ONCE
Status: COMPLETED | OUTPATIENT
Start: 2024-02-23 | End: 2024-02-23

## 2024-02-23 RX ORDER — LISINOPRIL 10 MG/1
10 TABLET ORAL DAILY
Status: SHIPPED | COMMUNITY
Start: 2024-02-23

## 2024-02-23 RX ORDER — TRAMADOL HYDROCHLORIDE 50 MG/1
50 TABLET ORAL 2 TIMES DAILY PRN
Qty: 30 TABLET | Refills: 0 | Status: SHIPPED | OUTPATIENT
Start: 2024-02-23

## 2024-02-23 RX ORDER — ACETAMINOPHEN 500 MG
500 TABLET ORAL EVERY 4 HOURS PRN
Qty: 90 TABLET | Refills: 0 | Status: SHIPPED | OUTPATIENT
Start: 2024-02-23

## 2024-02-23 NOTE — PROGRESS NOTES
Progress Note    Patient: Kierra Joshua  Medical record #: [id number]    Kierra Joshua is a 78 year old y/o female who is POD #2 IM nailing for right subtrochanteric femur fracture.  The patient's main complaint today is surgical pain at the operative site.  The pain is controlled.  The patient denies chest pain or shortness of breath.    Hospital Problem List:  Principal Problem:    Closed fracture of right hip, initial encounter ([facility])      Current Medications:   [COMPLETED] sodium chloride 0.9% infusion   Intravenous Once    [COMPLETED] sodium chloride 0.9% infusion   Intravenous Once    docusate sodium (Colace) cap 100 mg  100 mg Oral BID    polyethylene glycol (PEG 3350) (Miralax) 17 g oral packet 17 g  17 g Oral Daily    [COMPLETED] clonidine/epinephrine/ropivacaine/ketorolac in 0.9% NaCl 60 mL pain cocktail syringe for hip arthroplasty   Infiltration Once (Intra-Op)    [] sodium chloride 0.9 % IV bolus 500 mL  500 mL Intravenous Once PRN    enoxaparin (Lovenox) 40 MG/0.4ML SUBQ injection 40 mg  40 mg Subcutaneous Daily    oxyCODONE immediate release tab 2.5 mg  2.5 mg Oral Q4H PRN    Or    oxyCODONE immediate release tab 5 mg  5 mg Oral Q4H PRN    HYDROmorphone (Dilaudid) 1 MG/ML injection 0.2 mg  0.2 mg Intravenous Q2H PRN    Or    HYDROmorphone (Dilaudid) 1 MG/ML injection 0.4 mg  0.4 mg Intravenous Q2H PRN    traMADol (Ultram) tab 50 mg  50 mg Oral Q6H PRN    acetaminophen (Tylenol) tab 650 mg  650 mg Oral Q4H While awake    [COMPLETED] ceFAZolin (Ancef) 2 g in 20mL IV syringe premix  2 g Intravenous Q8H    [COMPLETED] insulin aspart (NovoLOG) 100 Units/mL vial 2 Units  2 Units Subcutaneous Once    traMADol (Ultram) tab 50 mg  50 mg Oral BID    risperiDONE (RisperDAL) tab 0.5 mg  0.5 mg Oral Noon    risperiDONE (RisperDAL) tab 0.75 mg  0.75 mg Oral BID    acetaminophen (Tylenol Extra Strength) tab 500 mg  500 mg Oral Q4H PRN    morphINE PF 2 MG/ML injection 0.5 mg  0.5 mg Intravenous Q2H PRN     Or    morphINE PF 2 MG/ML injection 1 mg  1 mg Intravenous Q2H PRN    Or    morphINE PF 2 MG/ML injection 2 mg  2 mg Intravenous Q2H PRN    melatonin tab 3 mg  3 mg Oral Nightly PRN    sennosides (Senokot) tab 17.2 mg  17.2 mg Oral Nightly PRN    bisacodyl (Dulcolax) 10 MG rectal suppository 10 mg  10 mg Rectal Daily PRN    fleet enema (Fleet) 7-19 GM/118ML rectal enema 133 mL  1 enema Rectal Once PRN    ondansetron (Zofran) 4 MG/2ML injection 4 mg  4 mg Intravenous Q6H PRN    benzonatate (Tessalon) cap 200 mg  200 mg Oral TID PRN    guaiFENesin (Robitussin) 100 MG/5 ML oral liquid 200 mg  200 mg Oral Q4H PRN    glycerin-hypromellose- (Artifical Tears) 0.2-0.2-1 % ophthalmic solution 1 drop  1 drop Both Eyes QID PRN    sodium chloride (Saline Mist) 0.65 % nasal solution 1 spray  1 spray Each Nare Q3H PRN    glucose (Dex4) 15 GM/59ML oral liquid 15 g  15 g Oral Q15 Min PRN    Or    glucose (Glutose) 40% oral gel 15 g  15 g Oral Q15 Min PRN    Or    glucose-vitamin C (Dex-4) chewable tab 4 tablet  4 tablet Oral Q15 Min PRN    Or    dextrose 50% injection 50 mL  50 mL Intravenous Q15 Min PRN    Or    glucose (Dex4) 15 GM/59ML oral liquid 30 g  30 g Oral Q15 Min PRN    Or    glucose (Glutose) 40% oral gel 30 g  30 g Oral Q15 Min PRN    Or    glucose-vitamin C (Dex-4) chewable tab 8 tablet  8 tablet Oral Q15 Min PRN    insulin aspart (NovoLOG) 100 Units/mL FlexPen 1-10 Units  1-10 Units Subcutaneous TID AC and HS         Input/Output:    Intake/Output Summary (Last 24 hours) at 2/23/2024 0825  Last data filed at 2/23/2024 0415  Gross per 24 hour   Intake 1331.67 ml   Output 700 ml   Net 631.67 ml       Vital signs:  Blood pressure 114/63, pulse 107, temperature 97.4 °F (36.3 °C), temperature source Axillary, resp. rate 19, weight 143 lb (64.9 kg), SpO2 96%, not currently breastfeeding.    Physical Exam:     right lower extremity:  Dressing: clean and dry  Able to dorsiflex ankle and move toes  Sensation grossly  intact to light touch throughout  Distal pulses intact  No calf swelling  Darcie's sign negative  Compartments soft  No signs or symptoms of DVT or compartment syndrome  Leg lengths equal    Labs:   Lab Results   Component Value Date    WBC 8.1 02/23/2024    HGB 9.1 02/23/2024    HGB 9.1 02/23/2024    HCT 27.6 02/23/2024    .0 02/23/2024         Assessment: 78 year old  female POD #2 IM nailing for right subtrochanteric femur fracture    Plan:    Hgb - 9.1 - acute blood loss anemia, consistent with post op changes, stable/asymptomatic  Mobilize with PT, WBAT on operative extremity with walker  Pain controlled with meds  Lovenox for DVT prophylaxis for 2 weeks, then transition to ECASA 81mg BID daily 4 weeks  TEDs, SCDs, IS  Ice and elevation  Calli/sutures out in 14 days  Disposition: plan transfer to rehab when stable vs home with home health    Follow up with Dr. Guidry - 2 weeks     Followed by [facility] for medical conditions to include Principal Problem:    Closed fracture of right hip, initial encounter ([facility])          Signed:  Brian Guidry MD  2/22/2024  12:13 PM

## 2024-02-23 NOTE — PLAN OF CARE
Patient alert, oriented to self at baseline. Responds to simple questions/commands appropriately. Pain with movement, PO scheduled medications given. Right hip with aquacel dressing x3 to RLE, CDI. Tolerating diet, set up, order, and feeds. Meds tolerated crushed with applesauce. VSS, tele in place; NSR.     Plan: PT to see again, hgb check this afternoon, discharge to [facility] when cleared.

## 2024-02-23 NOTE — PROGRESS NOTES
[facility]     Hospitalist Progress Note     Kierra Joshua Patient Status:  Inpatient    1945 MRN [id number]   [facility] 3SW-A Attending Shree Bowman MD   Hosp Day # 3 PCP Tate Tovar     Chief Complaint: Fx    Subjective:   Patient denies complaints of pain. No chest pain or shortness of breath. On room air.     Current medications:   docusate sodium  100 mg Oral BID    polyethylene glycol (PEG 3350)  17 g Oral Daily    enoxaparin  40 mg Subcutaneous Daily    acetaminophen  650 mg Oral Q4H While awake    traMADol  50 mg Oral BID    risperiDONE  0.5 mg Oral Noon    risperiDONE  0.75 mg Oral BID    insulin aspart  1-10 Units Subcutaneous TID AC and HS       Objective:    Review of Systems:   Limited d/t patient factors.    Vital signs:  Temp:  [96.6 °F (35.9 °C)-99.1 °F (37.3 °C)] 98 °F (36.7 °C)  Pulse:  [] 107  Resp:  [14-20] 19  BP: ()/(37-79) 114/63  SpO2:  [96 %-100 %] 96 %  Patient Weight for the past 72 hrs:   Weight   24 1817 143 lb 4.8 oz (65 kg)   24 2247 143 lb (64.9 kg)     Physical Exam:    General: No acute distress.   Respiratory: Diminished, clear  Cardiovascular: S1, S2. Regular rate and rhythm.   Abdomen: Soft, nontender, nondistended.  Positive bowel sounds.   Extremities: Right thigh soft     Diagnostic Data:    Labs:  Recent Labs   Lab 24  1930 24  0518 24  0535 24  1249 24  1951 24  0008 24  0528   WBC 10.5 8.5 8.1  --   --   --  8.1   HGB 10.7* 10.0* 7.1* 6.2* 7.0* 6.8* 9.1*  9.1*   MCV 90.2 88.0 89.5  --   --   --  84.1   .0 204.0 158.0  --   --   --  116.0*   INR 1.06  --   --   --   --   --   --        Recent Labs   Lab 24  19324  0518 24  0535   * 159* 204*   BUN 23 23 23   CREATSERUM 0.68 0.97 0.96   CA 9.0 8.9 8.4*   ALB 3.0*  --   --     138 136   K 4.4 4.5 4.9    108 108   CO2 24.0 24.0 25.0   ALKPHO 60  --   --    AST 16  --   --    ALT 14   --   --    BILT 0.3  --   --    TP 5.7*  --   --        Estimated Creatinine Clearance: 44 mL/min (based on SCr of 0.96 mg/dL).    Recent Labs   Lab 02/20/24  1930   PTP 13.8   INR 1.06            COVID-19 Lab Results    COVID-19  Lab Results   Component Value Date    COVID19 Detected (A) 12/27/2023    COVID19 Detected (A) 03/12/2023    COVID19 Not Detected 01/02/2023       Pro-Calcitonin  No results for input(s): \"PCT\" in the last 168 hours.    Cardiac  No results for input(s): \"TROP\", \"PBNP\" in the last 168 hours.    Creatinine Kinase  No results for input(s): \"CK\" in the last 168 hours.    Inflammatory Markers  No results for input(s): \"CRP\", \"SHAHIDA\", \"LDH\", \"DDIMER\" in the last 168 hours.    No results for input(s): \"TROP\", \"TROPHS\", \"CK\" in the last 168 hours.    Imaging: Imaging data reviewed in Epic.    Medications:    docusate sodium  100 mg Oral BID    polyethylene glycol (PEG 3350)  17 g Oral Daily    enoxaparin  40 mg Subcutaneous Daily    acetaminophen  650 mg Oral Q4H While awake    traMADol  50 mg Oral BID    risperiDONE  0.5 mg Oral Noon    risperiDONE  0.75 mg Oral BID    insulin aspart  1-10 Units Subcutaneous TID AC and HS       Assessment & Plan:    Complete impacted fracture right proximal femoral shaft sp right femur IM nail 2/21/2024  Pain control  PT/OT  Ortho consult  Anemia, acute blood loss sp PRBC x 2  Monitor H&H   Dementia  Risperidone  Essential hypertension  Resume Lisinopril and Lasix on DC  Monitor hemodynamics   Diabetes mellitus  Change Correctional scale 1:30  Add Carb 1:15  Dyslipidemia    Supplementary Documentation:   Quality:  DVT Prophylaxis: Lovenox    HARSHA over the weekend.    Plan of care discussed with patient, RN and SW.    Shree Bowman MD

## 2024-02-23 NOTE — OCCUPATIONAL THERAPY NOTE
Attempted to see pt for skilled OT services this date. Pt increasingly agitated when attempted to encourage bed mobility/OOB activity. RN made aware of attempt. OT continue to follow.

## 2024-02-23 NOTE — PHYSICAL THERAPY NOTE
Physical therapy attempted to see pt for f/u therapy session. Pt becoming increasingly agitated when attempted to encourage out of bed mobility. RN made aware of attempt. Will re-attempt as pt remains medically appropriate, as schedule allows, and as pt in agreement to participate.

## 2024-02-23 NOTE — CM/SW NOTE
Met with pt's dtr to discuss DC planning.  Pt's dtr agreeable with pt's return to [facility] at discharge, but does not feel pt is medically ready for DC yet.  She feels pt will need \"another couple of days.\"  She stated that she would appeal discharge if she does not feel pt is ready to leave the hospital.  Medicare IM given.    Updated Dr Bowman.  Plan for weekend discharge.  Updated [facility].  / to remain available for support and/or discharge planning.     [facility]  [phone]    [facility]  Discharge Planner  [phone]

## 2024-02-23 NOTE — PLAN OF CARE
A/o x 1-2 hx of dementia. RA/. Tele:NSR. SCD's/ankle pumps encouraged. Aquacel dsg x3 cdi no drainage noted.LBM 2/23. Voiding without difficulty with purwick. PT/OT to see. Monitor hgb. POC updated with pt and pt daughter. All safety measures in place. Call light within reach.

## 2024-02-24 ENCOUNTER — APPOINTMENT (OUTPATIENT)
Dept: GENERAL RADIOLOGY | Facility: HOSPITAL | Age: 79
End: 2024-02-24
Attending: HOSPITALIST
Payer: MEDICARE

## 2024-02-24 LAB
BASOPHILS # BLD AUTO: 0.03 X10(3) UL (ref 0–0.2)
BASOPHILS NFR BLD AUTO: 0.4 %
EOSINOPHIL # BLD AUTO: 0.08 X10(3) UL (ref 0–0.7)
EOSINOPHIL NFR BLD AUTO: 1.2 %
ERYTHROCYTE [DISTWIDTH] IN BLOOD BY AUTOMATED COUNT: 16.5 %
GLUCOSE BLD-MCNC: 157 MG/DL (ref 70–99)
GLUCOSE BLD-MCNC: 176 MG/DL (ref 70–99)
GLUCOSE BLD-MCNC: 193 MG/DL (ref 70–99)
GLUCOSE BLD-MCNC: 217 MG/DL (ref 70–99)
HCT VFR BLD AUTO: 28 %
HGB BLD-MCNC: 9 G/DL
IMM GRANULOCYTES # BLD AUTO: 0.03 X10(3) UL (ref 0–1)
IMM GRANULOCYTES NFR BLD: 0.4 %
LYMPHOCYTES # BLD AUTO: 0.97 X10(3) UL (ref 1–4)
LYMPHOCYTES NFR BLD AUTO: 14.2 %
MCH RBC QN AUTO: 27.8 PG (ref 26–34)
MCHC RBC AUTO-ENTMCNC: 32.1 G/DL (ref 31–37)
MCV RBC AUTO: 86.4 FL
MONOCYTES # BLD AUTO: 0.6 X10(3) UL (ref 0.1–1)
MONOCYTES NFR BLD AUTO: 8.8 %
NEUTROPHILS # BLD AUTO: 5.13 X10 (3) UL (ref 1.5–7.7)
NEUTROPHILS # BLD AUTO: 5.13 X10(3) UL (ref 1.5–7.7)
NEUTROPHILS NFR BLD AUTO: 75 %
PLATELET # BLD AUTO: 142 10(3)UL (ref 150–450)
RBC # BLD AUTO: 3.24 X10(6)UL
WBC # BLD AUTO: 6.8 X10(3) UL (ref 4–11)

## 2024-02-24 PROCEDURE — 74018 RADEX ABDOMEN 1 VIEW: CPT | Performed by: HOSPITALIST

## 2024-02-24 PROCEDURE — 99233 SBSQ HOSP IP/OBS HIGH 50: CPT | Performed by: HOSPITALIST

## 2024-02-24 RX ORDER — ACETAMINOPHEN 500 MG
1000 TABLET ORAL EVERY 6 HOURS
Status: DISCONTINUED | OUTPATIENT
Start: 2024-02-24 | End: 2024-02-27

## 2024-02-24 RX ORDER — TRAMADOL HYDROCHLORIDE 50 MG/1
50 TABLET ORAL EVERY 12 HOURS PRN
Status: DISCONTINUED | OUTPATIENT
Start: 2024-02-24 | End: 2024-02-28

## 2024-02-24 RX ORDER — KETOROLAC TROMETHAMINE 15 MG/ML
15 INJECTION, SOLUTION INTRAMUSCULAR; INTRAVENOUS EVERY 6 HOURS PRN
Status: DISPENSED | OUTPATIENT
Start: 2024-02-24 | End: 2024-02-26

## 2024-02-24 RX ORDER — KETOROLAC TROMETHAMINE 30 MG/ML
30 INJECTION, SOLUTION INTRAMUSCULAR; INTRAVENOUS EVERY 6 HOURS PRN
Status: DISCONTINUED | OUTPATIENT
Start: 2024-02-24 | End: 2024-02-24 | Stop reason: ALTCHOICE

## 2024-02-24 NOTE — PLAN OF CARE
POD 3 Rt femur IM nail, Pt is AAOX1 to self, room air, TELE, incontinent to brief and purewick, pills crushed in apple sauce, pain meds adjusted, see MAR, lethargic, ABD distended, passed multiple stools today, orders for GI prep, Pt doing well, all needs met, all safety measures in place, call light within reach, will CTM.

## 2024-02-24 NOTE — OCCUPATIONAL THERAPY NOTE
OCCUPATIONAL THERAPY TREATMENT NOTE - INPATIENT     Room Number: 381/381-A  Session: 1   Number of Visits to Meet Established Goals: 5    Presenting Problem: RIGHT SUBTROCHANTERIC FEMUR FRACTURE, 2/21 s/p RIGHT FEMUR INTRAMEDULLARY NAILING      ASSESSMENT   Patient demonstrates limited progress this session, goals remain in progress. Demonstrates a decline in UB ADL participation and requires increased assist for mobility compared with initial evaluation.    Patient continues to function below baseline with  ADL and mobility .   Contributing factors to remaining limitations include decreased functional strength, decreased endurance, pain, impaired sitting/standing balance, cognitive deficits (chronic), decreased compliance/participation, decreased insight to deficits, decreased safety awareness, and volitional factors .  Next session anticipate patient to progress grooming, transfers, dynamic sitting balance, and functional standing tolerance.  Occupational Therapy will continue to follow patient for duration of hospitalization.    Patient continues to benefit from continued skilled OT services: to promote return to prior level of function and safety with continuous assistance and gradual rehabilitative therapy .          OT Device Recommendations: TBD    History: Patient is a 78 year old female admitted on 2/20/2024 with Presenting Problem: RIGHT SUBTROCHANTERIC FEMUR FRACTURE, 2/21 s/p RIGHT FEMUR INTRAMEDULLARY NAILING. Co-Morbidities : DM, HTN, bipolar, Alzheimer's, CVA. Pt was admitted from long-term care facility on 2/20 with R hip pain.  Per daughter, she received a call from the facility about pt's R hip pain. Per daughter, limited information from the facility about any event.  Admitted for R subtrochanteric femur fracture.  2/21 s/p R femur IM nailing.      Recent admissions:  12/27 to 12/31/23 for COVID -> back to long term care with in-house therapy.     Surgery 2/21/24  Preoperative Diagnosis:  RIGHT  SUBTROCHANTERIC FEMUR FRACTURE     Postoperative Diagnosis:  RIGHT SUBTROCHANTERIC FEMUR FRACTURE     Procedure Performed:  RIGHT FEMUR INTRAMEDULLARY NAILING    WEIGHT BEARING RESTRICTION  Weight Bearing Restriction: R lower extremity        R Lower Extremity: Weight Bearing as Tolerated       Recommendations for nursing staff:   Transfers: total lift  Toileting location: bed level    TREATMENT SESSION:  Patient Start of Session: semi supine  FUNCTIONAL TRANSFER ASSESSMENT  Sit to Stand: Edge of Bed  Edge of Bed: Dependent    BED MOBILITY  Supine to Sit : Dependent  Sit to Supine (OT): Dependent  Scooting: Dependent    BALANCE ASSESSMENT     FUNCTIONAL ADL ASSESSMENT  Grooming Seated: Maximum Assist (Requiring hand over hand assist to  toothbrush and brush teeth. Once brushed, resistant to therapist encouragement to rinse mouth, stating \"I can't!\" repeatedly.)  LB Dressing Seated: Dependent      ACTIVITY TOLERANCE: vitals wfl                         O2 SATURATIONS       EDUCATION PROVIDED  Patient: Role of Occupational Therapy; Plan of Care; Functional Transfer Techniques; Fall Prevention; Posture/Positioning  Patient's Response to Education: Does Not Demonstrate Skills Needed for Learning  Family/Caregiver: -- (n/a)  Family/Caregiver's Response to Education: -- (n/a)      Equipment used: RW  Demonstrates functional use, Would benefit from additional trial      Exercises: states \"I can't\" during therapist attempt to engage in bed level exercise.    Exercises Repetitions Comments   Scapular elevation     Scapular retraction     Shoulder rolls     Shoulder flexion     Shoulder abduction     Shoulder internal/external rotation     Forward punch     Elbow flexion     Elbow extension     Forearm pronation/supination     Wrist flexion/extension     Gross grasp/fist pumps     Ankle pumps     Knee extension     Marching         Therapist comments: Bedsheet used for supine to sit. C/o increased pain with transfer.  Initial min (A) with posterior lean, progresses to close SBA seated. Max of 2 sit to stand via RW, completed 2 trials. Able to clear buttocks from EOB to bear weight through BLE, but unable to achieve fully upright posture. Engaged in grooming task EOB with poor pt participation as noted. Total (A) to supine.   Patient End of Session: In bed;Needs met;Call light within reach;RN aware of session/findings;All patient questions and concerns addressed;Alarm set;SCDs in place    SUBJECTIVE  Pt states \"I can't!\" Frequently during session, prior to attempting requested task.    PAIN ASSESSMENT  Rating: Unable to rate  Location: RLE  Management Techniques: Repositioning     OBJECTIVE  Precautions: Bed/chair alarm    AM-PAC ‘6-Clicks’ Inpatient Daily Activity Short Form  -   Putting on and taking off regular lower body clothing?: Total  -   Bathing (including washing, rinsing, drying)?: Total  -   Toileting, which includes using toilet, bedpan or urinal? : Total  -   Putting on and taking off regular upper body clothing?: Total  -   Taking care of personal grooming such as brushing teeth?: A Lot  -   Eating meals?: A Lot    AM-PAC Score:  Score: 8  Approx Degree of Impairment: 85.69%  Standardized Score (AM-PAC Scale): 22.86    PLAN  OT Treatment Plan: Balance activities;Energy conservation/work simplification techniques;ADL training;Functional transfer training;UE strengthening/ROM;Patient/Family education;Patient/Family training;Equipment eval/education  Rehab Potential : Guarded  Frequency: 3x/week    OT Goals: ongoing 2/24  ADL Goals   Patient will perform grooming: with setup  Patient will perform upper body dressing:  with setup  Patient will perform lower body dressing:  with mod assist  Patient will perform toileting: with mod assist     Functional Transfer Goals  Patient will transfer from supine to sit:  with mod assist  Patient will transfer to bedside commode:  with mod assist     UE Exercise Program  Goal  Patient will be supervision with bilateral AROM HEP (home exercise program).    OT Session Time: 30 minutes  Self-Care Home Management: 8 minutes  Therapeutic Activity: 15 minutes  Neuromuscular Re-education:  minutes  Therapeutic Exercise:  minutes  Cognitive Skills:  minutes  Sensory Integrative:  minutes  Orthotic Management and Training: minutes  Can add/delete any of these

## 2024-02-24 NOTE — PLAN OF CARE
A/o x 1-2 hx of dementia. RA/. Tele:NSR. SCD's/ankle pumps encouraged. Aquacel dsg x3 cdi no drainage noted.LBM 2/23. Mepilex dsg to sacrum. Voiding without difficulty with purwick. PT/OT  POC updated with pt and pt daughter. All safety measures in place. Call light within reach.

## 2024-02-24 NOTE — PROGRESS NOTES
[facility] Patient Status:  Inpatient    1945 MRN [id number]   Location [facility] 3SW-A Attending Shree Bowman MD   Hosp Day # 4 PCP Tate Tovar       Subjective:  POD #3 right hip IM nail by Dr. Guidry    Patient lying in bed, sleeping  RN denies fever, chills, chest pain, shortness of breath, calf pain        Objective:  Vital signs in last 24 hours:  Temp:  [97.8 °F (36.6 °C)-98.7 °F (37.1 °C)] 98.2 °F (36.8 °C)  Pulse:  [73-88] 77  Resp:  [15-20] 20  BP: ()/(43-64) 117/64  SpO2:  [96 %-100 %] 98 %      General: A&O, NAD  MSK: Dressing clean, dry, intact. Compartments soft, nontender. +EHL/PF/DF. Sensation intact. Pulses intact. No signs of DVT.         Data Review  CBC:   Lab Results   Component Value Date    WBC 6.8 2024    RBC 3.24 (L) 2024    HGB 9.0 (L) 2024    HCT 28.0 (L) 2024    .0 (L) 2024           Assessment/Plan:    POD #3 right hip IM nail    Continue present management  PT - WBAT RLE  DVT prophylaxis with Lovenox for 2 weeks then transition to ASA per Dr. Guidry  Discharge plan: May discharge to [facility] from orthopedic perspective once cleared by other services        Faith FONSECA  Discussed with Dr. Byrnes

## 2024-02-24 NOTE — PHYSICAL THERAPY NOTE
PHYSICAL THERAPY TREATMENT NOTE - INPATIENT    Room Number: 381/381-A     Session: 1     Number of Visits to Meet Established Goals: 5    Presenting Problem: R hip fracture  Co-Morbidities : DM, HTN, bipolar, Alzheimer's, CVA    Procedure Performed 2/21/24 Dr. Guidry:    RIGHT FEMUR INTRAMEDULLARY NAILING     ASSESSMENT   Patient demonstrates limited progress this session, goals  remain in progress.    Patient continues to function below baseline with bed mobility, transfers, and gait.  Contributing factors to remaining limitations include decreased functional strength, pain, impaired sitting and standing balance, cognitive deficits (alzheimer's, decrease safety awareness), and decreased compliance/participation.  Next session anticipate patient to progress bed mobility and transfers.  Physical Therapy will continue to follow patient for duration of hospitalization.    Patient continues to benefit from continued skilled PT services: to promote return to prior level of function and safety with continuous assistance and gradual rehabilitative therapy .    PLAN  PT Treatment Plan: Bed mobility;Body mechanics;Coordination;Endurance;Energy conservation;Patient education;Family education;Gait training;Neuromuscular re-educate;Range of motion;Strengthening;Transfer training;Balance training  Rehab Potential : Guarded  Frequency (Obs): 3-5x/week    CURRENT GOALS     Goal #1 Patient is able to demonstrate supine - sit EOB @ level: minimum assistance      Goal #2 Patient is able to demonstrate transfers Sit to/from Stand at assistance level: minimum assistance      Goal #3 Patient is able to ambulate 50 feet with assist device: walker - rolling at assistance level: supervision      Goal #4     Goal #5     Goal #6     Goal Comments: Goals established on 2/22/2024 2/24/2024 all goals ongoing     PHYSICAL THERAPY MEDICAL/SOCIAL HISTORY  History related to current admission: Patient is a 78 year old female admitted on  2/20/2024 from home at [facility] for increased R hip pain.  Pt diagnosed with Right subtrochanteric femur fracture. Now s/ IM nailing.   Daughter received call from C on 2/20 about her mother complaining of R hip pain. Per dtr facility providing very limited information in regards to what happened- per facility there was no fall, unsure of how she could have fractured her hip. Pt's daughter expressing significant frustration about event.       SUBJECTIVE  \"I can't.\"  Pt answer to multiple tasks (I.e. standing, brushing teeth, move LE's\"    OBJECTIVE  Precautions: Bed/chair alarm    WEIGHT BEARING RESTRICTION  Weight Bearing Restriction: R lower extremity        R Lower Extremity: Weight Bearing as Tolerated       PAIN ASSESSMENT   Rating: Unable to rate  Location: R hip  Management Techniques: Activity promotion;Relaxation;Repositioning    BALANCE                                                                                                                       Static Sitting: Fair -  Dynamic Sitting: Poor +           Static Standing: Poor  Dynamic Standing: Poor -    ACTIVITY TOLERANCE  Pulse: 101  Heart Rate Source: Monitor                   O2 WALK  Oxygen Therapy  SPO2% on Room Air at Rest: 100      AM-PAC '6-Clicks' INPATIENT SHORT FORM - BASIC MOBILITY  How much difficulty does the patient currently have...  Patient Difficulty: Turning over in bed (including adjusting bedclothes, sheets and blankets)?: A Lot   Patient Difficulty: Sitting down on and standing up from a chair with arms (e.g., wheelchair, bedside commode, etc.): A Lot   Patient Difficulty: Moving from lying on back to sitting on the side of the bed?: A Lot   How much help from another person does the patient currently need...   Help from Another: Moving to and from a bed to a chair (including a wheelchair)?: Total   Help from Another: Need to walk in hospital room?: Total   Help from Another: Climbing 3-5 steps with a railing?: Total        AM-PAC Score:  Raw Score: 9   Approx Degree of Impairment: 81.38%   Standardized Score (AM-PAC Scale): 30.55   CMS Modifier (G-Code): CM    FUNCTIONAL ABILITY STATUS  Gait Assessment   Functional Mobility/Gait Assessment  Gait Assistance: Not tested    Skilled Therapy Provided    Bed Mobility:  Rolling: not tested   Supine<>Sit: max assist x 2   Sit<>Supine: max assist x 2     Transfer Mobility:  Sit<>Stand: max assist x 2 from EOB to RW with pt unable to come up to upright standing.   Stand<>Sit: max assist x 2   Gait: not tested    Therapist's Comments: per RN pt ok to be seen. Pt received in bed, flat affect. Attempted to have pt participate in LE ex in supine. Pt does not follow commands consistently. Pt requiring max assist x2 for all mobility. Pt sitting at EOB x 5 mins with fluctuating amount of assist from SBA to mod assist. Pt sit to stand with 2 attempts with max assist x 2 from EOB to RW with verbal and tactile cues for hip ext. Pt stands with hip flexed unable to stand upright due to weakness and pain. Pt returns to sitting at EOB. Pt noted to need change of brief.   Pt appears to impulsively grab onto RW in front of her or bedside chair while sitting at EOB. Pt working with OT on ADL's, PT providing assist for sitting balance. Pt returned to supine with max assist x 2 and boosted up to HOB. Pt bed alarm set and pt call light and needs within reach, nursing aware of above.        Patient End of Session: In bed;Needs met;Call light within reach;RN aware of session/findings;All patient questions and concerns addressed;SCDs in place;Alarm set    PT Session Time: 25 minutes    Therapeutic Activity: 15 minutes    Neuromuscular Re-education: 8 minutes

## 2024-02-25 ENCOUNTER — ANESTHESIA EVENT (OUTPATIENT)
Dept: ENDOSCOPY | Facility: HOSPITAL | Age: 79
End: 2024-02-25
Payer: MEDICARE

## 2024-02-25 ENCOUNTER — APPOINTMENT (OUTPATIENT)
Dept: GENERAL RADIOLOGY | Facility: HOSPITAL | Age: 79
End: 2024-02-25
Attending: INTERNAL MEDICINE
Payer: MEDICARE

## 2024-02-25 ENCOUNTER — ANESTHESIA (OUTPATIENT)
Dept: ENDOSCOPY | Facility: HOSPITAL | Age: 79
End: 2024-02-25
Payer: MEDICARE

## 2024-02-25 ENCOUNTER — APPOINTMENT (OUTPATIENT)
Dept: CT IMAGING | Facility: HOSPITAL | Age: 79
End: 2024-02-25
Attending: INTERNAL MEDICINE
Payer: MEDICARE

## 2024-02-25 PROBLEM — K59.81 PSEUDOOBSTRUCTION OF COLON: Status: ACTIVE | Noted: 2024-02-25

## 2024-02-25 LAB
BLOOD TYPE BARCODE: 7300
BUN BLD-MCNC: 12 MG/DL (ref 9–23)
CREAT BLD-MCNC: 0.42 MG/DL
EGFRCR SERPLBLD CKD-EPI 2021: 99 ML/MIN/1.73M2 (ref 60–?)
ERYTHROCYTE [DISTWIDTH] IN BLOOD BY AUTOMATED COUNT: 16.3 %
GLUCOSE BLD-MCNC: 135 MG/DL (ref 70–99)
GLUCOSE BLD-MCNC: 147 MG/DL (ref 70–99)
GLUCOSE BLD-MCNC: 178 MG/DL (ref 70–99)
GLUCOSE BLD-MCNC: 182 MG/DL (ref 70–99)
GLUCOSE BLD-MCNC: 204 MG/DL (ref 70–99)
HCT VFR BLD AUTO: 28.9 %
HGB BLD-MCNC: 9.1 G/DL
MCH RBC QN AUTO: 27.7 PG (ref 26–34)
MCHC RBC AUTO-ENTMCNC: 31.5 G/DL (ref 31–37)
MCV RBC AUTO: 87.8 FL
PLATELET # BLD AUTO: 215 10(3)UL (ref 150–450)
RBC # BLD AUTO: 3.29 X10(6)UL
UNIT VOLUME: 350 ML
WBC # BLD AUTO: 6.3 X10(3) UL (ref 4–11)

## 2024-02-25 PROCEDURE — 74018 RADEX ABDOMEN 1 VIEW: CPT | Performed by: INTERNAL MEDICINE

## 2024-02-25 PROCEDURE — 74177 CT ABD & PELVIS W/CONTRAST: CPT | Performed by: INTERNAL MEDICINE

## 2024-02-25 PROCEDURE — 0D9H80Z DRAINAGE OF CECUM WITH DRAINAGE DEVICE, VIA NATURAL OR ARTIFICIAL OPENING ENDOSCOPIC: ICD-10-PCS | Performed by: INTERNAL MEDICINE

## 2024-02-25 PROCEDURE — 99232 SBSQ HOSP IP/OBS MODERATE 35: CPT | Performed by: HOSPITALIST

## 2024-02-25 RX ORDER — SODIUM CHLORIDE, SODIUM LACTATE, POTASSIUM CHLORIDE, CALCIUM CHLORIDE 600; 310; 30; 20 MG/100ML; MG/100ML; MG/100ML; MG/100ML
INJECTION, SOLUTION INTRAVENOUS CONTINUOUS
Status: DISCONTINUED | OUTPATIENT
Start: 2024-02-25 | End: 2024-02-26

## 2024-02-25 RX ORDER — SODIUM CHLORIDE, SODIUM LACTATE, POTASSIUM CHLORIDE, CALCIUM CHLORIDE 600; 310; 30; 20 MG/100ML; MG/100ML; MG/100ML; MG/100ML
INJECTION, SOLUTION INTRAVENOUS CONTINUOUS PRN
Status: DISCONTINUED | OUTPATIENT
Start: 2024-02-25 | End: 2024-02-25 | Stop reason: SURG

## 2024-02-25 RX ORDER — PHENYLEPHRINE HCL 10 MG/ML
VIAL (ML) INJECTION AS NEEDED
Status: DISCONTINUED | OUTPATIENT
Start: 2024-02-25 | End: 2024-02-25 | Stop reason: SURG

## 2024-02-25 RX ORDER — LIDOCAINE HYDROCHLORIDE 10 MG/ML
INJECTION, SOLUTION EPIDURAL; INFILTRATION; INTRACAUDAL; PERINEURAL AS NEEDED
Status: DISCONTINUED | OUTPATIENT
Start: 2024-02-25 | End: 2024-02-25 | Stop reason: SURG

## 2024-02-25 RX ORDER — NALOXONE HYDROCHLORIDE 0.4 MG/ML
0.08 INJECTION, SOLUTION INTRAMUSCULAR; INTRAVENOUS; SUBCUTANEOUS ONCE AS NEEDED
Status: DISCONTINUED | OUTPATIENT
Start: 2024-02-25 | End: 2024-02-25 | Stop reason: HOSPADM

## 2024-02-25 RX ADMIN — PHENYLEPHRINE HCL 100 MCG: 10 MG/ML VIAL (ML) INJECTION at 11:54:00

## 2024-02-25 RX ADMIN — SODIUM CHLORIDE, SODIUM LACTATE, POTASSIUM CHLORIDE, CALCIUM CHLORIDE: 600; 310; 30; 20 INJECTION, SOLUTION INTRAVENOUS at 11:47:00

## 2024-02-25 RX ADMIN — LIDOCAINE HYDROCHLORIDE 50 MG: 10 INJECTION, SOLUTION EPIDURAL; INFILTRATION; INTRACAUDAL; PERINEURAL at 11:47:00

## 2024-02-25 NOTE — SLP NOTE
Attempting dysphagia follow up with modified diet.   Patient currently NPO due to GI workup and not appropriate at this time.  Will follow closely.  RN aware.     Patient was discharged and is being seen by cardiology for inappropriate sinus tachycardia. Will need to see if patient is cleared by them before scheduling procedure.

## 2024-02-25 NOTE — CONSULTS
Consultation Note        Kierra CANDY Joshua Patient Status:  Inpatient    1945 MRN [id number]   Location [facility] 3SW-A Attending Shree Bowman MD   Hosp Day # 4 PCP Tate Tovar       Reason for Consultation   Colonic distension       History of Present Illness   Patient is a 77 y/o female with a h/o Alzheimer's dementia, chronic constipation, colonic ileus requiring decompression in the past who initially presented with a femur fracture post fall  -During admission, daughter noted abdomen to be distended, and firm  -Patient herself unable to provide history, but was able to eat two meals (50% of each) today, and having liquid loose BM's  -No nausea, vomiting, fever, chills  -Per daughter, has chronic constipation, takes Miralax and 2 senna daily at NH         PMH/MEDS/ALLERGIES/SH/FH:     Past Medical History:   Diagnosis Date    Alzheimer's dementia with behavioral disturbance (HCC)     Anxiety state     Bipolar affective (HCC)     Cataract     Dementia (HCC)     Diabetes (HCC)     Elevated fasting lipid profile 2012    Essential hypertension     Essential hypertension, benign 2013    Falls     Gallstones and inflammation of gallbladder without obstruction     High blood pressure     High cholesterol     Muscle weakness     Neuropathy     Osteoarthritis     Psychosis, unspecified psychosis type (HCC)     Shortness of breath       Past Surgical History:   Procedure Laterality Date    BIOPSY OF BREAST, INCISIONAL      left breast bx during surgery    COLONOSCOPY N/A 2023    Procedure: UNPREPPED  COLONOSCOPY;  Surgeon: Fabrice Trejo MD;  Location:  ENDOSCOPY    OTHER SURGICAL HISTORY      aneurysm clipped x2        No recently discontinued medications to reconcile   No current facility-administered medications on file prior to encounter.     Current Outpatient Medications on File Prior to Encounter   Medication Sig Dispense Refill    lisinopril 10 MG Oral Tab  Take 1 tablet (10 mg total) by mouth daily. Hold for SBP < 130      FLEET ENEMA 7-19 GM/118ML Rectal Enema       Cranberry 425 MG Oral Cap Take by mouth.      Melatonin 3 MG Oral Cap Take by mouth.      magnesium hydroxide (MILK OF MAGNESIA) 400 MG/5ML Oral Suspension Take by mouth daily as needed for constipation.      Multiple Vitamins-Minerals (MULTI-VITAMIN/MINERALS) Oral Tab Take 1 tablet by mouth daily.      sennosides 17.2 MG Oral Tab Take 1 tablet (17.2 mg total) by mouth daily. 30 tablet 0    traMADol 50 MG Oral Tab Take 1 tablet (50 mg total) by mouth in the morning and 1 tablet (50 mg total) before bedtime. 15 tablet 0    bisacodyl 10 MG Rectal Suppos Place 1 suppository (10 mg total) rectally Q24H PRN.      polyethylene glycol, PEG 3350, 17 g Oral Powd Pack Take 17 g by mouth daily. 30 each 0    glipiZIDE 5 MG Oral Tab Take 0.5 tablets (2.5 mg total) by mouth daily with lunch.      Potassium Chloride ER 10 MEQ Oral Tab CR Take 1 tablet (10 mEq total) by mouth Every Monday, Wednesday, and Friday.      risperiDONE 0.25 MG Oral Tab Take 2 tablets (0.5 mg total) by mouth Noon.      furosemide 20 MG Oral Tab Take 1 tablet (20 mg total) by mouth Every Monday, Wednesday, and Friday.      glipiZIDE 5 MG Oral Tab Take 1 tablet (5 mg total) by mouth every morning before breakfast.      risperiDONE 0.25 MG Oral Tab Take 3 tablets (0.75 mg total) by mouth 2 (two) times daily.      fluticasone propionate 50 MCG/ACT Nasal Suspension 2 sprays by Each Nare route daily.      bismuth subsalicylate (PEPTO-BISMOL) 262 MG/15ML Oral Suspension Take 30 mL (524 mg total) by mouth every 6 (six) hours as needed for Indigestion.      meclizine 25 MG Oral Tab Take 1 tablet (25 mg total) by mouth every 8 (eight) hours as needed.         Current Allergies: No Active Allergies    Social History     Occupational History    Not on file   Tobacco Use    Smoking status: Former     Packs/day: 1     Types: Cigarettes     Quit date: 2001      Years since quittin.1    Smokeless tobacco: Never   Vaping Use    Vaping Use: Never used   Substance and Sexual Activity    Alcohol use: No     Alcohol/week: 0.0 standard drinks of alcohol    Drug use: No    Sexual activity: Not on file       Marital Status:    Lives alone?:    Occupation:   Taking fiber supplements?:    Tattoos?:   Body piercing?:   High risk sexual behavior?:    Advance Directive:          No family history on file.    Colon operative report  Patient Name: Kierra Joshua  Procedure: Colonoscopy with decompression  Indication: pseudo-obstruction  Post-Op Dx: pseudo-obstruction  Attending: Fabrice Trejo M.D.  Consent: The risks, benefits, and alternatives were discussed with the patient / POA.  Risks included, but were not limited to, bleeding, perforation, medication effects, cardiac arrhythmias, missed polyps, and aspiration.  After all questions were answered to their satisfaction, a signed, informed, and witnessed consent was obtained.  Sedation: general anesthesia   Monitoring: Pulsoximetry, pulse, respirations, and blood pressure were monitored throughout the entire procedure     Preparation Quality: unprepped.    Procedure: After achieving adequate sedation, and placing the patient in the left lateral decubitus position, a digital rectal examination was performed.  The lubricated tip of the pediatric colonoscope was then introduced into the rectum and advanced to the right colon.  Decompression performed (see below).  The endoscope was then removed from the patient.  The patient tolerated the procedure without apparent procedural complications.  The patient left the procedure room in stable condition for recovery.  Findings:    Dilated rectum with copious amounts of solid stool scattered throughout.  Suction performed to decompress and the scope was advanced further.  Solid stool was noted in the sigmoid colon and the scope was carefully advanced into the descending colon, where more  distention of the colonic lumen was identified.  The air was suctioned and removed and the scope was advanced further into the right colon, though no clear landmark (IC valve or appendiceal orifice) were seen.  The air was suctioned to the point that only solid stool was encountered.  The scope was then removed from the colon.    Impression: Findings as above.    Recommendations:   NPO  Repeat XR abdomen obs series in AM  Up to chair and ambulate around the room with assistance as much as possible  Call GI service with any clinical updates or worsening of symptoms          MEDICATIONS      ketorolac (Toradol) 15 MG/ML injection 15 mg  15 mg Intravenous Q6H PRN    acetaminophen (Tylenol Extra Strength) tab 1,000 mg  1,000 mg Oral q6h    traMADol (Ultram) tab 50 mg  50 mg Oral Q12H PRN    [COMPLETED] polyethylene glycol-electrolyte (Golytely) 236 g oral solution 2,000 mL  2,000 mL Oral Once    [START ON 2024] polyethylene glycol-electrolyte (Golytely) 236 g oral solution 2,000 mL  2,000 mL Oral Once    [COMPLETED] sodium chloride 0.9% infusion   Intravenous Once    insulin aspart (NovoLOG) 100 Units/mL FlexPen 1-10 Units  1-10 Units Subcutaneous TID AC and HS    insulin aspart (NovoLOG) 100 Units/mL FlexPen 1-68 Units  1-68 Units Subcutaneous TID CC    [COMPLETED] sodium chloride 0.9% infusion   Intravenous Once    docusate sodium (Colace) cap 100 mg  100 mg Oral BID    polyethylene glycol (PEG 3350) (Miralax) 17 g oral packet 17 g  17 g Oral Daily    [COMPLETED] clonidine/epinephrine/ropivacaine/ketorolac in 0.9% NaCl 60 mL pain cocktail syringe for hip arthroplasty   Infiltration Once (Intra-Op)    [] sodium chloride 0.9 % IV bolus 500 mL  500 mL Intravenous Once PRN    enoxaparin (Lovenox) 40 MG/0.4ML SUBQ injection 40 mg  40 mg Subcutaneous Daily    oxyCODONE immediate release tab 2.5 mg  2.5 mg Oral Q4H PRN    Or    oxyCODONE immediate release tab 5 mg  5 mg Oral Q4H PRN    HYDROmorphone (Dilaudid) 1  MG/ML injection 0.2 mg  0.2 mg Intravenous Q2H PRN    Or    HYDROmorphone (Dilaudid) 1 MG/ML injection 0.4 mg  0.4 mg Intravenous Q2H PRN    traMADol (Ultram) tab 50 mg  50 mg Oral Q6H PRN    [COMPLETED] ceFAZolin (Ancef) 2 g in 20mL IV syringe premix  2 g Intravenous Q8H    [COMPLETED] insulin aspart (NovoLOG) 100 Units/mL vial 2 Units  2 Units Subcutaneous Once    risperiDONE (RisperDAL) tab 0.5 mg  0.5 mg Oral Noon    risperiDONE (RisperDAL) tab 0.75 mg  0.75 mg Oral BID    morphINE PF 2 MG/ML injection 0.5 mg  0.5 mg Intravenous Q2H PRN    Or    morphINE PF 2 MG/ML injection 1 mg  1 mg Intravenous Q2H PRN    Or    morphINE PF 2 MG/ML injection 2 mg  2 mg Intravenous Q2H PRN    melatonin tab 3 mg  3 mg Oral Nightly PRN    sennosides (Senokot) tab 17.2 mg  17.2 mg Oral Nightly PRN    bisacodyl (Dulcolax) 10 MG rectal suppository 10 mg  10 mg Rectal Daily PRN    fleet enema (Fleet) 7-19 GM/118ML rectal enema 133 mL  1 enema Rectal Once PRN    ondansetron (Zofran) 4 MG/2ML injection 4 mg  4 mg Intravenous Q6H PRN    benzonatate (Tessalon) cap 200 mg  200 mg Oral TID PRN    guaiFENesin (Robitussin) 100 MG/5 ML oral liquid 200 mg  200 mg Oral Q4H PRN    glycerin-hypromellose- (Artifical Tears) 0.2-0.2-1 % ophthalmic solution 1 drop  1 drop Both Eyes QID PRN    sodium chloride (Saline Mist) 0.65 % nasal solution 1 spray  1 spray Each Nare Q3H PRN    glucose (Dex4) 15 GM/59ML oral liquid 15 g  15 g Oral Q15 Min PRN    Or    glucose (Glutose) 40% oral gel 15 g  15 g Oral Q15 Min PRN    Or    glucose-vitamin C (Dex-4) chewable tab 4 tablet  4 tablet Oral Q15 Min PRN    Or    dextrose 50% injection 50 mL  50 mL Intravenous Q15 Min PRN    Or    glucose (Dex4) 15 GM/59ML oral liquid 30 g  30 g Oral Q15 Min PRN    Or    glucose (Glutose) 40% oral gel 30 g  30 g Oral Q15 Min PRN    Or    glucose-vitamin C (Dex-4) chewable tab 8 tablet  8 tablet Oral Q15 Min PRN              ROS:     A comprehensive 14 point review of  systems was completed.  Pertinent positives and negatives noted in the the HPI.          Physical Exam     Vital signs:  /64 (BP Location: Right arm)   Pulse 88   Temp 97.8 °F (36.6 °C) (Oral)   Resp 18   Wt 143 lb (64.9 kg)   SpO2 100%   BMI 23.26 kg/m²         Physical Exam        General: Appears alert, oriented x 1 and in no acute distress.  HEENT: Normal. No scleral icterus.   NECK: Supple. No neck vein distention. Thyroid not enlarged. No lymphadenopathy.  CV: S1 and S2 normal. No murmurs or gallops.  LUNGS: Clear to percussion and auscultation.  ABDOMEN: Distended firm in right side of abdomen, tender to palpation, softer on left abdomen.   BACK: No CVA tenderness.  EXTREMITIES: No edema, cyanosis or clubbing.  SKIN: Warm and dry.         IMAGING/LABS       Labs:   Lab Results   Component Value Date    WBC 6.8 02/24/2024    HGB 9.0 02/24/2024    HCT 28.0 02/24/2024    .0 02/24/2024     Recent Labs   Lab 02/20/24  1930 02/21/24  0518 02/22/24  0535   * 159* 204*   BUN 23 23 23   CREATSERUM 0.68 0.97 0.96   CA 9.0 8.9 8.4*    138 136   K 4.4 4.5 4.9    108 108   CO2 24.0 24.0 25.0     Recent Labs   Lab 02/24/24  0527   RBC 3.24*   HGB 9.0*   HCT 28.0*   MCV 86.4   MCH 27.8   MCHC 32.1   RDW 16.5   NEPRELIM 5.13   WBC 6.8   .0*       Recent Labs   Lab 02/20/24 1930   ALT 14   AST 16       Imaging:   XR ABDOMEN (1 VIEW) (CPT=74018)  Narrative: PROCEDURE:  XR ABDOMEN (1 VIEW) (CPT=74018)     INDICATIONS:  ro ileus, obstruction     COMPARISON:  [facility] , XR, XR ABDOMEN (1 VIEW) (CPT=74018), 12/29/2023, 1:00 PM.     TECHNIQUE:  Supine AP view was obtained.     PATIENT STATED HISTORY: (As transcribed by Technologist)  Patient offered no additional history at this time.          FINDINGS:    BOWEL GAS PATTERN:  Marked distension of colon most severe involving the right side of the colon measuring up to 10.3 cm. .  Moderate stool in the descending colon.  CALCIFICATIONS:   None significant.  OTHER:  No free air.                   Impression: CONCLUSION:  Gaseous distension of colon.  There is marked distension of right side of the colon.     CT may be helpful for further evaluation.        LOCATION:  [facility]        Dictated by (CST): Emilia Madison MD on 2/24/2024 at 2:26 PM       Finalized by (CST): Emilia Madison MD on 2/24/2024 at 2:29 PM               IMPRESSION:     77 y/o female with a h/o chronic constipation, colonic ileus admitted with a femur fracture, now with colonic distension-tolerated solid diet and having liquid stools-suspect chronic [place]ies' exacerbated by fall and immobility           PLAN:   Trial of Golytely 2 l overnight and in am  Repeat KUB in am  NPO  If still remains distended colonoscopy with decompression in am  Plan of care d/w daughter and RN at bedside           Trudi Albright MD  6:25 PM  2/24/2024  [facility]  [phone]

## 2024-02-25 NOTE — ANESTHESIA POSTPROCEDURE EVALUATION
[facility] Patient Status:  Inpatient   Age/Gender 78 year old female MRN [id number]   Location [facility] Attending Shree Bowman MD   Hosp Day # 5 PCP Tate Tovar       Anesthesia Post-op Note    COLONOSCOPY WITH DECOMPRESSION    Procedure Summary       Date: 02/25/24 Room / Location:  ENDOSCOPY 03 / [facility]    Anesthesia Start: 1145 Anesthesia Stop: 1229    Procedure: COLONOSCOPY WITH DECOMPRESSION Diagnosis: (COLONIC DISTENTION)    Surgeons: Trudi Albright MD Anesthesiologist: Vasiliy Haile MD    Anesthesia Type: MAC ASA Status: 3            Anesthesia Type: MAC    Vitals Value Taken Time   /92 02/25/24 1230   Temp 98.3 02/25/24 1230   Pulse 77 02/25/24 1230   Resp 16 02/25/24 1230   SpO2 100 02/25/24 1230       Patient Location: PACU    Anesthesia Type: MAC    Airway Patency: patent    Postop Pain Control: adequate    Mental Status: preanesthetic baseline    Nausea/Vomiting: none    Cardiopulmonary/Hydration status: stable euvolemic    Complications: no apparent anesthesia related complications    Postop vital signs: stable    Dental Exam: Unchanged from Preop    Patient to be discharged from PACU when criteria met.

## 2024-02-25 NOTE — PLAN OF CARE
Patient is drowsy, alert to self. Vitals stable on room air. Pain managed by prn meds.Denies numbness & tingling. Aquacel x3 with small amt drainage. Patient refusing to drink golytele prep, GI notified no new orders at this time. Plan for repeat abdominal xray tmrw and NPO. Safety precautions in place.

## 2024-02-25 NOTE — PLAN OF CARE
POD 4 Rt femur IM nail, Pt is AAOX1 to self, room air, TELE, incontinent to brief and purewick, pills crushed in apple sauce, pain meds adjusted, see MAR, lethargic, ABD distended, GI decompression done today, Pt doing well, all needs met, all safety measures in place, call light within reach, will CTM.

## 2024-02-25 NOTE — OPERATIVE REPORT
Operative Report-Colonoscopy with decompression    PREOPERATIVE DIAGNOSIS/INDICATION:  Colonic ileus  POSTOPERTATIVE DIAGNOSIS:  Markedly tortuous colon, significant looping s/p decompression  Retained liquid stool throughout  Diverticulosis  PROCEDURE PERFORMED: COLONOSCOPY with decompression  INFORMED CONSENT:  Once a brief history and physical examination was performed, the risks, benefits and alternatives to the procedure were discussed with the patient and/or family and informed consent was obtained. The risks of sedation, perforation, missed lesions and need for surgery were all discussed.  Patient expressed understanding of the risks and agreed to proceed.    PROCEDURE DESCRIPTION:The patient was then brought to the endoscopy suite where her pulse, pulse oximetry and blood pressure were monitored. She was placed in the left lateral decubitus position and deep sedation was administered. Once adequate sedation was achieved, a rectal exam was performed which was normal. A lubricated tip of an Olympus video colonoscope was then inserted through the rectum and gently manipulated under direct visualization to the ascending colon The quality of the preparation was poor.  Upon withdrawal of the colonoscope, careful visualization of the mucosa was performed.   [place] Prep Score: Right Colon \1 Transverse colon 1 Left colon 1  FINDINGS/THERAPEUTICS:  TERMINAL ILEUM:Not visualized  COLON: Retained thick brown liquid stool throughout the colon precluding adequate visualization of mucosa. The colon was also markedly tortuous with looping in the pelvis. The colonoscope was able to be advanced to the ascending colon using abdominal pressure. Suctioned was performed of air stool with notable decrease in abdominal distension. The colonoscope was slowly withdrawn and a red rectal tube was advanced into the rectosigmoid colon  RECTUM: Internal hemorrhoids, external  hemorrhoids.  RECOMMENDATIONS:   Post Colonoscopy/polypectomy precautions, watch for bleeding, infection, perforation, adverse drug reactions   Rectal tube to LIS  Clear liquid diet  KUB today and in am.  CC Report:   Trudi Albright MD  2/25/2024  12:23 PM  [facility]

## 2024-02-25 NOTE — ANESTHESIA PREPROCEDURE EVALUATION
PRE-OP EVALUATION    Patient Name: Kierra Joshua    Admit Diagnosis: Closed fracture of right hip, initial encounter ([facility]) [S72.001A]    Pre-op Diagnosis: COLONIC DISTENTION    COLONOSCOPY WITH DECOMPRESSION    Anesthesia Procedure: COLONOSCOPY WITH DECOMPRESSION    Surgeon(s) and Role:     * Trudi Albright MD - Primary    Pre-op vitals reviewed.  Temp: 97.6 °F (36.4 °C)  Pulse: 77  Resp: 23  BP: 131/56  SpO2: 97 %  Body mass index is 23.26 kg/m².    Current medications reviewed.  Hospital Medications:  • ketorolac (Toradol) 15 MG/ML injection 15 mg  15 mg Intravenous Q6H PRN   • acetaminophen (Tylenol Extra Strength) tab 1,000 mg  1,000 mg Oral q6h   • traMADol (Ultram) tab 50 mg  50 mg Oral Q12H PRN   • [COMPLETED] polyethylene glycol-electrolyte (Golytely) 236 g oral solution 2,000 mL  2,000 mL Oral Once   • polyethylene glycol-electrolyte (Golytely) 236 g oral solution 2,000 mL  2,000 mL Oral Once   • [COMPLETED] sodium chloride 0.9% infusion   Intravenous Once   • insulin aspart (NovoLOG) 100 Units/mL FlexPen 1-10 Units  1-10 Units Subcutaneous TID AC and HS   • insulin aspart (NovoLOG) 100 Units/mL FlexPen 1-68 Units  1-68 Units Subcutaneous TID CC   • [COMPLETED] sodium chloride 0.9% infusion   Intravenous Once   • docusate sodium (Colace) cap 100 mg  100 mg Oral BID   • polyethylene glycol (PEG 3350) (Miralax) 17 g oral packet 17 g  17 g Oral Daily   • [COMPLETED] clonidine/epinephrine/ropivacaine/ketorolac in 0.9% NaCl 60 mL pain cocktail syringe for hip arthroplasty   Infiltration Once (Intra-Op)   • [] sodium chloride 0.9 % IV bolus 500 mL  500 mL Intravenous Once PRN   • [Held by provider] enoxaparin (Lovenox) 40 MG/0.4ML SUBQ injection 40 mg  40 mg Subcutaneous Daily   • oxyCODONE immediate release tab 2.5 mg  2.5 mg Oral Q4H PRN    Or   • oxyCODONE immediate release tab 5 mg  5 mg Oral Q4H PRN   • HYDROmorphone (Dilaudid) 1 MG/ML injection 0.2 mg  0.2 mg Intravenous Q2H PRN    Or   •  HYDROmorphone (Dilaudid) 1 MG/ML injection 0.4 mg  0.4 mg Intravenous Q2H PRN   • traMADol (Ultram) tab 50 mg  50 mg Oral Q6H PRN   • [COMPLETED] ceFAZolin (Ancef) 2 g in 20mL IV syringe premix  2 g Intravenous Q8H   • [COMPLETED] insulin aspart (NovoLOG) 100 Units/mL vial 2 Units  2 Units Subcutaneous Once   • risperiDONE (RisperDAL) tab 0.5 mg  0.5 mg Oral Noon   • risperiDONE (RisperDAL) tab 0.75 mg  0.75 mg Oral BID   • morphINE PF 2 MG/ML injection 0.5 mg  0.5 mg Intravenous Q2H PRN    Or   • morphINE PF 2 MG/ML injection 1 mg  1 mg Intravenous Q2H PRN    Or   • morphINE PF 2 MG/ML injection 2 mg  2 mg Intravenous Q2H PRN   • melatonin tab 3 mg  3 mg Oral Nightly PRN   • sennosides (Senokot) tab 17.2 mg  17.2 mg Oral Nightly PRN   • bisacodyl (Dulcolax) 10 MG rectal suppository 10 mg  10 mg Rectal Daily PRN   • fleet enema (Fleet) 7-19 GM/118ML rectal enema 133 mL  1 enema Rectal Once PRN   • ondansetron (Zofran) 4 MG/2ML injection 4 mg  4 mg Intravenous Q6H PRN   • benzonatate (Tessalon) cap 200 mg  200 mg Oral TID PRN   • guaiFENesin (Robitussin) 100 MG/5 ML oral liquid 200 mg  200 mg Oral Q4H PRN   • glycerin-hypromellose- (Artifical Tears) 0.2-0.2-1 % ophthalmic solution 1 drop  1 drop Both Eyes QID PRN   • sodium chloride (Saline Mist) 0.65 % nasal solution 1 spray  1 spray Each Nare Q3H PRN   • glucose (Dex4) 15 GM/59ML oral liquid 15 g  15 g Oral Q15 Min PRN    Or   • glucose (Glutose) 40% oral gel 15 g  15 g Oral Q15 Min PRN    Or   • glucose-vitamin C (Dex-4) chewable tab 4 tablet  4 tablet Oral Q15 Min PRN    Or   • dextrose 50% injection 50 mL  50 mL Intravenous Q15 Min PRN    Or   • glucose (Dex4) 15 GM/59ML oral liquid 30 g  30 g Oral Q15 Min PRN    Or   • glucose (Glutose) 40% oral gel 30 g  30 g Oral Q15 Min PRN    Or   • glucose-vitamin C (Dex-4) chewable tab 8 tablet  8 tablet Oral Q15 Min PRN       Outpatient Medications:     Medications Prior to Admission   Medication Sig Dispense  Refill Last Dose   • FLEET ENEMA 7-19 GM/118ML Rectal Enema    Past Month   • Cranberry 425 MG Oral Cap Take by mouth.   2/20/2024   • Melatonin 3 MG Oral Cap Take by mouth.   2/19/2024   • magnesium hydroxide (MILK OF MAGNESIA) 400 MG/5ML Oral Suspension Take by mouth daily as needed for constipation.   Past Month   • Multiple Vitamins-Minerals (MULTI-VITAMIN/MINERALS) Oral Tab Take 1 tablet by mouth daily.   2/20/2024   • sennosides 17.2 MG Oral Tab Take 1 tablet (17.2 mg total) by mouth daily. 30 tablet 0 2/20/2024   • traMADol 50 MG Oral Tab Take 1 tablet (50 mg total) by mouth in the morning and 1 tablet (50 mg total) before bedtime. 15 tablet 0 2/20/2024   • bisacodyl 10 MG Rectal Suppos Place 1 suppository (10 mg total) rectally Q24H PRN.   Past Month   • polyethylene glycol, PEG 3350, 17 g Oral Powd Pack Take 17 g by mouth daily. 30 each 0 2/20/2024   • glipiZIDE 5 MG Oral Tab Take 0.5 tablets (2.5 mg total) by mouth daily with lunch.   2/20/2024   • Potassium Chloride ER 10 MEQ Oral Tab CR Take 1 tablet (10 mEq total) by mouth Every Monday, Wednesday, and Friday.   2/20/2024   • risperiDONE 0.25 MG Oral Tab Take 2 tablets (0.5 mg total) by mouth Noon.   2/20/2024   • furosemide 20 MG Oral Tab Take 1 tablet (20 mg total) by mouth Every Monday, Wednesday, and Friday.   Past Week   • glipiZIDE 5 MG Oral Tab Take 1 tablet (5 mg total) by mouth every morning before breakfast.   2/20/2024   • risperiDONE 0.25 MG Oral Tab Take 3 tablets (0.75 mg total) by mouth 2 (two) times daily.   2/20/2024   • fluticasone propionate 50 MCG/ACT Nasal Suspension 2 sprays by Each Nare route daily.   Unknown   • bismuth subsalicylate (PEPTO-BISMOL) 262 MG/15ML Oral Suspension Take 30 mL (524 mg total) by mouth every 6 (six) hours as needed for Indigestion.   Unknown   • meclizine 25 MG Oral Tab Take 1 tablet (25 mg total) by mouth every 8 (eight) hours as needed.   Unknown   • [DISCONTINUED] lisinopril 10 MG Oral Tab Take 1 tablet  (10 mg total) by mouth daily.   2024       Allergies: Patient has no active allergies.      Anesthesia Evaluation    Patient summary reviewed.    Anesthetic Complications  (-) history of anesthetic complications         GI/Hepatic/Renal  Comment: Sigmoid volvulus                                Cardiovascular                  (+) hypertension   (+) hyperlipidemia                                  Endo/Other      (+) diabetes  type 2,       (+) anemia            (+) arthritis       Pulmonary  Comment: COVID +               (+) recent URI          Neuro/Psych  Comment: alzheimers                                Past Surgical History:   Procedure Laterality Date   • BIOPSY OF BREAST, INCISIONAL      left breast bx during surgery   • COLONOSCOPY N/A 2023    Procedure: UNPREPPED  COLONOSCOPY;  Surgeon: Fabrice Trejo MD;  Location:  ENDOSCOPY   • OTHER SURGICAL HISTORY      aneurysm clipped x2     Social History     Socioeconomic History   • Marital status:    Tobacco Use   • Smoking status: Former     Packs/day: 1     Types: Cigarettes     Quit date:      Years since quittin.1   • Smokeless tobacco: Never   Vaping Use   • Vaping Use: Never used   Substance and Sexual Activity   • Alcohol use: No     Alcohol/week: 0.0 standard drinks of alcohol   • Drug use: No   Other Topics Concern   • Caffeine Concern Yes   • Exercise No     History   Drug Use No     Available pre-op labs reviewed.  Lab Results   Component Value Date    WBC 6.3 2024    RBC 3.29 (L) 2024    HGB 9.1 (L) 2024    HCT 28.9 (L) 2024    MCV 87.8 2024    MCH 27.7 2024    MCHC 31.5 2024    RDW 16.3 2024    .0 2024     Lab Results   Component Value Date     2024    K 4.9 2024     2024    CO2 25.0 2024    BUN 23 2024    CREATSERUM 0.96 2024     (H) 2024    CA 8.4 (L) 2024     Lab Results   Component  Value Date    INR 1.06 02/20/2024         Airway      Mallampati: II  Mouth opening: 3 FB  TM distance: 4 - 6 cm  Neck ROM: full Cardiovascular    Cardiovascular exam normal.         Dental    Dentition appears grossly intact         Pulmonary    Pulmonary exam normal.                 Other findings        ASA: 3   Plan: MAC  NPO status verified and patient meets guidelines.          Plan/risks discussed with: patient, child/children and healthcare power of             Present on Admission:  **None**

## 2024-02-25 NOTE — PROGRESS NOTES
[facility]     Hospitalist Progress Note     Kierra Joshua Patient Status:  Inpatient    1945 MRN [id number]   [facility] 3SW-A Attending Shree Bowman MD   Hosp Day # 5 PCP Tate Tovar     Chief Complaint: Fx    Subjective:   Patient denies complaints of pain. On room air.     Current medications:   acetaminophen  1,000 mg Oral q6h    polyethylene glycol-electrolyte  2,000 mL Oral Once    insulin aspart  1-10 Units Subcutaneous TID AC and HS    insulin aspart  1-68 Units Subcutaneous TID CC    docusate sodium  100 mg Oral BID    polyethylene glycol (PEG 3350)  17 g Oral Daily    [Held by provider] enoxaparin  40 mg Subcutaneous Daily    risperiDONE  0.5 mg Oral Noon    risperiDONE  0.75 mg Oral BID       Objective:    Review of Systems:   Limited d/t patient factors.    Vital signs:  Temp:  [97.4 °F (36.3 °C)-98.3 °F (36.8 °C)] 97.6 °F (36.4 °C)  Pulse:  [] 77  Resp:  [15-23] 23  BP: (119-145)/(43-71) 131/56  SpO2:  [94 %-100 %] 97 %  Patient Weight for the past 72 hrs:   Weight   24 1817 143 lb 4.8 oz (65 kg)   24 2247 143 lb (64.9 kg)     Physical Exam:    General: No acute distress.   Respiratory: clear, diminished   Cardiovascular: S1, S2. Regular rate and rhythm.   Abdomen: Soft, distended, tender  Extremities: Right thigh soft     Diagnostic Data:    Labs:  Recent Labs   Lab 24  1930 24  0518 24  0535 24  1249 24  0008 24  0528 24  1357 24  0527 24  0446   WBC 10.5 8.5 8.1  --   --  8.1  --  6.8 6.3   HGB 10.7* 10.0* 7.1*   < > 6.8* 9.1*  9.1* 8.3* 9.0* 9.1*   MCV 90.2 88.0 89.5  --   --  84.1  --  86.4 87.8   .0 204.0 158.0  --   --  116.0*  --  142.0* 215.0   INR 1.06  --   --   --   --   --   --   --   --     < > = values in this interval not displayed.       Recent Labs   Lab 24  1930 24  0518 24  0535   * 159* 204*   BUN 23 23 23   CREATSERUM 0.68 0.97 0.96   CA 9.0  8.9 8.4*   ALB 3.0*  --   --     138 136   K 4.4 4.5 4.9    108 108   CO2 24.0 24.0 25.0   ALKPHO 60  --   --    AST 16  --   --    ALT 14  --   --    BILT 0.3  --   --    TP 5.7*  --   --        Estimated Creatinine Clearance: 44 mL/min (based on SCr of 0.96 mg/dL).    Recent Labs   Lab 02/20/24  1930   PTP 13.8   INR 1.06            COVID-19 Lab Results    COVID-19  Lab Results   Component Value Date    COVID19 Detected (A) 12/27/2023    COVID19 Detected (A) 03/12/2023    COVID19 Not Detected 01/02/2023       Pro-Calcitonin  No results for input(s): \"PCT\" in the last 168 hours.    Cardiac  No results for input(s): \"TROP\", \"PBNP\" in the last 168 hours.    Creatinine Kinase  No results for input(s): \"CK\" in the last 168 hours.    Inflammatory Markers  No results for input(s): \"CRP\", \"SHAHIDA\", \"LDH\", \"DDIMER\" in the last 168 hours.    No results for input(s): \"TROP\", \"TROPHS\", \"CK\" in the last 168 hours.    Imaging: Imaging data reviewed in Epic.    Medications:    acetaminophen  1,000 mg Oral q6h    polyethylene glycol-electrolyte  2,000 mL Oral Once    insulin aspart  1-10 Units Subcutaneous TID AC and HS    insulin aspart  1-68 Units Subcutaneous TID CC    docusate sodium  100 mg Oral BID    polyethylene glycol (PEG 3350)  17 g Oral Daily    [Held by provider] enoxaparin  40 mg Subcutaneous Daily    risperiDONE  0.5 mg Oral Noon    risperiDONE  0.75 mg Oral BID       Assessment & Plan:    Complete impacted fracture right proximal femoral shaft sp right femur IM nail 2/21/2024  Lovenox x 14 d then Aspirin  Pain control  PT/OT > HARSHA  Ortho consult  Marked gaseous distension of colon  History of pseudo-obstruction requiring decompression    Bowel prep not completed  Decompression today  GI consult  Anemia, acute blood loss sp PRBC x 2  Monitor H&H   Dementia  Risperidone  Essential hypertension  Resume Lisinopril and Lasix on DC   Monitor hemodynamics   Diabetes mellitus  Correctional scale 1:30  Carb  1:15  Dyslipidemia    Supplementary Documentation:   Quality:  DVT Prophylaxis: Lovenox - hold for GI intervention     Discharge planning.     Plan of care discussed with patient, RN and GI.    Shree Bowman MD

## 2024-02-26 ENCOUNTER — APPOINTMENT (OUTPATIENT)
Dept: GENERAL RADIOLOGY | Facility: HOSPITAL | Age: 79
End: 2024-02-26
Attending: INTERNAL MEDICINE
Payer: MEDICARE

## 2024-02-26 LAB
ANION GAP SERPL CALC-SCNC: 2 MMOL/L (ref 0–18)
BASOPHILS # BLD AUTO: 0.02 X10(3) UL (ref 0–0.2)
BASOPHILS NFR BLD AUTO: 0.4 %
BUN BLD-MCNC: 9 MG/DL (ref 9–23)
CALCIUM BLD-MCNC: 8.6 MG/DL (ref 8.5–10.1)
CHLORIDE SERPL-SCNC: 109 MMOL/L (ref 98–112)
CO2 SERPL-SCNC: 28 MMOL/L (ref 21–32)
CREAT BLD-MCNC: 0.37 MG/DL
EGFRCR SERPLBLD CKD-EPI 2021: 103 ML/MIN/1.73M2 (ref 60–?)
EOSINOPHIL # BLD AUTO: 0.14 X10(3) UL (ref 0–0.7)
EOSINOPHIL NFR BLD AUTO: 2.5 %
ERYTHROCYTE [DISTWIDTH] IN BLOOD BY AUTOMATED COUNT: 16.2 %
GLUCOSE BLD-MCNC: 153 MG/DL (ref 70–99)
GLUCOSE BLD-MCNC: 155 MG/DL (ref 70–99)
GLUCOSE BLD-MCNC: 158 MG/DL (ref 70–99)
GLUCOSE BLD-MCNC: 162 MG/DL (ref 70–99)
GLUCOSE BLD-MCNC: 173 MG/DL (ref 70–99)
GLUCOSE BLD-MCNC: 174 MG/DL (ref 70–99)
HCT VFR BLD AUTO: 28.1 %
HGB BLD-MCNC: 9 G/DL
IMM GRANULOCYTES # BLD AUTO: 0.05 X10(3) UL (ref 0–1)
IMM GRANULOCYTES NFR BLD: 0.9 %
LYMPHOCYTES # BLD AUTO: 0.84 X10(3) UL (ref 1–4)
LYMPHOCYTES NFR BLD AUTO: 14.9 %
MCH RBC QN AUTO: 27.7 PG (ref 26–34)
MCHC RBC AUTO-ENTMCNC: 32 G/DL (ref 31–37)
MCV RBC AUTO: 86.5 FL
MONOCYTES # BLD AUTO: 0.48 X10(3) UL (ref 0.1–1)
MONOCYTES NFR BLD AUTO: 8.5 %
NEUTROPHILS # BLD AUTO: 4.1 X10 (3) UL (ref 1.5–7.7)
NEUTROPHILS # BLD AUTO: 4.1 X10(3) UL (ref 1.5–7.7)
NEUTROPHILS NFR BLD AUTO: 72.8 %
OSMOLALITY SERPL CALC.SUM OF ELEC: 290 MOSM/KG (ref 275–295)
PLATELET # BLD AUTO: 255 10(3)UL (ref 150–450)
POTASSIUM SERPL-SCNC: 3.5 MMOL/L (ref 3.5–5.1)
RBC # BLD AUTO: 3.25 X10(6)UL
SODIUM SERPL-SCNC: 139 MMOL/L (ref 136–145)
WBC # BLD AUTO: 5.6 X10(3) UL (ref 4–11)

## 2024-02-26 PROCEDURE — 99233 SBSQ HOSP IP/OBS HIGH 50: CPT | Performed by: HOSPITALIST

## 2024-02-26 PROCEDURE — 74018 RADEX ABDOMEN 1 VIEW: CPT | Performed by: INTERNAL MEDICINE

## 2024-02-26 RX ORDER — RISPERIDONE 0.25 MG/1
0.5 TABLET ORAL 2 TIMES DAILY
Status: DISCONTINUED | OUTPATIENT
Start: 2024-02-26 | End: 2024-02-28

## 2024-02-26 RX ORDER — LISINOPRIL 10 MG/1
10 TABLET ORAL DAILY
Status: DISCONTINUED | OUTPATIENT
Start: 2024-02-26 | End: 2024-02-28

## 2024-02-26 RX ORDER — RISPERIDONE 0.25 MG/1
0.25 TABLET ORAL
Status: DISCONTINUED | OUTPATIENT
Start: 2024-02-26 | End: 2024-02-28

## 2024-02-26 NOTE — PLAN OF CARE
A&O x self. VSS on RA. . Pain controlled with scheduled ES tylenol. Turns with max assist. Voids freely via purewick. Rectal tube to LIS, small output overnight. Bilateral SCDs. Aquacel dressing x3 with old drainage, D/I. CT with contrast completed tonight. Reviewed POC, pain management, and fall precautions with pt. Bed alarm on w/bed in lowest position. Pt reminded to use call light. Verbalized understanding.

## 2024-02-26 NOTE — PLAN OF CARE
Post-op day 5. Dressing intact. Alert and oriented x1. Room air. Continuous pulse oximeter. Telemetry monitoring. Non-cardiac electrolyte protocol. Clear liquid diet with accuchecks and carb count. 1:1 feed. Max assist. Rectal tube in place. KUB completed and results sent to attending and GI. Pills crushed in applesauce. Plan is [facility] when medically stable. Daughter at bedside during shift for support.

## 2024-02-26 NOTE — OCCUPATIONAL THERAPY NOTE
OCCUPATIONAL THERAPY TREATMENT NOTE - INPATIENT     Room Number: 381/381-A  Session: 1   Number of Visits to Meet Established Goals: 5    Presenting Problem: RIGHT SUBTROCHANTERIC FEMUR FRACTURE, 2/21 s/p RIGHT FEMUR INTRAMEDULLARY NAILING  Prior to admission, patient's baseline is per daughter, min A toilet transfer using rw and grab bar, mod A LB dressing, max A showering, and1-person assistance to ambulate in hallway with RW.  Pt has been a long term resident at [facility] for about 16 months, per daughter. Daughter visits her daily and spends time with her.     ASSESSMENT   Patient demonstrates limited progress this session, goals remain in progress.     Patient continues to function below baseline with  ADL and mobility .   Contributing factors to remaining limitations include decreased functional strength, decreased endurance, pain, impaired sitting/standing balance, cognitive deficits (chronic), decreased compliance/participation, decreased insight to deficits, decreased safety awareness, and volitional factors .  Next session anticipate patient to progress grooming, transfers, dynamic sitting balance, and functional standing tolerance.  Occupational Therapy will continue to follow patient for duration of hospitalization.    Patient continues to benefit from continued skilled OT services: to promote return to prior level of function and safety with continuous assistance and gradual rehabilitative therapy .          OT Device Recommendations: TBD    History: Patient is a 78 year old female admitted on 2/20/2024 with Presenting Problem: RIGHT SUBTROCHANTERIC FEMUR FRACTURE, 2/21 s/p RIGHT FEMUR INTRAMEDULLARY NAILING. Co-Morbidities : DM, HTN, bipolar, Alzheimer's, CVA. Pt was admitted from long-term [facility] on 2/20 with R hip pain.  Per daughter, she received a call from the facility about pt's R hip pain. Per daughter, limited information from the facility about any event.  Admitted for R subtrochanteric  femur fracture.  2/21 s/p R femur IM nailing.      Recent admissions:  12/27 to 12/31/23 for COVID -> back to long term care with in-house therapy.     Surgery 2/21/24  Preoperative Diagnosis:  RIGHT SUBTROCHANTERIC FEMUR FRACTURE  Postoperative Diagnosis:  RIGHT SUBTROCHANTERIC FEMUR FRACTURE  Procedure Performed:  RIGHT FEMUR INTRAMEDULLARY NAILING    02/25  PREOPERATIVE DIAGNOSIS/INDICATION:  Colonic ileus  POSTOPERTATIVE DIAGNOSIS:  Markedly tortuous colon, significant looping s/p decompression  Retained liquid stool throughout  Diverticulosis  PROCEDURE PERFORMED: COLONOSCOPY with decompression    WEIGHT BEARING RESTRICTION  Weight Bearing Restriction: R lower extremity        R Lower Extremity: Weight Bearing as Tolerated       Recommendations for nursing staff:   Transfers: total lift  Toileting location: bed level    TREATMENT SESSION:  Patient Start of Session: semi supine in bed; daughter present  FUNCTIONAL TRANSFER ASSESSMENT  Sit to Stand: Edge of Bed  Edge of Bed: Maximum Assist (mod A x 2)    BED MOBILITY  Supine to Sit : Maximum Assist  Sit to Supine (OT): Not Tested  Scooting: Max A    BALANCE ASSESSMENT  Static Sitting: Stand-by Assist  Static Standing: Maximum Assist    FUNCTIONAL ADL ASSESSMENT  Grooming Seated: Not Tested  LB Dressing Seated: Not Tested      ACTIVITY TOLERANCE: WFL                         O2 SATURATIONS       EDUCATION PROVIDED  Patient: Role of Occupational Therapy; Plan of Care; Discharge Recommendations; Functional Transfer Techniques; Fall Prevention; Weight Bear Status; Posture/Positioning; Energy Conservation; Proper Body Mechanics  Patient's Response to Education: Demonstrates Poor Carry Over to Information  Family/Caregiver: -- (n/a)  Family/Caregiver's Response to Education: -- (n/a)      Therapist comments: Pt performs bed mobility at max A with HOB elevated and multimodal cues provided for hand placement, LE advancement, and sequencing. Pt intermittently agitated  throughout session when cueing and educating pt on importance of mobility. Pt performs sit to stand transfers at mod A x 2 pull to stand from citlali stedy. Pt transfers to chair via citlali stedy. All needs met.  Patient End of Session: Up in chair;Needs met;Call light within reach;RN aware of session/findings;All patient questions and concerns addressed;Alarm set;Family present    SUBJECTIVE  \"Will you shut up!\"    PAIN ASSESSMENT  Rating: Unable to rate  Location: R LE  Management Techniques: Repositioning     OBJECTIVE  Precautions: Bed/chair alarm;Rectal tube    AM-PAC ‘6-Clicks’ Inpatient Daily Activity Short Form  -   Putting on and taking off regular lower body clothing?: Total  -   Bathing (including washing, rinsing, drying)?: Total  -   Toileting, which includes using toilet, bedpan or urinal? : Total  -   Putting on and taking off regular upper body clothing?: Total  -   Taking care of personal grooming such as brushing teeth?: A Lot  -   Eating meals?: A Lot    AM-PAC Score:  Score: 8  Approx Degree of Impairment: 85.69%  Standardized Score (AM-PAC Scale): 22.86    PLAN  OT Treatment Plan: Balance activities;Energy conservation/work simplification techniques;ADL training;Functional transfer training;UE strengthening/ROM;Patient/Family education;Patient/Family training;Equipment eval/education  Rehab Potential : Guarded  Frequency: 3x/week    OT Goals:    All goals ongoing 02/26    ADL Goals   Patient will perform grooming: with setup  Patient will perform upper body dressing:  with setup  Patient will perform lower body dressing:  with mod assist  Patient will perform toileting: with mod assist     Functional Transfer Goals  Patient will transfer from supine to sit:  with mod assist  Patient will transfer to bedside commode:  with mod assist     UE Exercise Program Goal  Patient will be supervision with bilateral AROM HEP (home exercise program).    OT Session Time: 25 minutes  Therapeutic Activity: 25  minutes

## 2024-02-26 NOTE — PROGRESS NOTES
[facility]  Progress Note    Kierra Joshua Patient Status:  Inpatient    1945 MRN [id number]   Location [facility] 3SW-A Attending Shree Bowman MD   Date 2024 PCP Tate Tovar     Subjective:  Kierra Joshua is a(n) 78 year old female with colon pseudo-obstruction. Denies pain, passing liquid stool per rectal tube, tolerating clears, no nausea or vomiting    Objective:  Blood pressure 118/43, pulse 71, temperature 98.3 °F (36.8 °C), temperature source Oral, resp. rate 20, weight 143 lb (64.9 kg), SpO2 98%, not currently breastfeeding.    Constitutional: Appearance: well nourished  Psychiatric: Normal affect, orientation, mood  Eyes: Normal sclera and conjunctiva  Cardiovascular: Normal heart  rate.  Ear, nose, mouth and throat: Normal lips  Respiratory: Normal effort.  Abdomen: Normal liver, spleen, negative tenderness, mild distension    Labs:   Lab Results   Component Value Date    WBC 5.6 2024    HGB 9.0 2024    HCT 28.1 2024    .0 2024    CREATSERUM 0.37 2024    BUN 9 2024     2024    K 3.5 2024     2024    CO2 28.0 2024     2024    CA 8.6 2024    PGLU 153 2024           Assessment:  Patient Active Problem List   Diagnosis    Uncontrolled type 2 diabetes mellitus with hyperglycemia (HCC)    Pure hypercholesterolemia    Essential hypertension, benign    Osteopenia of necks of both femurs    Psychosis (HCC)    Alzheimer's dementia with behavioral disturbance (HCC)    Gallstones    Dilated cbd, acquired    COVID-19    COVID-19 virus infection    Closed fracture of right hip, initial encounter ([facility])    Pseudoobstruction of colon       Impression:  Colon pseudo-obstruction post decompression and rectal tube placement    Plan:  KUB  Liquid diet as tolerated    Total time spent in the care of the patient today: 35 minutes    Micky Lal MD  2024  12:08 PM

## 2024-02-26 NOTE — PHYSICAL THERAPY NOTE
PHYSICAL THERAPY TREATMENT NOTE - INPATIENT    Room Number: 381/381-A     Session: 2/5     Number of Visits to Meet Established Goals: 5    Presenting Problem: R hip fracture  Co-Morbidities : DM, HTN, bipolar, Alzheimer's, CVA    History related to current admission: Patient is a 78 year old female admitted on 2/20/2024 from home at [facility] for increased R hip pain.  Pt diagnosed with Right subtrochanteric femur fracture. Now s/ IM nailing.   Daughter received call from [facility] on 2/20 about her mother complaining of R hip pain. Per dtr facility providing very limited information in regards to what happened- per facility there was no fall, unsure of how she could have fractured her hip. Pt's daughter expressing significant frustration about event.    Procedure Performed 2/21/24 Dr. Guidry:    RIGHT FEMUR INTRAMEDULLARY NAILING     Pt now s/p colonoscopy with decompression on 2/25/24 due to tortuous colon, significant looping s/p decompression  ASSESSMENT   Patient demonstrates limited progress this session, goals  remain in progress.    Patient continues to function below baseline with bed mobility, transfers, gait, stair negotiation, maintaining seated position, and standing prolonged periods.  Contributing factors to remaining limitations include decreased functional strength, decreased endurance/aerobic capacity, pain, impaired static and dynamic sitting and standing balance, impaired coordination, impaired motor planning, decreased muscular endurance, cognitive deficits (Alzheimer's, decr awareness of need for safety and assistance), limited RLE ROM, and decreased compliance/participation.  Next session anticipate patient to progress bed mobility and transfers.  Physical Therapy will continue to follow patient for duration of hospitalization.    Patient continues to benefit from continued skilled PT services: to promote return to prior level of function and safety with continuous assistance and  gradual rehabilitative therapy .    PLAN  PT Treatment Plan: Bed mobility;Body mechanics;Coordination;Endurance;Energy conservation;Patient education;Family education;Gait training;Neuromuscular re-educate;Range of motion;Strengthening;Transfer training;Balance training  Rehab Potential : Guarded  Frequency (Obs): 3-5x/week  CURRENT GOALS      Goal #1 Patient is able to demonstrate supine - sit EOB @ level: minimum assistance      Goal #2 Patient is able to demonstrate transfers Sit to/from Stand at assistance level: minimum assistance      Goal #3 Patient is able to ambulate 50 feet with assist device: walker - rolling at assistance level: supervision      Goal #4     Goal #5     Goal #6     Goal Comments: Goals established on 2/22/2024 2/26/2024 all goals ongoing    SUBJECTIVE  \"Will you shut up!\"    OBJECTIVE  Precautions: Bed/chair alarm;Rectal tube    WEIGHT BEARING RESTRICTION  Weight Bearing Restriction: R lower extremity        R Lower Extremity: Weight Bearing as Tolerated       PAIN ASSESSMENT   Rating: Unable to rate  Location: R hip  Management Techniques: Activity promotion;Body mechanics;Repositioning    BALANCE                                                                                                                       Static Sitting: Fair -  Dynamic Sitting: Poor           Static Standing: Poor  Dynamic Standing: Poor -    ACTIVITY TOLERANCE                         O2 WALK         AM-PAC '6-Clicks' INPATIENT SHORT FORM - BASIC MOBILITY  How much difficulty does the patient currently have...  Patient Difficulty: Turning over in bed (including adjusting bedclothes, sheets and blankets)?: A Lot   Patient Difficulty: Sitting down on and standing up from a chair with arms (e.g., wheelchair, bedside commode, etc.): A Lot   Patient Difficulty: Moving from lying on back to sitting on the side of the bed?: A Lot   How much help from another person does the patient currently need...   Help from  Another: Moving to and from a bed to a chair (including a wheelchair)?: Total   Help from Another: Need to walk in hospital room?: Total   Help from Another: Climbing 3-5 steps with a railing?: Total       AM-PAC Score:  Raw Score: 9   Approx Degree of Impairment: 81.38%   Standardized Score (AM-PAC Scale): 30.55   CMS Modifier (G-Code): CM    FUNCTIONAL ABILITY STATUS  Gait Assessment   Functional Mobility/Gait Assessment  Gait Assistance: Not tested    Skilled Therapy Provided    Bed Mobility:  Rolling: NT   Supine<>Sit: w/ HOB significantly elevated maxA   Sit<>Supine: NT     Transfer Mobility:  Sit<>Stand: two trials at maxA (modA x 2) - pulling via BUE on Citlali steady   Stand<>Sit: maxA    Gait: NT    Therapist's Comments:   Patient presents sitting up in bed. Daughter present in room. Discussed role and goal of physical therapy in hospital setting. Pt with Alzheimer's dementia w/ intermittent agitation.   Bed mobility via maxA w/ HOB significantly elevated. Sit to stand maxA x 2 - two trials- pulling via BUE on citlali steady.   Citlali steady transfer to bedside chair- maxA. Up in chair with alarm on at end of session    Patient End of Session: Up in chair;Needs met;Call light within reach;RN aware of session/findings;All patient questions and concerns addressed;Alarm set;Family present;Discussed recommendations with /    PT Session Time: 28 minutes  Therapeutic Activity: 25 minutes

## 2024-02-26 NOTE — PROGRESS NOTES
[facility]     Hospitalist Progress Note     Kierra Joshua Patient Status:  Inpatient    1945 MRN [id number]   Location [facility] 3SW-A Attending Shree Bowman MD   Hosp Day # 6 PCP Tate Tovar     Chief Complaint: Fx    Subjective:   Patient denies complaints of pain. On room air.     Current medications:   lisinopril  10 mg Oral Daily    risperiDONE  0.5 mg Oral BID    risperiDONE  0.25 mg Oral Noon    acetaminophen  1,000 mg Oral q6h    insulin aspart  1-10 Units Subcutaneous TID AC and HS    insulin aspart  1-68 Units Subcutaneous TID CC    docusate sodium  100 mg Oral BID    polyethylene glycol (PEG 3350)  17 g Oral Daily    [Held by provider] enoxaparin  40 mg Subcutaneous Daily       Objective:    Review of Systems:   Limited d/t patient factors.    Vital signs:  Temp:  [97.2 °F (36.2 °C)-99 °F (37.2 °C)] 98.3 °F (36.8 °C)  Pulse:  [62-95] 71  Resp:  [18-22] 20  BP: (118-157)/() 118/43  SpO2:  [98 %-100 %] 98 %  Patient Weight for the past 72 hrs:   Weight   24 1817 143 lb 4.8 oz (65 kg)   24 2247 143 lb (64.9 kg)     Physical Exam:    General: No acute distress.   Respiratory: clear, diminished   Cardiovascular: S1, S2. Regular rate and rhythm.   Abdomen: Soft slightly rounded NT BS+  Extremities: Right thigh soft     Diagnostic Data:    Labs:  Recent Labs   Lab 24  1930 24  0518 24  0535 24  1249 24  0528 24  1357 24  0527 24  0446 24  0847   WBC 10.5   < > 8.1  --  8.1  --  6.8 6.3 5.6   HGB 10.7*   < > 7.1*   < > 9.1*  9.1* 8.3* 9.0* 9.1* 9.0*   MCV 90.2   < > 89.5  --  84.1  --  86.4 87.8 86.5   .0   < > 158.0  --  116.0*  --  142.0* 215.0 255.0   INR 1.06  --   --   --   --   --   --   --   --     < > = values in this interval not displayed.       Recent Labs   Lab 24  1930 24  0518 24  0535 24  1740 24  0847   * 159* 204*  --  155*   BUN  12 9    CREATSERUM 0.68 0.97 0.96 0.42* 0.37*   CA 9.0 8.9 8.4*  --  8.6   ALB 3.0*  --   --   --   --     138 136  --  139   K 4.4 4.5 4.9  --  3.5    108 108  --  109   CO2 24.0 24.0 25.0  --  28.0   ALKPHO 60  --   --   --   --    AST 16  --   --   --   --    ALT 14  --   --   --   --    BILT 0.3  --   --   --   --    TP 5.7*  --   --   --   --        Estimated Creatinine Clearance: 114.2 mL/min (A) (based on SCr of 0.37 mg/dL (L)).    Recent Labs   Lab 02/20/24 1930   PTP 13.8   INR 1.06            COVID-19 Lab Results    COVID-19  Lab Results   Component Value Date    COVID19 Detected (A) 12/27/2023    COVID19 Detected (A) 03/12/2023    COVID19 Not Detected 01/02/2023       Pro-Calcitonin  No results for input(s): \"PCT\" in the last 168 hours.    Cardiac  No results for input(s): \"TROP\", \"PBNP\" in the last 168 hours.    Creatinine Kinase  No results for input(s): \"CK\" in the last 168 hours.    Inflammatory Markers  No results for input(s): \"CRP\", \"SHAHIDA\", \"LDH\", \"DDIMER\" in the last 168 hours.    No results for input(s): \"TROP\", \"TROPHS\", \"CK\" in the last 168 hours.    Imaging: Imaging data reviewed in Epic.    Medications:    lisinopril  10 mg Oral Daily    risperiDONE  0.5 mg Oral BID    risperiDONE  0.25 mg Oral Noon    acetaminophen  1,000 mg Oral q6h    insulin aspart  1-10 Units Subcutaneous TID AC and HS    insulin aspart  1-68 Units Subcutaneous TID CC    docusate sodium  100 mg Oral BID    polyethylene glycol (PEG 3350)  17 g Oral Daily    [Held by provider] enoxaparin  40 mg Subcutaneous Daily       Assessment & Plan:    Complete impacted fracture right proximal femoral shaft sp right femur IM nail 2/21/2024  Lovenox x 14 d then Aspirin  Pain control, scheduled Tylenol   PT/OT > HARSHA  Ortho consult  Marked gaseous distension of colon sp decompression 2/25/2024  History of pseudo-obstruction requiring decompression    KUB  GI consult  Anemia, acute blood loss sp PRBC x 2  Monitor H&H    Dementia  Risperidone lower from 0.75 BID to 0.5 BID, and lower 0.5 at noon to 0.25 at noon  Essential hypertension  Resume Lisinopril  PTA Lasix on hold  Monitor hemodynamics   Diabetes mellitus  Correctional scale 1:30  Carb 1:15  Dyslipidemia    Supplementary Documentation:   Quality:  DVT Prophylaxis: Lovenox    Discharge planning.     Plan of care discussed with patient (as able), KOBE FAY and RN.    Shree Bowman MD

## 2024-02-27 ENCOUNTER — APPOINTMENT (OUTPATIENT)
Dept: GENERAL RADIOLOGY | Facility: HOSPITAL | Age: 79
End: 2024-02-27
Attending: INTERNAL MEDICINE
Payer: MEDICARE

## 2024-02-27 LAB
GLUCOSE BLD-MCNC: 147 MG/DL (ref 70–99)
GLUCOSE BLD-MCNC: 161 MG/DL (ref 70–99)
GLUCOSE BLD-MCNC: 174 MG/DL (ref 70–99)
GLUCOSE BLD-MCNC: 233 MG/DL (ref 70–99)

## 2024-02-27 PROCEDURE — 74018 RADEX ABDOMEN 1 VIEW: CPT | Performed by: INTERNAL MEDICINE

## 2024-02-27 PROCEDURE — 99231 SBSQ HOSP IP/OBS SF/LOW 25: CPT | Performed by: HOSPITALIST

## 2024-02-27 RX ORDER — RUFINAMIDE 40 MG/ML
1 SUSPENSION ORAL DAILY
Status: DISCONTINUED | OUTPATIENT
Start: 2024-02-28 | End: 2024-02-28

## 2024-02-27 RX ORDER — ACETAMINOPHEN 500 MG
1000 TABLET ORAL EVERY 6 HOURS PRN
Status: DISCONTINUED | OUTPATIENT
Start: 2024-02-27 | End: 2024-02-28

## 2024-02-27 NOTE — PROGRESS NOTES
[facility]  Progress Note    Kierra Joshua Patient Status:  Inpatient    1945 MRN [id number]   Location [facility] 3SW-A Attending Shree Bowman MD   Date 2024 PCP Tate Tovar     Subjective:  Kierra Joshua is a(n) 78 year old female with colon pseudo-obstruction. Denies pain, passing liquid stool per rectal tube, tolerating clears, no nausea or vomiting    Objective:  Blood pressure 137/64, pulse 88, temperature 98.3 °F (36.8 °C), temperature source Oral, resp. rate 17, weight 143 lb (64.9 kg), SpO2 97%, not currently breastfeeding.    Constitutional: Appearance: well nourished  Psychiatric: Normal affect, orientation, mood  Eyes: Normal sclera and conjunctiva  Cardiovascular: Normal heart  rate.  Ear, nose, mouth and throat: Normal lips  Respiratory: Normal effort.  Abdomen: Normal liver, spleen, negative tenderness, soft    Labs:   Lab Results   Component Value Date    PGLU 174 2024           Assessment:  Patient Active Problem List   Diagnosis    Uncontrolled type 2 diabetes mellitus with hyperglycemia (HCC)    Pure hypercholesterolemia    Essential hypertension, benign    Osteopenia of necks of both femurs    Psychosis (HCC)    Alzheimer's dementia with behavioral disturbance (HCC)    Gallstones    Dilated cbd, acquired    COVID-19    COVID-19 virus infection    Closed fracture of right hip, initial encounter ([facility])    Pseudoobstruction of colon       Impression:  Colon pseudo-obstruction post decompression and rectal tube placement    Plan:  Advance diet to full liquid diet today and tomorrow to soft as tolerated  Daily Miralax    Total time spent in the care of the patient today: 35 minutes    Micky Lal MD  2024  12:08 PM

## 2024-02-27 NOTE — DIETARY NOTE
[facility]    NUTRITION ASSESSMENT    Pt does not meet malnutrition criteria at this time.    NUTRITION INTERVENTION:    Meal and Snacks - Monitor and encourage adequate PO intake. Currently on Clear Liquid diet. NPO/Clear Liquid diet x 3 days. Diet advancement per GI.  Medical Food Supplements -berry Ensure Clear TID. Anh Glucerna TID, when diet advances. Rationale/use for oral supplements discussed.  Vitamin and Mineral Supplements - added Chewable MVI  Coordination of Nutrition Care - SLP consult prior to diet advancement.      PATIENT STATUS:     2/27/24- 79 y/o female from [facility] admitted with closed fx of R hip. Pt seen d/t length of stay. S/p R femur IM nailing on 2/21. GI following for colonic distention. S/p colonoscopy on 2/25, which revealed markedly tortuous colon, s/p decompression. Pt w/ rectal tube to LIS. Noted NPO/Clear Liquid status x 3 days. Tolerating Clear Liquids. Agreeable to add ONS TID to help maximize nutrition intakes. Plans are for repeat KUB and then possible diet advancement per RN. Noted SLP eval on 2/22 w/ diet recs for Soft & Bite Sized, thin liquids, and 1:1 feeding assist. Pt's daughter stated pt had a good appetite (eats 3 meals per day) PTA. Eats pureed meats. Takes a chewable MVI. Will monitor for diet advancement.  PMH:remote tobacco use, Alzheimer's dementia w/ behavioral disturbances, bipolar, HTN, DL, DM2, chronic constipation.         ANTHROPOMETRICS:  Ht:  5' 5.75\"  Wt: 64.9 kg (143 lb).   BMI: Body mass index is 23.26 kg/m².  IBW: 58.5 kg      WEIGHT HISTORY:     Noted no significant wt changes per EMR hx.     *However, pt's daughter reported a 16 lb wt loss in Dec, but pt has since gained 6 lb back.    Wt Readings from Last 10 Encounters:   02/20/24 64.9 kg (143 lb)   12/27/23 65.8 kg (145 lb)   06/12/23 66 kg (145 lb 8.1 oz)   03/12/23 66 kg (145 lb 8.1 oz)   01/02/23 65.5 kg (144 lb 4.8 oz)   12/08/22 70 kg (154 lb 5.2 oz)   10/29/22 69.2 kg (152 lb 9.6 oz)    08/13/20 61.7 kg (136 lb)   12/18/19 65.3 kg (144 lb)   09/16/19 64.4 kg (142 lb)        NUTRITION:  Diet:       Procedures    Clear liquid diet Calorie Restriction/Carb Controlled: 1800 kcal/60 grams; Fluid Consistency: Thin Liquids ; Texture Consistency: Chopped / Soft & Bite Sized; Is Patient on Accuchecks? Yes      Food Allergies: No  Cultural/Ethnic/[place]ian Preferences Addressed: Yes    Percent Meals Eaten (last 3 days)       Date/Time Percent Meals Eaten (%)    02/24/24 0834 50 %    02/24/24 1538 50 %    02/25/24 1431 50 %    02/26/24 1030 50 %            GI system review:  rectal tube to LIS  Last BM: 2/26  Skin and wounds: surgical incision on R hip, wound to R foot (stage not specified)    NUTRITION RELATED PHYSICAL FINDINGS:     1. Body Fat/Muscle Mass: mild depletion body fat Triceps and mild muscle depletion Temple region and Clavicle region     2. Fluid Accumulation:  edema of B/L hands and RLE      NUTRITION PRESCRIPTION: 64.9 kg-143 lb (2/20)  Calories: 6066-7992 calories/day (25-30 kcal/kg)  Protein: 78-97 grams protein/day (1.2-1.5 grams protein per kg)  Fluid: ~1 ml/kcal or per MD discretion    NUTRITION DIAGNOSIS/PROBLEM:  Inadequate energy intake related to  physiologic causes increasing nutrient needs and decreased ability to consume sufficient energy intakes  as evidenced by  NPO/Clear Liquid status x 3 days.    Increased nutrient needs related to wound healing as evidenced by R subtrochanteric fx, s/p IM nailing.    MONITOR AND EVALUATE/NUTRITION GOALS:  PO intake of 75% of meals TID - New  PO intake of 75% of oral nutrition supplement/s - New  Weight stable within 1 to 2 lbs during admission - New  Provide nutrition adequate for wound healing - New  Return to PO intake or advance diet in 24-48 hrs - New  Start alternative nutrition in 24-48 hrs if diet is not able to advance- New      MEDICATIONS:  Colace, Miralax, Insulin    LABS:  POC Glu:147-174    Pt is at High nutrition risk    Angelica  Ryan STANTON, RD, LDN  Clinical Dietitian  Ext:[phone]

## 2024-02-27 NOTE — PROGRESS NOTES
[facility]     Hospitalist Progress Note     Kierra Joshua Patient Status:  Inpatient    1945 MRN [id number]   [facility] 3SW-A Attending Shree Bowman MD   Hosp Day # 7 PCP Tate Tovar     Chief Complaint: Fx    Subjective:   Patient asleep at time of visit.     Current medications:   lisinopril  10 mg Oral Daily    risperiDONE  0.5 mg Oral BID    risperiDONE  0.25 mg Oral Noon    acetaminophen  1,000 mg Oral q6h    insulin aspart  1-10 Units Subcutaneous TID AC and HS    insulin aspart  1-68 Units Subcutaneous TID CC    docusate sodium  100 mg Oral BID    polyethylene glycol (PEG 3350)  17 g Oral Daily    enoxaparin  40 mg Subcutaneous Daily       Objective:    Review of Systems:   Limited d/t patient factors.    Vital signs:  Temp:  [97.6 °F (36.4 °C)-99 °F (37.2 °C)] 97.6 °F (36.4 °C)  Pulse:  [64-76] 64  Resp:  [18-20] 19  BP: (118-152)/(43-60) 152/56  SpO2:  [95 %-100 %] 100 %  Patient Weight for the past 72 hrs:   Weight   24 1817 143 lb 4.8 oz (65 kg)   24 2247 143 lb (64.9 kg)     Physical Exam:    General: No acute distress.   Respiratory: clear, diminished   Cardiovascular: S1, S2. Regular rate and rhythm.   Abdomen: Soft nondistended BS+  Extremities: Right thigh soft     Diagnostic Data:    Labs:  Recent Labs   Lab 24  1930 24  0518 24  0535 24  1249 24  0528 24  1357 24  0527 24  0446 24  0847   WBC 10.5   < > 8.1  --  8.1  --  6.8 6.3 5.6   HGB 10.7*   < > 7.1*   < > 9.1*  9.1* 8.3* 9.0* 9.1* 9.0*   MCV 90.2   < > 89.5  --  84.1  --  86.4 87.8 86.5   .0   < > 158.0  --  116.0*  --  142.0* 215.0 255.0   INR 1.06  --   --   --   --   --   --   --   --     < > = values in this interval not displayed.       Recent Labs   Lab 24  1930 24  0518 24  0535 24  1740 24  0847   * 159* 204*  --  155*   BUN 23 23 23 12 9   CREATSERUM 0.68 0.97 0.96 0.42* 0.37*   CA 9.0  8.9 8.4*  --  8.6   ALB 3.0*  --   --   --   --     138 136  --  139   K 4.4 4.5 4.9  --  3.5    108 108  --  109   CO2 24.0 24.0 25.0  --  28.0   ALKPHO 60  --   --   --   --    AST 16  --   --   --   --    ALT 14  --   --   --   --    BILT 0.3  --   --   --   --    TP 5.7*  --   --   --   --        Estimated Creatinine Clearance: 114.2 mL/min (A) (based on SCr of 0.37 mg/dL (L)).    Recent Labs   Lab 02/20/24  1930   PTP 13.8   INR 1.06            COVID-19 Lab Results    COVID-19  Lab Results   Component Value Date    COVID19 Detected (A) 12/27/2023    COVID19 Detected (A) 03/12/2023    COVID19 Not Detected 01/02/2023       Pro-Calcitonin  No results for input(s): \"PCT\" in the last 168 hours.    Cardiac  No results for input(s): \"TROP\", \"PBNP\" in the last 168 hours.    Creatinine Kinase  No results for input(s): \"CK\" in the last 168 hours.    Inflammatory Markers  No results for input(s): \"CRP\", \"SHAHIDA\", \"LDH\", \"DDIMER\" in the last 168 hours.    No results for input(s): \"TROP\", \"TROPHS\", \"CK\" in the last 168 hours.    Imaging: Imaging data reviewed in Epic.    Medications:    lisinopril  10 mg Oral Daily    risperiDONE  0.5 mg Oral BID    risperiDONE  0.25 mg Oral Noon    acetaminophen  1,000 mg Oral q6h    insulin aspart  1-10 Units Subcutaneous TID AC and HS    insulin aspart  1-68 Units Subcutaneous TID CC    docusate sodium  100 mg Oral BID    polyethylene glycol (PEG 3350)  17 g Oral Daily    enoxaparin  40 mg Subcutaneous Daily       Assessment & Plan:    Complete impacted fracture right proximal femoral shaft sp right femur IM nail 2/21/2024  Lovenox x 14 d then Aspirin  Pain control  PT/OT > HARSHA  Ortho consult  Marked gaseous distension of colon sp decompression 2/25/2024, improving  History of pseudo-obstruction requiring decompression    KUB pending   GI consult  Anemia, acute blood loss sp PRBC x 2  Monitor H&H   Dementia  Risperidone lowered from 0.75 BID to 0.5 BID, and lowered 0.5 at noon  to 0.25 at noon given sedation in conjunction with analgesics, resume PTA dose on discharge   Essential hypertension  Lisinopril  PTA Lasix on hold  Monitor hemodynamics   Diabetes mellitus  Correctional scale 1:30  Carb 1:15  Dyslipidemia    Supplementary Documentation:   Quality:  DVT Prophylaxis: Lovenox    Discharge planning to [facility] once cleared by GI.     Plan of care discussed with ROLANDO.    Shree Bowman MD

## 2024-02-27 NOTE — PLAN OF CARE
A&O x self. VSS on RA. . Pain controlled with scheduled ES tylenol. Turns with max assist. Voids freely via purewick. Rectal tube to LIS, small output overnight. Bilateral SCDs. Aquacel dressing x3 with old drainage, D/I. Reviewed POC, pain management, and fall precautions with pt. Bed alarm on w/bed in lowest position. Pt reminded to use call light. Verbalized understanding. Plan to return to [facility] when medically cleared.

## 2024-02-28 VITALS
WEIGHT: 143 LBS | SYSTOLIC BLOOD PRESSURE: 140 MMHG | BODY MASS INDEX: 23 KG/M2 | OXYGEN SATURATION: 97 % | RESPIRATION RATE: 18 BRPM | TEMPERATURE: 99 F | DIASTOLIC BLOOD PRESSURE: 70 MMHG | HEART RATE: 89 BPM

## 2024-02-28 LAB
GLUCOSE BLD-MCNC: 147 MG/DL (ref 70–99)
GLUCOSE BLD-MCNC: 210 MG/DL (ref 70–99)
HCT VFR BLD AUTO: 27.6 %
HGB BLD-MCNC: 8.6 G/DL

## 2024-02-28 PROCEDURE — 99239 HOSP IP/OBS DSCHRG MGMT >30: CPT | Performed by: HOSPITALIST

## 2024-02-28 RX ORDER — ASPIRIN 81 MG/1
81 TABLET ORAL 2 TIMES DAILY
Qty: 56 TABLET | Refills: 0 | Status: SHIPPED | OUTPATIENT
Start: 2024-03-06 | End: 2024-04-03

## 2024-02-28 RX ORDER — ENOXAPARIN SODIUM 100 MG/ML
40 INJECTION SUBCUTANEOUS DAILY
Qty: 2.4 ML | Refills: 0 | Status: SHIPPED | OUTPATIENT
Start: 2024-02-29 | End: 2024-03-06

## 2024-02-28 NOTE — SLP NOTE
SPEECH DAILY NOTE - INPATIENT    ASSESSMENT & PLAN   ASSESSMENT  Pt seen for dysphagia tx to assess tolerance with recommended diet, ensure proper utilization of aspiration precautions and provide pt/family education.  Patient alert in chair at time of evaluation. Patient's mental status much improved from SLP's initial visit. RN reported patient with good tolerance of soft and bite sized diet since upgrade this morning.     Patient self-presented single sips of thin liquid by straw with clinician assistance. Patient with intact bolus retrieval and formation and no overt signs of aspiration were noted. Given that patient is tolerating baseline diet, no further inpatient services warranted at this time, will sign off.     Diet Recommendations - Solids: Mechanical soft chopped/ Soft & Bite Sized (1:1 feeding assist per previous order from SNF)  Diet Recommendations - Liquids: Thin Liquids    Compensatory Strategies Recommended: Slow rate;Small bites and sips;Alternate consistencies (1:1 feeding assist and encouragement per documentation from SNF)  Aspiration Precautions: Upright position;Slow rate;Small bites and sips  Medication Administration Recommendations: One pill at a time (as tolerated with thin liquid vs puree, whole)    Patient Experiencing Pain: No                Treatment Plan  Treatment Plan/Recommendations: Aspiration precautions;Dysphagia therapy    Interdisciplinary Communication: Discussed with RN            GOALS  Goal #1 The patient will tolerate soft and bite size solid consistency and thin liquids without overt signs or symptoms of aspiration with 95 % accuracy over 1-2 session(s).  Met   Goal #2 The patient/family/caregiver will demonstrate understanding and implementation of aspiration precautions and swallow strategies independently over 1-2 session(s).     Met     FOLLOW UP  Follow Up Needed (Documentation Required): No  SLP Follow-up Date: 02/28/24  Number of Visits to Meet Established Goals:  1    Session: 1 of 1    If you have any questions, please contact Vonnie Camarena    Clinician  Pager [phone]

## 2024-02-28 NOTE — CONGREGATE LIVING REVIEW
Critical access hospital Living Authorization    The [facility] Review Committee has reviewed this case and the patient IS APPROVED for discharge to a facility for Short Term Skilled once the following procedure is followed:     - The physician discharge instructions (contained within the [facility] note for SNF) must inlcude the below appropriate and approved COVID instructions to the facility    For questions regarding CLRC approval process, please contact the CM assigned to the case.  For questions regarding RN discharge workflow, please contact the unit Clinical Leader.

## 2024-02-28 NOTE — PLAN OF CARE
NURSING DISCHARGE NOTE    Discharged Nursing home via Ambulance.  Accompanied by Support staff  Belongings Taken by patient/family.  Report given to RN at [facility].

## 2024-02-28 NOTE — PLAN OF CARE
Post-op day 6. Dressing intact. Alert and oriented x1. Room air. Continuous pulse oximeter. Telemetry monitoring. Non-cardiac electrolyte protocol. Clear liquid diet modified to full liquid with accuchecks and carb count. Orders in place to modify diet to soft foods on 2/28.1:1 feed. Max assist. Rectal tube removed per orders. KUB completed and results sent to attending and GI. Pills crushed in applesauce. Plan is [facility] when medically stable. Daughter at bedside during shift for support. PER DAUGHTER, PATIENT DISCHARGE NEEDS TO BE SCHEDULED AFTER 1400 AND DAUGHTER NEEDS TO BE PRESENT FOR DISCHARGE BACK TO [facility].

## 2024-02-28 NOTE — DISCHARGE SUMMARY
[employer] HOSPITALIST  DISCHARGE SUMMARY     Kierra Joshua Patient Status:  Inpatient    1945 MRN [id number]   Location [facility] 3SW-A Attending Bruce Casey MD   Hosp Day # 8 PCP Tate Tovar     Date of Admission: 2024  Date of Discharge:   2024    Discharge Disposition: SNF Long Term Care (NH)    Discharge Diagnosis:    Complete impacted fracture right proximal femoral shaft sp right femur IM nail 2024  Marked gaseous distension of colon sp decompression 2024, improving  History of pseudo-obstruction requiring decompression    Anemia, acute blood loss sp PRBC x 2   Dementia  Essential hypertension  Diabetes mellitus  Dyslipidemia    History of Present Illness: Kierra Joshua is a 78 year old female with PMHx Alzheimer's dementia with behavioral disturbances, anxiety, hypertension, diabetes mellitus type 2, dyslipidemia who presents to the hospital for evaluation of right hip pain.  Patient lives at the [facility] and previously was at memory care assisted living.  Daughter is at bedside assisting with history and visits the patient 7 times a week and is with her approximately 30 hours every week.  She walked the patient  night as well as Monday night and she was at her baseline.  No fever, chills, chest pain, shortness of breath, nausea, vomiting, diarrhea, dysuria or abdominal pain.  Today patient complained of pain in her right hip.  Daughter talked to the overnight tech and there were no issues.  Her leg was noted to be shorter and externally rotated compared to her left.  There was no witnessed fall or no idea how this came to be.  Daughter reports an investigation is ongoing at the nursing home.  X-ray in the ER shows complete impacted fracture of the right proximal femoral shaft.  She received nerve block in the ER and is now more comfortable.    Brief Synopsis:   Patient admitted with right hip pain.  She was found to have an impacted right proximal  femur fracture and underwent IM nailing on 2/21.  Patient's postoperative course was complicated by blood loss anemia requiring 2 units of packed red blood cells.  Patient also had marked gas distention and underwent colon decompression on 2/25.  Patient was doing well today.  Patient to be discharged back to febrile.  She will be on Lovenox for 2 weeks total followed by aspirin for 1 month.  She will follow-up with orthopedic surgery as well as her regular PCP after discharge.  All questions and concerns addressed with patient prior to discharge, she is agreeable to plan of care as stated.  She was encouraged return to the ER if her symptoms come back or worsen.    Lace+ Score: 74  59-90 High Risk  29-58 Medium Risk  0-28   Low Risk       TCM Follow-Up Recommendation:  LACE > 58: High Risk of readmission after discharge from the hospital.      Procedures during hospitalization:   Right hip intramedullary nailing on 2/21  Colonoscopy with decompression on 2/25    Incidental or significant findings and recommendations (brief descriptions):  None    Lab/Test results pending at Discharge:   N/a    Consultants:  GI, Orthopedic surgery     Discharge Medication List:     Discharge Medications        START taking these medications        Instructions Prescription details   acetaminophen 500 MG Tabs  Commonly known as: Tylenol Extra Strength      Take 1 tablet (500 mg total) by mouth every 4 (four) hours as needed.   Quantity: 90 tablet  Refills: 0     aspirin 81 MG Tbec  Start taking on: March 6, 2024      Take 1 tablet (81 mg total) by mouth 2 (two) times daily for 28 days.   Stop taking on: April 3, 2024  Quantity: 56 tablet  Refills: 0     enoxaparin 40 MG/0.4ML Sosy  Commonly known as: Lovenox  Start taking on: February 29, 2024      Inject 0.4 mL (40 mg total) into the skin daily for 6 days.   Stop taking on: March 6, 2024  Quantity: 2.4 mL  Refills: 0            CHANGE how you take these medications         Instructions Prescription details   lisinopril 10 MG Tabs  Commonly known as: Zestril  What changed: additional instructions      Take 1 tablet (10 mg total) by mouth daily. Hold for SBP < 130   Refills: 0     traMADol 50 MG Tabs  Commonly known as: Ultram  What changed:   when to take this  reasons to take this      Take 1 tablet (50 mg total) by mouth 2 (two) times daily as needed for Pain.   Quantity: 30 tablet  Refills: 0            CONTINUE taking these medications        Instructions Prescription details   bisacodyl 10 MG Supp  Commonly known as: Dulcolax      Place 1 suppository (10 mg total) rectally Q24H PRN.   Refills: 0     Cranberry 425 MG Caps      Take by mouth.   Refills: 0     FLEET ENEMA 7-19 GM/118ML Enem  Commonly known as: FLEET       Refills: 0     fluticasone propionate 50 MCG/ACT Susp  Commonly known as: Flonase      2 sprays by Each Nare route daily.   Refills: 0     furosemide 20 MG Tabs  Commonly known as: Lasix      Take 1 tablet (20 mg total) by mouth Every Monday, Wednesday, and Friday.   Refills: 0     glipiZIDE 5 MG Tabs  Commonly known as: Glucotrol      Take 0.5 tablets (2.5 mg total) by mouth daily with lunch.   Refills: 0     glipiZIDE 5 MG Tabs  Commonly known as: Glucotrol      Take 1 tablet (5 mg total) by mouth every morning before breakfast.   Refills: 0     meclizine 25 MG Tabs  Commonly known as: Antivert      Take 1 tablet (25 mg total) by mouth every 8 (eight) hours as needed.   Refills: 0     Melatonin 3 MG Caps      Take by mouth.   Refills: 0     Milk of Magnesia 400 MG/5ML Susp  Generic drug: magnesium hydroxide      Take by mouth daily as needed for constipation.   Refills: 0     Multi-Vitamin/Minerals Tabs      Take 1 tablet by mouth daily.   Refills: 0     Pepto-Bismol 262 MG/15ML Susp  Generic drug: bismuth subsalicylate      Take 30 mL (524 mg total) by mouth every 6 (six) hours as needed for Indigestion.   Refills: 0     polyethylene glycol (PEG 3350) 17 g  Pack  Commonly known as: Miralax      Take 17 g by mouth daily.   Quantity: 30 each  Refills: 0     Potassium Chloride ER 10 MEQ Tbcr  Commonly known as: K-DUR      Take 1 tablet (10 mEq total) by mouth Every Monday, Wednesday, and Friday.   Refills: 0     risperiDONE 0.25 MG Tabs  Commonly known as: RisperDAL      Take 3 tablets (0.75 mg total) by mouth 2 (two) times daily.   Refills: 0     risperiDONE 0.25 MG Tabs  Commonly known as: RisperDAL      Take 2 tablets (0.5 mg total) by mouth Noon.   Refills: 0     Sennosides 17.2 MG Tabs      Take 1 tablet (17.2 mg total) by mouth daily.   Quantity: 30 tablet  Refills: 0               Where to Get Your Medications        These medications were sent to [facility] [address] [facility] [phone], [phone]  [address] [facility] [address]      Phone: [phone]   acetaminophen 500 MG Tabs  aspirin 81 MG Tbec  enoxaparin 40 MG/0.4ML Sosy       Please  your prescriptions at the location directed by your doctor or nurse    Bring a paper prescription for each of these medications  traMADol 50 MG Tabs         [place]    Follow-up appointment:   Nanette Barrientos MD  [address] [place] [address]  [phone]    Schedule an appointment as soon as possible for a visit in 2 week(s)      Tate Tovar MD  [address] [address]  [phone]    Follow up in 1 week(s)      Appointments for Next 30 Days 2/28/2024 - 3/29/2024      None            Vital signs:  Temp:  [97.4 °F (36.3 °C)-98.6 °F (37 °C)] 98.6 °F (37 °C)  Pulse:  [81-96] 89  Resp:  [15-20] 18  BP: ()/(49-75) 140/70  SpO2:  [94 %-99 %] 97 %    Physical Exam:    General: No acute distress, pleasant    Lungs: clear to auscultation  Cardiovascular: S1, S2, RRR  Abdomen: Soft, NT, ND    -----------------------------------------------------------------------------------------------  PATIENT DISCHARGE INSTRUCTIONS: See  electronic chart    Bruce Casey MD    Total time spent on discharge plannin minutes     The  [employer] Cures Act makes medical notes like these available to patients in the interest of transparency. Please be advised this is a medical document. Medical documents are intended to carry relevant information, facts as evident, and the clinical opinion of the practitioner. The medical note is intended as peer to peer communication and may appear blunt or direct. It is written in medical language and may contain abbreviations or verbiage that are unfamiliar.

## 2024-02-28 NOTE — PLAN OF CARE
A&O x self. VSS on RA. . Pain controlled with scheduled ES tylenol. Turns with max assist. Voids freely via purewick. Declines SCDs. Aquacel dressing x3 with old drainage, D/I. Reviewed POC, pain management, and fall precautions with pt and dtr at bedside. Tolerating full liq diet, plan to trial soft diet in AM. Bed alarm on w/bed in lowest position. Pt reminded to use call light. Verbalized understanding. Plan to return to [facility] when medically cleared.     648am - Dr Zaman paged regarding loss of IV access. Waiting for response, no new orders. Will endorse to dayshift RN.

## 2024-02-28 NOTE — CM/SW NOTE
02/28/24 1049   Discharge disposition   Expected discharge disposition subacute   Post Acute Care Provider   ([facility])   Discharge transportation [facility]     Pt cleared to return to [facility]- late referral made to [facility] due to new hip fracture and PT recs for grandual therapy needs.  Updates sent to [facility]- 130 pm ambulance arranged.  RN to call report to [phone].    Marie Lackey LCSW  /Discharge Planner      [facility] approves [facility]  level of care.

## 2024-02-28 NOTE — DISCHARGE INSTRUCTIONS
Hip Fracture Discharge Instructions    Activity    Weight bearing restrictions:  WBAT  Non-weight bearing (NWB) ? you may NOT put any weight at all on your leg. You will need to use a walker, crutches, or          wheelchair to get around.  Toe-touch weight bearing (TTWB) or touch-down weight bearing(TDWB) ? you may only put your toes down lightly. This is for balance only. This often means you may not put more than 10 percent of your body weight on your leg. You will need to use a walker, crutches, or wheelchair to get around.  Partial weight bearing (PWB) ? you may only put about half of your weight on your leg. You will need a walker or crutches to get around.  Weight bearing as tolerated (WBAT) ? you may put as much of your weight on your leg as you can tolerate. This means you may use a walker, crutches, cane- or no device at all to get around.  Full weight bearing (FWB) ? you may put full weight on your leg. You may not need any device at all to help with walking.     Bathing  No tub baths, pools, or saunas until cleared by surgeon (about 4-6 weeks because it takes that long for the incision(s) on the skin to heal and be a barrier to prevent infection.)    When allowed to shower:  AQUACEL dressing is waterproof and does not require being covered before showering.  Pat dressing(s) and surrounding skin dry after shower.   There may be one incision or a few that have a dressing.                                      AQUACEL           MEDIPORE/COVERLET           DRY STERILE GAUZE                                                                                                                         MEDIPORE/COVERLET dressing and dry sterile gauze are NOT waterproof and REQUIRE being covered with a waterproof barrier to keep the dressing and incision dry.  SARAN WRAP, GLAD WRAP, PRESS N SEAL WORK REALLY WELL BUT ANY PLASTIC WRAP WILL DO.  Do not wash incision.   Remove entire wrapping and old dressing (if  Medipore/coverlet or gauze) after showering. Pat dry with a CLEAN TOWEL if necessary and cover incision with new Medipore/coverlet or gauze.    Incision care/Dressing changes  Wash hands before and after dressing changes.  There may be one or a few dressings on the hip/leg    FOR MEDIPORE/COVERLET DRESSINGS:  Change dressing daily using Medipore/coverlet once Aquacel (waterproof) dressing is removed (which is about 7 days after surgery). Patient should be standing or lying flat so dressing goes on smoothly.  (This dressing needed for hip patients because of location of incision-don’t want contamination from bathroom use)  FOR DRY STERILE GAUZE DRESSINGS:   Change dressing daily using dry sterile gauze and paper tape.  Watch ALL incision for any redness, drainage, increased warmth or opening of the incision.   Call surgeon if you notice any of these.  No lotions or ointments on or near incision(s).                             DRY STERILE GAUZE                         MEDIPORE /COVERLET    Continue dressing changes until your first visit with your surgeon’s office.  There could be a small amount of redness around the staples or incision; this is normal.  Watch for increased redness, warmth, any odor, increased drainage or opening of the incision. A little clear yellow or blood tinged drainage is normal up to 2 weeks after surgery but it should be less every day until it stops.  Call physician if you notice any concerning changes.  Sutures/staples will be removed at first office visit (10 days- 3 weeks).       Driving  Do not drive until cleared by surgeon. Possibly six weeks after surgery. Discuss this at follow-up office visit.   Not allowed while taking narcotic pain medication or muscle relaxants.    Sex  Usually allowed after six weeks - check with surgeon at your office visit.    Return to work  Usually allowed after six weeks. Discuss specific work activities with your surgeon.    Restrictions  Follow  instructions provided by physical therapy    No smoking  Avoid smoking. It is known to cause breathing problems and can decrease the rate of healing.                      Medication  Anticoagulants = blood thinners (Xarelto, Eliquis, Lovenox, Coumadin or Aspirin)  Pill or shot form depending on what your physician orders.   IF placed on Coumadin, you may also need lab work done for monitoring.  You will bleed easier and bruise easier while on these medications.   Usually you will be on a blood thinner for about 4-5 weeks after surgery.  Contact your physician if you have signs of bruising, nose bleeds or blood in your urine. Use electric razors and soft toothbrushes only.  Do not take NSAIDS (non-steroidal anti-inflammatory medications) while taking blood thinners unless ordered by your surgeon.  Review anticoagulant education information sheet provided.    Discomfort  Surgical discomfort is normal for one to two months.  Have realistic goals and keep a positive outlook.  Keep pain manageable; pain should not disrupt your sleep or activities like getting out of bed or walking.  You may need pain medication regularly (every 4-6 hours) the first 2 weeks and then begin to decrease how often you are taking it.  Take pain medication as prescribed with food, especially before therapy, allowing 30-60 minutes to take effect.  Do not drink alcohol while on pain medication.  As you have less discomfort, decrease the amount of pain medication you take. Use plain Tylenol (acetaminophen) for less severe pain.  Some pain medications have Tylenol (acetaminophen) in them such as Norco and Percocet. Do NOT take Tylenol (acetaminophen) within 4 hours of a dose of these medications.  Apply ice or cold therapy to surgical site for 20 minutes at least four times a day, especially after therapy. Be sure there is a thin cloth barrier between skin and ice or cold therapy.  Change position at least every 45 minutes while awake to avoid  stiffness or increased discomfort.  Deep breathing and relaxation techniques and distractions can help!  If you focus on something else, you do not experience the pain the same. Take advantage of everything available to your to help control you discomfort.  Contact physician if discomfort does not respond to pain medication.    Body changes  Constipation is common with the use of narcotics.  Eat fiber rich foods and drink plenty of fluids, at least 4-6 glasses of water a day, unless restricted.  To prevent constipation, use stool softeners such as Colace and laxatives such as Miralax, Senakot or Milk of Magnesia while taking laxatives.   An enema or suppository may be needed if above measures do not work.    Prevention of infection and promotion of healing  Good hand washing is important. Everyone should wash their hands or use hand  as soon as they walk in your house-whether they live there or are visiting.  Keep bed linen/clothing freshly laundered.  Do not allow others or pets to touch your incision.  Avoid people that have colds or the flu.  Your surgeon may recommend that you take antibiotics before you undergo any dental or other invasive surgical procedures after your hip fracture surgery. Speak with your surgeon about this at your post-op office visit.  Continue using incentive spirometry because narcotics make you sleepy so you may not take good deep breaths. We do not want you to get pneumonia.     Post op Office visits  Schedule 10 days to 3 weeks after surgery WITH SURGEON’s office.  Additional visits may need to be scheduled. Your physician will discuss this at first post-op office visit.  Schedule outpatient physical therapy per your surgeon’s orders.  Schedule one week follow up after surgery WITH PRIMARY CARE PHYSICIAN; review your medications over last 6 months.  Your body gets stressed by surgery and that stress can affect all your other health issues (such as high blood pressure,  diabetes, CHF, afib, and asthma just to name a few).  It is much better to catch developing problems and prevent them from becoming larger ones.  ANJELICA HOSE - IF ordered by your surgeon, wear these during the day and off at night.  Surgeon will tell you when you don’t need them anymore.    Notify your surgeon if you notice any of the following signs  Separation of incision line.  Increased redness, swelling, or warmth of skin around incision.  Increased or foul smelling drainage from incision  Red streaks on skin near incision.  Temperature >100.4F.  Increased pain at incision not relieved by pain medication.    Signs of Possible Hip Dislocation IF Total Hip Replacement or Radu-arthroplasty Done  Increased severe leg or groin pain  Turning in or out of surgical leg that is new  Increased numbness, tingling to leg  Inability to walk or put weight on your surgical leg  Signs of blood clot  Pain, excessive tenderness, redness, or swelling in leg or calf (other than incision site).  Call physician and await their directions.    Go directly to the ER or CALL 911 if you:  become short of breath  have chest pain  cough up blood  have unexplained anxiety with breathing     Traveling and Handicapped parking  Check with you surgeon when you are allowed to travel so you don’t set yourself up for greater chance of complications.  If traveling by car, get out to stretch every 2 hours.  This helps prevent stiffness.  You may need to do this any time you travel for the first year after surgery.  If traveling by plane, BEFORE you get into a security line, let them know that you had your hip replaced, as you will most likely set off the metal detector. The doctors no longer provide an identification card for this as they are easily copied. ALSO request a wheelchair the first year to board and get off a plane…this aids in priority seating and you should sit on the aisle or at the bulkhead where you can easily stretch your legs and get  up to walk up and down the aisles…this helps prevent blood clots and stiffness.  TEMPORARY HANDICAP PARKING APPLICATION  (good for 3-6 months)  - At Surgeon or PCP visit, request they fill out their part of the form. You fill our your portion, then go to Department of Motor Vehicles. Some [facility] offices provide the same service. ([facility] and [place] have this service; if you live in another [facility], you may check with them as well). You need space to open car doors to position yourself properly with walker to get in and out of your car safely; some parking spaces are  practically on top of each other and do not give you enough room.

## 2024-02-28 NOTE — PHYSICAL THERAPY NOTE
PHYSICAL THERAPY TREATMENT NOTE - INPATIENT    Room Number: 381/381-A       Session: 3     Number of Visits to Meet Established Goals: 5    Presenting Problem: R hip fracture  Co-Morbidities : DM, HTN, bipolar, Alzheimer's, CVA    History related to current admission: Patient is a 78 year old female admitted on 2/20/2024 from home at [facility] for increased R hip pain.  Pt diagnosed with Right subtrochanteric femur fracture. Now s/ IM nailing.   Daughter received call from [facility] on 2/20 about her mother complaining of R hip pain. Per dtr facility providing very limited information in regards to what happened- per facility there was no fall, unsure of how she could have fractured her hip. Pt's daughter expressing significant frustration about event.    Procedure Performed 2/21/24 Dr. Guidry:    RIGHT FEMUR INTRAMEDULLARY NAILING     Pt now s/p colonoscopy with decompression on 2/25/24 due to tortuous colon, significant looping s/p decompression  ASSESSMENT   Patient demonstrates limited progress this session, goals  remain in progress.    Patient continues to function below baseline with bed mobility, transfers, gait, stair negotiation, maintaining seated position, and standing prolonged periods.  Contributing factors to remaining limitations include decreased functional strength, decreased endurance/aerobic capacity, pain, impaired static and dynamic sitting and standing balance, impaired coordination, impaired motor planning, decreased muscular endurance, cognitive deficits (Alzheimer's, decr awareness of need for safety and assistance), limited RLE ROM, and decreased compliance/participation.  Next session anticipate patient to progress bed mobility and transfers.  Physical Therapy will continue to follow patient for duration of hospitalization.    Patient continues to benefit from continued skilled PT services: to promote return to prior level of function and safety with continuous assistance and  gradual rehabilitative therapy .    PLAN  PT Treatment Plan: Bed mobility;Body mechanics;Coordination;Endurance;Energy conservation;Patient education;Family education;Gait training;Neuromuscular re-educate;Range of motion;Strengthening;Transfer training;Balance training  Rehab Potential : Guarded  Frequency (Obs): 3-5x/week  CURRENT GOALS      Goal #1 Patient is able to demonstrate supine - sit EOB @ level: minimum assistance      Goal #2 Patient is able to demonstrate transfers Sit to/from Stand at assistance level: minimum assistance      Goal #3 Patient is able to ambulate 50 feet with assist device: walker - rolling at assistance level: supervision      Goal #4     Goal #5     Goal #6     Goal Comments: Goals established on 2/22/2024 2/26/2024 all goals ongoing    SUBJECTIVE  \"I want hillbilly music\" - writer offered to play music for mood stabilization during session.  Ie: Alonso Ruby    OBJECTIVE  Precautions: Bed/chair alarm;Rectal tube    WEIGHT BEARING RESTRICTION  Weight Bearing Restriction: R lower extremity        R Lower Extremity: Weight Bearing as Tolerated       PAIN ASSESSMENT   Rating: Unable to rate  Location: R hip  Management Techniques: Activity promotion;Body mechanics;Repositioning    BALANCE                                                                                                                       Static Sitting: Fair -  Dynamic Sitting: Poor           Static Standing: Poor  Dynamic Standing: Poor -    ACTIVITY TOLERANCE                         O2 WALK         AM-PAC '6-Clicks' INPATIENT SHORT FORM - BASIC MOBILITY  How much difficulty does the patient currently have...  Patient Difficulty: Turning over in bed (including adjusting bedclothes, sheets and blankets)?: A Lot   Patient Difficulty: Sitting down on and standing up from a chair with arms (e.g., wheelchair, bedside commode, etc.): Unable   Patient Difficulty: Moving from lying on back to sitting on the side of the bed?: A  Lot   How much help from another person does the patient currently need...   Help from Another: Moving to and from a bed to a chair (including a wheelchair)?: Total   Help from Another: Need to walk in hospital room?: Total   Help from Another: Climbing 3-5 steps with a railing?: Total       AM-PAC Score:  Raw Score: 8   Approx Degree of Impairment: 86.62%   Standardized Score (AM-PAC Scale): 28.58   CMS Modifier (G-Code): CM    FUNCTIONAL ABILITY STATUS  Gait Assessment   Functional Mobility/Gait Assessment  Gait Assistance: Not tested  Distance (ft): 0    Skilled Therapy Provided    Bed Mobility:  Rolling: NT   Supine<>Sit: w/ HOB significantly elevated maxA   Sit<>Supine: NT     Transfer Mobility:  Sit<>Stand: Max A x 2 (citlali steady)  Stand<>Sit: Max A   Gait: NT    Therapist's Comments:   Pt with less agitation this session - able to redirect with music     Patient End of Session: Up in chair;Needs met;Call light within reach;RN aware of session/findings;All patient questions and concerns addressed;Alarm set    PT Session Time: 15 minutes  Therapeutic Activity: 10 minutes

## 2024-02-28 NOTE — OCCUPATIONAL THERAPY NOTE
OCCUPATIONAL THERAPY TREATMENT NOTE - INPATIENT     Room Number: 381/381-A  Session: 3  Number of Visits to Meet Established Goals: 5    Presenting Problem: RIGHT SUBTROCHANTERIC FEMUR FRACTURE, 2/21 s/p RIGHT FEMUR INTRAMEDULLARY NAILING  Prior to admission, patient's baseline is per daughter, min A toilet transfer using rw and grab bar, mod A LB dressing, max A showering, and1-person assistance to ambulate in hallway with RW.  Pt has been a long term resident at [facility] for about 16 months, per daughter. Daughter visits her daily and spends time with her.     ASSESSMENT   Patient demonstrates limited progress this session, goals remain in progress.     Patient continues to function below baseline with  ADL and mobility .   Contributing factors to remaining limitations include decreased functional strength, decreased endurance, pain, impaired sitting/standing balance, cognitive deficits (chronic), decreased compliance/participation, decreased insight to deficits, decreased safety awareness, and volitional factors .  Next session anticipate patient to progress grooming, transfers, dynamic sitting balance, and functional standing tolerance.  Occupational Therapy will continue to follow patient for duration of hospitalization.    Patient continues to benefit from continued skilled OT services: to promote return to prior level of function and safety with continuous assistance and gradual rehabilitative therapy .          OT Device Recommendations: TBD    History: Patient is a 78 year old female admitted on 2/20/2024 with Presenting Problem: RIGHT SUBTROCHANTERIC FEMUR FRACTURE, 2/21 s/p RIGHT FEMUR INTRAMEDULLARY NAILING. Co-Morbidities : DM, HTN, bipolar, Alzheimer's, CVA. Pt was admitted from long-term [facility] on 2/20 with R hip pain.  Per daughter, she received a call from the facility about pt's R hip pain. Per daughter, limited information from the facility about any event.  Admitted for R subtrochanteric  femur fracture.  2/21 s/p R femur IM nailing.      Recent admissions:  12/27 to 12/31/23 for COVID -> back to long term care with in-house therapy.     Surgery 2/21/24  Preoperative Diagnosis:  RIGHT SUBTROCHANTERIC FEMUR FRACTURE  Postoperative Diagnosis:  RIGHT SUBTROCHANTERIC FEMUR FRACTURE  Procedure Performed:  RIGHT FEMUR INTRAMEDULLARY NAILING    02/25  PREOPERATIVE DIAGNOSIS/INDICATION:  Colonic ileus  POSTOPERTATIVE DIAGNOSIS:  Markedly tortuous colon, significant looping s/p decompression  Retained liquid stool throughout  Diverticulosis  PROCEDURE PERFORMED: COLONOSCOPY with decompression    WEIGHT BEARING RESTRICTION  Weight Bearing Restriction: R lower extremity        R Lower Extremity: Weight Bearing as Tolerated       Recommendations for nursing staff:   Transfers[facility]ali lopez  [facility]    TREATMENT SESSION:  Patient Start of Session: semi supine in bed  FUNCTIONAL TRANSFER ASSESSMENT  Sit to Stand: Edge of Bed  Edge of Bed: Dependent (max A x 2)    BED MOBILITY  Supine to Sit : Maximum Assist  Sit to Supine (OT): Not Tested  Scooting: Max A    BALANCE ASSESSMENT  Static Sitting: Stand-by Assist  Static Standing: Maximum Assist    FUNCTIONAL ADL ASSESSMENT  Grooming Seated: Not Tested  LB Dressing Seated: Not Tested      ACTIVITY TOLERANCE: WFL                         O2 SATURATIONS       EDUCATION PROVIDED  Patient: Role of Occupational Therapy; Plan of Care; Discharge Recommendations; Functional Transfer Techniques; Fall Prevention; Posture/Positioning; Energy Conservation; Proper Body Mechanics  Patient's Response to Education: Demonstrates Poor Carry Over to Information  Family/Caregiver: -- (n/a)  Family/Caregiver's Response to Education: -- (n/a)      Therapist comments: Pt performs bed mobility at max A with HOB elevated and multimodal cues provided for hand placement, LE advancement, and sequencing. Pt less agitated this session compared to previous OT session. Pt performs  sit to stand transfers at max A x 2 pull to stand from citlali stedy with cues provided for hand placement and glute engagement in order for pads to be safely placed and in preparation to transfer to bedside commode. Pt transfers to chair via citlali stedy. All needs met.  Patient End of Session: Up in chair;Needs met;Call light within reach;RN aware of session/findings;All patient questions and concerns addressed;Alarm set    SUBJECTIVE  \"I don't want country music.\"    PAIN ASSESSMENT  Rating: Unable to rate  Location: R LE  Management Techniques: Repositioning     OBJECTIVE  Precautions: Bed/chair alarm;Rectal tube    AM-PAC ‘6-Clicks’ Inpatient Daily Activity Short Form  -   Putting on and taking off regular lower body clothing?: Total  -   Bathing (including washing, rinsing, drying)?: Total  -   Toileting, which includes using toilet, bedpan or urinal? : Total  -   Putting on and taking off regular upper body clothing?: Total  -   Taking care of personal grooming such as brushing teeth?: A Lot  -   Eating meals?: A Lot    AM-PAC Score:  Score: 8  Approx Degree of Impairment: 85.69%  Standardized Score (AM-PAC Scale): 22.86    PLAN  OT Treatment Plan: Balance activities;Energy conservation/work simplification techniques;ADL training;Functional transfer training;UE strengthening/ROM;Patient/Family education;Patient/Family training;Equipment eval/education  Rehab Potential : Guarded  Frequency: 3x/week    OT Goals:    All goals ongoing 02/28    ADL Goals   Patient will perform grooming: with setup  Patient will perform upper body dressing:  with setup  Patient will perform lower body dressing:  with mod assist  Patient will perform toileting: with mod assist     Functional Transfer Goals  Patient will transfer from supine to sit:  with mod assist  Patient will transfer to bedside commode:  with mod assist     UE Exercise Program Goal  Patient will be supervision with bilateral AROM HEP (home exercise program).    OT  Session Time: 15 minutes  Therapeutic Activity: 15 minutes

## 2024-06-06 ENCOUNTER — HOSPITAL ENCOUNTER (EMERGENCY)
Facility: HOSPITAL | Age: 79
Discharge: HOME | End: 2024-06-06
Attending: EMERGENCY MEDICINE
Payer: MEDICARE

## 2024-06-06 ENCOUNTER — APPOINTMENT (OUTPATIENT)
Dept: GENERAL RADIOLOGY | Facility: HOSPITAL | Age: 79
End: 2024-06-06
Attending: EMERGENCY MEDICINE
Payer: MEDICARE

## 2024-06-06 ENCOUNTER — APPOINTMENT (OUTPATIENT)
Dept: CT IMAGING | Facility: HOSPITAL | Age: 79
End: 2024-06-06
Attending: EMERGENCY MEDICINE
Payer: MEDICARE

## 2024-06-06 VITALS
OXYGEN SATURATION: 99 % | HEART RATE: 72 BPM | SYSTOLIC BLOOD PRESSURE: 135 MMHG | TEMPERATURE: 97 F | DIASTOLIC BLOOD PRESSURE: 75 MMHG | RESPIRATION RATE: 21 BRPM

## 2024-06-06 DIAGNOSIS — M25.551 RIGHT HIP PAIN: ICD-10-CM
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
## 2024-06-06 PROCEDURE — 72125 CT NECK SPINE W/O DYE: CPT | Performed by: EMERGENCY MEDICINE

## 2024-06-06 PROCEDURE — 70450 CT HEAD/BRAIN W/O DYE: CPT | Performed by: EMERGENCY MEDICINE

## 2024-06-06 PROCEDURE — 99284 EMERGENCY DEPT VISIT MOD MDM: CPT

## 2024-06-06 PROCEDURE — 73502 X-RAY EXAM HIP UNI 2-3 VIEWS: CPT | Performed by: EMERGENCY MEDICINE

## 2024-06-06 NOTE — ED PROVIDER NOTES
Patient Seen in: [facility] Emergency Department      History     Chief Complaint   Patient presents with    Fall     Stated Complaint: fall    Subjective:   HPI    This is an 79-year-old female presents status post fall resident of the [facility] with past medical history of Alzheimer's, bipolar disease, diabetes, hypertension, high cholesterol who presents after being found on the mattress next to her bed in her room.  Patient has frequent falls.  Her bed is in the low position with a mattress next to her bed on the floor.  Apparently they found her on the mattress in her room and sent her here for evaluation.  Patient does complain of pain when her right hip is ranged.  She also appears to have some left-sided cervical spine pain.  She is not on anticoagulants.  She was brought here via ambulance for further evaluation.    Objective:   Past Medical History:    Alzheimer's dementia with behavioral disturbance (HCC)    Anxiety state    Bipolar affective (HCC)    Cataract    Dementia (HCC)    Diabetes (HCC)    Elevated fasting lipid profile    Essential hypertension    Essential hypertension, benign    Falls    Gallstones and inflammation of gallbladder without obstruction    High blood pressure    High cholesterol    Muscle weakness    Neuropathy    Osteoarthritis    Psychosis, unspecified psychosis type (HCC)    Shortness of breath              Past Surgical History:   Procedure Laterality Date    Biopsy of breast, incisional      left breast bx during surgery    Colonoscopy N/A 12/29/2023    Procedure: UNPREPPED  COLONOSCOPY;  Surgeon: Fabrice Trejo MD;  Location:  ENDOSCOPY    Colonoscopy N/A 2/25/2024    Procedure: COLONOSCOPY WITH DECOMPRESSION;  Surgeon: Trudi Albright MD;  Location:  ENDOSCOPY    Other surgical history  2001    aneurysm clipped x2                Social History     Socioeconomic History    Marital status:    Tobacco Use    Smoking status: Former      Current packs/day: 0.00     Types: Cigarettes     Quit date:      Years since quittin.4    Smokeless tobacco: Never   Vaping Use    Vaping status: Never Used   Substance and Sexual Activity    Alcohol use: No     Alcohol/week: 0.0 standard drinks of alcohol    Drug use: No   Other Topics Concern    Caffeine Concern Yes    Exercise No     Social Determinants of Health     Food Insecurity: No Food Insecurity (2024)    Food Insecurity     Food Insecurity: Never true   Transportation Needs: No Transportation Needs (2024)    Transportation Needs     Lack of Transportation: No   Housing Stability: Low Risk  (2024)    Housing Stability     Housing Instability: No              Review of Systems    Positive for stated complaint: fall  Other systems are as noted in HPI.  Constitutional and vital signs reviewed.      All other systems reviewed and negative except as noted above.    Physical Exam     ED Triage Vitals [24 1437]   /75   Pulse 72   Resp 21   Temp 97 °F (36.1 °C)   Temp src Temporal   SpO2 99 %   O2 Device None (Room air)       Current Vitals:   Vital Signs  BP: 135/75  Pulse: 72  Resp: 21  Temp: 97 °F (36.1 °C)  Temp src: Temporal    Oxygen Therapy  SpO2: 99 %  O2 Device: None (Room air)            Physical Exam    GENERAL: Awake, alert oriented x3, nontoxic appearing.   SKIN: Normal, warm, and dry.  HEENT:  Pupils equally round and reactive to light. Conjuctiva clear.  Oropharynx is clear and moist.   Lungs: Clear to auscultation bilaterally with no rales, no retractions, and no wheezing.  HEART:  Regular rate and rhythm. S1 and S2. No murmurs, no rubs or gallops.   ABDOMEN: Soft, nontender and nondistended. Normoactive bowel sounds. No rebound. No guarding.   EXTREMITIES: Warm with brisk capillary refill.         ED Course   Labs Reviewed - No data to display                   MDM          This is an 79-year-old female presents status post fall resident of the [facility]  home with past medical history of Alzheimer's, bipolar disease, diabetes, hypertension, high cholesterol who presents after being found on the mattress next to her bed in her room.  Patient has frequent falls.  Her bed is in the low position with a mattress next to her bed on the floor.  Apparently they found her on the mattress in her room and sent her here for evaluation.  Differential includes acute intracranial injury which is a life threat, cervical spine fracture, right hip fracture.      Patient will undergo imaging of CT brain/spine and x-ray of right hip for further evaluation.  The fall was unwitnessed.  patient unable to provide history due to dementia.      Apparently, daughter called and states that her mother had fallen in February and broke her right hip.      Case endorsed to oncoming physician will check x-ray findings and CT findings.                  Disposition and Plan     Clinical Impression:  1. Fall, initial encounter    2. Right hip pain         Disposition:  There is no disposition on file for this visit.  There is no disposition time on file for this visit.    Follow-up:  Tate Tovar MD  [address] [facility] [address]  [phone]    Follow up on 6/10/2024            Medications Prescribed:  Current Discharge Medication List

## 2024-06-06 NOTE — ED INITIAL ASSESSMENT (HPI)
Unwitnessed fall out of bed at [place]. No complaints. Has a pad next to her lowered bed, as she falls often. Found on pad next to bed.

## 2024-06-06 NOTE — ED NOTES
Patient was signed out to the CT head, neck, hip x-ray showed no fracture patient can be discharged home.        XR HIP W OR WO PELVIS 2 OR 3 VIEWS, RIGHT (CPT=73502)    Result Date: 6/6/2024  PROCEDURE:  XR HIP W OR WO PELVIS 2 OR 3 VIEWS, RIGHT ( CPT=73502)  TECHNIQUE:  Unilateral 2 to 3 views of the hip and pelvis if performed.  COMPARISON:  [facility] , XR, XR HIP W OR WO PELVIS 2 OR 3 VIEWS, RIGHT (CPT=73502), 2/20/2024, 6:32 PM.  INDICATIONS:  fall with hip pain  PATIENT STATED HISTORY: (As transcribed by Technologist)  Josh had a recent fall and has pain to her right hip. Patient offered no additional history at this time.              CONCLUSION:   Intramedullary nail transfixes an intertrochanteric right femoral neck fracture; no evidence of hardware complication.  While osseous alignment is improved compared to 02/20/2024 there continues to be mild apex lateral angulation at the fracture site.  No traumatic dislocation.   Moderate osteoarthritis of bilateral hips.  Obturator rings are intact.  Normal sacroiliac joints.  Degenerative sclerosis of the pubic symphysis.  Lower lumbar spondylosis.  LOCATION:  [facility]   Dictated by (CST): Sam Velazquez MD on 6/06/2024 at 5:08 PM     Finalized by (CST): Sam Velazquez MD on 6/06/2024 at 5:10 PM       CT SPINE CERVICAL (CPT=72125)    Result Date: 6/6/2024  PROCEDURE:  CT SPINE CERVICAL (CPT=72125)  COMPARISON:  [facility] , CT, CT SPINE CERVICAL (CPT=72125), 6/12/2023, 9:07 AM.  INDICATIONS:  fall with head and neck pain  TECHNIQUE:  Noncontrast CT scanning of the cervical spine is performed from the skull base through C7.  Multiplanar reconstructions are generated.  Dose reduction techniques were used. Dose information is transmitted to the ACR (American College of Radiology) NRDR (National Radiology Data Registry) which includes the Dose Index Registry.  PATIENT STATED HISTORY: (As transcribed by Technologist)  Patient had an unwitnessed fall out of bed. Found on floor.     FINDINGS:  No acute fracture or traumatic malalignment of the cervical spine.  Straightening of the usual cervical lordosis may be positional or sequela of muscle strain.  Advanced disc height loss noted at the C5-6 and C6-7 levels with multilevel endplate osteophyte formation.  Disc osteophyte complex contributes to multilevel central canal stenosis, moderate at the C5-6 and C6-7 levels.  Facet and uncovertebral hypertrophy contribute to moderate multilevel foraminal stenosis.  Mild degenerative arthrosis of the atlantodental interval.  Lateral masses are aligned.  Odontoid process is intact.  No prevertebral edema.  No epidural or paraspinal collections.            CONCLUSION:   Cervical spondylosis without evidence of acute traumatic injury of the cervical spine    LOCATION:  [facility]   Dictated by (CST): Sam Velazquez MD on 6/06/2024 at 4:46 PM     Finalized by (CST): Sam Velazquez MD on 6/06/2024 at 4:50 PM       CT BRAIN OR HEAD (34345)    Result Date: 6/6/2024  PROCEDURE:  CT BRAIN OR HEAD (80396)  COMPARISON:  [facility] , CT, CT BRAIN OR HEAD (32200), 2/20/2024, 7:52 PM.  INDICATIONS:  fall with head and neck pain  TECHNIQUE:  Noncontrast CT scanning is performed through the brain. Dose reduction techniques were used. Dose information is transmitted to the ACR (American College of Radiology) NRDR (National Radiology Data Registry) which includes the Dose Index Registry.  PATIENT STATED HISTORY: (As transcribed by Technologist)  Patient had an unwitnessed fall out of bed. Found on floor.    FINDINGS:   VENTRICLES/SULCI: Diffuse sulcal and ventricular prominence concordant with age.  No hydrocephalus. INTRACRANIAL:  Negative for intracranial hemorrhage, mass effect, or acute large vessel transcortical infarct.  Streak artifact associated with metallic clips/coils of the left supraclinoid region.  Confluent periventricular white matter hypodensity most  in keeping with chronic microvascular ischemia.  Encephalomalacia  of the inferior left temporal lobe consistent with prior insult.  Intracranial atherosclerosis. SINUSES:           Paranasal sinuses and mastoid air cells are clear. SKULL:               Left frontal craniotomy.  No acute osseous findings. OTHER:               No scalp hematoma.            CONCLUSION:   1. Negative for acute intracranial process.    LOCATION:  [facility]   Dictated by (CST): Sam Velazquez MD on 6/06/2024 at 4:44 PM     Finalized by (CST): Sam Velazquez MD on 6/06/2024 at 4:46 PM      Discussed with family close follow-up with the primary MD, orthopedic surgery.

## 2024-06-06 NOTE — ED QUICK NOTES
Patient arrives via EMS from [facility]. Patient had an unwitnessed fall out of bed. Patient falls often, has a pad next to her lowered bed. Staff state the walked past, patient was in bed. 10 minutes later, she was on the pad next to her bed. Unsure if she hit her head. Unsure about LOC. Patient arrives in cervical collar. No complaints. Hx of dementia. Is oriented to her name, . Daughter called, state she's upset with [facility] because the patient sustained a fall there in February and fractured her right femur. \"Where is the staff there?\"

## 2024-11-05 ENCOUNTER — APPOINTMENT (OUTPATIENT)
Dept: CT IMAGING | Facility: HOSPITAL | Age: 79
End: 2024-11-05
Attending: EMERGENCY MEDICINE
Payer: MEDICARE

## 2024-11-05 ENCOUNTER — HOSPITAL ENCOUNTER (INPATIENT)
Facility: HOSPITAL | Age: 79
LOS: 2 days | Discharge: NURSING, REHAB OR LONG-TERM CARE | End: 2024-11-07
Attending: EMERGENCY MEDICINE | Admitting: HOSPITALIST
Payer: MEDICARE

## 2024-11-05 ENCOUNTER — APPOINTMENT (OUTPATIENT)
Dept: GENERAL RADIOLOGY | Facility: HOSPITAL | Age: 79
End: 2024-11-05
Attending: EMERGENCY MEDICINE
Payer: MEDICARE

## 2024-11-05 ENCOUNTER — APPOINTMENT (OUTPATIENT)
Dept: ULTRASOUND IMAGING | Facility: HOSPITAL | Age: 79
End: 2024-11-05
Attending: EMERGENCY MEDICINE
Payer: MEDICARE

## 2024-11-05 DIAGNOSIS — R53.1 WEAKNESS: Primary | ICD-10-CM

## 2024-11-05 DIAGNOSIS — R79.89 ELEVATED LFTS: ICD-10-CM

## 2024-11-05 DIAGNOSIS — N30.00 ACUTE CYSTITIS WITHOUT HEMATURIA: ICD-10-CM

## 2024-11-05 LAB
ALBUMIN SERPL-MCNC: 4.8 G/DL (ref 3.2–4.8)
ALBUMIN/GLOB SERPL: 2 {RATIO} (ref 1–2)
ALP LIVER SERPL-CCNC: 138 U/L
ALT SERPL-CCNC: 671 U/L
ANION GAP SERPL CALC-SCNC: 5 MMOL/L (ref 0–18)
AST SERPL-CCNC: 484 U/L (ref ?–34)
ATRIAL RATE: 74 BPM
BASOPHILS # BLD AUTO: 0.03 X10(3) UL (ref 0–0.2)
BASOPHILS NFR BLD AUTO: 0.5 %
BILIRUB SERPL-MCNC: 0.6 MG/DL (ref 0.2–1.1)
BILIRUB UR QL STRIP.AUTO: NEGATIVE
BUN BLD-MCNC: 30 MG/DL (ref 9–23)
CALCIUM BLD-MCNC: 10.1 MG/DL (ref 8.7–10.4)
CHLORIDE SERPL-SCNC: 107 MMOL/L (ref 98–112)
CLARITY UR REFRACT.AUTO: CLEAR
CO2 SERPL-SCNC: 27 MMOL/L (ref 21–32)
COLOR UR AUTO: YELLOW
CREAT BLD-MCNC: 0.81 MG/DL
EGFRCR SERPLBLD CKD-EPI 2021: 73 ML/MIN/1.73M2 (ref 60–?)
EOSINOPHIL # BLD AUTO: 0.03 X10(3) UL (ref 0–0.7)
EOSINOPHIL NFR BLD AUTO: 0.5 %
ERYTHROCYTE [DISTWIDTH] IN BLOOD BY AUTOMATED COUNT: 13 %
FLUAV + FLUBV RNA SPEC NAA+PROBE: NEGATIVE
FLUAV + FLUBV RNA SPEC NAA+PROBE: NEGATIVE
GLOBULIN PLAS-MCNC: 2.4 G/DL (ref 2–3.5)
GLUCOSE BLD-MCNC: 112 MG/DL (ref 70–99)
GLUCOSE BLD-MCNC: 92 MG/DL (ref 70–99)
GLUCOSE UR STRIP.AUTO-MCNC: NORMAL MG/DL
HAV IGM SER QL: NONREACTIVE
HBV CORE IGM SER QL: NONREACTIVE
HBV SURFACE AG SERPL QL IA: NONREACTIVE
HCT VFR BLD AUTO: 42.8 %
HCV AB SERPL QL IA: NONREACTIVE
HGB BLD-MCNC: 13.9 G/DL
IMM GRANULOCYTES # BLD AUTO: 0.02 X10(3) UL (ref 0–1)
IMM GRANULOCYTES NFR BLD: 0.3 %
KETONES UR STRIP.AUTO-MCNC: NEGATIVE MG/DL
LACTATE SERPL-SCNC: 1.5 MMOL/L (ref 0.5–2)
LEUKOCYTE ESTERASE UR QL STRIP.AUTO: 75
LYMPHOCYTES # BLD AUTO: 1.12 X10(3) UL (ref 1–4)
LYMPHOCYTES NFR BLD AUTO: 17.9 %
MCH RBC QN AUTO: 29.6 PG (ref 26–34)
MCHC RBC AUTO-ENTMCNC: 32.5 G/DL (ref 31–37)
MCV RBC AUTO: 91.1 FL
MONOCYTES # BLD AUTO: 0.49 X10(3) UL (ref 0.1–1)
MONOCYTES NFR BLD AUTO: 7.8 %
NEUTROPHILS # BLD AUTO: 4.56 X10 (3) UL (ref 1.5–7.7)
NEUTROPHILS # BLD AUTO: 4.56 X10(3) UL (ref 1.5–7.7)
NEUTROPHILS NFR BLD AUTO: 73 %
NITRITE UR QL STRIP.AUTO: NEGATIVE
OSMOLALITY SERPL CALC.SUM OF ELEC: 295 MOSM/KG (ref 275–295)
P AXIS: 82 DEGREES
P-R INTERVAL: 238 MS
PH UR STRIP.AUTO: 5.5 [PH] (ref 5–8)
PLATELET # BLD AUTO: 224 10(3)UL (ref 150–450)
POTASSIUM SERPL-SCNC: 3.9 MMOL/L (ref 3.5–5.1)
PROT SERPL-MCNC: 7.2 G/DL (ref 5.7–8.2)
PROT UR STRIP.AUTO-MCNC: 20 MG/DL
Q-T INTERVAL: 374 MS
QRS DURATION: 90 MS
QTC CALCULATION (BEZET): 415 MS
R AXIS: 67 DEGREES
RBC # BLD AUTO: 4.7 X10(6)UL
RBC UR QL AUTO: NEGATIVE
RSV RNA SPEC NAA+PROBE: NEGATIVE
SARS-COV-2 RNA RESP QL NAA+PROBE: NOT DETECTED
SODIUM SERPL-SCNC: 139 MMOL/L (ref 136–145)
SP GR UR STRIP.AUTO: >1.03 (ref 1–1.03)
T AXIS: 74 DEGREES
TROPONIN I SERPL HS-MCNC: 3 NG/L
UROBILINOGEN UR STRIP.AUTO-MCNC: 2 MG/DL
VENTRICULAR RATE: 74 BPM
WBC # BLD AUTO: 6.3 X10(3) UL (ref 4–11)

## 2024-11-05 PROCEDURE — 76700 US EXAM ABDOM COMPLETE: CPT | Performed by: EMERGENCY MEDICINE

## 2024-11-05 PROCEDURE — 71045 X-RAY EXAM CHEST 1 VIEW: CPT | Performed by: EMERGENCY MEDICINE

## 2024-11-05 PROCEDURE — 99223 1ST HOSP IP/OBS HIGH 75: CPT | Performed by: HOSPITALIST

## 2024-11-05 PROCEDURE — 70450 CT HEAD/BRAIN W/O DYE: CPT | Performed by: EMERGENCY MEDICINE

## 2024-11-05 RX ORDER — POLYETHYLENE GLYCOL 3350 17 G/17G
17 POWDER, FOR SOLUTION ORAL DAILY PRN
Status: DISCONTINUED | OUTPATIENT
Start: 2024-11-05 | End: 2024-11-07

## 2024-11-05 RX ORDER — FLUTICASONE PROPIONATE 50 MCG
2 SPRAY, SUSPENSION (ML) NASAL DAILY
Status: DISCONTINUED | OUTPATIENT
Start: 2024-11-06 | End: 2024-11-07

## 2024-11-05 RX ORDER — NICOTINE POLACRILEX 4 MG
30 LOZENGE BUCCAL
Status: DISCONTINUED | OUTPATIENT
Start: 2024-11-05 | End: 2024-11-07

## 2024-11-05 RX ORDER — NICOTINE POLACRILEX 4 MG
15 LOZENGE BUCCAL
Status: DISCONTINUED | OUTPATIENT
Start: 2024-11-05 | End: 2024-11-07

## 2024-11-05 RX ORDER — RISPERIDONE 0.25 MG/1
0.5 TABLET ORAL
Status: DISCONTINUED | OUTPATIENT
Start: 2024-11-06 | End: 2024-11-07

## 2024-11-05 RX ORDER — SODIUM PHOSPHATE, DIBASIC AND SODIUM PHOSPHATE, MONOBASIC 7; 19 G/230ML; G/230ML
1 ENEMA RECTAL ONCE AS NEEDED
Status: DISCONTINUED | OUTPATIENT
Start: 2024-11-05 | End: 2024-11-07

## 2024-11-05 RX ORDER — SENNOSIDES 8.6 MG
17.2 TABLET ORAL NIGHTLY PRN
Status: DISCONTINUED | OUTPATIENT
Start: 2024-11-05 | End: 2024-11-07

## 2024-11-05 RX ORDER — BISACODYL 10 MG
10 SUPPOSITORY, RECTAL RECTAL
Status: DISCONTINUED | OUTPATIENT
Start: 2024-11-05 | End: 2024-11-07

## 2024-11-05 RX ORDER — SODIUM CHLORIDE, SODIUM LACTATE, POTASSIUM CHLORIDE, CALCIUM CHLORIDE 600; 310; 30; 20 MG/100ML; MG/100ML; MG/100ML; MG/100ML
INJECTION, SOLUTION INTRAVENOUS CONTINUOUS
Status: DISCONTINUED | OUTPATIENT
Start: 2024-11-05 | End: 2024-11-07

## 2024-11-05 RX ORDER — MAGNESIUM OXIDE 400 MG (241.3 MG MAGNESIUM) TABLET
1000 TABLET DAILY
COMMUNITY

## 2024-11-05 RX ORDER — SODIUM CHLORIDE 9 MG/ML
INJECTION, SOLUTION INTRAVENOUS CONTINUOUS
Status: ACTIVE | OUTPATIENT
Start: 2024-11-05 | End: 2024-11-05

## 2024-11-05 RX ORDER — ENOXAPARIN SODIUM 100 MG/ML
40 INJECTION SUBCUTANEOUS DAILY
Status: DISCONTINUED | OUTPATIENT
Start: 2024-11-06 | End: 2024-11-07

## 2024-11-05 RX ORDER — ONDANSETRON 2 MG/ML
4 INJECTION INTRAMUSCULAR; INTRAVENOUS EVERY 6 HOURS PRN
Status: DISCONTINUED | OUTPATIENT
Start: 2024-11-05 | End: 2024-11-07

## 2024-11-05 RX ORDER — LISINOPRIL 10 MG/1
10 TABLET ORAL DAILY
Status: DISCONTINUED | OUTPATIENT
Start: 2024-11-06 | End: 2024-11-07

## 2024-11-05 RX ORDER — DEXTROSE MONOHYDRATE 25 G/50ML
50 INJECTION, SOLUTION INTRAVENOUS
Status: DISCONTINUED | OUTPATIENT
Start: 2024-11-05 | End: 2024-11-07

## 2024-11-05 RX ORDER — RISPERIDONE 0.25 MG/1
0.75 TABLET ORAL 2 TIMES DAILY
Status: DISCONTINUED | OUTPATIENT
Start: 2024-11-05 | End: 2024-11-07

## 2024-11-05 RX ORDER — ASCORBIC ACID 500 MG
1 TABLET ORAL
COMMUNITY

## 2024-11-05 NOTE — ED PROVIDER NOTES
Patient Seen in: [facility] Emergency Department      History     Chief Complaint   Patient presents with    Numbness Weakness     Stated Complaint: weakness    Subjective:   HPI      Patient is a 79-year-old female presents from the [facility] with reports of increased weakness.  Unclear how long this has been.  Difficulty swallowing this morning by report.  Patient is on help for the history.  History of Alzheimer's.  History of bipolar disorder.  Multiple medication.  No family immediately available.  Is awake and alert.  Will respond.  Not answering questions reliably.  Appears to move all extremities without focal weakness.    Objective:     Past Medical History:    Alzheimer's dementia with behavioral disturbance (HCC)    Anxiety state    Bipolar affective (HCC)    Cataract    Dementia (HCC)    Diabetes (HCC)    Elevated fasting lipid profile    Essential hypertension    Essential hypertension, benign    Falls    Gallstones and inflammation of gallbladder without obstruction    High blood pressure    High cholesterol    Muscle weakness    Neuropathy    Osteoarthritis    Psychosis, unspecified psychosis type ([facility])    Shortness of breath              Past Surgical History:   Procedure Laterality Date    Biopsy of breast, incisional      left breast bx during surgery    Colonoscopy N/A 2023    Procedure: UNPREPPED  COLONOSCOPY;  Surgeon: Fabrice Trejo MD;  Location:  ENDOSCOPY    Colonoscopy N/A 2024    Procedure: COLONOSCOPY WITH DECOMPRESSION;  Surgeon: Trudi Albright MD;  Location:  ENDOSCOPY    Other surgical history      aneurysm clipped x2                Social History     Socioeconomic History    Marital status:    Tobacco Use    Smoking status: Former     Current packs/day: 0.00     Types: Cigarettes     Quit date:      Years since quittin.8    Smokeless tobacco: Never   Vaping Use    Vaping status: Never Used   Substance and Sexual Activity     Alcohol use: No     Alcohol/week: 0.0 standard drinks of alcohol    Drug use: No   Other Topics Concern    Caffeine Concern Yes    Exercise No     Social Drivers of Health     Food Insecurity: No Food Insecurity (2/20/2024)    Food Insecurity     Food Insecurity: Never true   Transportation Needs: No Transportation Needs (2/20/2024)    Transportation Needs     Lack of Transportation: No   Housing Stability: Low Risk  (2/20/2024)    Housing Stability     Housing Instability: No                  Physical Exam     ED Triage Vitals [11/05/24 1247]   /89   Pulse 87   Resp 19   Temp 98.7 °F (37.1 °C)   Temp src Oral   SpO2 97 %   O2 Device None (Room air)       Current Vitals:   Vital Signs  BP: 136/63  Pulse: 70  Resp: 17  Temp: 98.7 °F (37.1 °C)  Temp src: Oral  MAP (mmHg): 83    Oxygen Therapy  SpO2: 94 %  O2 Device: None (Room air)        Physical Exam  General: Elderly chronically ill 79-year-old woman  HEENT: Normal cephalic atraumatic.  Nonicteric sclera.  Mildly dry mucous membranes.  No meningismus.  No adenopathy  Lungs: No tachypnea.  Lungs clear to auscultation bilaterally without rales/rhonchi.  Equal breath sounds bilaterally  Cardiac: No tachycardia.  No murmurs.  Regular rate and rhythm.  Abdomen: Soft and nontender throughout.  No rebound or guarding  Extremities: No clubbing/cyanosis/edema.  Skin: No rashes, no pallor  Neuro: Awake and will respond.  Appears to move all extremities.  Generalized weakness.  Nonfocal.  ED Course     Labs Reviewed   COMP METABOLIC PANEL (14) - Abnormal; Notable for the following components:       Result Value    Glucose 112 (*)     BUN 30 (*)      (*)      (*)     All other components within normal limits   URINALYSIS WITH CULTURE REFLEX - Abnormal; Notable for the following components:    Spec Gravity >1.030 (*)     Protein Urine 20 (*)     Urobilinogen Urine 2 (*)     Leukocyte Esterase Urine 75 (*)     WBC Urine 11-20 (*)     RBC Urine 3-5 (*)      Squamous Epi. Cells Few (*)     Ca Oxalate Crystals Few (*)     All other components within normal limits   LACTIC ACID, PLASMA - Normal   TROPONIN I HIGH SENSITIVITY - Normal   SARS-COV-2/FLU A AND B/RSV BY PCR (GENEXPERT) - Normal    Narrative:     This test is intended for the qualitative detection and differentiation of SARS-CoV-2, influenza A, influenza B, and respiratory syncytial virus (RSV) viral RNA in nasopharyngeal or nares swabs from individuals suspected of respiratory viral infection consistent with COVID-19 by their healthcare provider. Signs and symptoms of respiratory viral infection due to SARS-CoV-2, influenza, and RSV can be similar.    Test performed using the Xpert Xpress SARS-CoV-2/FLU/RSV (real time RT-PCR)  assay on the Fastrpert instrument, [employer], [place] [address].   This test is being used under the Food and Drug Administration's Emergency Use Authorization.    The authorized Fact Sheet for Healthcare Providers for this assay is available upon request from the laboratory.   CBC WITH DIFFERENTIAL WITH PLATELET   BLOOD CULTURE   BLOOD CULTURE   URINE CULTURE, ROUTINE     EKG    Rate, intervals and axes as noted on EKG Report.  Rate: 74  Rhythm: Sinus Rhythm  Reading: Normal sinus rhythm.  No acute ST-T wave changes.  Axis/intervals are noted.  Otherwise, agree with EKG report            CT brain: I personally reviewed the films and my independent interpertaion showed no acute hemorrhage.  Chronic changes.  Stable appearance.  Microvascular disease with areas of superimposed infarct.    Chest x-ray: I personally reviewed the films and my independent interpertaion showed no acute pathology.  Official report reviewed and negative.  COVID flu RSV all negative.  Cath UA consistent with a probable infection.  11-20 white cells.  Few squamous cells.  Will send for culture.  Lactate normal.  White count normal.  Troponin negative.  Chemistry reviewed.  AST ALT elevated.  COVID, flu negative.    Head CT: No acute pathology.  Official report reviewed  MDM      Generalized weakness in an 79-year-old woman with a history of Alzheimer's, bipolar, multiple medications.  Afebrile here.  Differential would be sepsis, UTI, electrolyte abnormality, medication reaction, stroke, other.  Will check head CT, chest x-ray, blood work.  Will check COVID and flu.  Will reassess after blood work and imaging    Admission disposition: 11/5/2024  5:00 PM       Blood work imaging reviewed.  Will check CT. CT is negative for acute pathology.    Discussed with daughter.  Patient is a DNR selective care.  This is significant change from his normal mental status.  She usually well-recognized her daughter and talk and is ambulatory.  Given this we will admit.  Possible urosepsis.  Continue IV fluids, antibiotics.  Discussed with hospitalist.  Medical Decision Making      Disposition and Plan     Clinical Impression:  1. Weakness    2. Acute cystitis without hematuria         Disposition:  Admit  11/5/2024  5:00 pm    Follow-up:  Tate Tovar MD  [address] [facility] [id number]  [phone]    Follow up in 3 day(s)            Medications Prescribed:  Current Discharge Medication List              Supplementary Documentation:         Hospital Problems       Present on Admission  Date Reviewed: 3/12/2023            ICD-10-CM Noted POA    * (Principal) Weakness R53.1 11/5/2024 Unknown

## 2024-11-05 NOTE — ED INITIAL ASSESSMENT (HPI)
BIBA from the [facility]. Per EMS, pt has been increasingly weak and lethargic, per staff does not know how long she has been weak. Per EMS, pt normally A&Ox2, alert only to self on arrival. Pt is wheelchair bound.

## 2024-11-05 NOTE — DISCHARGE INSTRUCTIONS
Suggest IV Rocephin for 3 days transitioning to Keflex 500 3 times daily.  Return if worsening symptoms, fevers, new complaints.

## 2024-11-05 NOTE — ED QUICK NOTES
Orders for admission, patient is aware of plan and ready to go upstairs. Any questions, please call ED RN [phone] at extension balwinder.     Patient Covid vaccination status: Fully vaccinated     COVID Test Ordered in ED: SARS-CoV-2/Flu A and B/RSV by PCR (GeneXpert)    COVID Suspicion at Admission: N/A    Running Infusions:  None    Mental Status/LOC at time of transport: x0    Other pertinent information:   CIWA score: N/A   NIH score:  N/A

## 2024-11-06 PROBLEM — A04.72 C. DIFFICILE DIARRHEA: Status: ACTIVE | Noted: 2024-11-06

## 2024-11-06 PROBLEM — A04.72 C. DIFFICILE DIARRHEA: Status: ACTIVE | Noted: 2024-01-01

## 2024-11-06 LAB
ALBUMIN SERPL-MCNC: 3.4 G/DL (ref 3.2–4.8)
ALBUMIN/GLOB SERPL: 1.5 {RATIO} (ref 1–2)
ALP LIVER SERPL-CCNC: 91 U/L
ALT SERPL-CCNC: 341 U/L
ANION GAP SERPL CALC-SCNC: 7 MMOL/L (ref 0–18)
AST SERPL-CCNC: 156 U/L (ref ?–34)
BILIRUB SERPL-MCNC: 0.5 MG/DL (ref 0.2–1.1)
BUN BLD-MCNC: 24 MG/DL (ref 9–23)
C DIFF GDH + TOXINS A+B STL QL IA.RAPID: NOT DETECTED
C DIFF TOX B STL QL: POSITIVE
CALCIUM BLD-MCNC: 8.8 MG/DL (ref 8.7–10.4)
CHLORIDE SERPL-SCNC: 109 MMOL/L (ref 98–112)
CO2 SERPL-SCNC: 25 MMOL/L (ref 21–32)
CREAT BLD-MCNC: 0.55 MG/DL
EGFRCR SERPLBLD CKD-EPI 2021: 93 ML/MIN/1.73M2 (ref 60–?)
EST. AVERAGE GLUCOSE BLD GHB EST-MCNC: 117 MG/DL (ref 68–126)
GLOBULIN PLAS-MCNC: 2.3 G/DL (ref 2–3.5)
GLUCOSE BLD-MCNC: 118 MG/DL (ref 70–99)
GLUCOSE BLD-MCNC: 158 MG/DL (ref 70–99)
GLUCOSE BLD-MCNC: 81 MG/DL (ref 70–99)
GLUCOSE BLD-MCNC: 97 MG/DL (ref 70–99)
GLUCOSE BLD-MCNC: 98 MG/DL (ref 70–99)
HBA1C MFR BLD: 5.7 % (ref ?–5.7)
OSMOLALITY SERPL CALC.SUM OF ELEC: 296 MOSM/KG (ref 275–295)
POTASSIUM SERPL-SCNC: 3.1 MMOL/L (ref 3.5–5.1)
PROT SERPL-MCNC: 5.7 G/DL (ref 5.7–8.2)
SODIUM SERPL-SCNC: 141 MMOL/L (ref 136–145)

## 2024-11-06 PROCEDURE — 99232 SBSQ HOSP IP/OBS MODERATE 35: CPT | Performed by: HOSPITALIST

## 2024-11-06 RX ORDER — POTASSIUM CHLORIDE 750 MG/1
10 TABLET, EXTENDED RELEASE ORAL
COMMUNITY

## 2024-11-06 RX ORDER — GLUC/MSM/COLGN2/HYAL/ANTIARTH3 375-375-20
1 TABLET ORAL 2 TIMES DAILY
COMMUNITY

## 2024-11-06 RX ORDER — VANCOMYCIN HYDROCHLORIDE 125 MG/1
125 CAPSULE ORAL 4 TIMES DAILY
Status: DISCONTINUED | OUTPATIENT
Start: 2024-11-06 | End: 2024-11-07

## 2024-11-06 RX ORDER — POTASSIUM CHLORIDE 14.9 MG/ML
20 INJECTION INTRAVENOUS ONCE
Status: COMPLETED | OUTPATIENT
Start: 2024-11-06 | End: 2024-11-06

## 2024-11-06 RX ORDER — DEXTROSE MONOHYDRATE 50 MG/ML
INJECTION, SOLUTION INTRAVENOUS CONTINUOUS
Status: DISCONTINUED | OUTPATIENT
Start: 2024-11-06 | End: 2024-11-07

## 2024-11-06 NOTE — SLP NOTE
ADULT SWALLOWING EVALUATION    ASSESSMENT    ASSESSMENT/OVERALL IMPRESSION:  Patient seen for swallowing evaluation as patient failed RN dysphagia screen on admission. Patient presented to ED due to increased weakness. There was also report of difficulty swallowing the morning of 11/5. She has a history of Alzheimer's and bipolar disorder. She is known to this department from hospitalization in Feb of this year with resulting recommendation for soft and bites size solids and thin liquids with 1:1 feeding assistance and observation of aspiration precautions. Paperwork from transferring facility indicates patient diet prescription as \"mechanical soft texture, Regular (Thin) consistency, PUREE SIDES; patient allowed to have grilled cheese, hamburgers, and hot dogs IF fully alert and cut into small pieces; 1:1 feeding (Encourage patient to feed self however offer assistance when needed)\". This morning, the patient is alert, pleasant and cooperative. She followed simple commands well.    Oral mechanism exam was grossly unremarkable. Patient with intact oral retrieval and containment in self presenting solids and thin liquids. Oral prep and transit were adequate. SLP did structure availability of trials to avoid impulsivity with respect to rate of intake as she was noted to self present large bites. Mastication was adequate and with complete oral clearance. Laryngeal excursion judged to be of adequate strength and timeliness to palpation. There were no overt signs of aspiration throughout.     Recommend advance to soft and bite size solids and thin liquids with observation of aspiration precautions as well as 1:1 assistance/encouragement to self feed and providing assistance as needed consistent with order from transferring facility. Discussed with RN.          RECOMMENDATIONS   Diet Recommendations - Solids: Mechanical soft chopped/ Soft & Bite Sized  Diet Recommendations - Liquids: Thin Liquids                         Compensatory Strategies Recommended: Slow rate;Small bites and sips;Alternate consistencies (1:1 feeding assistance)  Aspiration Precautions: Upright position;Slow rate;Small bites and sips (1:1 feeding assistance)  Medication Administration Recommendations: One pill at a time (with thin vs puree as tolerated)  Treatment Plan/Recommendations: Aspiration precautions    HISTORY   MEDICAL HISTORY  Reason for Referral: Altered diet consistency    Problem List  Principal Problem:    Weakness  Active Problems:    Acute cystitis without hematuria      Past Medical History  Past Medical History:    Alzheimer's dementia with behavioral disturbance (HCC)    Anxiety state    Bipolar affective (HCC)    Cataract    Dementia (HCC)    Diabetes (HCC)    Elevated fasting lipid profile    Essential hypertension    Essential hypertension, benign    Falls    Gallstones and inflammation of gallbladder without obstruction    High blood pressure    High cholesterol    Muscle weakness    Neuropathy    Osteoarthritis    Psychosis, unspecified psychosis type ([facility])    Shortness of breath       Prior Living Situation: Skilled nursing facility  Diet Prior to Admission:  (mechanical soft texture, Regular (Thin) consistency, PUREE SIDES; patient allowed to have grilled cheese, hamburgers, and hot dogs IF fully alert and cut into small pieces; 1:1 feeding (Encourage patient to feed self however offer assistance when needed))  Precautions: Aspiration    Patient/Family Goals: none stated    SWALLOWING HISTORY  Current Diet Consistency: NPO  Dysphagia History: as above  Imaging Results:   CXR from 11/5/24 revealed:  Impression   CONCLUSION:  No acute radiographic findings.        LOCATION[facility]  [facility]                 Dictated by (CST): Ronald Gutierrez MD on 11/05/2024 at 1:55 PM      Finalized by (CST): Ronald Gutierrez MD on 11/05/2024 at 1:56 PM         SUBJECTIVE       OBJECTIVE   ORAL MOTOR EXAMINATION  Dentition: Functional  Symmetry: Within  Functional Limits  Strength: Within Functional Limits  Tone: Within Functional Limits  Range of Motion: Within Functional Limits  Rate of Motion: Within Functional Limits    Voice Quality: Clear  Respiratory Status: Unlabored  Consistencies Trialed: Thin liquids;Hard solid  Method of Presentation: Self presentation;Straw;Consecutive swallows  Patient Positioning: Upright;Midline (rotated to R)    Oral Phase of Swallow: Within Functional Limits                      Pharyngeal Phase of Swallow: Within Functional Limits           (Please note: Silent aspiration cannot be evaluated clinically. Videofluoroscopic Swallow Study is required to rule-out silent aspiration.)    Esophageal Phase of Swallow: No complaints consistent with possible esophageal involvement              GOALS  Goal #1 The patient will tolerate soft and bite size consistency and thin liquids without overt signs or symptoms of aspiration with 100 % accuracy over 1-2 session(s).  In Progress   Goal #2 The patient/family/caregiver will demonstrate understanding and implementation of aspiration precautions and swallow strategies independently over 1-2 session(s).    In Progress     FOLLOW UP  Treatment Plan/Recommendations: Aspiration precautions  Number of Visits to Meet Established Goals: 2  Follow Up Needed (Documentation Required): Yes  SLP Follow-up Date: 11/07/24    Thank you for your referral.   If you have any questions, please contact Contreras Dos Santos MS CCC-SLP  Pager [phone]

## 2024-11-06 NOTE — OCCUPATIONAL THERAPY NOTE
OCCUPATIONAL THERAPY EVALUATION - INPATIENT     Room Number: 380/380-A  Evaluation Date: 11/6/2024  Type of Evaluation: Initial  Presenting Problem: weakness, acute cystitis    Physician Order: IP Consult to Occupational Therapy  Reason for Therapy: ADL/IADL Dysfunction and Discharge Planning    OCCUPATIONAL THERAPY ASSESSMENT   Patient is currently functioning near baseline with toileting, bathing, upper body dressing, lower body dressing, grooming, eating, bed mobility, transfers, static standing balance, and dynamic standing balance. Prior to admission, patient's baseline is 1-2 person assist for bed to/from WC transfers, 1 person assist for ambulation with WC follow; 1 person assist for toileting, bathing, dressing.  Patient is requiring maximum assistance as a result of the following impairments: decreased functional reach, impaired sitting and standing balance, cognitive deficits (dementia, impaired memory and problem solving), and decreased insight to deficits. Occupational Therapy will continue to follow for duration of hospitalization.    Patient will benefit from continued skilled OT Services at discharge to promote prior level of function.  Anticipate patient will return home with home health OT    History Related to Current Admission: Patient is a 79 year old female admitted on 11/5/2024 with Presenting Problem: weakness, acute cystitis. Co-Morbidities : dementia, bipolar, DM, h/o R hip IM nail    WEIGHT BEARING RESTRICTION       Recommendations for nursing staff:   Transfers: citlali steady  Toileting location: bed level   OR  bedside commode with use of lift    EVALUATION SESSION:  Patient Start of Session: supine    FUNCTIONAL TRANSFER ASSESSMENT  Sit to Stand: Edge of Bed  Edge of Bed: Maximum Assist    BED MOBILITY  Supine to Sit : Maximum Assist  Sit to Supine (OT): Dependent    BALANCE ASSESSMENT     FUNCTIONAL ADL ASSESSMENT  Eating: Not Tested (per SLP, able to bring cup and food to mouth)  LB  Dressing Seated: Maximum Assist  LB Dressing Standing: Maximum Assist    ACTIVITY TOLERANCE: stable on room air. BP 130s over 60s at edge of bed                         O2 SATURATIONS       COGNITION  Arousal/Alertness:  appropriate responses to stimuli  Attention Span:  difficulty dividing attention  Orientation Level:  oriented to person  Memory:  impaired working memory, decreased recall of biographical information, and decreased short term memory  Following Commands:  follows one step commands without difficulty  Initiation: cues to initiate tasks  Awareness of Errors:  decreased awareness of errors   Problem Solving:  assistance required to identify errors made, assistance required to generate solutions, and assistance required to implement solutions    Upper Extremity   ROM: within functional limits   Strength: within functional limits   Coordination  Gross motor: wfl  Fine motor: wfl  Sensation: no evidence of impairment    EDUCATION PROVIDED  Patient Education : Plan of Care; Functional Transfer Techniques; Role of Occupational Therapy  Patient's Response to Education: Demonstrates Poor Carry Over to Information    Equipment used: gait belt, RW  Demonstrates functional use, Would benefit from additional trial yes     Therapist comments:   Patient noted to have IV out upon arrival to room. Nurse updated. OT educated patient on role and session goals. Patient assisted with bed mobility . Tendency for posterior lean in sitting.  Patient assisted to standing at RW x 5 with MAX A x2 improved to MOD A x2 . Patient was assisted with sit to supine and with boosting up in bed with MAX A/DEP A x2. RN aware of session.     Patient End of Session: All patient questions and concerns addressed;SCDs in place;RN aware of session/findings;Call light within reach;Needs met;In bed;Alarm set;Hospital anti-slip socks    OCCUPATIONAL PROFILE    HOME SITUATION  Type of Home: Skilled nursing facility  Home Layout: One level  Lives  With: Staff 24 hours                     Drives: No       Prior Level of Function: Per writer call to LTC; patient is in WC most of the day; she can feed self after set-up, is assisted with 1 person for toilet transfers, 1 person with toileting, bathing, dressing; 1 person and gait belt for ambulation with RW and WC follow; 1-2 people for transfers in/out of bed. Has been resident at LT for approx 2 years.     SUBJECTIVE   \"Home\"  ( when asked if she is at airport or hospital )    PAIN ASSESSMENT  Ratin          OBJECTIVE  Precautions: Bed/chair alarm  Fall Risk: High fall risk    ASSESSMENTS    AM-PAC ‘6-Clicks’ Inpatient Daily Activity Short Form  -   Putting on and taking off regular lower body clothing?: A Lot  -   Bathing (including washing, rinsing, drying)?: A Lot  -   Toileting, which includes using toilet, bedpan or urinal? : A Lot  -   Putting on and taking off regular upper body clothing?: A Lot  -   Taking care of personal grooming such as brushing teeth?: A Lot  -   Eating meals?: A Little    AM-PAC Score:  Score: 13  Approx Degree of Impairment: 63.03%  Standardized Score (AM-PAC Scale): 32.03    ADDITIONAL TESTS     NEUROLOGICAL FINDINGS      COGNITION ASSESSMENTS     PLAN     OT Treatment Plan: Cognitive reorientation;Endurance training;UE strengthening/ROM;Functional transfer training;ADL training;Balance activities;Patient/Family education;Patient/Family training;Equipment eval/education  Rehab Potential : Fair  Frequency: 3x/week  Number of Visits to Meet Established Goals: 5    ADL Goals   Patient will perform grooming: with setup, with stand by assist, and with cues  Patient will perform lower body dressing:  with max assist  Patient will perform toileting: with max assist    Functional Transfer Goals  Patient will transfer to toilet:  with min assist    Patient Evaluation Complexity Level:   Occupational Profile/Medical History MODERATE - Expanded review of history including review of  medical or therapy record   Specific performance deficits impacting engagement in ADL/IADL MODERATE  3 - 5 performance deficits   Client Assessment/Performance Deficits LOW - No comorbidities nor modifications of tasks    Clinical Decision Making LOW - Analysis of occupational profile, problem-focused assessments, limited treatment options    Overall Complexity LOW     OT Session Time: 15 minutes    Therapeutic Activity: 13 minutes

## 2024-11-06 NOTE — PHYSICAL THERAPY NOTE
PHYSICAL THERAPY EVALUATION - INPATIENT     Room Number: 380/380-A  Evaluation Date: 11/6/2024  Type of Evaluation: Initial  Physician Order: PT Eval and Treat    Presenting Problem: weakness, UTI  Co-Morbidities : dementia, bipolar, DM, h/o R hip IM nail  Reason for Therapy: Mobility Dysfunction and Discharge Planning    PHYSICAL THERAPY ASSESSMENT   Patient is a 79 year old female admitted 11/5/2024 for weakness and lethargy. Prior to admission, patient's baseline is 1 person assist.    Patient is currently functioning near baseline with bed mobility and transfers.  Patient is requiring moderate assist as a result of the following impairments: decreased functional strength, impaired standing balance, and cognitive deficits (memory impairments, decreased insight into impairments).  Physical Therapy will continue to follow for duration of hospitalization.    Patient will benefit from continued skilled PT Services during admission and at return to LTC facility.     PLAN DURING HOSPITALIZATION        PT Treatment Plan: Bed mobility;Endurance;Energy conservation;Gait training;Strengthening;Transfer training  Rehab Potential : Fair  Frequency (Obs): 3-5x/week     CURRENT GOALS    Goal #1 Patient is able to demonstrate supine - sit EOB @ level: moderate assistance     Goal #2 Patient is able to demonstrate transfers EOB to/from C at assistance level: moderate assistance     Goal #3      Goal #4    Goal #5    Goal #6    Goal Comments: Goals established on 11/6/2024      HOME SITUATION  Type of Home: Skilled nursing facility  Home Layout: One level                     Lives With: Staff 24 hours    Drives: No          Prior Level of [place]: Pt is a LTC resident from [facility]. OT spoke with RN at [facility] to confirm patients baseline functional level. Pt has 1 person assist for bed mobility and transfers to W/C. Pt ambulates with PT staff a few times per week with RW and chair follow. Pts daughter  visits often.     SUBJECTIVE  \"Im 33.\" When asked her age    OBJECTIVE  Precautions: Bed/chair alarm  Fall Risk: High fall risk    WEIGHT BEARING RESTRICTION     PAIN ASSESSMENT  Ratin          COGNITION  Orientation Level:  oriented to person, disoriented to place, disoriented to time, and disoriented to situation  Memory:  decreased long term memory and decreased short term memory  Following Commands:  follows one step commands with increased time and follows one step commands with repetition  Awareness of Errors:  decreased awareness of errors   Awareness of Deficits:  decreased awareness of deficits    RANGE OF MOTION AND STRENGTH ASSESSMENT  Upper extremity ROM and strength are within functional limits     Lower extremity ROM is within functional limits     Lower extremity strength is within functional limits     BALANCE  Static Sitting: Fair +  Dynamic Sitting: Fair  Static Standing: Poor  Dynamic Standing: Poor    ADDITIONAL TESTS                                    ACTIVITY TOLERANCE                         O2 WALK       NEUROLOGICAL FINDINGS                        AM-PAC '6-Clicks' INPATIENT SHORT FORM - BASIC MOBILITY  How much difficulty does the patient currently have...  Patient Difficulty: Turning over in bed (including adjusting bedclothes, sheets and blankets)?: A Lot   Patient Difficulty: Sitting down on and standing up from a chair with arms (e.g., wheelchair, bedside commode, etc.): A Lot   Patient Difficulty: Moving from lying on back to sitting on the side of the bed?: A Lot   How much help from another person does the patient currently need...   Help from Another: Moving to and from a bed to a chair (including a wheelchair)?: A Lot   Help from Another: Need to walk in hospital room?: Total   Help from Another: Climbing 3-5 steps with a railing?: Total     AM-PAC Score:  Raw Score: 10   Approx Degree of Impairment: 76.75%   Standardized Score (AM-PAC Scale): 32.29   CMS Modifier (G-Code):  CL    FUNCTIONAL ABILITY STATUS  Gait Assessment   Functional Mobility/Gait Assessment  Gait Assistance: Not tested    Skilled Therapy Provided   MAX assist to roll to L.   VC for UE/LE placement.   MOD assist for trunk support with supine-sit.   VC for midline orientation.   Pt impulsive with standing/sitting.   Sit-stand x 5 reps with RW and MOD assist for force generation/balance.   VC for posture and knee/hip extension in standing.   Tolerated standing for 10-15 sec each rep with MIN/MOD assist for balance.   Returned to sitting bedside impulsively.   Pt unable to take side steps.  Returned to supine in bed with MAX assist for LE/trunk support.   Assisted with repositioning.     Bed Mobility:  Rolling: MIN to R/MAX to L  Supine to sit: MOD   Sit to supine: MAX     Transfer Mobility:  Sit to stand: MOD   Stand to sit: MOD   Gait = NT    Therapist's Comments: Reviewed POC and activity recommendations.     Exercise/Education Provided:  Bed mobility  Energy conservation  Functional activity tolerated  Gait training  Posture  Strengthening  Transfer training    Patient End of Session: In bed;Needs met;Call light within reach;RN aware of session/findings;All patient questions and concerns addressed;Alarm set      Patient Evaluation Complexity Level:  History High - 3 or more personal factors and/or co-morbidities   Examination of body systems Moderate - addressing a total of 3 or more elements   Clinical Presentation  Moderate - Evolving   Clinical Decision Making Moderate Complexity       PT Session Time: 20 minutes  Gait Training:  minutes  Therapeutic Activity:  10 minutes  Neuromuscular Re-education:  minutes  Therapeutic Exercise:  minutes

## 2024-11-06 NOTE — ED QUICK NOTES
Patient daughter was informed patient is nothing to eat or drink by mouth at this time d/t choking hazard.

## 2024-11-06 NOTE — PROGRESS NOTES
Patient admitted via bed from ED. Oriented to room. Safety precautions initiated. Call light in reach. Two person skin checked with [employer] & intact.

## 2024-11-06 NOTE — PLAN OF CARE
Patient is lethargic & oriented to self. Vitals stable on room air. Denies pain. Pureed diet. Plan for IV abx, PT/OT, and speech to see. Plan of care discussed with patient.

## 2024-11-06 NOTE — CM/SW NOTE
Pt is LTC resident at [facility]. Updates sent via [employer].     GUSTAVO Ingram RN, CM  [phone]

## 2024-11-06 NOTE — H&P
[facility]     Kierra Joshua Patient Status:  Emergency    1945 MRN [id number]   Location [facility] EMERGENCY DEPARTMENT Attending Vern Knapp MD   Hosp Day # 0 PCP Tate Tovar     Chief Complaint: generalized weakness    Subjective:    History of Present Illness:     Kierra Joshua is a 79 year old female with medical history of dementia, DM type II, hypertension, bipolar and hyperlipidemia who present from the [facility] for evaluation of generalized weakness.  Her daughter is present at bedside and is giving history.  She reports her mom has dementia and whenever she gets a UTI she gets franco confused and weak.  She was noted about a week ago to not be right per her daughter who sees her daily.  She had a urine test and was told she had bacteria but not enough for a UTI and was not treated with any antibiotics .  Her symptoms progressed and now she is not eating and so she was sent to the ER for further evaluation    History/Other:    Past Medical History:  Past Medical History:    Alzheimer's dementia with behavioral disturbance (HCC)    Anxiety state    Bipolar affective (HCC)    Cataract    Dementia (HCC)    Diabetes (HCC)    Elevated fasting lipid profile    Essential hypertension    Essential hypertension, benign    Falls    Gallstones and inflammation of gallbladder without obstruction    High blood pressure    High cholesterol    Muscle weakness    Neuropathy    Osteoarthritis    Psychosis, unspecified psychosis type (HCC)    Shortness of breath     Past Surgical History:   Past Surgical History:   Procedure Laterality Date    Biopsy of breast, incisional      left breast bx during surgery    Colonoscopy N/A 2023    Procedure: UNPREPPED  COLONOSCOPY;  Surgeon: Fabrice Trejo MD;  Location:  ENDOSCOPY    Colonoscopy N/A 2024    Procedure: COLONOSCOPY WITH DECOMPRESSION;  Surgeon: Trudi Albright MD;  Location:   ENDOSCOPY    Other surgical history  2001    aneurysm clipped x2      Family History:   History reviewed. No pertinent family history.  Social History:    reports that she quit smoking about 23 years ago. Her smoking use included cigarettes. She has never used smokeless tobacco. She reports that she does not drink alcohol and does not use drugs.     Allergies: Allergies[1]    Medications:  Medications Ordered Prior to Encounter[2]    Review of Systems:   A comprehensive review of systems was completed.    Pertinent positives and negatives noted in the HPI.    Objective:   Physical Exam:    /63   Pulse 66   Temp 98.7 °F (37.1 °C) (Oral)   Resp 15   SpO2 98%   General: No acute distress, Alert  Respiratory: No rhonchi, no wheezes  Cardiovascular: S1, S2. Regular rate and rhythm  Abdomen: Soft, Non-tender, non-distended, positive bowel sounds  Neuro: No new focal deficits  Extremities: No edema      Results:    Labs:      Labs Last 24 Hours:    Recent Labs   Lab 11/05/24  1313   RBC 4.70   HGB 13.9   HCT 42.8   MCV 91.1   MCH 29.6   MCHC 32.5   RDW 13.0   NEPRELIM 4.56   WBC 6.3   .0       Recent Labs   Lab 11/05/24  1313   *   BUN 30*   CREATSERUM 0.81   EGFRCR 73   CA 10.1   ALB 4.8      K 3.9      CO2 27.0   ALKPHO 138   *   *   BILT 0.6   TP 7.2       Lab Results   Component Value Date    INR 1.06 02/20/2024       Recent Labs   Lab 11/05/24  1313   TROPHS 3       No results for input(s): \"TROP\", \"PBNP\" in the last 168 hours.    No results for input(s): \"PCT\" in the last 168 hours.    Imaging: Imaging data reviewed in Epic.    Assessment & Plan:      #Generalized weakness  #UTI  -empiric Abx  -follow Cx results    #Elevated LFTs  -trend  -US with gallstones without obsttruction  -hepatitis panel  -GI consult    #Hypertension  #Hyperlipidemia    #Dementia  #bipolar  -resume PTA meds    #DM type II  -hyperglycemia protocol  -check Hba1c  -hold oral meds    Plan of care  discussed with patient and daughter    Lori Reyes MD    Supplementary Documentation:     The 21st Century Cures Act makes medical notes like these available to patients in the interest of transparency. Please be advised this is a medical document. Medical documents are intended to carry relevant information, facts as evident, and the clinical opinion of the practitioner. The medical note is intended as peer to peer communication and may appear blunt or direct. It is written in medical language and may contain abbreviations or verbiage that are unfamiliar.                                       [1] No Known Allergies  [2]   No current facility-administered medications on file prior to encounter.     Current Outpatient Medications on File Prior to Encounter   Medication Sig Dispense Refill    Sennosides 17.2 MG Oral Tab Take 2 tablets (34.4 mg total) by mouth daily as needed (constipation).      cyanocobalamin 500 MCG Oral Tab Take 2 tablets (1,000 mcg total) by mouth daily.      ASCORBIC ACID OR Take 1 tablet by mouth once daily.      acetaminophen 500 MG Oral Tab Take 1 tablet (500 mg total) by mouth every 4 (four) hours as needed. (Patient taking differently: Take 1 tablet (500 mg total) by mouth every 4 (four) hours as needed for Pain.) 90 tablet 0    lisinopril 10 MG Oral Tab Take 1 tablet (10 mg total) by mouth daily. Hold for SBP < 130      traMADol 50 MG Oral Tab Take 1 tablet (50 mg total) by mouth 2 (two) times daily as needed for Pain. 30 tablet 0    FLEET ENEMA 7-19 GM/118ML Rectal Enema Place 1 enema (118 mL total) rectally daily as needed (CONSTIPTAION).      Cranberry 425 MG Oral Cap Take 1 tablet by mouth once daily.      Melatonin 5 MG Oral Tab Take 1 tablet (5 mg total) by mouth at bedtime.      magnesium hydroxide (MILK OF MAGNESIA) 400 MG/5ML Oral Suspension Take 5 mL by mouth daily as needed for constipation.      Multiple Vitamins-Minerals (MULTI-VITAMIN/MINERALS) Oral Tab Take 1 tablet by  mouth daily.      bisacodyl 10 MG Rectal Suppos Place 1 suppository (10 mg total) rectally Q24H PRN (constipation).      fluticasone propionate 50 MCG/ACT Nasal Suspension 2 sprays by Each Nare route daily.      bismuth subsalicylate (PEPTO-BISMOL) 262 MG/15ML Oral Suspension Take 30 mL (524 mg total) by mouth every 6 (six) hours as needed for Indigestion.      polyethylene glycol, PEG 3350, 17 g Oral Powd Pack Take 17 g by mouth daily. 30 each 0    meclizine 25 MG Oral Tab Take 1 tablet (25 mg total) by mouth every 8 (eight) hours as needed.      Potassium Chloride ER 10 MEQ Oral Tab CR Take 1 tablet (10 mEq total) by mouth once daily.      risperiDONE 0.25 MG Oral Tab Take 2 tablets (0.5 mg total) by mouth Noon.      furosemide 20 MG Oral Tab Take 1 tablet (20 mg total) by mouth Every Monday, Wednesday, and Friday.      glipiZIDE 5 MG Oral Tab Take 1 tablet (5 mg total) by mouth every morning before breakfast.      risperiDONE 0.25 MG Oral Tab Take 3 tablets (0.75 mg total) by mouth 2 (two) times daily.

## 2024-11-06 NOTE — PROGRESS NOTES
[facility]   part of [facility]     Hospitalist Progress Note     Kierra Joshua Patient Status:  Inpatient    1945 MRN [id number]   Location [facility] 3SW-A Attending Jose Shah MD   Hosp Day # 1 PCP Tate Tovar     Subjective:   Confused, agitated     Objective:    Review of Systems:   A comprehensive review of systems was completed; pertinent positive and negatives stated in subjective.  Vital signs:  Temp:  [97.9 °F (36.6 °C)-99 °F (37.2 °C)] 98.8 °F (37.1 °C)  Pulse:  [66-87] 72  Resp:  [14-19] 14  BP: (102-136)/(30-89) 107/43  SpO2:  [91 %-99 %] 97 %  Physical Exam:    General: No acute distress Confused, agitated lost IV site    Respiratory: no wheezes, no rhonchi  Cardiovascular: S1, S2, RRR  Abdomen: Soft, NT/ND, +BS  Extremities: no edema    Diagnostic Data:    Labs:  Recent Labs   Lab 24  1313   WBC 6.3   HGB 13.9   MCV 91.1   .0     Recent Labs   Lab 24  1313   *   BUN 30*   CREATSERUM 0.81   CA 10.1   ALB 4.8      K 3.9      CO2 27.0   ALKPHO 138   *   *   BILT 0.6   TP 7.2     Estimated Creatinine Clearance: 49.8 mL/min (based on SCr of 0.81 mg/dL).  No results for input(s): \"PTP\", \"INR\" in the last 168 hours.     Microbiology  No results found for this visit on 24.  Imaging: Reviewed in Epic.  Medications:    fluticasone propionate  2 spray Each Nare Daily    lisinopril  10 mg Oral Daily    melatonin  5 mg Oral Nightly    risperiDONE  0.75 mg Oral BID    risperiDONE  0.5 mg Oral Noon    cefTRIAXone  1 g Intravenous Q24H    enoxaparin  40 mg Subcutaneous Daily    insulin aspart  1-10 Units Subcutaneous TID AC and HS       Assessment & Plan:    #Generalized weakness  #UTI  -empiric Abx  -follow Cx results    #Cdiff diarrhea- start oral vanc      #Elevated LFTs- trending down nicely    -US with gallstones without obsttruction  -hepatitis panel unimpressive   -GI consulted     #Hypertension  #Hyperlipidemia      #Dementia with behavioral disturbances  #Bipolar  -Resume PTA meds     #DM type II  -hyperglycemia protocol  -check Hba1c 5.7   -hold oral meds         Supplementary Documentation:   Quality:  DVT Mechanical Prophylaxis:   SCDs,    DVT Pharmacologic Prophylaxis   Medication    enoxaparin (Lovenox) 40 MG/0.4ML SUBQ injection 40 mg                Code Status: DNAR/Selective Treatment  Smith: External urinary catheter in place  Smith Duration (in days):   Central line:    CONRADO:   At this point Ms. Joshua is expected to be discharge to: tbd     The 21st Century Cures Act makes medical notes like these available to patients in the interest of transparency. Please be advised this is a medical document. Medical documents are intended to carry relevant information, facts as evident, and the clinical opinion of the practitioner. The medical note is intended as peer to peer communication and may appear blunt or direct. It is written in medical language and may contain abbreviations or verbiage that are unfamiliar.              **Certification      PHYSICIAN Certification of Need for Inpatient Hospitalization - Initial Certification    Patient will require inpatient services that will reasonably be expected to span two midnight's based on the clinical documentation in H+P.   Based on patients current state of illness, I anticipate that, after discharge, patient will require TBD.

## 2024-11-07 VITALS
HEART RATE: 64 BPM | DIASTOLIC BLOOD PRESSURE: 59 MMHG | WEIGHT: 132 LBS | SYSTOLIC BLOOD PRESSURE: 121 MMHG | TEMPERATURE: 98 F | RESPIRATION RATE: 18 BRPM | BODY MASS INDEX: 21.99 KG/M2 | OXYGEN SATURATION: 98 % | HEIGHT: 65 IN

## 2024-11-07 LAB
ALBUMIN SERPL-MCNC: 3.5 G/DL (ref 3.2–4.8)
ALBUMIN/GLOB SERPL: 1.8 {RATIO} (ref 1–2)
ALP LIVER SERPL-CCNC: 78 U/L
ALT SERPL-CCNC: 202 U/L
ANION GAP SERPL CALC-SCNC: 4 MMOL/L (ref 0–18)
AST SERPL-CCNC: 65 U/L (ref ?–34)
BILIRUB SERPL-MCNC: 0.4 MG/DL (ref 0.2–1.1)
BUN BLD-MCNC: 11 MG/DL (ref 9–23)
CALCIUM BLD-MCNC: 9.1 MG/DL (ref 8.7–10.4)
CHLORIDE SERPL-SCNC: 112 MMOL/L (ref 98–112)
CO2 SERPL-SCNC: 26 MMOL/L (ref 21–32)
CREAT BLD-MCNC: 0.49 MG/DL
EGFRCR SERPLBLD CKD-EPI 2021: 95 ML/MIN/1.73M2 (ref 60–?)
GLOBULIN PLAS-MCNC: 2 G/DL (ref 2–3.5)
GLUCOSE BLD-MCNC: 100 MG/DL (ref 70–99)
GLUCOSE BLD-MCNC: 104 MG/DL (ref 70–99)
GLUCOSE BLD-MCNC: 111 MG/DL (ref 70–99)
OSMOLALITY SERPL CALC.SUM OF ELEC: 294 MOSM/KG (ref 275–295)
POTASSIUM SERPL-SCNC: 3.7 MMOL/L (ref 3.5–5.1)
POTASSIUM SERPL-SCNC: 3.8 MMOL/L (ref 3.5–5.1)
PROT SERPL-MCNC: 5.5 G/DL (ref 5.7–8.2)
SODIUM SERPL-SCNC: 142 MMOL/L (ref 136–145)

## 2024-11-07 PROCEDURE — 99239 HOSP IP/OBS DSCHRG MGMT >30: CPT | Performed by: HOSPITALIST

## 2024-11-07 RX ORDER — CEFUROXIME AXETIL 500 MG/1
500 TABLET ORAL 2 TIMES DAILY
Qty: 4 TABLET | Refills: 0 | Status: SHIPPED | OUTPATIENT
Start: 2024-11-07 | End: 2024-11-09

## 2024-11-07 RX ORDER — POTASSIUM CHLORIDE 14.9 MG/ML
20 INJECTION INTRAVENOUS ONCE
Status: COMPLETED | OUTPATIENT
Start: 2024-11-07 | End: 2024-11-07

## 2024-11-07 NOTE — PROGRESS NOTES
Skin check  performed by this RN and ROLANDO CHAUDHARI.  skin intact to sacrum, heels and elbows.  No redness observed.  Patient having multiple episodes of incontinence of the bowels, barrier cream applied to periarea.

## 2024-11-07 NOTE — PLAN OF CARE
Buttocks slight red, blanchable.   Skin barrier cream applied.  Frequently repositioned. Tolerated well.   Ivf infusing.  Plan to [facility] to [facility].  Instructed daughter (Makayla).  Verbalized understanding.

## 2024-11-07 NOTE — PLAN OF CARE
Alert and oriented x1-2.  VSS.  On room air.  , Telemonitoring in progress.  SCD's on BLE, Tolerating diet.  Pt incontinent of bowel and bladder.  Pericare provided, barrier cream applied.  Pain controlled with prn pain medication and non-pharmalogical interventions.. patient repositioned q2hr for skin integrity, pt repositions self on her right side..  Plan is po abx, pt/ot.

## 2024-11-07 NOTE — CONSULTS
Consultation Note        Kierra Joshua Patient Status:  Inpatient    1945 MRN [id number]   Location [facility] 3SW-A Attending Jose Shah MD   Hosp Day # 1 PCP Tate Tovar       Reason for Consultation   Elevated LFTs       History of Present Illness   Ms Joshua is a 79 year old with dementia, DM, HTN, HLD, and bipolar who presents with weakness, and was diagnosed with a UTI. She was also found to have an elevated AST/ALT, 400s/600s, which have started to improve with treatment of her UTI. Her abdominal US showed cholelithiasis but no biliary dilation. She denies abdominal pain at the time of evaluation.         PMH/MEDS/ALLERGIES/SH/FH:     Past Medical History:    Alzheimer's dementia with behavioral disturbance (HCC)    Anxiety state    Bipolar affective (HCC)    Cataract    Dementia (HCC)    Diabetes (HCC)    Elevated fasting lipid profile    Essential hypertension    Essential hypertension, benign    Falls    Gallstones and inflammation of gallbladder without obstruction    High blood pressure    High cholesterol    Muscle weakness    Neuropathy    Osteoarthritis    Psychosis, unspecified psychosis type (HCC)    Shortness of breath      Past Surgical History:   Procedure Laterality Date    Biopsy of breast, incisional      left breast bx during surgery    Colonoscopy N/A 2023    Procedure: UNPREPPED  COLONOSCOPY;  Surgeon: Fabrice Trejo MD;  Location:  ENDOSCOPY    Colonoscopy N/A 2024    Procedure: COLONOSCOPY WITH DECOMPRESSION;  Surgeon: Trudi Albright MD;  Location:  ENDOSCOPY    Other surgical history      aneurysm clipped x2        No recently discontinued medications to reconcile   Medications Ordered Prior to Encounter[1]    Current Allergies: Allergies[2]    Social History     Occupational History    Not on file   Tobacco Use    Smoking status: Former     Current packs/day: 0.00     Types: Cigarettes     Quit date:      Years since  quittin.8    Smokeless tobacco: Never   Vaping Use    Vaping status: Never Used   Substance and Sexual Activity    Alcohol use: No     Alcohol/week: 0.0 standard drinks of alcohol    Drug use: No    Sexual activity: Not on file         History reviewed. No pertinent family history.           MEDICATIONS      dextrose 5% infusion   Intravenous Continuous    [COMPLETED] potassium chloride 40 mEq in 250mL sodium chloride 0.9% IVPB premix  40 mEq Intravenous Once    Followed by    [COMPLETED] potassium chloride 20 mEq/100mL IVPB premix 20 mEq  20 mEq Intravenous Once    vancomycin (Vancocin) cap 125 mg  125 mg Oral QID    [COMPLETED] sodium chloride 0.9 % IV bolus 1,000 mL  1,000 mL Intravenous Once    [COMPLETED] cefTRIAXone (Rocephin) 2 g in sodium chloride 0.9% 100 mL IVPB-ADDV  2 g Intravenous Once    [] sodium chloride 0.9% infusion   Intravenous Continuous    fluticasone propionate (Flonase) 50 MCG/ACT nasal suspension 2 spray  2 spray Each Nare Daily    lisinopril (Zestril) tab 10 mg  10 mg Oral Daily    melatonin cap/tab 5 mg  5 mg Oral Nightly    risperiDONE (RisperDAL) tab 0.75 mg  0.75 mg Oral BID    risperiDONE (RisperDAL) tab 0.5 mg  0.5 mg Oral Noon    glucose (Dex4) 15 GM/59ML oral liquid 15 g  15 g Oral Q15 Min PRN    Or    glucose (Glutose) 40% oral gel 15 g  15 g Oral Q15 Min PRN    Or    glucose-vitamin C (Dex-4) chewable tab 4 tablet  4 tablet Oral Q15 Min PRN    Or    dextrose 50% injection 50 mL  50 mL Intravenous Q15 Min PRN    Or    glucose (Dex4) 15 GM/59ML oral liquid 30 g  30 g Oral Q15 Min PRN    Or    glucose (Glutose) 40% oral gel 30 g  30 g Oral Q15 Min PRN    Or    glucose-vitamin C (Dex-4) chewable tab 8 tablet  8 tablet Oral Q15 Min PRN    cefTRIAXone (Rocephin) 1 g in sodium chloride 0.9% 100 mL IVPB-ADDV  1 g Intravenous Q24H    lactated ringers infusion   Intravenous Continuous    enoxaparin (Lovenox) 40 MG/0.4ML SUBQ injection 40 mg  40 mg Subcutaneous Daily     polyethylene glycol (PEG 3350) (Miralax) 17 g oral packet 17 g  17 g Oral Daily PRN    sennosides (Senokot) tab 17.2 mg  17.2 mg Oral Nightly PRN    bisacodyl (Dulcolax) 10 MG rectal suppository 10 mg  10 mg Rectal Daily PRN    fleet enema (Fleet) rectal enema 133 mL  1 enema Rectal Once PRN    ondansetron (Zofran) 4 MG/2ML injection 4 mg  4 mg Intravenous Q6H PRN    insulin aspart (NovoLOG) 100 Units/mL FlexPen 1-10 Units  1-10 Units Subcutaneous TID AC and HS              ROS:     A comprehensive 14 point review of systems was completed.  Pertinent positives and negatives noted in the the HPI.          Physical Exam     Vital signs:  /78 (BP Location: Right arm)   Pulse 85   Temp 97.9 °F (36.6 °C) (Oral)   Resp 16   Ht 5' 5\" (1.651 m)   Wt 132 lb (59.9 kg)   SpO2 96%   BMI 21.97 kg/m²         Physical Exam        General: Appears alert, oriented x 1 and in no acute distress.  HEENT: Normal. No scleral icterus.   NECK: Supple. No neck vein distention.  CV: S1 and S2 normal. No murmurs or gallops.  LUNGS: Clear to percussion and auscultation.  ABDOMEN: Soft and non-distended. Non-tender. No masses. Bowel sounds are present.  BACK: No CVA tenderness.  EXTREMITIES: No edema, cyanosis or clubbing.  SKIN: Warm and dry.         IMAGING/LABS       Labs:   Lab Results   Component Value Date    CREATSERUM 0.55 11/06/2024    BUN 24 11/06/2024     11/06/2024    K 3.1 11/06/2024     11/06/2024    CO2 25.0 11/06/2024    GLU 98 11/06/2024    CA 8.8 11/06/2024    ALB 3.4 11/06/2024    ALKPHO 91 11/06/2024    BILT 0.5 11/06/2024     11/06/2024     11/06/2024     Recent Labs   Lab 11/05/24  1313 11/06/24  0817   * 98   BUN 30* 24*   CREATSERUM 0.81 0.55   CA 10.1 8.8    141   K 3.9 3.1*    109   CO2 27.0 25.0     Recent Labs   Lab 11/05/24  1313   RBC 4.70   HGB 13.9   HCT 42.8   MCV 91.1   MCH 29.6   MCHC 32.5   RDW 13.0   NEPRELIM 4.56   WBC 6.3   .0       Recent  Labs   Lab 11/05/24  1313 11/06/24  0817   * 341*   * 156*       Imaging:   US ABDOMEN COMPLETE (CPT=76700)  Narrative: PROCEDURE:  US ABDOMEN COMPLETE (CPT=76700)     COMPARISON:  [facility] , US, US ABDOMEN COMPLETE (CPT=76700), 1/02/2023, 5:27 PM.     INDICATIONS:  elevated lft     TECHNIQUE:  Real time gray-scale ultrasound was used to evaluate the abdomen.  The exam includes images of the liver, gallbladder, common bile duct, pancreas, spleen, kidneys, IVC, and aorta.  Technically limited examination due to overlying bowel gas.     PATIENT STATED HISTORY: (As transcribed by Technologist)  Patient offered no additional history at this time.             FINDINGS:    LIVER:  Normal size and echogenicity. No significant masses.  BILIARY:  Cholelithiasis with nonspecific gallbladder wall thickening and suggestion of areas of pericholecystic fluid.  6 mm nonshadowing echogenic nodule adherent to the gallbladder wall is most consistent with a polyp.  Common bile duct diameter is 5   mm.  PANCREAS:  Obscured due to overlying bowel gas.  SPLEEN:  Normal.  KIDNEYS:  Normal.  Right kidney measures 9.4 cm.  Left kidney measures 8.4 cm.  AORTA/IVC:  Patent.  Atherosclerotic tortuous ectatic abdominal aorta.                      Impression: CONCLUSION:    1. Cholelithiasis.  No biliary dilatation.  Sonographer states negative sonographic Sinha sign.  2. The pancreas and portions of the abdominal aorta are obscured due to overlying bowel gas limiting evaluation.        LOCATION:  [facility]           Dictated by (CST): Lawanda Thrasher MD on 11/05/2024 at 5:47 PM       Finalized by (CST): Lawanda Thrasher MD on 11/05/2024 at 5:51 PM     CT BRAIN OR HEAD (CPT=70450)  Narrative: PROCEDURE:  CT BRAIN OR HEAD (00414)     COMPARISON:  [facility] , CT, CT BRAIN OR HEAD (45214), 6/06/2024, 4:22 PM.     INDICATIONS:  weakness     TECHNIQUE:  Noncontrast CT scanning is performed through the brain. Dose reduction techniques were used. Dose  information is transmitted to the ACR (American College of Radiology) NRDR (National Radiology Data Registry) which includes the Dose Index   Registry.     PATIENT STATED HISTORY: (As transcribed by Technologist)  Weakness         FINDINGS:    VENTRICLES/SULCI:  Ventricles and sulci are normal in size.    INTRACRANIAL:  Postsurgical changes are noted.  Areas of encephalomalacia within the left frontal lobe and left temporal lobe are unchanged.  A background of moderate chronic microvascular ischemic changes is likely present within the white matter.  No   new midline shift or mass effect is seen.  SINUSES:           No sign of acute sinusitis.    MASTOIDS:          No sign of acute inflammation.  SKULL:             No evidence for fracture or osseous abnormality.  OTHER:             None.                   Impression: CONCLUSION:  No significant midline shift or mass effect is seen.  Stable appearance of probable moderate chronic microvascular ischemic changes with areas of superimposed infarct present.  If there is persistent clinical concern then recommend follow-up   imaging including MRI.           LOCATION:  [facility]        Dictated by (CST): Kirby Devlin MD on 11/05/2024 at 3:30 PM       Finalized by (CST): Kirby Devlin MD on 11/05/2024 at 3:32 PM     XR CHEST AP PORTABLE  (CPT=71045)  Narrative: PROCEDURE:  XR CHEST AP PORTABLE  (CPT=71045)     TECHNIQUE:  AP chest radiograph was obtained.     COMPARISON:  [facility] , XR, XR CHEST AP/PA (1 VIEW) (CPT=71045), 2/20/2024, 6:55 PM.  [facility] , XR, XR CHEST AP PORTABLE  (CPT=71045), 12/27/2023, 1:35 PM.     INDICATIONS:  weakness     PATIENT STATED HISTORY: (As transcribed by Technologist)  Patient offered no additional history at this time.             FINDINGS:  No focal consolidation.  No pleural effusion.  No pneumothorax.  No pulmonary edema.  The cardiomediastinal silhouette is within normal limits.  No acute osseous findings.  Aortic calcifications.                    Impression: CONCLUSION:  No acute radiographic findings.        LOCATION:  [facility]                 Dictated by (CST): Ronald Gutierrez MD on 11/05/2024 at 1:55 PM       Finalized by (CST): Ronald Gutierrez MD on 11/05/2024 at 1:56 PM               IMPRESSION:   Ms Joshua is a 79 year old with dementia, DM, HTN, HLD, and bipolar who presents with weakness, and was diagnosed with a UTI, also found to have abnormal LFTs, which are most likely secondary to her infection. They are improving with treatment of her UTI.             PLAN:   -repeat LFTs on 11/7  -continued management of UTI per hospitalist           Librado Main MD  8:14 PM  11/6/2024  [facility]  [phone]                  [1]   No current facility-administered medications on file prior to encounter.     Current Outpatient Medications on File Prior to Encounter   Medication Sig Dispense Refill    Calcium Carbonate-Vitamin D 600-5 MG-MCG Oral Cap Take 1 tablet by mouth in the morning and 1 tablet before bedtime.      Potassium Chloride ER 10 MEQ Oral Tab CR Take 1 tablet (10 mEq total) by mouth 3 (three) times a week. MWF      Sennosides 17.2 MG Oral Tab Take 2 tablets (34.4 mg total) by mouth daily as needed (constipation).      cyanocobalamin 500 MCG Oral Tab Take 2 tablets (1,000 mcg total) by mouth daily.      ascorbic acid 500 MG Oral Tab Take 1 tablet (500 mg total) by mouth once daily.      acetaminophen 500 MG Oral Tab Take 1 tablet (500 mg total) by mouth every 4 (four) hours as needed. (Patient taking differently: Take 1 tablet (500 mg total) by mouth every 4 (four) hours as needed for Pain.) 90 tablet 0    lisinopril 10 MG Oral Tab Take 1 tablet (10 mg total) by mouth daily. Hold for SBP < 130      traMADol 50 MG Oral Tab Take 1 tablet (50 mg total) by mouth 2 (two) times daily as needed for Pain. 30 tablet 0    FLEET ENEMA 7-19 GM/118ML Rectal Enema Place 1 enema (118 mL total) rectally daily as needed (CONSTIPTAION).       Cranberry 450 MG Oral Cap Take 1 tablet by mouth once daily.      Melatonin 5 MG Oral Tab Take 1 tablet (5 mg total) by mouth at bedtime.      magnesium hydroxide (MILK OF MAGNESIA) 400 MG/5ML Oral Suspension Take 5 mL by mouth daily as needed for constipation.      Multiple Vitamins-Minerals (MULTI-VITAMIN/MINERALS) Oral Tab Take 1 tablet by mouth daily.      bisacodyl 10 MG Rectal Suppos Place 1 suppository (10 mg total) rectally Q24H PRN (constipation).      fluticasone propionate 50 MCG/ACT Nasal Suspension 2 sprays by Each Nare route daily.      bismuth subsalicylate (PEPTO-BISMOL) 262 MG/15ML Oral Suspension Take 30 mL (524 mg total) by mouth every 6 (six) hours as needed for Indigestion.      polyethylene glycol, PEG 3350, 17 g Oral Powd Pack Take 17 g by mouth daily. 30 each 0    meclizine 25 MG Oral Tab Take 1 tablet (25 mg total) by mouth every 8 (eight) hours as needed.      risperiDONE 0.25 MG Oral Tab Take 2 tablets (0.5 mg total) by mouth Noon.      furosemide 20 MG Oral Tab Take 1 tablet (20 mg total) by mouth Every Monday, Wednesday, and Friday.      glipiZIDE 5 MG Oral Tab Take 1 tablet (5 mg total) by mouth every morning before breakfast.      risperiDONE 0.25 MG Oral Tab Take 3 tablets (0.75 mg total) by mouth 2 (two) times daily.     [2] No Known Allergies

## 2024-11-07 NOTE — PROGRESS NOTES
Inpatient Follow up Note    Kierra Joshua Patient Status:  Inpatient    1945 MRN [id number]   Location [facility] 3SW-A Attending Jose Shah MD   Hosp Day # 2 PCP Tate Tovar     Reason for Consultation   Abnormal LFTs     Subjective   Patient says she feels better today. Denies abdominal pain. LFTs improving.             Objective:     /51 (BP Location: Left arm)   Pulse 57   Temp 98.9 °F (37.2 °C) (Oral)   Resp 18   Ht 5' 5\" (1.651 m)   Wt 132 lb (59.9 kg)   SpO2 100%   BMI 21.97 kg/m²   Gen: AAOx1  CV: RRR with normal S1 / S2  Resp: CTA bilaterally  Abd: (+)BS, soft, non-tender, non-distended; no rebound or guarding  Ext: No edema or cyanosis  Skin: Warm and dry     Labs/Imaging     LABRCNTIP[PGLU:5@  No results for input(s): \"INR\" in the last 168 hours.      Recent Labs   Lab 24  1313   WBC 6.3   HGB 13.9   .0       Recent Labs   Lab 24  1313 24  0817 24  0143 24  0504    141  --  142   K 3.9 3.1* 3.7 3.8    109  --  112   CO2 27.0 25.0  --  26.0   BUN 30* 24*  --  11   CREATSERUM 0.81 0.55  --  0.49*       Recent Labs   Lab 24  1313 24  0817 24  0504   * 341* 202*   * 156* 65*     US ABDOMEN COMPLETE (CPT=76700)    Result Date: 2024  CONCLUSION:  1. Cholelithiasis.  No biliary dilatation.  Sonographer states negative sonographic Sinha sign. 2. The pancreas and portions of the abdominal aorta are obscured due to overlying bowel gas limiting evaluation.   LOCATION:  [place]    Dictated by (CST): Lawanda Thrasher MD on 2024 at 5:47 PM     Finalized by (CST): Lawanda Thrasher MD on 2024 at 5:51 PM       CT BRAIN OR HEAD (CPT=70450)    Result Date: 2024  CONCLUSION:  No significant midline shift or mass effect is seen.  Stable appearance of probable moderate chronic microvascular ischemic changes with areas of superimposed infarct present.  If there is persistent clinical concern  then recommend follow-up  imaging including MRI.    LOCATION:  [place]   Dictated by (CST): Kirby Devlin MD on 11/05/2024 at 3:30 PM     Finalized by (CST): Kirby Devlin MD on 11/05/2024 at 3:32 PM       XR CHEST AP PORTABLE  (CPT=71045)    Result Date: 11/5/2024  CONCLUSION:  No acute radiographic findings.   LOCATION:  [facility]      Dictated by (CST): Ronald Gutierrez MD on 11/05/2024 at 1:55 PM     Finalized by (CST): Ronald Gutierrez MD on 11/05/2024 at 1:56 PM      AST   Date/Time Value Ref Range Status   11/07/2024 05:04 AM 65 (H) <34 U/L Final   02/28/2014 11:45 AM 17 15 - 41 U/L Final     Comment:     [facility], [address] [place] [id number]     ALT   Date/Time Value Ref Range Status   11/07/2024 05:04  (H) 10 - 49 U/L Final   02/28/2014 11:45 AM 23 14 - 54 U/L Final     Comment:     [facility], [address] [place] [address],[place], [place],[address]     BUN   Date/Time Value Ref Range Status   11/07/2024 05:04 AM 11 9 - 23 mg/dL Final   05/24/2013 01:15 PM 17 8 - 20 mg/dL Final              Assessment   Ms Joshua is a 79 year old with dementia, DM, HTN, HLD, and bipolar who presents with weakness, and was diagnosed with a UTI, also found to have abnormal LFTs, which are most likely secondary to her infection. They are improving with treatment of her UTI.        Plan   -continue to trend LFTs daily while hospitalized  -continued management of UTI per hospitalist  -we will help arrange outpatient GI f/u    GI will sign off now, re-consult with questions/concerns     Librado Main MD  11:30 AM  11/7/2024  [facility]  [phone]

## 2024-11-07 NOTE — SLP NOTE
SPEECH DAILY NOTE - INPATIENT    ASSESSMENT & PLAN   ASSESSMENT  Pt seen for dysphagia tx to assess tolerance with recommended diet, ensure proper utilization of aspiration precautions and provide pt/family education.  Patient asleep in bed, easily awakened. Breakfast tray at bedside with patient agreeable to accept some po. She demonstrated good overt tolerance breakfast fed by SLP though with limited acceptance. Patient appears at baseline with respect to swallow function. Will sign off.     Diet Recommendations - Solids: Mechanical soft chopped/ Soft & Bite Sized  Diet Recommendations - Liquids: Thin Liquids    Compensatory Strategies Recommended: Slow rate;Small bites and sips;Alternate consistencies (1:1 feeding assistance)  Aspiration Precautions: Upright position;Slow rate;Small bites and sips (1:1 feeding assistance)  Medication Administration Recommendations: One pill at a time (with thin vs puree as tolerated)    Patient Experiencing Pain: No                Treatment Plan  Treatment Plan/Recommendations: No further inpatient SLP service warranted              GOALS  Goal #1 The patient will tolerate soft and bite size consistency and thin liquids without overt signs or symptoms of aspiration with 100 % accuracy over 1-2 session(s).  Met   Goal #2 The patient/family/caregiver will demonstrate understanding and implementation of aspiration precautions and swallow strategies independently over 1-2 session(s).     Met     FOLLOW UP  Follow Up Needed (Documentation Required): No  SLP Follow-up Date: 11/07/24  Number of Visits to Meet Established Goals: 2    Session: 1    If you have any questions, please contact Contreras Dos Santos MS CCC-SLP  Pager [phone]

## 2024-11-07 NOTE — CM/SW NOTE
Informed by RN that pt can discharge back to NH today.  Spoke with Mariana from [facility] who confirmed pt can be accepted for readmission today.  Ambulance transport scheduled for 4pm.  PCS form completed and available for RN to print.  Updated pt's dtr/THAIS Benavides who expressed agreement with DC plan.  No other needs at this time.  / to remain available for support and/or discharge planning.     [facility]  [phone]    [facility]  [phone]    Mikaela Leija LCSW  Discharge Planner  [phone]

## 2024-11-07 NOTE — PROGRESS NOTES
[facility]   part of [facility]     Hospitalist Progress Note     Kierra Joshua Patient Status:  Inpatient    1945 MRN [id number]   Location [facility] 3SW-A Attending Jose Shah MD   Hosp Day # 2 PCP Tate Tovar     Subjective:   Feeling better   Had loose stool yesterday but sft/solid today and no more diarrhea     Objective:    Review of Systems:   A comprehensive review of systems was completed; pertinent positive and negatives stated in subjective.  Vital signs:  Temp:  [97.8 °F (36.6 °C)-100 °F (37.8 °C)] 97.8 °F (36.6 °C)  Pulse:  [66-85] 68  Resp:  [16] 16  BP: (101-125)/(39-91) 125/52  SpO2:  [94 %-97 %] 97 %  Physical Exam:    General: No acute distress Confused- no further agitation   Respiratory: no wheezes, no rhonchi  Cardiovascular: S1, S2, RRR  Abdomen: Soft, NT/ND, +BS  Extremities: no edema    Diagnostic Data:    Labs:  Recent Labs   Lab 24  1313   WBC 6.3   HGB 13.9   MCV 91.1   .0     Recent Labs   Lab 24  1313 24  0817 24  0143 24  0504   * 98  --  104*   BUN 30* 24*  --  11   CREATSERUM 0.81 0.55  --  0.49*   CA 10.1 8.8  --  9.1   ALB 4.8 3.4  --  3.5    141  --  142   K 3.9 3.1* 3.7 3.8    109  --  112   CO2 27.0 25.0  --  26.0   ALKPHO 138 91  --  78   * 156*  --  65*   * 341*  --  202*   BILT 0.6 0.5  --  0.4   TP 7.2 5.7  --  5.5*     Estimated Creatinine Clearance: 82.4 mL/min (A) (based on SCr of 0.49 mg/dL (L)).  No results for input(s): \"PTP\", \"INR\" in the last 168 hours.     Microbiology  Hospital Encounter on 24   1. Blood Culture     Status: None (Preliminary result)    Collection Time: 24  1:14 PM    Specimen: Blood,peripheral   Result Value Ref Range    Blood Culture Result No Growth 1 Day N/A   2. Urine Culture, Routine     Status: None    Collection Time: 24  1:13 PM    Specimen: Urine, clean catch   Result Value Ref Range    Urine Culture No Growth at 18-24  hrs. N/A     Imaging: Reviewed in Epic.  Medications:    vancomycin  125 mg Oral QID    fluticasone propionate  2 spray Each Nare Daily    lisinopril  10 mg Oral Daily    melatonin  5 mg Oral Nightly    risperiDONE  0.75 mg Oral BID    risperiDONE  0.5 mg Oral Noon    cefTRIAXone  1 g Intravenous Q24H    enoxaparin  40 mg Subcutaneous Daily    insulin aspart  1-10 Units Subcutaneous TID AC and HS       Assessment & Plan:    #Generalized weakness  #UTI  -empiric Abx  -follow Cx results    #Cdiff + PCR but EIA negative - suggest colonization and clinically also suggest no active infection- stop Vanc      #Elevated LFTs- trending down nicely again this AM   -US with gallstones without obsttruction  -hepatitis panel unimpressive   -GI consult appreciated      #Hypertension  #Hyperlipidemia     #Dementia with behavioral disturbances  #Bipolar  -Resume PTA meds     #DM type II  -hyperglycemia protocol  -Hba1c 5.7   -hold oral meds    DC planning          Supplementary Documentation:   Quality:  DVT Mechanical Prophylaxis:   SCDs,    DVT Pharmacologic Prophylaxis   Medication    enoxaparin (Lovenox) 40 MG/0.4ML SUBQ injection 40 mg                Code Status: DNAR/Selective Treatment  Smith: External urinary catheter in place  Smith Duration (in days):   Central line:    CONRADO:   At this point Ms. Joshua is expected to be discharge to: tbd     The 21st Century Cures Act makes medical notes like these available to patients in the interest of transparency. Please be advised this is a medical document. Medical documents are intended to carry relevant information, facts as evident, and the clinical opinion of the practitioner. The medical note is intended as peer to peer communication and may appear blunt or direct. It is written in medical language and may contain abbreviations or verbiage that are unfamiliar.              **Certification      PHYSICIAN Certification of Need for Inpatient Hospitalization - Initial  Certification    Patient will require inpatient services that will reasonably be expected to span two midnight's based on the clinical documentation in H+P.   Based on patients current state of illness, I anticipate that, after discharge, patient will require TBD.

## 2024-11-07 NOTE — PLAN OF CARE
NURSING DISCHARGE NOTE    Discharged Nursing home via Ambulance.  Accompanied by Support staff  Belongings Taken by patient/family.  Report called to Aline MARSHALL @ [facility].

## 2024-11-08 NOTE — DISCHARGE SUMMARY
[facility]     Kierra Joshua Patient Status:  Inpatient    1945 MRN [id number]   Location [facility] 3SW-A Attending No att. providers found   Hosp Day # 2 PCP Tate Tovar     Date of Admission: 2024  Date of Discharge: 2024    Discharge Disposition: SNF Long Term Care ([facility])    Admitting Diagnosis:   Weakness [R53.1]  Acute cystitis without hematuria [N30.00]    Hospital Discharge Diagnoses:  #Generalized weakness  #UTI  -empiric Abx  -follow Cx results     #Cdiff + PCR but EIA negative - suggest colonization and clinically also suggest no active infection- stop Vanc      #Elevated LFTs- trending down nicely again this AM   -US with gallstones without obsttruction  -hepatitis panel unimpressive   -GI consult appreciated      #Hypertension  #Hyperlipidemia     #Dementia with behavioral disturbances  #Bipolar  -Resume PTA meds     #DM type II  -hyperglycemia protocol  -Hba1c 5.7   -hold oral meds    Lace+ Score: 69  59-90 High Risk  29-58 Medium Risk  0-28   Low Risk.     Brief Synopsis: patient treated for UTI and Cx sent. UCx unimpressive but clinically will treat for UTI. LFTS came dowen nicely and asymptomatic. US abdomen unimpressive. Pt had C diff + PCR for some loose stool. Confirmatory test neg and no further episodes of diarrhea after one morning of loose stools. Vancomycin was stopped as suggested colonization. Pt DC in stable condition.      Procedures during hospitalization:   None     Lab/Test results pending at Discharge:   none    Consultants:  None     Discharge Medication List:     Discharge Medications        START taking these medications        Instructions Prescription details   cefuroxime 500 MG Tabs  Commonly known as: Ceftin      Take 1 tablet (500 mg total) by mouth 2 (two) times daily for 2 days.   Stop taking on: 2024  Quantity: 4 tablet  Refills: 0            CONTINUE taking these medications        Instructions Prescription  details   acetaminophen 500 MG Tabs  Commonly known as: Tylenol Extra Strength      Take 1 tablet (500 mg total) by mouth every 4 (four) hours as needed.   Quantity: 90 tablet  Refills: 0     ascorbic acid 500 MG Tabs  Commonly known as: Vitamin C      Take 1 tablet (500 mg total) by mouth once daily.   Refills: 0     bisacodyl 10 MG Supp  Commonly known as: Dulcolax      Place 1 suppository (10 mg total) rectally Q24H PRN (constipation).   Refills: 0     Calcium Carbonate-Vitamin D 600-5 MG-MCG Caps      Take 1 tablet by mouth in the morning and 1 tablet before bedtime.   Refills: 0     Cranberry 450 MG Caps      Take 1 tablet by mouth once daily.   Refills: 0     cyanocobalamin 500 MCG Tabs  Commonly known as: Vitamin B12      Take 2 tablets (1,000 mcg total) by mouth daily.   Refills: 0     FLEET ENEMA 7-19 GM/118ML Enem  Commonly known as: FLEET      Place 1 enema (118 mL total) rectally daily as needed (CONSTIPTAION).   Refills: 0     fluticasone propionate 50 MCG/ACT Susp  Commonly known as: Flonase      2 sprays by Each Nare route daily.   Refills: 0     furosemide 20 MG Tabs  Commonly known as: Lasix      Take 1 tablet (20 mg total) by mouth Every Monday, Wednesday, and Friday.   Refills: 0     glipiZIDE 5 MG Tabs  Commonly known as: Glucotrol      Take 1 tablet (5 mg total) by mouth every morning before breakfast.   Refills: 0     lisinopril 10 MG Tabs  Commonly known as: Zestril      Take 1 tablet (10 mg total) by mouth daily. Hold for SBP < 130   Refills: 0     meclizine 25 MG Tabs  Commonly known as: Antivert      Take 1 tablet (25 mg total) by mouth every 8 (eight) hours as needed.   Refills: 0     Melatonin 5 MG Tabs      Take 1 tablet (5 mg total) by mouth at bedtime.   Refills: 0     Milk of Magnesia 400 MG/5ML Susp  Generic drug: magnesium hydroxide      Take 5 mL by mouth daily as needed for constipation.   Refills: 0     Multi-Vitamin/Minerals Tabs      Take 1 tablet by mouth daily.   Refills: 0      Pepto-Bismol 262 MG/15ML Susp  Generic drug: bismuth subsalicylate      Take 30 mL (524 mg total) by mouth every 6 (six) hours as needed for Indigestion.   Refills: 0     polyethylene glycol (PEG 3350) 17 g Pack  Commonly known as: Miralax      Take 17 g by mouth daily.   Quantity: 30 each  Refills: 0     Potassium Chloride ER 10 MEQ Tbcr      Take 1 tablet (10 mEq total) by mouth 3 (three) times a week. MWF   Refills: 0     risperiDONE 0.25 MG Tabs  Commonly known as: RisperDAL      Take 3 tablets (0.75 mg total) by mouth 2 (two) times daily.   Refills: 0     risperiDONE 0.25 MG Tabs  Commonly known as: RisperDAL      Take 2 tablets (0.5 mg total) by mouth Noon.   Refills: 0     Sennosides 17.2 MG Tabs      Take 2 tablets (34.4 mg total) by mouth daily as needed (constipation).   Refills: 0     traMADol 50 MG Tabs  Commonly known as: Ultram      Take 1 tablet (50 mg total) by mouth 2 (two) times daily as needed for Pain.   Quantity: 30 tablet  Refills: 0               Where to Get Your Medications        These medications were sent to [facility] [address] [phone], [phone]  [address] [facility] [address]      Phone: [phone]   cefuroxime 500 MG Tabs         Follow-up appointment:   Tate Tovar MD  [address] [place] [address]  [phone]    Follow up in 3 day(s)      Librado Main MD  [address] [facility]   [place] [id number] [phone]    Go in 2 week(s)  for a follow-up office visit with GI., The office will call to arrange date/time of visit. YOU WILL ALSO NEED TO GET REPEAT LABS 2 DAYS PRIOR TO YOUR APPOINTMENT. AN ORDER IS PLACED IN THE [employer] SYSTEM PLEASE GET THESE DRAWN      -----------------------------------------------------------------------------------------------  PATIENT DISCHARGE INSTRUCTIONS: See electronic chart    Jose Shah MD 11/7/2024    Time spent: 33 minutes

## 2025-01-01 ENCOUNTER — APPOINTMENT (OUTPATIENT)
Dept: CV DIAGNOSTICS | Facility: HOSPITAL | Age: 80
DRG: 344 | End: 2025-01-01
Attending: INTERNAL MEDICINE
Payer: MEDICARE

## 2025-01-01 ENCOUNTER — APPOINTMENT (OUTPATIENT)
Dept: GENERAL RADIOLOGY | Facility: HOSPITAL | Age: 80
DRG: 344 | End: 2025-01-01
Attending: INTERNAL MEDICINE
Payer: MEDICARE

## 2025-01-01 ENCOUNTER — APPOINTMENT (OUTPATIENT)
Dept: GENERAL RADIOLOGY | Facility: HOSPITAL | Age: 80
DRG: 344 | End: 2025-01-01
Attending: EMERGENCY MEDICINE
Payer: MEDICARE

## 2025-01-01 ENCOUNTER — APPOINTMENT (OUTPATIENT)
Dept: GENERAL RADIOLOGY | Facility: HOSPITAL | Age: 80
DRG: 344 | End: 2025-01-01
Attending: HOSPITALIST
Payer: MEDICARE

## 2025-01-01 ENCOUNTER — HOSPITAL ENCOUNTER (INPATIENT)
Facility: HOSPITAL | Age: 80
LOS: 1 days | End: 2025-01-01
Attending: STUDENT IN AN ORGANIZED HEALTH CARE EDUCATION/TRAINING PROGRAM | Admitting: STUDENT IN AN ORGANIZED HEALTH CARE EDUCATION/TRAINING PROGRAM
Payer: OTHER MISCELLANEOUS

## 2025-01-01 ENCOUNTER — APPOINTMENT (OUTPATIENT)
Dept: CT IMAGING | Facility: HOSPITAL | Age: 80
DRG: 344 | End: 2025-01-01
Attending: EMERGENCY MEDICINE
Payer: MEDICARE

## 2025-01-01 ENCOUNTER — APPOINTMENT (OUTPATIENT)
Dept: GENERAL RADIOLOGY | Facility: HOSPITAL | Age: 80
DRG: 344 | End: 2025-01-01
Attending: STUDENT IN AN ORGANIZED HEALTH CARE EDUCATION/TRAINING PROGRAM
Payer: MEDICARE

## 2025-01-01 ENCOUNTER — HOSPITAL ENCOUNTER (INPATIENT)
Facility: HOSPITAL | Age: 80
LOS: 9 days | Discharge: HOSPICE | DRG: 344 | End: 2025-01-01
Attending: EMERGENCY MEDICINE | Admitting: STUDENT IN AN ORGANIZED HEALTH CARE EDUCATION/TRAINING PROGRAM
Payer: MEDICARE

## 2025-01-01 ENCOUNTER — APPOINTMENT (OUTPATIENT)
Dept: CT IMAGING | Facility: HOSPITAL | Age: 80
DRG: 344 | End: 2025-01-01
Attending: INTERNAL MEDICINE
Payer: MEDICARE

## 2025-01-01 VITALS
WEIGHT: 133 LBS | SYSTOLIC BLOOD PRESSURE: 153 MMHG | TEMPERATURE: 99 F | OXYGEN SATURATION: 99 % | RESPIRATION RATE: 18 BRPM | BODY MASS INDEX: 22 KG/M2 | DIASTOLIC BLOOD PRESSURE: 72 MMHG | HEART RATE: 96 BPM

## 2025-01-01 VITALS
DIASTOLIC BLOOD PRESSURE: 67 MMHG | HEART RATE: 81 BPM | TEMPERATURE: 98 F | RESPIRATION RATE: 20 BRPM | OXYGEN SATURATION: 100 % | SYSTOLIC BLOOD PRESSURE: 148 MMHG

## 2025-01-01 DIAGNOSIS — K59.00 CONSTIPATION, UNSPECIFIED CONSTIPATION TYPE: Primary | ICD-10-CM

## 2025-01-01 LAB
ALBUMIN SERPL-MCNC: 3.3 G/DL (ref 3.2–4.8)
ALBUMIN SERPL-MCNC: 3.8 G/DL (ref 3.2–4.8)
ALBUMIN SERPL-MCNC: 4.2 G/DL (ref 3.2–4.8)
ALBUMIN SERPL-MCNC: 4.5 G/DL (ref 3.2–4.8)
ALBUMIN/GLOB SERPL: 1.7 {RATIO} (ref 1–2)
ALBUMIN/GLOB SERPL: 1.8 {RATIO} (ref 1–2)
ALBUMIN/GLOB SERPL: 2 {RATIO} (ref 1–2)
ALBUMIN/GLOB SERPL: 2 {RATIO} (ref 1–2)
ALP LIVER SERPL-CCNC: 60 U/L (ref 55–142)
ALP LIVER SERPL-CCNC: 64 U/L (ref 55–142)
ALP LIVER SERPL-CCNC: 69 U/L (ref 55–142)
ALP LIVER SERPL-CCNC: 76 U/L (ref 55–142)
ALT SERPL-CCNC: 7 U/L (ref 10–49)
ALT SERPL-CCNC: 8 U/L (ref 10–49)
ALT SERPL-CCNC: <7 U/L (ref 10–49)
ALT SERPL-CCNC: <7 U/L (ref 10–49)
ANION GAP SERPL CALC-SCNC: 10 MMOL/L (ref 0–18)
ANION GAP SERPL CALC-SCNC: 11 MMOL/L (ref 0–18)
ANION GAP SERPL CALC-SCNC: 11 MMOL/L (ref 0–18)
ANION GAP SERPL CALC-SCNC: 12 MMOL/L (ref 0–18)
ANION GAP SERPL CALC-SCNC: 7 MMOL/L (ref 0–18)
ANION GAP SERPL CALC-SCNC: 7 MMOL/L (ref 0–18)
ANION GAP SERPL CALC-SCNC: 9 MMOL/L (ref 0–18)
ANION GAP SERPL CALC-SCNC: 9 MMOL/L (ref 0–18)
ARTERIAL PATENCY WRIST A: POSITIVE
AST SERPL-CCNC: 11 U/L (ref ?–34)
AST SERPL-CCNC: 11 U/L (ref ?–34)
AST SERPL-CCNC: 15 U/L (ref ?–34)
AST SERPL-CCNC: 22 U/L (ref ?–34)
ATRIAL RATE: 43 BPM
ATRIAL RATE: 60 BPM
BASE EXCESS BLDA CALC-SCNC: 1.4 MMOL/L (ref ?–2)
BASE EXCESS BLDA CALC-SCNC: 8.1 MMOL/L (ref ?–2)
BASE EXCESS BLDA CALC-SCNC: 8.5 MMOL/L (ref ?–2)
BASOPHILS # BLD AUTO: 0.03 X10(3) UL (ref 0–0.2)
BASOPHILS # BLD AUTO: 0.04 X10(3) UL (ref 0–0.2)
BASOPHILS # BLD AUTO: 0.04 X10(3) UL (ref 0–0.2)
BASOPHILS NFR BLD AUTO: 0.4 %
BASOPHILS NFR BLD AUTO: 0.5 %
BASOPHILS NFR BLD AUTO: 0.9 %
BILIRUB SERPL-MCNC: 0.3 MG/DL (ref 0.2–1.1)
BILIRUB SERPL-MCNC: 0.4 MG/DL (ref 0.2–1.1)
BILIRUB UR QL STRIP.AUTO: NEGATIVE
BODY TEMPERATURE: 98.6 F
BUN BLD-MCNC: 10 MG/DL (ref 9–23)
BUN BLD-MCNC: 13 MG/DL (ref 9–23)
BUN BLD-MCNC: 7 MG/DL (ref 9–23)
BUN BLD-MCNC: <5 MG/DL (ref 9–23)
CA-I BLD-SCNC: 1.22 MMOL/L (ref 0.95–1.32)
CALCIUM BLD-MCNC: 10.7 MG/DL (ref 8.7–10.6)
CALCIUM BLD-MCNC: 8.8 MG/DL (ref 8.7–10.6)
CALCIUM BLD-MCNC: 8.9 MG/DL (ref 8.7–10.6)
CALCIUM BLD-MCNC: 9 MG/DL (ref 8.7–10.6)
CALCIUM BLD-MCNC: 9.2 MG/DL (ref 8.7–10.6)
CALCIUM BLD-MCNC: 9.5 MG/DL (ref 8.7–10.6)
CALCIUM BLD-MCNC: 9.6 MG/DL (ref 8.7–10.6)
CALCIUM BLD-MCNC: 9.7 MG/DL (ref 8.7–10.6)
CHLORIDE SERPL-SCNC: 103 MMOL/L (ref 98–112)
CHLORIDE SERPL-SCNC: 104 MMOL/L (ref 98–112)
CHLORIDE SERPL-SCNC: 105 MMOL/L (ref 98–112)
CHLORIDE SERPL-SCNC: 106 MMOL/L (ref 98–112)
CHLORIDE SERPL-SCNC: 108 MMOL/L (ref 98–112)
CHLORIDE SERPL-SCNC: 109 MMOL/L (ref 98–112)
CHLORIDE SERPL-SCNC: 110 MMOL/L (ref 98–112)
CHLORIDE SERPL-SCNC: 113 MMOL/L (ref 98–112)
CO2 SERPL-SCNC: 21 MMOL/L (ref 21–32)
CO2 SERPL-SCNC: 23 MMOL/L (ref 21–32)
CO2 SERPL-SCNC: 23 MMOL/L (ref 21–32)
CO2 SERPL-SCNC: 26 MMOL/L (ref 21–32)
CO2 SERPL-SCNC: 26 MMOL/L (ref 21–32)
CO2 SERPL-SCNC: 28 MMOL/L (ref 21–32)
CO2 SERPL-SCNC: 30 MMOL/L (ref 21–32)
CO2 SERPL-SCNC: 32 MMOL/L (ref 21–32)
COHGB MFR BLD: 1.4 % SAT (ref 0–3)
COHGB MFR BLD: 1.4 % SAT (ref 0–3)
COHGB MFR BLD: 1.5 % SAT (ref 0–3)
COLOR UR AUTO: YELLOW
CREAT BLD-MCNC: 0.48 MG/DL (ref 0.55–1.02)
CREAT BLD-MCNC: 0.51 MG/DL (ref 0.55–1.02)
CREAT BLD-MCNC: 0.52 MG/DL (ref 0.55–1.02)
CREAT BLD-MCNC: 0.52 MG/DL (ref 0.55–1.02)
CREAT BLD-MCNC: 0.59 MG/DL (ref 0.55–1.02)
CREAT BLD-MCNC: 0.61 MG/DL (ref 0.55–1.02)
CREAT BLD-MCNC: 0.64 MG/DL (ref 0.55–1.02)
CREAT BLD-MCNC: 0.69 MG/DL (ref 0.55–1.02)
EGFRCR SERPLBLD CKD-EPI 2021: 88 ML/MIN/1.73M2 (ref 60–?)
EGFRCR SERPLBLD CKD-EPI 2021: 89 ML/MIN/1.73M2 (ref 60–?)
EGFRCR SERPLBLD CKD-EPI 2021: 90 ML/MIN/1.73M2 (ref 60–?)
EGFRCR SERPLBLD CKD-EPI 2021: 91 ML/MIN/1.73M2 (ref 60–?)
EGFRCR SERPLBLD CKD-EPI 2021: 94 ML/MIN/1.73M2 (ref 60–?)
EGFRCR SERPLBLD CKD-EPI 2021: 96 ML/MIN/1.73M2 (ref 60–?)
EOSINOPHIL # BLD AUTO: 0.04 X10(3) UL (ref 0–0.7)
EOSINOPHIL # BLD AUTO: 0.09 X10(3) UL (ref 0–0.7)
EOSINOPHIL # BLD AUTO: 0.1 X10(3) UL (ref 0–0.7)
EOSINOPHIL NFR BLD AUTO: 0.6 %
EOSINOPHIL NFR BLD AUTO: 0.9 %
EOSINOPHIL NFR BLD AUTO: 2.3 %
ERYTHROCYTE [DISTWIDTH] IN BLOOD BY AUTOMATED COUNT: 13 %
ERYTHROCYTE [DISTWIDTH] IN BLOOD BY AUTOMATED COUNT: 13 %
ERYTHROCYTE [DISTWIDTH] IN BLOOD BY AUTOMATED COUNT: 13.1 %
ERYTHROCYTE [DISTWIDTH] IN BLOOD BY AUTOMATED COUNT: 13.2 %
ERYTHROCYTE [DISTWIDTH] IN BLOOD BY AUTOMATED COUNT: 13.3 %
ERYTHROCYTE [DISTWIDTH] IN BLOOD BY AUTOMATED COUNT: 13.4 %
ERYTHROCYTE [DISTWIDTH] IN BLOOD BY AUTOMATED COUNT: 13.6 %
EST. AVERAGE GLUCOSE BLD GHB EST-MCNC: 117 MG/DL (ref 68–126)
EXPIRATORY PRESSURE: 6 CM H2O
EXPIRATORY PRESSURE: 6 CM H2O
FIO2: 30 %
FIO2: 60 %
GLOBULIN PLAS-MCNC: 1.9 G/DL (ref 2–3.5)
GLOBULIN PLAS-MCNC: 1.9 G/DL (ref 2–3.5)
GLOBULIN PLAS-MCNC: 2.1 G/DL (ref 2–3.5)
GLOBULIN PLAS-MCNC: 2.5 G/DL (ref 2–3.5)
GLUCOSE BLD-MCNC: 103 MG/DL (ref 70–99)
GLUCOSE BLD-MCNC: 114 MG/DL (ref 70–99)
GLUCOSE BLD-MCNC: 120 MG/DL (ref 70–99)
GLUCOSE BLD-MCNC: 129 MG/DL (ref 70–99)
GLUCOSE BLD-MCNC: 132 MG/DL (ref 70–99)
GLUCOSE BLD-MCNC: 137 MG/DL (ref 70–99)
GLUCOSE BLD-MCNC: 164 MG/DL (ref 70–99)
GLUCOSE BLD-MCNC: 226 MG/DL (ref 70–99)
GLUCOSE BLD-MCNC: 267 MG/DL (ref 70–99)
GLUCOSE BLD-MCNC: 94 MG/DL (ref 70–99)
GLUCOSE UR STRIP.AUTO-MCNC: NORMAL MG/DL
HBA1C MFR BLD: 5.7 % (ref ?–5.7)
HCO3 BLDA-SCNC: 26 MEQ/L (ref 21–27)
HCO3 BLDA-SCNC: 31.3 MEQ/L (ref 21–27)
HCO3 BLDA-SCNC: 31.6 MEQ/L (ref 21–27)
HCT VFR BLD AUTO: 33 % (ref 35–48)
HCT VFR BLD AUTO: 35.5 % (ref 35–48)
HCT VFR BLD AUTO: 35.7 % (ref 35–48)
HCT VFR BLD AUTO: 35.8 % (ref 35–48)
HCT VFR BLD AUTO: 38.8 % (ref 35–48)
HCT VFR BLD AUTO: 41.3 % (ref 35–48)
HCT VFR BLD AUTO: 46.4 % (ref 35–48)
HGB BLD-MCNC: 10.5 G/DL (ref 12–16)
HGB BLD-MCNC: 11.4 G/DL (ref 12–16)
HGB BLD-MCNC: 11.5 G/DL (ref 12–16)
HGB BLD-MCNC: 11.6 G/DL (ref 12–16)
HGB BLD-MCNC: 11.8 G/DL (ref 12–16)
HGB BLD-MCNC: 12.4 G/DL (ref 12–16)
HGB BLD-MCNC: 12.7 G/DL (ref 12–16)
HGB BLD-MCNC: 12.9 G/DL (ref 12–16)
HGB BLD-MCNC: 13.1 G/DL (ref 12–16)
HGB BLD-MCNC: 14.6 G/DL (ref 12–16)
IMM GRANULOCYTES # BLD AUTO: 0.01 X10(3) UL (ref 0–1)
IMM GRANULOCYTES # BLD AUTO: 0.02 X10(3) UL (ref 0–1)
IMM GRANULOCYTES # BLD AUTO: 0.03 X10(3) UL (ref 0–1)
IMM GRANULOCYTES NFR BLD: 0.2 %
IMM GRANULOCYTES NFR BLD: 0.3 %
IMM GRANULOCYTES NFR BLD: 0.5 %
INSPIRATION SETTING TIME VENT: 0.9 %
IPAP MAX: 30 CM H2O
IPAP MAX: 30 CM H2O
IPAP MIN: 15 CM H2O
IPAP MIN: 15 CM H2O
KETONES UR STRIP.AUTO-MCNC: NEGATIVE MG/DL
LACTATE BLD-SCNC: 0.8 MMOL/L (ref 0.5–2)
LEUKOCYTE ESTERASE UR QL STRIP.AUTO: 500
LYMPHOCYTES # BLD AUTO: 1.14 X10(3) UL (ref 1–4)
LYMPHOCYTES # BLD AUTO: 1.19 X10(3) UL (ref 1–4)
LYMPHOCYTES # BLD AUTO: 1.24 X10(3) UL (ref 1–4)
LYMPHOCYTES NFR BLD AUTO: 12.5 %
LYMPHOCYTES NFR BLD AUTO: 20 %
LYMPHOCYTES NFR BLD AUTO: 26.6 %
MAGNESIUM SERPL-MCNC: 1.8 MG/DL (ref 1.6–2.6)
MAGNESIUM SERPL-MCNC: 2.2 MG/DL (ref 1.6–2.6)
MCH RBC QN AUTO: 28.4 PG (ref 26–34)
MCH RBC QN AUTO: 28.4 PG (ref 26–34)
MCH RBC QN AUTO: 28.5 PG (ref 26–34)
MCH RBC QN AUTO: 28.7 PG (ref 26–34)
MCH RBC QN AUTO: 29 PG (ref 26–34)
MCH RBC QN AUTO: 29.1 PG (ref 26–34)
MCH RBC QN AUTO: 29.2 PG (ref 26–34)
MCHC RBC AUTO-ENTMCNC: 31.5 G/DL (ref 31–37)
MCHC RBC AUTO-ENTMCNC: 31.7 G/DL (ref 31–37)
MCHC RBC AUTO-ENTMCNC: 31.8 G/DL (ref 31–37)
MCHC RBC AUTO-ENTMCNC: 31.8 G/DL (ref 31–37)
MCHC RBC AUTO-ENTMCNC: 32 G/DL (ref 31–37)
MCHC RBC AUTO-ENTMCNC: 32.5 G/DL (ref 31–37)
MCHC RBC AUTO-ENTMCNC: 33.2 G/DL (ref 31–37)
MCV RBC AUTO: 87.2 FL (ref 80–100)
MCV RBC AUTO: 87.7 FL (ref 80–100)
MCV RBC AUTO: 89.6 FL (ref 80–100)
MCV RBC AUTO: 90.2 FL (ref 80–100)
MCV RBC AUTO: 90.3 FL (ref 80–100)
MCV RBC AUTO: 91.3 FL (ref 80–100)
MCV RBC AUTO: 91.5 FL (ref 80–100)
METHGB MFR BLD: 0.7 % SAT (ref 0.4–1.5)
METHGB MFR BLD: 0.7 % SAT (ref 0.4–1.5)
METHGB MFR BLD: 0.9 % SAT (ref 0.4–1.5)
MONOCYTES # BLD AUTO: 0.38 X10(3) UL (ref 0.1–1)
MONOCYTES # BLD AUTO: 0.68 X10(3) UL (ref 0.1–1)
MONOCYTES # BLD AUTO: 0.71 X10(3) UL (ref 0.1–1)
MONOCYTES NFR BLD AUTO: 11 %
MONOCYTES NFR BLD AUTO: 7.5 %
MONOCYTES NFR BLD AUTO: 8.9 %
NEUTROPHILS # BLD AUTO: 2.6 X10 (3) UL (ref 1.5–7.7)
NEUTROPHILS # BLD AUTO: 2.6 X10(3) UL (ref 1.5–7.7)
NEUTROPHILS # BLD AUTO: 4.2 X10 (3) UL (ref 1.5–7.7)
NEUTROPHILS # BLD AUTO: 4.2 X10(3) UL (ref 1.5–7.7)
NEUTROPHILS # BLD AUTO: 7.45 X10 (3) UL (ref 1.5–7.7)
NEUTROPHILS # BLD AUTO: 7.45 X10(3) UL (ref 1.5–7.7)
NEUTROPHILS NFR BLD AUTO: 60.8 %
NEUTROPHILS NFR BLD AUTO: 67.7 %
NEUTROPHILS NFR BLD AUTO: 78.4 %
NITRITE UR QL STRIP.AUTO: NEGATIVE
OSMOLALITY SERPL CALC.SUM OF ELEC: 287 MOSM/KG (ref 275–295)
OSMOLALITY SERPL CALC.SUM OF ELEC: 295 MOSM/KG (ref 275–295)
OSMOLALITY SERPL CALC.SUM OF ELEC: 296 MOSM/KG (ref 275–295)
OSMOLALITY SERPL CALC.SUM OF ELEC: 302 MOSM/KG (ref 275–295)
OSMOLALITY SERPL CALC.SUM OF ELEC: 308 MOSM/KG (ref 275–295)
OXYHGB MFR BLDA: 97.3 % (ref 92–100)
OXYHGB MFR BLDA: 97.3 % (ref 92–100)
OXYHGB MFR BLDA: 97.8 % (ref 92–100)
P AXIS: 76 DEGREES
P AXIS: 79 DEGREES
P-R INTERVAL: 234 MS
P-R INTERVAL: 236 MS
PCO2 BLDA: 41 MM HG (ref 35–45)
PCO2 BLDA: 55 MM HG (ref 35–45)
PCO2 BLDA: 89 MM HG (ref 35–45)
PH BLDA: 7.17 [PH] (ref 7.35–7.45)
PH BLDA: 7.41 [PH] (ref 7.35–7.45)
PH BLDA: 7.5 [PH] (ref 7.35–7.45)
PH UR STRIP.AUTO: 6.5 [PH] (ref 5–8)
PLATELET # BLD AUTO: 166 10(3)UL (ref 150–450)
PLATELET # BLD AUTO: 185 10(3)UL (ref 150–450)
PLATELET # BLD AUTO: 208 10(3)UL (ref 150–450)
PLATELET # BLD AUTO: 212 10(3)UL (ref 150–450)
PLATELET # BLD AUTO: 217 10(3)UL (ref 150–450)
PLATELET # BLD AUTO: 220 10(3)UL (ref 150–450)
PLATELET # BLD AUTO: 231 10(3)UL (ref 150–450)
PLATELETS.RETICULATED NFR BLD AUTO: 2 % (ref 0–7)
PO2 BLDA: 120 MM HG (ref 80–100)
PO2 BLDA: 122 MM HG (ref 80–100)
PO2 BLDA: 205 MM HG (ref 80–100)
POTASSIUM BLD-SCNC: 2.9 MMOL/L (ref 3.6–5.1)
POTASSIUM SERPL-SCNC: 2.9 MMOL/L (ref 3.5–5.1)
POTASSIUM SERPL-SCNC: 2.9 MMOL/L (ref 3.5–5.1)
POTASSIUM SERPL-SCNC: 3.2 MMOL/L (ref 3.5–5.1)
POTASSIUM SERPL-SCNC: 3.3 MMOL/L (ref 3.5–5.1)
POTASSIUM SERPL-SCNC: 3.4 MMOL/L (ref 3.5–5.1)
POTASSIUM SERPL-SCNC: 3.5 MMOL/L (ref 3.5–5.1)
POTASSIUM SERPL-SCNC: 3.6 MMOL/L (ref 3.5–5.1)
POTASSIUM SERPL-SCNC: 3.6 MMOL/L (ref 3.5–5.1)
POTASSIUM SERPL-SCNC: 3.7 MMOL/L (ref 3.5–5.1)
POTASSIUM SERPL-SCNC: 3.9 MMOL/L (ref 3.5–5.1)
POTASSIUM SERPL-SCNC: 3.9 MMOL/L (ref 3.5–5.1)
POTASSIUM SERPL-SCNC: 4.3 MMOL/L (ref 3.5–5.1)
PROT SERPL-MCNC: 5.2 G/DL (ref 5.7–8.2)
PROT SERPL-MCNC: 5.7 G/DL (ref 5.7–8.2)
PROT SERPL-MCNC: 6.3 G/DL (ref 5.7–8.2)
PROT SERPL-MCNC: 7 G/DL (ref 5.7–8.2)
PROT UR STRIP.AUTO-MCNC: 70 MG/DL
Q-T INTERVAL: 436 MS
Q-T INTERVAL: 478 MS
QRS DURATION: 92 MS
QRS DURATION: 96 MS
QTC CALCULATION (BEZET): 403 MS
QTC CALCULATION (BEZET): 436 MS
R AXIS: 64 DEGREES
R AXIS: 65 DEGREES
RBC # BLD AUTO: 3.66 X10(6)UL (ref 3.8–5.3)
RBC # BLD AUTO: 3.92 X10(6)UL (ref 3.8–5.3)
RBC # BLD AUTO: 4.07 X10(6)UL (ref 3.8–5.3)
RBC # BLD AUTO: 4.07 X10(6)UL (ref 3.8–5.3)
RBC # BLD AUTO: 4.24 X10(6)UL (ref 3.8–5.3)
RBC # BLD AUTO: 4.61 X10(6)UL (ref 3.8–5.3)
RBC # BLD AUTO: 5.14 X10(6)UL (ref 3.8–5.3)
RBC #/AREA URNS AUTO: >10 /HPF
SODIUM BLD-SCNC: 141 MMOL/L (ref 135–145)
SODIUM SERPL-SCNC: 138 MMOL/L (ref 136–145)
SODIUM SERPL-SCNC: 142 MMOL/L (ref 136–145)
SODIUM SERPL-SCNC: 142 MMOL/L (ref 136–145)
SODIUM SERPL-SCNC: 143 MMOL/L (ref 136–145)
SODIUM SERPL-SCNC: 144 MMOL/L (ref 136–145)
SODIUM SERPL-SCNC: 148 MMOL/L (ref 136–145)
SP GR UR STRIP.AUTO: >1.03 (ref 1–1.03)
T AXIS: 63 DEGREES
T AXIS: 70 DEGREES
TIDAL VOLUME: 400 ML
TIDAL VOLUME: 400 ML
TSI SER-ACNC: 1.64 UIU/ML (ref 0.55–4.78)
UROBILINOGEN UR STRIP.AUTO-MCNC: NORMAL MG/DL
VENT RATE: 22 /MIN
VENTRICULAR RATE: 43 BPM
VENTRICULAR RATE: 60 BPM
WBC # BLD AUTO: 11.5 X10(3) UL (ref 4–11)
WBC # BLD AUTO: 4.3 X10(3) UL (ref 4–11)
WBC # BLD AUTO: 4.3 X10(3) UL (ref 4–11)
WBC # BLD AUTO: 4.6 X10(3) UL (ref 4–11)
WBC # BLD AUTO: 4.7 X10(3) UL (ref 4–11)
WBC # BLD AUTO: 6.2 X10(3) UL (ref 4–11)
WBC # BLD AUTO: 9.5 X10(3) UL (ref 4–11)
WBC #/AREA URNS AUTO: >50 /HPF
WBC CLUMPS UR QL AUTO: PRESENT /HPF

## 2025-01-01 PROCEDURE — 99232 SBSQ HOSP IP/OBS MODERATE 35: CPT | Performed by: HOSPITALIST

## 2025-01-01 PROCEDURE — 71045 X-RAY EXAM CHEST 1 VIEW: CPT | Performed by: INTERNAL MEDICINE

## 2025-01-01 PROCEDURE — 74018 RADEX ABDOMEN 1 VIEW: CPT | Performed by: INTERNAL MEDICINE

## 2025-01-01 PROCEDURE — 74176 CT ABD & PELVIS W/O CONTRAST: CPT | Performed by: INTERNAL MEDICINE

## 2025-01-01 PROCEDURE — 99223 1ST HOSP IP/OBS HIGH 75: CPT | Performed by: INTERNAL MEDICINE

## 2025-01-01 PROCEDURE — 5A09457 ASSISTANCE WITH RESPIRATORY VENTILATION, 24-96 CONSECUTIVE HOURS, CONTINUOUS POSITIVE AIRWAY PRESSURE: ICD-10-PCS | Performed by: STUDENT IN AN ORGANIZED HEALTH CARE EDUCATION/TRAINING PROGRAM

## 2025-01-01 PROCEDURE — 74019 RADEX ABDOMEN 2 VIEWS: CPT | Performed by: STUDENT IN AN ORGANIZED HEALTH CARE EDUCATION/TRAINING PROGRAM

## 2025-01-01 PROCEDURE — 99233 SBSQ HOSP IP/OBS HIGH 50: CPT | Performed by: HOSPITALIST

## 2025-01-01 PROCEDURE — 99222 1ST HOSP IP/OBS MODERATE 55: CPT | Performed by: STUDENT IN AN ORGANIZED HEALTH CARE EDUCATION/TRAINING PROGRAM

## 2025-01-01 PROCEDURE — 99231 SBSQ HOSP IP/OBS SF/LOW 25: CPT | Performed by: STUDENT IN AN ORGANIZED HEALTH CARE EDUCATION/TRAINING PROGRAM

## 2025-01-01 PROCEDURE — 74177 CT ABD & PELVIS W/CONTRAST: CPT | Performed by: EMERGENCY MEDICINE

## 2025-01-01 PROCEDURE — 93306 TTE W/DOPPLER COMPLETE: CPT | Performed by: INTERNAL MEDICINE

## 2025-01-01 PROCEDURE — 99222 1ST HOSP IP/OBS MODERATE 55: CPT | Performed by: CLINICAL NURSE SPECIALIST

## 2025-01-01 PROCEDURE — 99232 SBSQ HOSP IP/OBS MODERATE 35: CPT | Performed by: STUDENT IN AN ORGANIZED HEALTH CARE EDUCATION/TRAINING PROGRAM

## 2025-01-01 PROCEDURE — 99291 CRITICAL CARE FIRST HOUR: CPT | Performed by: STUDENT IN AN ORGANIZED HEALTH CARE EDUCATION/TRAINING PROGRAM

## 2025-01-01 PROCEDURE — 71045 X-RAY EXAM CHEST 1 VIEW: CPT | Performed by: EMERGENCY MEDICINE

## 2025-01-01 PROCEDURE — 99497 ADVNCD CARE PLAN 30 MIN: CPT | Performed by: HOSPITALIST

## 2025-01-01 PROCEDURE — 0D9N80Z DRAINAGE OF SIGMOID COLON WITH DRAINAGE DEVICE, VIA NATURAL OR ARTIFICIAL OPENING ENDOSCOPIC: ICD-10-PCS | Performed by: INTERNAL MEDICINE

## 2025-01-01 PROCEDURE — 71045 X-RAY EXAM CHEST 1 VIEW: CPT | Performed by: STUDENT IN AN ORGANIZED HEALTH CARE EDUCATION/TRAINING PROGRAM

## 2025-01-01 PROCEDURE — 99239 HOSP IP/OBS DSCHRG MGMT >30: CPT | Performed by: STUDENT IN AN ORGANIZED HEALTH CARE EDUCATION/TRAINING PROGRAM

## 2025-01-01 PROCEDURE — 0D9M8ZZ DRAINAGE OF DESCENDING COLON, VIA NATURAL OR ARTIFICIAL OPENING ENDOSCOPIC: ICD-10-PCS | Performed by: INTERNAL MEDICINE

## 2025-01-01 PROCEDURE — 74018 RADEX ABDOMEN 1 VIEW: CPT | Performed by: EMERGENCY MEDICINE

## 2025-01-01 PROCEDURE — 74018 RADEX ABDOMEN 1 VIEW: CPT | Performed by: HOSPITALIST

## 2025-01-01 PROCEDURE — 0D9N8ZZ DRAINAGE OF SIGMOID COLON, VIA NATURAL OR ARTIFICIAL OPENING ENDOSCOPIC: ICD-10-PCS | Performed by: INTERNAL MEDICINE

## 2025-01-01 RX ORDER — SENNOSIDES 8.6 MG
8.6 TABLET ORAL 2 TIMES DAILY
Status: DISCONTINUED | OUTPATIENT
Start: 2025-01-01 | End: 2025-01-01

## 2025-01-01 RX ORDER — LORAZEPAM 2 MG/ML
2 INJECTION INTRAMUSCULAR EVERY 4 HOURS PRN
Status: DISCONTINUED | OUTPATIENT
Start: 2025-01-01 | End: 2025-01-01

## 2025-01-01 RX ORDER — INDOMETHACIN 25 MG/1
100 CAPSULE ORAL ONCE
Status: COMPLETED | OUTPATIENT
Start: 2025-01-01 | End: 2025-01-01

## 2025-01-01 RX ORDER — FLUTICASONE PROPIONATE 50 MCG
2 SPRAY, SUSPENSION (ML) NASAL DAILY
Status: DISCONTINUED | OUTPATIENT
Start: 2025-01-01 | End: 2025-01-01

## 2025-01-01 RX ORDER — ENOXAPARIN SODIUM 100 MG/ML
40 INJECTION SUBCUTANEOUS DAILY
Status: DISCONTINUED | OUTPATIENT
Start: 2025-01-01 | End: 2025-01-01

## 2025-01-01 RX ORDER — RISPERIDONE 0.25 MG/1
0.75 TABLET ORAL 2 TIMES DAILY
Status: DISCONTINUED | OUTPATIENT
Start: 2025-01-01 | End: 2025-01-01

## 2025-01-01 RX ORDER — ONDANSETRON 2 MG/ML
4 INJECTION INTRAMUSCULAR; INTRAVENOUS EVERY 6 HOURS PRN
Status: DISCONTINUED | OUTPATIENT
Start: 2025-01-01 | End: 2025-01-01

## 2025-01-01 RX ORDER — SODIUM CHLORIDE 9 MG/ML
INJECTION, SOLUTION INTRAVENOUS CONTINUOUS
Status: DISCONTINUED | OUTPATIENT
Start: 2025-01-01 | End: 2025-01-01

## 2025-01-01 RX ORDER — SENNOSIDES 8.6 MG
17.2 TABLET ORAL NIGHTLY PRN
Status: DISCONTINUED | OUTPATIENT
Start: 2025-01-01 | End: 2025-01-01

## 2025-01-01 RX ORDER — SCOPOLAMINE 1 MG/3D
1 PATCH, EXTENDED RELEASE TRANSDERMAL
Status: DISCONTINUED | OUTPATIENT
Start: 2025-01-01 | End: 2025-01-01

## 2025-01-01 RX ORDER — BISACODYL 10 MG
10 SUPPOSITORY, RECTAL RECTAL
Status: DISCONTINUED | OUTPATIENT
Start: 2025-01-01 | End: 2025-01-01

## 2025-01-01 RX ORDER — POTASSIUM CHLORIDE 14.9 MG/ML
20 INJECTION INTRAVENOUS ONCE
Status: COMPLETED | OUTPATIENT
Start: 2025-01-01 | End: 2025-01-01

## 2025-01-01 RX ORDER — METOCLOPRAMIDE HYDROCHLORIDE 5 MG/ML
10 INJECTION INTRAMUSCULAR; INTRAVENOUS EVERY 8 HOURS PRN
Status: DISCONTINUED | OUTPATIENT
Start: 2025-01-01 | End: 2025-01-01

## 2025-01-01 RX ORDER — POLYETHYLENE GLYCOL 3350 17 G/17G
17 POWDER, FOR SOLUTION ORAL DAILY PRN
Status: DISCONTINUED | OUTPATIENT
Start: 2025-01-01 | End: 2025-01-01

## 2025-01-01 RX ORDER — SIMETHICONE 80 MG
80 TABLET,CHEWABLE ORAL
Status: DISCONTINUED | OUTPATIENT
Start: 2025-01-01 | End: 2025-01-01

## 2025-01-01 RX ORDER — MAGNESIUM SULFATE HEPTAHYDRATE 40 MG/ML
2 INJECTION, SOLUTION INTRAVENOUS ONCE
Status: COMPLETED | OUTPATIENT
Start: 2025-01-01 | End: 2025-01-01

## 2025-01-01 RX ORDER — SODIUM PHOSPHATE, DIBASIC AND SODIUM PHOSPHATE, MONOBASIC 7; 19 G/230ML; G/230ML
1 ENEMA RECTAL ONCE AS NEEDED
Status: DISCONTINUED | OUTPATIENT
Start: 2025-01-01 | End: 2025-01-01

## 2025-01-01 RX ORDER — GLUC/MSM/COLGN2/HYAL/ANTIARTH3 375-375-20
1 TABLET ORAL 2 TIMES DAILY
Status: DISCONTINUED | OUTPATIENT
Start: 2025-01-01 | End: 2025-01-01

## 2025-01-01 RX ORDER — MORPHINE SULFATE 2 MG/ML
2 INJECTION, SOLUTION INTRAMUSCULAR; INTRAVENOUS ONCE
Status: DISCONTINUED | OUTPATIENT
Start: 2025-01-01 | End: 2025-01-01

## 2025-01-01 RX ORDER — ALBUTEROL SULFATE 0.83 MG/ML
2.5 SOLUTION RESPIRATORY (INHALATION) EVERY 4 HOURS PRN
Status: DISCONTINUED | OUTPATIENT
Start: 2025-01-01 | End: 2025-01-01

## 2025-01-01 RX ORDER — GLYCOPYRROLATE 0.2 MG/ML
0.4 INJECTION, SOLUTION INTRAMUSCULAR; INTRAVENOUS
Status: DISCONTINUED | OUTPATIENT
Start: 2025-01-01 | End: 2025-01-01

## 2025-01-01 RX ORDER — ATROPINE SULFATE 10 MG/ML
2 SOLUTION/ DROPS OPHTHALMIC EVERY 2 HOUR PRN
Status: DISCONTINUED | OUTPATIENT
Start: 2025-01-01 | End: 2025-01-01

## 2025-01-01 RX ORDER — RISPERIDONE 0.25 MG/1
0.75 TABLET ORAL 2 TIMES DAILY
Status: CANCELLED | OUTPATIENT
Start: 2025-01-01

## 2025-01-01 RX ORDER — BISMUTH SUBSALICYLATE 262 MG/1
1 TABLET, CHEWABLE ORAL EVERY 6 HOURS PRN
Status: DISCONTINUED | OUTPATIENT
Start: 2025-01-01 | End: 2025-01-01

## 2025-01-01 RX ORDER — BENZONATATE 200 MG/1
200 CAPSULE ORAL 3 TIMES DAILY PRN
Status: DISCONTINUED | OUTPATIENT
Start: 2025-01-01 | End: 2025-01-01

## 2025-01-01 RX ORDER — MELATONIN
1000 DAILY
Status: DISCONTINUED | OUTPATIENT
Start: 2025-01-01 | End: 2025-01-01

## 2025-01-01 RX ORDER — FUROSEMIDE 10 MG/ML
40 INJECTION INTRAMUSCULAR; INTRAVENOUS EVERY 8 HOURS PRN
Status: DISCONTINUED | OUTPATIENT
Start: 2025-01-01 | End: 2025-01-01

## 2025-01-01 RX ORDER — ALBUTEROL SULFATE 0.83 MG/ML
SOLUTION RESPIRATORY (INHALATION)
Status: COMPLETED
Start: 2025-01-01 | End: 2025-01-01

## 2025-01-01 RX ORDER — LORAZEPAM 2 MG/ML
1 INJECTION INTRAMUSCULAR EVERY 4 HOURS PRN
Status: DISCONTINUED | OUTPATIENT
Start: 2025-01-01 | End: 2025-01-01

## 2025-01-01 RX ORDER — HALOPERIDOL 5 MG/ML
1 INJECTION INTRAMUSCULAR
Status: DISCONTINUED | OUTPATIENT
Start: 2025-01-01 | End: 2025-01-01

## 2025-01-01 RX ORDER — ALBUTEROL SULFATE 0.83 MG/ML
2.5 SOLUTION RESPIRATORY (INHALATION) EVERY 4 HOURS PRN
Status: CANCELLED | OUTPATIENT
Start: 2025-01-01

## 2025-01-01 RX ORDER — HALOPERIDOL 5 MG/ML
2 INJECTION INTRAMUSCULAR
Status: DISCONTINUED | OUTPATIENT
Start: 2025-01-01 | End: 2025-01-01

## 2025-01-01 RX ORDER — MORPHINE SULFATE 2 MG/ML
2 INJECTION, SOLUTION INTRAMUSCULAR; INTRAVENOUS
Status: DISCONTINUED | OUTPATIENT
Start: 2025-01-01 | End: 2025-01-01

## 2025-01-01 RX ORDER — RISPERIDONE 0.25 MG/1
0.5 TABLET ORAL
Status: DISCONTINUED | OUTPATIENT
Start: 2025-01-01 | End: 2025-01-01

## 2025-01-01 RX ORDER — ACETAMINOPHEN 650 MG/1
650 SUPPOSITORY RECTAL EVERY 6 HOURS SCHEDULED
Status: DISCONTINUED | OUTPATIENT
Start: 2025-01-01 | End: 2025-01-01

## 2025-01-01 RX ORDER — ONDANSETRON 2 MG/ML
4 INJECTION INTRAMUSCULAR; INTRAVENOUS EVERY 4 HOURS PRN
Status: DISCONTINUED | OUTPATIENT
Start: 2025-01-01 | End: 2025-01-01

## 2025-01-01 RX ORDER — VANCOMYCIN HYDROCHLORIDE 125 MG/1
125 CAPSULE ORAL DAILY
Status: DISCONTINUED | OUTPATIENT
Start: 2025-01-01 | End: 2025-01-01

## 2025-01-01 RX ORDER — RISPERIDONE 0.25 MG/1
0.5 TABLET ORAL
Status: CANCELLED | OUTPATIENT
Start: 2025-01-01

## 2025-01-01 RX ORDER — POTASSIUM CHLORIDE 1500 MG/1
40 TABLET, EXTENDED RELEASE ORAL ONCE
Status: COMPLETED | OUTPATIENT
Start: 2025-01-01 | End: 2025-01-01

## 2025-04-19 PROBLEM — K59.00 CONSTIPATION, UNSPECIFIED CONSTIPATION TYPE: Status: ACTIVE | Noted: 2025-01-01

## 2025-04-19 PROBLEM — K59.00 CONSTIPATION, UNSPECIFIED CONSTIPATION TYPE: Status: ACTIVE | Noted: 2025-04-19

## 2025-04-19 NOTE — ED QUICK NOTES
Rounding Completed    Plan of Care reviewed. Waiting for  blood  results  Elimination needs assessed. Purwick in place    Bed is locked and in lowest position. Call light within reach.

## 2025-04-19 NOTE — ED QUICK NOTES
Orders for admission, patient is aware of plan and ready to go upstairs. Any questions, please call ED RN anna at extension [phone].     Patient Covid vaccination status: Fully vaccinated     COVID Test Ordered in ED: None    COVID Suspicion at Admission: N/A    Running Infusions: Medication Infusions[1] None    Mental Status/LOC at time of transport: oriented x1    Other pertinent information:  dementia  CIWA score: N/A   NIH score:  N/A             [1]

## 2025-04-19 NOTE — ED PROVIDER NOTES
Patient Seen in: [facility] Emergency Department      History     Chief Complaint   Patient presents with    Failure To Thrive     Stated Complaint: Poor appetite, constipation    Subjective:     HPI    79-year-old female with diabetes (glipizide), hypertension (lisinopril, furosemide), bipolar affective disorder, anxiety (risperidone) who has frequent constipation was sent from the [facility] because she had not been having a bowel movement.  Her daughter says her abdomen is somewhat distended and tender.  She may have had some vomiting.  She has disinterest in food.  No fever reported.      Patient's daughter said that this degree of constipation occurs about every 6 months and requires admission.    Objective:   Past Medical History:    Alzheimer's dementia with behavioral disturbance (HCC)    Anxiety state    Bipolar affective (HCC)    Cataract    Dementia (HCC)    Diabetes (HCC)    Elevated fasting lipid profile    Essential hypertension    Essential hypertension, benign    Falls    Gallstones and inflammation of gallbladder without obstruction    High blood pressure    High cholesterol    Muscle weakness    Neuropathy    Osteoarthritis    Psychosis, unspecified psychosis type (HCC)    Shortness of breath              Past Surgical History:   Procedure Laterality Date    Biopsy of breast, incisional      left breast bx during surgery    Colonoscopy N/A 2023    Procedure: UNPREPPED  COLONOSCOPY;  Surgeon: Fabrice Trejo MD;  Location:  ENDOSCOPY    Colonoscopy N/A 2024    Procedure: COLONOSCOPY WITH DECOMPRESSION;  Surgeon: Trudi Albright MD;  Location:  ENDOSCOPY    Other surgical history      aneurysm clipped x2                Social History     Socioeconomic History    Marital status:    Tobacco Use    Smoking status: Former     Current packs/day: 0.00     Types: Cigarettes     Quit date:      Years since quittin.3    Smokeless tobacco: Never    Vaping Use    Vaping status: Never Used   Substance and Sexual Activity    Alcohol use: No     Alcohol/week: 0.0 standard drinks of alcohol    Drug use: No   Other Topics Concern    Caffeine Concern Yes    Exercise No     Social Drivers of Health     Food Insecurity: No Food Insecurity (11/5/2024)    Food Insecurity     Food Insecurity: Never true   Transportation Needs: No Transportation Needs (11/5/2024)    Transportation Needs     Lack of Transportation: No   Housing Stability: Low Risk  (11/5/2024)    Housing Stability     Housing Instability: No              Review of Systems    Positive for stated complaint: Poor appetite, constipation  Other systems are as noted in HPI.  Constitutional and vital signs reviewed.      All other systems reviewed and negative except as noted above.    Physical Exam     ED Triage Vitals [04/19/25 1404]   /65   Pulse 94   Resp 17   Temp 97.6 °F (36.4 °C)   Temp src Temporal   SpO2 100 %   O2 Device None (Room air)       Current:/60   Pulse 76   Temp 97.6 °F (36.4 °C) (Temporal)   Resp 18   SpO2 100%     General:  Vitals as listed.  No acute distress   HEENT: Sclerae anicteric.  Conjunctivae show no pallor.  Oropharynx clear, mucous membranes moist   Lungs: Decreased air exchange and clear   Heart: regular rate rhythm and no murmur   Abdomen: Protuberant with lower abdominal tenderness.  No abdominal masses.  No peritoneal signs   Extremities: no edema, normal peripheral pulses   Neuro: Alert, confused chronically, and nonfocal      ED Course     Labs Reviewed   COMP METABOLIC PANEL (14) - Abnormal; Notable for the following components:       Result Value    Glucose 120 (*)     Calcium, Total 10.7 (*)     ALT 8 (*)     All other components within normal limits   CBC WITH DIFFERENTIAL WITH PLATELET   URINALYSIS WITH CULTURE REFLEX     XR CHEST AP PORTABLE  (CPT=71045)  Result Date: 4/19/2025  CONCLUSION: No acute cardiopulmonary abnormality.   LOCATION:  [facility]       Dictated by (CST): Ronald Yen MD on 4/19/2025 at 3:26 PM     Finalized by (CST): Ronald Yen MD on 4/19/2025 at 3:27 PM       XR ABDOMEN (1 VIEW) (CPT=74018)  Result Date: 4/19/2025  CONCLUSION:  Large amount of stool in the rectum and distal sigmoid colon.   LOCATION:  [facility]   Dictated by (CST): Geo Carolina MD on 4/19/2025 at 2:34 PM     Finalized by (CST): Geo Carolina MD on 4/19/2025 at 2:35 PM     I independently read the radiographs and no bowel obstruction is noted on KUB.  ED COURSE and MDM     Sources of the medical history included the patient and her daughter    Differential diagnosis before testing included constipation versus ischemic bowel, a medical condition that poses a threat to life.    I reviewed prior external notes including ultrasound of the abdomen on 11/5/2024 that showed cholelithiasis and was otherwise unremarkable.    Dr. Marina, [facility] hospitalist, aware.  Dr. Molina, [facility], aware.    I have discussed with the patient the results of testing, differential diagnosis, and treatment plan. They expressed clear understanding of these instructions and agrees to the plan provided.    Disposition and Plan     Clinical Impression:  1. Constipation, unspecified constipation type         Disposition:  Admit  4/19/2025  4:31 pm    Follow-up:  [facility] [address] [address]  Schedule an appointment as soon as possible for a visit in 1 week(s)          Medications Prescribed:  Current Discharge Medication List        START taking these medications    Details   polyethylene glycol, PEG 3350-KCl-NaBcb-NaCl-NaSulf, 236 g Oral Recon Soln Take 4,000 mL by mouth once for 1 dose.  Qty: 4000 mL, Refills: 0

## 2025-04-19 NOTE — ED QUICK NOTES
Patient's daughter at bedside was updated on plan of care with no questions/complaints.     Attempted straight cath without success, iam applied

## 2025-04-19 NOTE — CONSULTS
[facility]                       [facility]astroenter[facility] Consultation-[facility]    Kierra Joshua Patient Status:  Emergency    1945 MRN [id number]   Location [facility] EMERGENCY DEPARTMENT Attending Librado Appiah MD   Hosp Day # 0 PCP Tate Tovar         Reason for consultation: constipation  HPI: Ms. Joshua is an 80 yo F with Alzheimer's dementia, DM, chronic constipation, h/o C diff, presents with constipation. Pt with decreased po intake and brought in due to slightly distended abdomen. Pt cannot provide history, thus history obtained from chart. She has required endoscopic decompression in the past.   PMHx: Past Medical History[1]  PSHx: Past Surgical History[2]  Medications: Current Medications[3]  Allergies: Allergies[4]  SocHx:    Short Social Hx on File[5]   FamHx:   Family History[6]   ROS:  In addition to the pertinent positives described above:            Infectious Disease:  No chronic infections or recent fevers prior to the acute illness            Cardiovascular: No history of CAD, prior MI, chest pain, or palpitations            Respiratory: No shortness of breath, asthma, copd, recurrent pneumonia            Hematologic: The patient reports no easy bruising, frequent gum bleeding or nose bleeding;  The patient has no history of known chronic anemia            Dermatologic: The patient reports no recent rashes or chronic skin disorders            Rheumatologic: The patient reports no history of chronic arthritis, myalgias, arthralgias            Genitourinary:  The patient reports no history of recurrent urinary tract infections, hematuria, dysuria, or nephrolithiasis           Psychiatric: bipolar           Oncologic: The patient reports on history of prior solid tumor or hematologic malignancy           ENT: The patient reports no hoarseness of voice, hearing loss, sinus congestion, tinnitus           Neurologic: dementia    PE: /72    Pulse 79   Temp 97.6 °F (36.4 °C) (Temporal)   Resp 18   SpO2 100%   Gen: Awake and alert but does not always respond to questions appropriately  HENT: NCAT, EOMI, oropharynx is clear with moist mucosal membranes  Eyes: Sclerae are anicteric  Neck:  Supple without nuchal rigidity  CV: Regular rate and rhythm, with normal S1 and S2  Resp: No increased respiratory effort  Abdomen: Soft, non-tender, non-distended with the presence of bowel sounds; no rebound or guarding; No ascites is clinically apparent  Ext: No peripheral edema or cyanosis  Skin: Warm and dry  Psychiatric: Appropriate mood and congruent affect without obvious depression or anxiety  Rectal exam without hard stool in vault  Labs:   Lab Results   Component Value Date    WBC 9.5 04/19/2025    HGB 14.6 04/19/2025    HCT 46.4 04/19/2025    .0 04/19/2025    CREATSERUM 0.61 04/19/2025    BUN 13 04/19/2025     04/19/2025    K 4.3 04/19/2025     04/19/2025    CO2 23.0 04/19/2025     04/19/2025    CA 10.7 04/19/2025    ALB 4.5 04/19/2025    ALKPHO 76 04/19/2025    BILT 0.3 04/19/2025    AST 22 04/19/2025    ALT 8 04/19/2025     Recent Labs   Lab 04/19/25  1512   *   BUN 13   CREATSERUM 0.61   CA 10.7*      K 4.3      CO2 23.0     Recent Labs   Lab 04/19/25  1512   RBC 5.14   HGB 14.6   HCT 46.4   MCV 90.3   MCH 28.4   MCHC 31.5   RDW 13.6   NEPRELIM 7.45   WBC 9.5   .0       Recent Labs   Lab 04/19/25  1512   ALT 8*   AST 22       Imaging:   Abd XR:  Impression   CONCLUSION:  Large amount of stool in the rectum and distal sigmoid colon.     CT A/p:  Impression   CONCLUSION:       1. Extensive distal stool volume with rectal stool impaction, the rectum distended to a caliber of 11 cm.  No obstructing lesion detected.  No imaging features of stercoral colitis.     2. Thickening and hyperemia of the urinary bladder compatible with cystitis.  Recommend correlation with urinalysis.     Impression:   Constipation  with stool impaction and distended rectum. Pt with known chronic constipation. Pt does not appear to have abdominal tenderness on exam. Rectal exam without hard stool in vault. Rest of colon is not dilated, thus this is not [place]'s.   Dementia    Recommendations:   Recommend tap water enema q shift; discussed this with her nurse as this needs to be completed as there was no stool in rectal vault and I was unable to manually disimpact her  She was given 2 L Golytely yesterday and did have BM after enema given last night  Repeat Abd XR after next enema  4. Monitor lytes and replace as needed      Thank you for the consultation, we will follow the patient with you.    Swati Briceno DO  6:13 PM  4/19/2025  [facility]  [phone]             [1]   Past Medical History:   Alzheimer's dementia with behavioral disturbance (HCC)    Anxiety state    Bipolar affective (HCC)    Cataract    Dementia (HCC)    Diabetes (HCC)    Elevated fasting lipid profile    Essential hypertension    Essential hypertension, benign    Falls    Gallstones and inflammation of gallbladder without obstruction    High blood pressure    High cholesterol    Muscle weakness    Neuropathy    Osteoarthritis    Psychosis, unspecified psychosis type (HCC)    Shortness of breath   [2]   Past Surgical History:  Procedure Laterality Date    Biopsy of breast, incisional      left breast bx during surgery    Colonoscopy N/A 12/29/2023    Procedure: UNPREPPED  COLONOSCOPY;  Surgeon: Fabrice Trejo MD;  Location:  ENDOSCOPY    Colonoscopy N/A 2/25/2024    Procedure: COLONOSCOPY WITH DECOMPRESSION;  Surgeon: Trudi Albright MD;  Location:  ENDOSCOPY    Other surgical history  2001    aneurysm clipped x2   [3]    [COMPLETED] iopamidol 76% (ISOVUE-370) injection for power injector  80 mL Intravenous ONCE PRN   [4] No Known Allergies  [5]   Social History  Socioeconomic History    Marital status:    Tobacco Use    Smoking  status: Former     Current packs/day: 0.00     Types: Cigarettes     Quit date:      Years since quittin.3    Smokeless tobacco: Never   Vaping Use    Vaping status: Never Used   Substance and Sexual Activity    Alcohol use: No     Alcohol/week: 0.0 standard drinks of alcohol    Drug use: No   Other Topics Concern    Caffeine Concern Yes    Exercise No     Social Drivers of Health     Food Insecurity: No Food Insecurity (2024)    Food Insecurity     Food Insecurity: Never true   Transportation Needs: No Transportation Needs (2024)    Transportation Needs     Lack of Transportation: No   Housing Stability: Low Risk  (2024)    Housing Stability     Housing Instability: No   [6] No family history on file.

## 2025-04-19 NOTE — ED INITIAL ASSESSMENT (HPI)
Presents from [employer] renetta Ruiz d/t concerns for pocketing food in mouth and fecal impaction; per EMS: VSS, blood glucose 180 en route. Patient is AOX1 per norm d/t dementia

## 2025-04-20 NOTE — PROGRESS NOTES
[facility]   part of [facility]     Hospitalist Progress Note     Kierra Joshua Patient Status:  Observation    1945 MRN [id number]   Location [facility] 4NW-A Attending Marleny Marina MD   Hosp Day # 0 PCP Tate Tovar     Chief Complaint: Constipation     Subjective:     Patient  more awake today, reports a headache. Afebrile. Had BM with golytely.     Objective:    Review of Systems:   A comprehensive review of systems was completed; pertinent positive and negatives stated in subjective.    Vital signs:  Temp:  [97.6 °F (36.4 °C)-98 °F (36.7 °C)] 98 °F (36.7 °C)  Pulse:  [66-94] 66  Resp:  [16-18] 16  BP: (110-176)/(31-86) 112/49  SpO2:  [95 %-100 %] 98 %    Physical Exam:    General: No acute distress  Respiratory: No wheezes, no rhonchi  Cardiovascular: S1, S2, regular rate and rhythm  Abdomen: Soft, Non-tender, non-distended, positive bowel sounds  Neuro: No new focal deficits.   Extremities: No edema      Diagnostic Data:    Labs:  Recent Labs   Lab 25  1512   WBC 9.5   HGB 14.6   MCV 90.3   .0       Recent Labs   Lab 25  1512 25  0547   * 114*   BUN 13 13   CREATSERUM 0.61 0.64   CA 10.7* 9.5   ALB 4.5 4.2    142   K 4.3 3.3*    103   CO2 23.0 28.0   ALKPHO 76 69   AST 22 15   ALT 8* 7*   BILT 0.3 0.4   TP 7.0 6.3       Estimated Glomerular Filtration Rate: 89 mL/min/1.73m2 (result from lab).    No results for input(s): \"TROP\", \"TROPHS\", \"CK\" in the last 168 hours.    No results for input(s): \"PTP\", \"INR\" in the last 168 hours.               Microbiology    Hospital Encounter on 25   1. Urine Culture, Routine     Status: None (Preliminary result)    Collection Time: 25  6:44 PM    Specimen: Urine, clean catch   Result Value Ref Range    Urine Culture No Growth at <18 hours N/A         Imaging: Reviewed in Epic.    Medications: Scheduled Medications[1]    Assessment & Plan:      #Constipation with high stool burden  CT A/P  noted   2 L Golyetly, enema, senna BID  GI on CS     #h/o dementia with behavioral disturbances      #HTN     #HLD      #DM type 2  ISS     #UTI ruled out       Marleny Marina MD    Supplementary Documentation:     Quality:  DVT Mechanical Prophylaxis:   SCDs,    DVT Pharmacologic Prophylaxis   Medication    enoxaparin (Lovenox) 40 MG/0.4ML SUBQ injection 40 mg                Code Status: DNAR/Selective Treatment  Smith: No urinary catheter in place  Smith Duration (in days):   Central line:    CONRADO:     Discharge is dependent on: clinical   At this point Ms. Joshua is expected to be discharge to: home     Called POA 10: 34 AM on 4/20 - no answer left VM    The 21st Century Cures Act makes medical notes like these available to patients in the interest of transparency. Please be advised this is a medical document. Medical documents are intended to carry relevant information, facts as evident, and the clinical opinion of the practitioner. The medical note is intended as peer to peer communication and may appear blunt or direct. It is written in medical language and may contain abbreviations or verbiage that are unfamiliar.                        [1]    potassium chloride  40 mEq Intravenous Once    enoxaparin  40 mg Subcutaneous Daily    sennosides  8.6 mg Oral BID    cefTRIAXone  1 g Intravenous Q24H    vancomycin  125 mg Oral Daily    cyanocobalamin  1,000 mcg Oral Daily    fluticasone propionate  2 spray Each Nare Daily    risperiDONE  0.75 mg Oral BID    risperiDONE  0.5 mg Oral Noon    calcium carbonate-vitamin D  2 tablet Oral BID with meals

## 2025-04-20 NOTE — CM/SW NOTE
04/20/25 1300   CM/SW Referral Data   Referral Source Social Work (self-referral)   Reason for Referral Discharge planning   Informant Clinical Staff Member;EMR   Patient Info   Patient's Home Environment Long Term Care Facility   Post Acute Care Provider Upon Admission   ([facility])   Discharge Needs   Anticipated D/C needs Long term care facility;Transportation services;To be determined     Patient is a 80 y/o female who admitted with Constipation.   Per chart review, patient noted to be a LTC resident at the [facility]. Referral sent on aidin.     KINGA/CM to remain available for support and/or discharge planning.    Marilu GALEAS LCSW  Discharge Planner

## 2025-04-20 NOTE — PLAN OF CARE
NURSING ADMISSION NOTE      Patient admitted via Cart  Oriented to room.  Safety precautions initiated.  Bed in low position.  Call light in reach.    Pt received A&Ox1, hx dementia. VSS. RA. Tele. Afebrile. Denies pain. Admission navigator complete w/ guardianMakayla. Medications given per MAR, receiving 2L golytely. Tap water enema Qshift. NPO sips w/ meds.

## 2025-04-21 NOTE — PLAN OF CARE
Problem: GASTROINTESTINAL - ADULT  Goal: Minimal or absence of nausea and vomiting  Description: INTERVENTIONS:- Maintain adequate hydration with IV or PO as ordered and tolerated- Nasogastric tube to low intermittent suction as ordered- Evaluate effectiveness of ordered antiemetic medications- Provide nonpharmacologic comfort measures as appropriate- Advance diet as tolerated, if ordered- Obtain nutritional consult as needed- Evaluate fluid balance  Outcome: Progressing  Goal: Maintains or returns to baseline bowel function  Description: INTERVENTIONS:- Assess bowel function- Maintain adequate hydration with IV or PO as ordered and tolerated- Evaluate effectiveness of GI medications- Encourage mobilization and activity- Obtain nutritional consult as needed- Establish a toileting routine/schedule- Consider collaborating with pharmacy to review patient's medication profile  Outcome: Progressing  Goal: Maintains adequate nutritional intake (undernourished)  Description: INTERVENTIONS:- Monitor percentage of each meal consumed- Identify factors contributing to decreased intake, treat as appropriate- Assist with meals as needed- Monitor I&O, WT and lab values- Obtain nutritional consult as needed- Optimize oral hygiene and moisture- Encourage food from home; allow for food preferences- Enhance eating environment  Outcome: Progressing  Goal: Achieves appropriate nutritional intake (bariatric)  Description: INTERVENTIONS:- Monitor for over-consumption- Identify factors contributing to increased intake, treat as appropriate- Monitor I&O, WT and lab values- Obtain nutritional consult as needed- Evaluate psychosocial factors contributing to over-consumption  Outcome: Progressing

## 2025-04-21 NOTE — PROGRESS NOTES
Inpatient Follow up Note    Kierra Joshua Patient Status:  Inpatient    1945 MRN [id number]   Location [facility] 4NW-A Attending Marleny Marina MD   Hosp Day # 0 PCP Tate Tovar     Reason for Consultation   constipation     Subjective   Having multiple Bms. XR showed large stool burden had decreased, but showed dilated colon, CT showed improved dilation of recto-sigmoid colon. No abdominal swelling/pain, vomiting, fevers.             Objective:     /52 (BP Location: Right arm)   Pulse 60   Temp 98 °F (36.7 °C) (Oral)   Resp 16   Wt 133 lb (60.3 kg)   SpO2 98%   BMI 22.13 kg/m²   Gen: AAOx1  CV: RRR with normal S1 / S2  Resp: CTA bilaterally  Abd: (+)BS, soft, non-tender, non-distended; no rebound or guarding  Ext: No edema or cyanosis  Skin: Warm and dry     Labs/Imaging     LABRCNTIP[PGLU:5@  No results for input(s): \"INR\" in the last 168 hours.      Recent Labs   Lab 25  1512 25  0458   WBC 9.5 4.3   HGB 14.6 12.4   .0 212.0       Recent Labs   Lab 25  1512 25  0547 25  0458    142 144   K 4.3 3.3* 3.9  3.9    103 109   CO2 23.0 28.0 23.0   BUN 13 13 7*   CREATSERUM 0.61 0.64 0.59       Recent Labs   Lab 25  1512 25  0547   ALT 8* 7*   AST 22 15     CT ABDOMEN+PELVIS(CPT=74176)  Result Date: 2025  CONCLUSION:  1. No evidence of pneumoperitoneum. 2. Redemonstrated is diffuse gaseous distension of the colon, measuring up to 8.3 cm within the rectosigmoid colon, overall mildly improved.  Interval resolution of large volume stool within the rectum. 3. Circumferential urinary bladder wall thickening, correlate for cystitis. 4. Please see above for further details.   LOCATION:  [facility]   Dictated by (CST): Ronald Gutierrez MD on 2025 at 3:15 PM     Finalized by (CST): Ronald Gutierrez MD on 2025 at 3:20 PM       XR ABDOMEN (1 VIEW) (CPT=74018)  Result Date: 2025  CONCLUSION:    1. The large amount of  stool previously demonstrated in the rectum on a CT scan from 2 days ago appears cleared.  2. Continued large amount of gas in the colon, probably diminished compared to the prior CT.  Moderate air within the small bowel.  3. A prominent dilated portion of colon in the central abdomen shows visibility of both walls, which can be seen in free air as double wall sign, but can also be seen as a pseudo double wall sign with bowel against other dilated bowel.  Assessment for free air is limited on a supine radiograph.  If the patient has acute abdomen, signs or symptoms of free air, or other imaging requirements beyond x-ray, then a CT scan of the abdomen would be advised.    LOCATION:  [facility]   Dictated by ([facility]): Gavin Haas MD on 4/21/2025 at 10:38 AM     Finalized by (CST): Gavin Haas MD on 4/21/2025 at 10:42 AM       XR ABDOMEN (1 VIEW) (CPT=74018)  Result Date: 4/20/2025  CONCLUSION:  Air-filled distention of multiple loops of bowel throughout the abdomen and pelvis.  This may be due to an ileus.  Previously noted large amount of stool in the rectum is no longer there.   LOCATION:  [facility]   Dictated by ([facility]): Geo Carolina MD on 4/20/2025 at 12:35 PM     Finalized by (CST): Geo Carolina MD on 4/20/2025 at 12:36 PM       CT ABDOMEN+PELVIS(CONTRAST ONLY)(CPT=74177)  Result Date: 4/19/2025  CONCLUSION:   1. Extensive distal stool volume with rectal stool impaction, the rectum distended to a caliber of 11 cm.  No obstructing lesion detected.  No imaging features of stercoral colitis.  2. Thickening and hyperemia of the urinary bladder compatible with cystitis.  Recommend correlation with urinalysis.    LOCATION:  [facility]   Dictated by ([facility]): Sam Velazquez MD on 4/19/2025 at 6:37 PM     Finalized by (CST): Sam Velazquez MD on 4/19/2025 at 6:43 PM       XR CHEST AP PORTABLE  (CPT=71045)  Result Date: 4/19/2025  CONCLUSION: No acute cardiopulmonary abnormality.   LOCATION:  [facility]      Dictated by (CST):  Ronald Yen MD on 4/19/2025 at 3:26 PM     Finalized by (CST): Ronald Yen MD on 4/19/2025 at 3:27 PM       XR ABDOMEN (1 VIEW) (CPT=74018)  Result Date: 4/19/2025  CONCLUSION:  Large amount of stool in the rectum and distal sigmoid colon.   LOCATION:  [facility]   Dictated by (CST): Geo Carolina MD on 4/19/2025 at 2:34 PM     Finalized by (CST): Geo Carolina MD on 4/19/2025 at 2:35 PM       AST   Date/Time Value Ref Range Status   04/20/2025 05:47 AM 15 <34 U/L Final   02/28/2014 11:45 AM 17 15 - 41 U/L Final     Comment:     [facility], [address] [place] [id number]     ALT   Date/Time Value Ref Range Status   04/20/2025 05:47 AM 7 (L) 10 - 49 U/L Final   02/28/2014 11:45 AM 23 14 - 54 U/L Final     Comment:     [facility], [address] [place] [address],[place], [place],[address]     BUN   Date/Time Value Ref Range Status   04/21/2025 04:58 AM 7 (L) 9 - 23 mg/dL Final   05/24/2013 01:15 PM 17 8 - 20 mg/dL Final              Assessment   Ms Joshua is a 79 year old female with a history of dementia, HTN, HLD, and DM who presented with constipation, now s/p golytely cleanout and enemas with resolution of constipation. Her dilated colon likely has a chronic component, and is improved compared to admission, and she is having Bms.         Plan   -clear liquid diet, keep at this for now  -will need miralax scheduled once loose stools stops  -monitor for abdominal pain, swelling  -up and out of bed, knees to chest, correct electrolytes             Librado Main MD  5:04 PM  4/21/2025  [facility]  [phone]

## 2025-04-21 NOTE — PLAN OF CARE
Pt received A&Ox1. VSS. RA. Tele. Afebrile. Pt c/o mild abd pain, medications given per MAR. Tap water enema Qshift. Call light within reach. Fall precautions in place. NPO.

## 2025-04-21 NOTE — PLAN OF CARE
Patient alert times one, history of dementia, on telemetry sinus rate 60-95. Patient received enema, with return of very  large amounts of brown liquid stool, on  3 different occasions. Patient had xray, with mulitple loops of bowel, questionable ileus, gi md notified, patient npo with sips of water with meds, and repeat xray tomorrow am, call light in reach, fall precautions maintained.

## 2025-04-21 NOTE — PROGRESS NOTES
[facility]   part of [facility]     Hospitalist Progress Note     Kierra Joshua Patient Status:  Observation    1945 MRN [id number]   Location [facility] 4NW-A Attending Marleny Marina MD   Hosp Day # 0 PCP Tate Tovar     Chief Complaint: Constipation     Subjective:     Patient  more awake today, denies any pain.    Objective:    Review of Systems:   A comprehensive review of systems was completed; pertinent positive and negatives stated in subjective.    Vital signs:  Temp:  [97.4 °F (36.3 °C)-98.7 °F (37.1 °C)] 97.4 °F (36.3 °C)  Pulse:  [66-87] 69  Resp:  [16-18] 16  BP: (107-133)/(53-81) 126/59  SpO2:  [99 %-100 %] 99 %    Physical Exam:    General: No acute distress  Respiratory: No wheezes, no rhonchi  Cardiovascular: S1, S2, regular rate and rhythm  Abdomen: Soft, Non-tender, non-distended, positive bowel sounds  Neuro: No new focal deficits.   Extremities: No edema      Diagnostic Data:    Labs:  Recent Labs   Lab 25  1512 25  0458   WBC 9.5 4.3   HGB 14.6 12.4   MCV 90.3 91.5   .0 212.0       Recent Labs   Lab 25  1512 25  0547 25  0458   * 114* 103*   BUN 13 13 7*   CREATSERUM 0.61 0.64 0.59   CA 10.7* 9.5 8.9   ALB 4.5 4.2  --     142 144   K 4.3 3.3* 3.9  3.9    103 109   CO2 23.0 28.0 23.0   ALKPHO 76 69  --    AST 22 15  --    ALT 8* 7*  --    BILT 0.3 0.4  --    TP 7.0 6.3  --        Estimated Glomerular Filtration Rate: 91 mL/min/1.73m2 (result from lab).    No results for input(s): \"TROP\", \"TROPHS\", \"CK\" in the last 168 hours.    No results for input(s): \"PTP\", \"INR\" in the last 168 hours.               Microbiology    Hospital Encounter on 25   1. Urine Culture, Routine     Status: None    Collection Time: 25  6:44 PM    Specimen: Urine, clean catch   Result Value Ref Range    Urine Culture  N/A     10-50,000 cfu/ml Three or more species of Gram negative bacilli; none predominant. No further workup  performed.         Imaging: Reviewed in Epic.    Medications: Scheduled Medications[1]    Assessment & Plan:      #Constipation with high stool burden  CT A/P noted   2 L Golyetly, enema, senna BID  GI on CS- Abdominal Xray on 4/20 with ? Ileus, repeat Xray on 4/21 with ? Tear, will order STAT CT A/P   Keep NPO     #h/o dementia with behavioral disturbances      #HTN     #HLD      #DM type 2  ISS     #UTI ruled out     Spoke to POA on 4/21/25 notified of Xray results awaiting CT    Marleny Marina MD    Supplementary Documentation:     Quality:  DVT Mechanical Prophylaxis:   SCDs,    DVT Pharmacologic Prophylaxis   Medication    enoxaparin (Lovenox) 40 MG/0.4ML SUBQ injection 40 mg                Code Status: DNAR/Selective Treatment  Smith: No urinary catheter in place  Smith Duration (in days):   Central line:    CONRADO:     Discharge is dependent on: clinical   At this point Ms. Joshua is expected to be discharge to: home     Called POA 10: 34 AM on 4/20 - no answer left VM    The 21st Century Cures Act makes medical notes like these available to patients in the interest of transparency. Please be advised this is a medical document. Medical documents are intended to carry relevant information, facts as evident, and the clinical opinion of the practitioner. The medical note is intended as peer to peer communication and may appear blunt or direct. It is written in medical language and may contain abbreviations or verbiage that are unfamiliar.                          [1]    enoxaparin  40 mg Subcutaneous Daily    sennosides  8.6 mg Oral BID    vancomycin  125 mg Oral Daily    cyanocobalamin  1,000 mcg Oral Daily    fluticasone propionate  2 spray Each Nare Daily    risperiDONE  0.75 mg Oral BID    risperiDONE  0.5 mg Oral Noon    calcium carbonate-vitamin D  2 tablet Oral BID with meals

## 2025-04-22 NOTE — PLAN OF CARE
Pt is awake, answers simple questions, denies pain, abdomen soft, non tender, will have abd XR.  Problem: Diabetes/Glucose Control  Goal: Glucose maintained within prescribed range  Description: INTERVENTIONS:- Monitor Blood Glucose as ordered- Assess for signs and symptoms of hyperglycemia and hypoglycemia- Administer ordered medications to maintain glucose within target range- Assess barriers to adequate nutritional intake and initiate nutrition consult as needed- Instruct patient on self management of diabetes  Outcome: Progressing     Problem: Patient/Family Goals  Goal: Patient/Family Long Term Goal  Description: Patient's Long Term Goal: Interventions:- See additional Care Plan goals for specific interventions  Outcome: Progressing  Goal: Patient/Family Short Term Goal  Description: Patient's Short Term Goal: Interventions: - - See additional Care Plan goals for specific interventions  Outcome: Progressing     Problem: GASTROINTESTINAL - ADULT  Goal: Minimal or absence of nausea and vomiting  Description: INTERVENTIONS:- Maintain adequate hydration with IV or PO as ordered and tolerated- Nasogastric tube to low intermittent suction as ordered- Evaluate effectiveness of ordered antiemetic medications- Provide nonpharmacologic comfort measures as appropriate- Advance diet as tolerated, if ordered- Obtain nutritional consult as needed- Evaluate fluid balance  Outcome: Progressing  Goal: Maintains or returns to baseline bowel function  Description: INTERVENTIONS:- Assess bowel function- Maintain adequate hydration with IV or PO as ordered and tolerated- Evaluate effectiveness of GI medications- Encourage mobilization and activity- Obtain nutritional consult as needed- Establish a toileting routine/schedule- Consider collaborating with pharmacy to review patient's medication profile  Outcome: Progressing  Goal: Maintains adequate nutritional intake (undernourished)  Description: INTERVENTIONS:- Monitor percentage  of each meal consumed- Identify factors contributing to decreased intake, treat as appropriate- Assist with meals as needed- Monitor I&O, WT and lab values- Obtain nutritional consult as needed- Optimize oral hygiene and moisture- Encourage food from home; allow for food preferences- Enhance eating environment  Outcome: Progressing  Goal: Achieves appropriate nutritional intake (bariatric)  Description: INTERVENTIONS:- Monitor for over-consumption- Identify factors contributing to increased intake, treat as appropriate- Monitor I&O, WT and lab values- Obtain nutritional consult as needed- Evaluate psychosocial factors contributing to over-consumption  Outcome: Progressing

## 2025-04-22 NOTE — PROGRESS NOTES
[facility]   part of [facility]     Hospitalist Progress Note     Kierra Joshua Patient Status:  Observation    1945 MRN [id number]   Location [facility] 4NW-A Attending Marleny Marina MD   Hosp Day # 1 PCP Tate Tovar     Chief Complaint: Constipation     Subjective:     Patient siting up. No N, no emesis, no pain.    Objective:    Review of Systems:   A comprehensive review of systems was completed; pertinent positive and negatives stated in subjective.    Vital signs:  Temp:  [97.3 °F (36.3 °C)-99 °F (37.2 °C)] 98.8 °F (37.1 °C)  Pulse:  [57-86] 83  Resp:  [16-20] 18  BP: (128-156)/(44-84) 143/74  SpO2:  [95 %-98 %] 98 %    Physical Exam:    General: No acute distress  Respiratory: No wheezes, no rhonchi  Cardiovascular: S1, S2, regular rate and rhythm  Abdomen: Soft, Non-tender, non-distended, positive bowel sounds  Neuro: No new focal deficits.   Extremities: No edema      Diagnostic Data:    Labs:  Recent Labs   Lab 25  1512 25  0458   WBC 9.5 4.3   HGB 14.6 12.4   MCV 90.3 91.5   .0 212.0       Recent Labs   Lab 25  1512 25  0547 25  0458   * 114* 103*   BUN 13 13 7*   CREATSERUM 0.61 0.64 0.59   CA 10.7* 9.5 8.9   ALB 4.5 4.2  --     142 144   K 4.3 3.3* 3.9  3.9    103 109   CO2 23.0 28.0 23.0   ALKPHO 76 69  --    AST 22 15  --    ALT 8* 7*  --    BILT 0.3 0.4  --    TP 7.0 6.3  --        Estimated Glomerular Filtration Rate: 91 mL/min/1.73m2 (result from lab).    No results for input(s): \"TROP\", \"TROPHS\", \"CK\" in the last 168 hours.    No results for input(s): \"PTP\", \"INR\" in the last 168 hours.               Microbiology    Hospital Encounter on 25   1. Urine Culture, Routine     Status: None    Collection Time: 25  6:44 PM    Specimen: Urine, clean catch   Result Value Ref Range    Urine Culture  N/A     10-50,000 cfu/ml Three or more species of Gram negative bacilli; none predominant. No further workup  performed.         Imaging: Reviewed in Epic.    Medications: Scheduled Medications[1]    Assessment & Plan:      #Constipation with high stool burden  CT A/P noted   2 L Golyetly, enema, senna BID  GI on CS- Abdominal Xray on 4/20 with ? Ileus, repeat Xray on 4/21 with ? Tear, CT A/P no tear- gaseous distension noted  Repeat Abdominal Xray on 4/22 pending      #h/o dementia with behavioral disturbances      #HTN     #HLD      #DM type 2  ISS     #UTI ruled out     Spoke to POA on 4/21/25 notified of Xray results awaiting CT, called POA 4/22 - all questions answered     Marleny Marina MD    Supplementary Documentation:     Quality:  DVT Mechanical Prophylaxis:   SCDs,    DVT Pharmacologic Prophylaxis   Medication    enoxaparin (Lovenox) 40 MG/0.4ML SUBQ injection 40 mg                Code Status: DNAR/Selective Treatment  Smith: No urinary catheter in place  Smith Duration (in days):   Central line:    CONRADO: 4/22/2025    Discharge is dependent on: clinical   At this point Ms. Joshua is expected to be discharge to: home     Called POA 10: 34 AM on 4/20 - no answer left VM    The 21st Century Cures Act makes medical notes like these available to patients in the interest of transparency. Please be advised this is a medical document. Medical documents are intended to carry relevant information, facts as evident, and the clinical opinion of the practitioner. The medical note is intended as peer to peer communication and may appear blunt or direct. It is written in medical language and may contain abbreviations or verbiage that are unfamiliar.                          [1]    simethicone  80 mg Oral TID CC and HS    enoxaparin  40 mg Subcutaneous Daily    sennosides  8.6 mg Oral BID    vancomycin  125 mg Oral Daily    cyanocobalamin  1,000 mcg Oral Daily    fluticasone propionate  2 spray Each Nare Daily    risperiDONE  0.75 mg Oral BID    risperiDONE  0.5 mg Oral Noon    calcium carbonate-vitamin D  2 tablet Oral BID with  meals

## 2025-04-22 NOTE — PLAN OF CARE
Patient is alert and oriented to self at baseline. Continues to have watery Bms overnight. IVF infusing. Tolerating clear liquid diet. No complaints of pain or SOB. Vitals stable, no fever overnight. Safety precautions in place, call light within reach, plan of care ongoing.     Problem: GASTROINTESTINAL - ADULT  Goal: Maintains or returns to baseline bowel function  Description: INTERVENTIONS:- Assess bowel function- Maintain adequate hydration with IV or PO as ordered and tolerated- Evaluate effectiveness of GI medications- Encourage mobilization and activity- Obtain nutritional consult as needed- Establish a toileting routine/schedule- Consider collaborating with pharmacy to review patient's medication profile  Outcome: Progressing  Goal: Maintains adequate nutritional intake (undernourished)  Description: INTERVENTIONS:- Monitor percentage of each meal consumed- Identify factors contributing to decreased intake, treat as appropriate- Assist with meals as needed- Monitor I&O, WT and lab values- Obtain nutritional consult as needed- Optimize oral hygiene and moisture- Encourage food from home; allow for food preferences- Enhance eating environment  Outcome: Progressing

## 2025-04-22 NOTE — PROGRESS NOTES
Inpatient Follow up Note    Kierra Joshua Patient Status:  Inpatient    1945 MRN [id number]   Location [facility] 4NW-A Attending Marleny Marina MD   Hosp Day # 1 PCP Tate Tovar     Reason for Consultation   constipation     Subjective   Having multiple bowel movements. Denies abdominal pain/swelling.             Objective:     /74 (BP Location: Right arm)   Pulse 83   Temp 98.8 °F (37.1 °C) (Oral)   Resp 18   Wt 133 lb (60.3 kg)   SpO2 98%   BMI 22.13 kg/m²   Gen: AAOx1  CV: RRR with normal S1 / S2  Resp: CTA bilaterally  Abd: (+)BS, soft, non-tender, non-distended; no rebound or guarding  Ext: No edema or cyanosis  Skin: Warm and dry     Labs/Imaging     LABRCNTIP[PGLU:5@  No results for input(s): \"INR\" in the last 168 hours.      Recent Labs   Lab 25  1512 25  0458   WBC 9.5 4.3   HGB 14.6 12.4   .0 212.0       Recent Labs   Lab 25  1512 25  0547 25  0458    142 144   K 4.3 3.3* 3.9  3.9    103 109   CO2 23.0 28.0 23.0   BUN 13 13 7*   CREATSERUM 0.61 0.64 0.59       Recent Labs   Lab 25  1512 25  0547   ALT 8* 7*   AST 22 15     CT ABDOMEN+PELVIS(CPT=74176)  Result Date: 2025  CONCLUSION:  1. No evidence of pneumoperitoneum. 2. Redemonstrated is diffuse gaseous distension of the colon, measuring up to 8.3 cm within the rectosigmoid colon, overall mildly improved.  Interval resolution of large volume stool within the rectum. 3. Circumferential urinary bladder wall thickening, correlate for cystitis. 4. Please see above for further details.   LOCATION:  [facility]   Dictated by (CST): Ronald Gutierrez MD on 2025 at 3:15 PM     Finalized by (CST): Ronald Gutierrez MD on 2025 at 3:20 PM       XR ABDOMEN (1 VIEW) (CPT=74018)  Result Date: 2025  CONCLUSION:    1. The large amount of stool previously demonstrated in the rectum on a CT scan from 2 days ago appears cleared.  2. Continued large amount of gas  in the colon, probably diminished compared to the prior CT.  Moderate air within the small bowel.  3. A prominent dilated portion of colon in the central abdomen shows visibility of both walls, which can be seen in free air as double wall sign, but can also be seen as a pseudo double wall sign with bowel against other dilated bowel.  Assessment for free air is limited on a supine radiograph.  If the patient has acute abdomen, signs or symptoms of free air, or other imaging requirements beyond x-ray, then a CT scan of the abdomen would be advised.    LOCATION:  [facility]   Dictated by (CST): Gavin Haas MD on 4/21/2025 at 10:38 AM     Finalized by (CST): Gavin Haas MD on 4/21/2025 at 10:42 AM       XR ABDOMEN (1 VIEW) (CPT=74018)  Result Date: 4/20/2025  CONCLUSION:  Air-filled distention of multiple loops of bowel throughout the abdomen and pelvis.  This may be due to an ileus.  Previously noted large amount of stool in the rectum is no longer there.   LOCATION:  [facility]   Dictated by (CST): Geo Carolina MD on 4/20/2025 at 12:35 PM     Finalized by (CST): Geo Carolina MD on 4/20/2025 at 12:36 PM       CT ABDOMEN+PELVIS(CONTRAST ONLY)(CPT=74177)  Result Date: 4/19/2025  CONCLUSION:   1. Extensive distal stool volume with rectal stool impaction, the rectum distended to a caliber of 11 cm.  No obstructing lesion detected.  No imaging features of stercoral colitis.  2. Thickening and hyperemia of the urinary bladder compatible with cystitis.  Recommend correlation with urinalysis.    LOCATION:  [facility]   Dictated by (CST): Sam Velazquez MD on 4/19/2025 at 6:37 PM     Finalized by (CST): Sam Velazquez MD on 4/19/2025 at 6:43 PM       XR CHEST AP PORTABLE  (CPT=71045)  Result Date: 4/19/2025  CONCLUSION: No acute cardiopulmonary abnormality.   LOCATION:  [facility]      Dictated by (CST): Ronald Yen MD on 4/19/2025 at 3:26 PM     Finalized by (CST): Ronald Yen MD on 4/19/2025 at 3:27 PM       XR ABDOMEN (1  VIEW) (CPT=74018)  Result Date: 4/19/2025  CONCLUSION:  Large amount of stool in the rectum and distal sigmoid colon.   LOCATION:  [facility]   Dictated by (CST): Geo Carolina MD on 4/19/2025 at 2:34 PM     Finalized by (CST): Geo Carolina MD on 4/19/2025 at 2:35 PM       AST   Date/Time Value Ref Range Status   04/20/2025 05:47 AM 15 <34 U/L Final   02/28/2014 11:45 AM 17 15 - 41 U/L Final     Comment:     [facility], [address] [place] [id number]     ALT   Date/Time Value Ref Range Status   04/20/2025 05:47 AM 7 (L) 10 - 49 U/L Final   02/28/2014 11:45 AM 23 14 - 54 U/L Final     Comment:     [facility], [address] [place] [id number]     BUN   Date/Time Value Ref Range Status   04/21/2025 04:58 AM 7 (L) 9 - 23 mg/dL Final   05/24/2013 01:15 PM 17 8 - 20 mg/dL Final              Assessment   Ms Joshua is a 79 year old female with a history of dementia, HTN, HLD, and DM who presented with constipation, now s/p golytely cleanout and enemas with resolution of constipation. Her dilated colon likely has a chronic component, and is improved compared to admission, and she is having Bms.          Plan   -clear liquid diet; if AXR shows improved colon size, will recommend advancing diet as tolerated  -will need miralax scheduled once loose stools stops  -monitor for abdominal pain, swelling  -up and out of bed, knees to chest, correct electrolytes       Librado Main MD  1:16 PM  4/22/2025  [facility]  [phone]

## 2025-04-23 ENCOUNTER — ANESTHESIA EVENT (OUTPATIENT)
Dept: ENDOSCOPY | Facility: HOSPITAL | Age: 80
End: 2025-04-23
Payer: MEDICARE

## 2025-04-23 ENCOUNTER — ANESTHESIA (OUTPATIENT)
Dept: ENDOSCOPY | Facility: HOSPITAL | Age: 80
End: 2025-04-23
Payer: MEDICARE

## 2025-04-23 RX ORDER — SODIUM CHLORIDE, SODIUM LACTATE, POTASSIUM CHLORIDE, CALCIUM CHLORIDE 600; 310; 30; 20 MG/100ML; MG/100ML; MG/100ML; MG/100ML
INJECTION, SOLUTION INTRAVENOUS CONTINUOUS PRN
Status: DISCONTINUED | OUTPATIENT
Start: 2025-04-23 | End: 2025-04-23 | Stop reason: SURG

## 2025-04-23 RX ORDER — LIDOCAINE HYDROCHLORIDE 10 MG/ML
INJECTION, SOLUTION EPIDURAL; INFILTRATION; INTRACAUDAL; PERINEURAL AS NEEDED
Status: DISCONTINUED | OUTPATIENT
Start: 2025-04-23 | End: 2025-04-23 | Stop reason: SURG

## 2025-04-23 RX ADMIN — LIDOCAINE HYDROCHLORIDE 50 MG: 10 INJECTION, SOLUTION EPIDURAL; INFILTRATION; INTRACAUDAL; PERINEURAL at 17:07:00

## 2025-04-23 RX ADMIN — SODIUM CHLORIDE, SODIUM LACTATE, POTASSIUM CHLORIDE, CALCIUM CHLORIDE: 600; 310; 30; 20 INJECTION, SOLUTION INTRAVENOUS at 17:05:00

## 2025-04-23 NOTE — PROGRESS NOTES
[facility]   part of [facility]     Hospitalist Progress Note     Kierra Joshua Patient Status:  Observation    1945 MRN [id number]   Location [facility] 4NW-A Attending Marleny Marina MD   Hosp Day # 2 PCP Tate Tovar     Chief Complaint: Constipation     Subjective:     Rapid response called last night around 7p. Reportedly concerned about \"choking\", made npo and to see ST. Pt has no change in mentation per staff.     Pt reportedly had small Bm overnight.       Objective:    Review of Systems:   A comprehensive review of systems was completed; pertinent positive and negatives stated in subjective.    Vital signs:  Temp:  [97.8 °F (36.6 °C)-99.9 °F (37.7 °C)] 99.2 °F (37.3 °C)  Pulse:  [60-87] 66  Resp:  [14-19] 18  BP: (104-165)/(41-87) 104/41  SpO2:  [95 %-100 %] 99 %    Physical Exam:    General: No acute distress  Respiratory: No wheezes, no rhonchi  Cardiovascular: S1, S2, regular rate and rhythm  Abdomen: Soft, Non-tender, non-distended, positive bowel sounds  Neuro: No new focal deficits.   Extremities: No edema      Diagnostic Data:    Labs:  Recent Labs   Lab 25  1512 25  0458   WBC 9.5 4.3   HGB 14.6 12.4   MCV 90.3 91.5   .0 212.0       Recent Labs   Lab 25  1512 25  0547 25  0458   * 114* 103*   BUN 13 13 7*   CREATSERUM 0.61 0.64 0.59   CA 10.7* 9.5 8.9   ALB 4.5 4.2  --     142 144   K 4.3 3.3* 3.9  3.9    103 109   CO2 23.0 28.0 23.0   ALKPHO 76 69  --    AST 22 15  --    ALT 8* 7*  --    BILT 0.3 0.4  --    TP 7.0 6.3  --        Estimated Glomerular Filtration Rate: 91 mL/min/1.73m2 (result from lab).    No results for input(s): \"TROP\", \"TROPHS\", \"CK\" in the last 168 hours.    No results for input(s): \"PTP\", \"INR\" in the last 168 hours.               Microbiology    Hospital Encounter on 25   1. Urine Culture, Routine     Status: None    Collection Time: 25  6:44 PM    Specimen: Urine, clean catch    Result Value Ref Range    Urine Culture  N/A     10-50,000 cfu/ml Three or more species of Gram negative bacilli; none predominant. No further workup performed.         Imaging: Reviewed in Epic.    Medications: Scheduled Medications[1]    Assessment & Plan:      #Dysphagia, transient   -ST to see, hopefully does okay and can resume her stool softeners     #Constipation with high stool burden  CT A/P noted   2 L Golyetly, enema, senna BID  GI on CS- Abdominal Xray on 4/20 with ? Ileus, repeat Xray on 4/21 with ? Tear, CT A/P no tear- gaseous distension noted  Repeat Abdominal Xray on 4/22 reviewed, shows gaseous distention      #h/o dementia with behavioral disturbances      #HTN     #HLD      #DM type 2  ISS     #UTI ruled out      Kulwant Geller, DO    Supplementary Documentation:     Quality:  DVT Mechanical Prophylaxis:   SCDs,    DVT Pharmacologic Prophylaxis   Medication    enoxaparin (Lovenox) 40 MG/0.4ML SUBQ injection 40 mg                Code Status: DNAR/Selective Treatment  Smith: No urinary catheter in place  Smith Duration (in days):   Central line:    CONRADO:     Discharge is dependent on: clinical   At this point Ms. Joshua is expected to be discharge to: home     Called POA 10: 34 AM on 4/20 - no answer left VM    The 21st Century Cures Act makes medical notes like these available to patients in the interest of transparency. Please be advised this is a medical document. Medical documents are intended to carry relevant information, facts as evident, and the clinical opinion of the practitioner. The medical note is intended as peer to peer communication and may appear blunt or direct. It is written in medical language and may contain abbreviations or verbiage that are unfamiliar.                          [1]    simethicone  80 mg Oral TID CC and HS    enoxaparin  40 mg Subcutaneous Daily    sennosides  8.6 mg Oral BID    cyanocobalamin  1,000 mcg Oral Daily    fluticasone propionate  2 spray Each Nare  Daily    risperiDONE  0.75 mg Oral BID    risperiDONE  0.5 mg Oral Noon    calcium carbonate-vitamin D  2 tablet Oral BID with meals

## 2025-04-23 NOTE — ANESTHESIA POSTPROCEDURE EVALUATION
[facility] Patient Status:  Inpatient   Age/Gender 79 year old female MRN [id number]   Location [facility] ENDOSCOPY PAIN CENTER Attending Marleny Marina MD   Hosp Day # 2 PCP Tate Tovar       Anesthesia Post-op Note    FLEXIBLE SIGMOIDOSCOPY    Procedure Summary       Date: 04/23/25 Room / Location:  ENDOSCOPY 02 / [facility] ENDOSCOPY    Anesthesia Start: 1705 Anesthesia Stop: 1722    Procedure: FLEXIBLE SIGMOIDOSCOPY Diagnosis: (colon distention)    Surgeons: Librado Main MD Anesthesiologist: Seferino Garcia MD    Anesthesia Type: MAC ASA Status: 3            Anesthesia Type: MAC    Vitals Value Taken Time   /53 04/23/25 17:23   Temp 98.0 04/23/25 17:23   Pulse 63 04/23/25 17:23   Resp 16 04/23/25 17:23   SpO2 97% 04/23/25 17:23           Patient Location: Endoscopy    Anesthesia Type: MAC    Airway Patency: patent    Postop Pain Control: adequate    Mental Status: mildly sedated but able to meaningfully participate in the post-anesthesia evaluation    Nausea/Vomiting: none    Cardiopulmonary/Hydration status: stable euvolemic    Complications: no apparent anesthesia related complications    Postop vital signs: stable    Dental Exam: Unchanged from Preop    Patient to be discharged from PACU when criteria met.

## 2025-04-23 NOTE — ANESTHESIA PREPROCEDURE EVALUATION
PRE-OP EVALUATION    Patient Name: Kierra Joshua    Admit Diagnosis: Constipation, unspecified constipation type [K59.00]    Pre-op Diagnosis: constpation    FLEXIBLE SIGMOIDOSCOPY    Anesthesia Procedure: FLEXIBLE SIGMOIDOSCOPY    Surgeons and Role:     * Librado Main MD - Primary    Pre-op vitals reviewed.  Temp: 99.5 °F (37.5 °C)  Pulse: 67  Resp: 18  BP: 104/34  SpO2: 95 %  Body mass index is 22.13 kg/m².    Current medications reviewed.  Hospital Medications:  Current Medications[1]    Outpatient Medications:   Prescriptions Prior to Admission[2]    Allergies: Patient has no known allergies.      Anesthesia Evaluation    Patient summary reviewed.    Anesthetic Complications  (-) history of anesthetic complications         GI/Hepatic/Renal    Negative GI/hepatic/renal ROS.                             Cardiovascular        Exercise tolerance: good     MET: >4      (+) hypertension   (+) hyperlipidemia                                  Endo/Other    Negative endo/other ROS.  (+) diabetes  type 2,                          Pulmonary               (+) shortness of breath            Neuro/Psych  Comment: Dementia                                    Past Surgical History[3]  Social Hx on file[4]  History   Drug Use No     Available pre-op labs reviewed.  Lab Results   Component Value Date    WBC 4.3 04/21/2025    RBC 4.24 04/21/2025    HGB 12.4 04/21/2025    HCT 38.8 04/21/2025    MCV 91.5 04/21/2025    MCH 29.2 04/21/2025    MCHC 32.0 04/21/2025    RDW 13.4 04/21/2025    .0 04/21/2025     Lab Results   Component Value Date     04/21/2025    K 3.9 04/21/2025    K 3.9 04/21/2025     04/21/2025    CO2 23.0 04/21/2025    BUN 7 (L) 04/21/2025    CREATSERUM 0.59 04/21/2025     (H) 04/21/2025    CA 8.9 04/21/2025            Airway      Mallampati: II  Mouth opening: >3 FB  TM distance: > 6 cm  Neck ROM: full Cardiovascular    Cardiovascular exam normal.  Rhythm: regular  Rate: normal      Dental    Dentition appears grossly intact         Pulmonary    Pulmonary exam normal.  Breath sounds clear to auscultation bilaterally.               Other findings              ASA: 3   Plan: MAC  NPO status verified and patient meets guidelines.  Patient has not taken beta blockers in last 24 hours.  Post-procedure pain management plan discussed with surgeon and patient.      Plan/risks discussed with: Makayla Hughes (guardian)                Present on Admission:  **None**             [1]    simethicone (Mylicon) chewable tab 80 mg  80 mg Oral TID CC and HS    [COMPLETED] potassium chloride 40 mEq in 250mL sodium chloride 0.9% IVPB premix  40 mEq Intravenous Once    [COMPLETED] magnesium sulfate in sterile water for injection 2 g/50mL IVPB premix 2 g  2 g Intravenous Once    sodium chloride 0.9% infusion   Intravenous Continuous    enoxaparin (Lovenox) 40 MG/0.4ML SUBQ injection 40 mg  40 mg Subcutaneous Daily    polyethylene glycol (PEG 3350) (Miralax) 17 g oral packet 17 g  17 g Oral Daily PRN    sennosides (Senokot) tab 17.2 mg  17.2 mg Oral Nightly PRN    bisacodyl (Dulcolax) 10 MG rectal suppository 10 mg  10 mg Rectal Daily PRN    fleet enema (Fleet) rectal enema 133 mL  1 enema Rectal Once PRN    benzonatate (Tessalon) cap 200 mg  200 mg Oral TID PRN    ondansetron (Zofran) 4 MG/2ML injection 4 mg  4 mg Intravenous Q6H PRN    sennosides (Senokot) tab 8.6 mg  8.6 mg Oral BID    [COMPLETED] polyethylene glycol-electrolyte (Golytely) 236 g oral solution 2,000 mL  2,000 mL Oral Once    [COMPLETED] iopamidol 76% (ISOVUE-370) injection for power injector  80 mL Intravenous ONCE PRN    bismuth subsalicylate (PEPTO-BISMOL) chew tab CHEW 262 mg  1 tablet Oral Q6H PRN    cyanocobalamin (Vitamin B12) tab 1,000 mcg  1,000 mcg Oral Daily    fluticasone propionate (Flonase) 50 MCG/ACT nasal suspension 2 spray  2 spray Each Nare Daily    melatonin cap/tab 5 mg  5 mg Oral Nightly PRN    risperiDONE (RisperDAL) tab 0.75  mg  0.75 mg Oral BID    risperiDONE (RisperDAL) tab 0.5 mg  0.5 mg Oral Noon    calcium carbonate-vitamin D (Oyster Shell-D) 250-3.125 MG-MCG per tab 2 tablet  2 tablet Oral BID with meals   [2]   Medications Prior to Admission   Medication Sig Dispense Refill Last Dose/Taking    Calcium Carbonate-Vitamin D 600-5 MG-MCG Oral Cap Take 1 tablet by mouth in the morning and 1 tablet before bedtime.   4/18/2025    Potassium Chloride ER 10 MEQ Oral Tab CR Take 1 tablet (10 mEq total) by mouth 3 (three) times a week. MWF   Past Week    cyanocobalamin 500 MCG Oral Tab Take 2 tablets (1,000 mcg total) by mouth in the morning.   Taking    ascorbic acid 500 MG Oral Tab Take 1 tablet (500 mg total) by mouth in the morning.   Taking    acetaminophen 500 MG Oral Tab Take 1 tablet (500 mg total) by mouth every 4 (four) hours as needed. 90 tablet 0 Taking As Needed    lisinopril 10 MG Oral Tab Take 1 tablet (10 mg total) by mouth in the morning. Hold for SBP < 130.   4/19/2025    traMADol 50 MG Oral Tab Take 1 tablet (50 mg total) by mouth 2 (two) times daily as needed for Pain. 30 tablet 0 4/18/2025    Cranberry 450 MG Oral Cap Take 1 tablet by mouth in the morning.   4/18/2025    Melatonin 5 MG Oral Tab Take 1 tablet (5 mg total) by mouth at bedtime.   4/18/2025    magnesium hydroxide (MILK OF MAGNESIA) 400 MG/5ML Oral Suspension Take 5 mL by mouth daily as needed for constipation.   4/18/2025    Multiple Vitamins-Minerals (MULTI-VITAMIN/MINERALS) Oral Tab Take 1 tablet by mouth in the morning.   4/18/2025    fluticasone propionate 50 MCG/ACT Nasal Suspension 2 sprays by Each Nare route in the morning.   Taking    polyethylene glycol, PEG 3350, 17 g Oral Powd Pack Take 17 g by mouth daily. 30 each 0 4/18/2025    risperiDONE 0.25 MG Oral Tab Take 2 tablets (0.5 mg total) by mouth Noon.   4/19/2025    furosemide 20 MG Oral Tab Take 1 tablet (20 mg total) by mouth Every Monday, Wednesday, and Friday.   4/18/2025    glipiZIDE 5 MG  Oral Tab Take 1 tablet (5 mg total) by mouth every morning before breakfast.   2025    risperiDONE 0.25 MG Oral Tab Take 3 tablets (0.75 mg total) by mouth in the morning and 3 tablets (0.75 mg total) before bedtime.   2025    FLEET ENEMA 7-19 GM/118ML Rectal Enema Place 1 enema (118 mL total) rectally daily as needed (CONSTIPTAION).   Unknown    bismuth subsalicylate (PEPTO-BISMOL) 262 MG/15ML Oral Suspension Take 30 mL (524 mg total) by mouth every 6 (six) hours as needed for Indigestion.   Unknown    meclizine 25 MG Oral Tab Take 1 tablet (25 mg total) by mouth every 8 (eight) hours as needed. (Patient not taking: Reported on 2025)   Not Taking   [3]   Past Surgical History:  Procedure Laterality Date    Biopsy of breast, incisional      left breast bx during surgery    Colonoscopy N/A 2023    Procedure: UNPREPPED  COLONOSCOPY;  Surgeon: Fabrice Trejo MD;  Location:  ENDOSCOPY    Colonoscopy N/A 2024    Procedure: COLONOSCOPY WITH DECOMPRESSION;  Surgeon: Trudi Albright MD;  Location:  ENDOSCOPY    Other surgical history      aneurysm clipped x2   [4]   Social History  Socioeconomic History    Marital status:    Tobacco Use    Smoking status: Former     Current packs/day: 0.00     Types: Cigarettes     Quit date:      Years since quittin.3    Smokeless tobacco: Never   Vaping Use    Vaping status: Never Used   Substance and Sexual Activity    Alcohol use: No     Alcohol/week: 0.0 standard drinks of alcohol    Drug use: No   Other Topics Concern    Caffeine Concern Yes    Exercise No

## 2025-04-23 NOTE — PLAN OF CARE
Assumed care of pt at 0700.  Pt drowsy this morning, arousable to her name, oriented to self.  NPO for speech eval.  Dr. Main here to see.  Flex sig scheduled for this afternoon, kept NPO.  Daughter, Makayla updated on pt status.  Telephone consent for procedure obtained fr Makayla.     1630:  transport here to take pt down to GI lab.    1800:  Pt returned fr GI lab awake and alert x 1.  Vitals stable.  Lungs clear.  Writer assessed bedside swallow eval with daughter present.  No coughing/choking noted.  Ok for clear liquid diet per GI.  Updated pt's daughter n plan of care. Verbalizes understanding.  Needs addressed.  Saafety measures in place.     Problem: GASTROINTESTINAL - ADULT  Goal: Minimal or absence of nausea and vomiting  Description: INTERVENTIONS:- Maintain adequate hydration with IV or PO as ordered and tolerated- Nasogastric tube to low intermittent suction as ordered- Evaluate effectiveness of ordered antiemetic medications- Provide nonpharmacologic comfort measures as appropriate- Advance diet as tolerated, if ordered- Obtain nutritional consult as needed- Evaluate fluid balance  Outcome: Progressing  Goal: Maintains or returns to baseline bowel function  Description: INTERVENTIONS:- Assess bowel function- Maintain adequate hydration with IV or PO as ordered and tolerated- Evaluate effectiveness of GI medications- Encourage mobilization and activity- Obtain nutritional consult as needed- Establish a toileting routine/schedule- Consider collaborating with pharmacy to review patient's medication profile  Outcome: Progressing  Goal: Maintains adequate nutritional intake (undernourished)  Description: INTERVENTIONS:- Monitor percentage of each meal consumed- Identify factors contributing to decreased intake, treat as appropriate- Assist with meals as needed- Monitor I&O, WT and lab values- Obtain nutritional consult as needed- Optimize oral hygiene and moisture- Encourage food from home; allow for food  preferences- Enhance eating environment  Outcome: Not Progressing

## 2025-04-23 NOTE — SIGNIFICANT EVENT
XR still with gaseous colon distension. Discussed with POA/daughter decompressive flex sig, which I recommend; I have recommended a unprepped flexible sigmoidoscopy with the assistance of MAC anesthesia for further evaluation-the risks, benefits and alternatives were discussed, including the risk of anesthesia and the risk of perforation and bleeding with the procedure itself. The risks of not proceeding were also discussed, including the risks of missing lesions including polyps or masses. The patient's daughter expresses an understanding and agrees to proceed as planned     Librado Main MD  [facility]

## 2025-04-23 NOTE — OPERATIVE REPORT
Flexible Sigmoidoscopy Operative Report  Patient Name: Kierra Joshua  YOB: 1945  MRN: [id number]  Procedure: flexible sigmoidoscopy with decompression  Pre-operative Diagnosis & Indication: colon distension  Post-operative Diagnosis: distended colon s/p decompression, left sided diverticulosis, inadequate prep  Attending Endoscopist: Librado Main MD  Informed Consent: The planned procedure(s), the explanation of the procedure, its expected benefits, the potential complications and risks and possible alternatives and their benefits and risks were discussed with the patient or the patient's surrogate. The discussion of risks, not limited to but including bleeding, infection, perforation, adverse effects from anesthesia, need for emergency surgery, medication effects, cardiac arrhythmias, missed polyps, and aspiration and death, were discussed with patient.  Pt and/or surrogate understood the proposed procedure(s), its risks, benefits and alternatives and wish to proceed with procedure(s). All questions answered in full.  After all questions were answered to their satisfaction, a signed, informed, and witnessed consent was obtained.  Physical Exam: Heart: regular rate and rhythm. No rubs, murmurs, or gallops. Lungs: Clear to auscultation bilaterally. Abdomen: Soft, non-tender, non distended. Positive bowel sounds. No rebound tenderness, no guarding.   A TIME OUT WAS COMPLETED prior to the procedure to confirm the patient, procedure and complete endoscopy safety procedure.   Sedation: Monitored Anesthesia Care; ASA class per anesthesiology team   Monitoring: Pulsoximetry, pulse, respirations, and blood pressure, vitals were monitored throughout the entire procedure under monitored anesthesia care.   Preparation Quality:  [place] Bowel Prep Score: inadequate prep   Procedure: After achieving adequate sedation, and placing the patient in the left lateral decubitus position, a digital rectal examination  was performed.  The lubricated tip of the adult gastroscope was then introduced into the rectum and advanced to the descending colon.   The endoscope was then carefully withdrawn from the patient with careful visualization of the colonic mucosa to the best of my ability, with bowel preparation quality as noted above.  Air was suctioned to the best of my ability, during withdrawal of the endoscope  The endoscope was then removed from the patient.  The patient tolerated the procedure without apparent procedural complications.  The patient left the procedure room in stable condition for recovery.  Toleration: Patient tolerated procedure well   Complications: No immediate complications   Technical Difficulty:  The procedure was not technically difficult   Estimated Blood Loss: Minimal, less than 5mL of estimated blood loss.   Findings and Therapeutics:  Colon: The bowel prep was inadequate for mass and polyp detection. The colon and rectum were distended and no fecal impaction was present. The scope was advanced to the descending colon without insufflation and suction was applied with decompression of the lumen. No signs of inflammation, ulceration or erosions. There were diverticula noted throughout the entire visualized colon.   Rectum: not performed due to large amount of stool in the rectum.  Impression: colon distension s/p decompression  Recommendations:    Post Colonoscopy/polypectomy precautions, watch for bleeding, infection, perforation, adverse drug reactions.   Clear liquids  AXR tomorrow am  If distension recurs, will need to consider surgery consult    Librado Main MD  4/23/2025  5:13 PM

## 2025-04-23 NOTE — SLP NOTE
Attempted evaluation this morning, patient asleep in bed and requested to defer to allow her to sleep more. Later this morning, notified by RN patient to be kept NPO for procedure. SLP will hold and continue to follow, completing evaluation as clinically appropriate.    Contreras Dos Santos MS [facility]-SLP  Spectra [phone]

## 2025-04-23 NOTE — PLAN OF CARE
RR called at the beginning of the shift. Patient seemed to be SOB with abdominal breathing, and short episodes of apnea.Complaining of pain with breathing. Also, difficulty swallowing saliva. Vitals stable during RR. Chx ordered, showed minimal atelactasis. Speech consult placed to evaluate swallowing, pt strict NPO. Still having small watery Bms. Patient changed and repositioned, bed bath given. Safety precautions in place, plan of care ongoing.     Problem: Diabetes/Glucose Control  Goal: Glucose maintained within prescribed range  Description: INTERVENTIONS:- Monitor Blood Glucose as ordered- Assess for signs and symptoms of hyperglycemia and hypoglycemia- Administer ordered medications to maintain glucose within target range- Assess barriers to adequate nutritional intake and initiate nutrition consult as needed- Instruct patient on self management of diabetes  Outcome: Progressing     Problem: GASTROINTESTINAL - ADULT  Goal: Maintains or returns to baseline bowel function  Description: INTERVENTIONS:- Assess bowel function- Maintain adequate hydration with IV or PO as ordered and tolerated- Evaluate effectiveness of GI medications- Encourage mobilization and activity- Obtain nutritional consult as needed- Establish a toileting routine/schedule- Consider collaborating with pharmacy to review patient's medication profile  Outcome: Progressing

## 2025-04-24 PROBLEM — K59.81 OGILVIE SYNDROME: Status: ACTIVE | Noted: 2025-01-01

## 2025-04-24 PROBLEM — Z51.5 PALLIATIVE CARE ENCOUNTER: Status: ACTIVE | Noted: 2025-04-24

## 2025-04-24 PROBLEM — Z71.89 GOALS OF CARE, COUNSELING/DISCUSSION: Status: ACTIVE | Noted: 2025-01-01

## 2025-04-24 PROBLEM — Z71.89 GOALS OF CARE, COUNSELING/DISCUSSION: Status: ACTIVE | Noted: 2025-04-24

## 2025-04-24 PROBLEM — K59.81 OGILVIE SYNDROME: Status: ACTIVE | Noted: 2025-04-24

## 2025-04-24 PROBLEM — Z51.5 PALLIATIVE CARE ENCOUNTER: Status: ACTIVE | Noted: 2025-01-01

## 2025-04-24 NOTE — PROGRESS NOTES
Inpatient Follow up Note    Kierra Joshua Patient Status:  Inpatient    1945 MRN [id number]   Location [facility] 4NW-A Attending Kulwant Geller,    Hosp Day # 3 PCP Tate Tovar     Reason for Consultation   Constipation, recurrent colonic pseudo-obstruction     Subjective   Denies abdominal pain and swelling. AXR today with accumulation of gas and continued colonic distension despite endoscopic decompression on .             Objective:     /48 (BP Location: Right arm)   Pulse 63   Temp 97.4 °F (36.3 °C) (Oral)   Resp 20   Wt 133 lb (60.3 kg)   SpO2 99%   BMI 22.13 kg/m²   Gen: AAOx2  CV: RRR with normal S1 / S2  Resp: CTA bilaterally  Abd: (+)BS, soft, non-tender, non-distended; no rebound or guarding  Ext: No edema or cyanosis  Skin: Warm and dry     Labs/Imaging     LABRCNTIP[PGLU:5@  No results for input(s): \"INR\" in the last 168 hours.      Recent Labs   Lab 25  1512 25  0458 25  0709   WBC 9.5 4.3 4.6   HGB 14.6 12.4 11.4*   .0 212.0 166.0       Recent Labs   Lab 25  1512 25  0547 25  0458 25  0709    142 144 142   K 4.3 3.3* 3.9  3.9 3.4*    103 109 110   CO2 23.0 28.0 23.0 21.0   BUN 13 13 7* <5*   CREATSERUM 0.61 0.64 0.59 0.48*       Recent Labs   Lab 25  1512 25  0547   ALT 8* 7*   AST 22 15     XR ABDOMEN (1 VIEW) (CPT=74018)  Result Date: 2025  CONCLUSION:  See above.   LOCATION:  [place]   Dictated by (CST): Stromberg, LeRoy, MD on 2025 at 11:31 AM     Finalized by (CST): Stromberg, LeRoy, MD on 2025 at 11:33 AM       XR CHEST AP PORTABLE  (CPT=71045)  Result Date: 2025  CONCLUSION:  Minimal bibasilar atelectasis.   LOCATION:  [place]      Dictated by (CST): Christiana Kerns MD on 2025 at 8:20 PM     Finalized by (CST): Christiana Kerns MD on 2025 at 8:20 PM       XR ABDOMEN OBSTRUCTIVE SERIES ROUTINE(2 VW)(CPT=74019)  Result Date: 2025  CONCLUSION:   See above   LOCATION:  [id number]    Dictated by (CST): Ronald Gutierrez MD on 4/22/2025 at 1:40 PM     Finalized by (CST): Ronald Gutierrez MD on 4/22/2025 at 1:41 PM       CT ABDOMEN+PELVIS(CPT=74176)  Result Date: 4/21/2025  CONCLUSION:  1. No evidence of pneumoperitoneum. 2. Redemonstrated is diffuse gaseous distension of the colon, measuring up to 8.3 cm within the rectosigmoid colon, overall mildly improved.  Interval resolution of large volume stool within the rectum. 3. Circumferential urinary bladder wall thickening, correlate for cystitis. 4. Please see above for further details.   LOCATION:  [id number]   Dictated by (CST): Ronald Gutierrez MD on 4/21/2025 at 3:15 PM     Finalized by (CST): Ronald Gutierrez MD on 4/21/2025 at 3:20 PM       AST   Date/Time Value Ref Range Status   04/20/2025 05:47 AM 15 <34 U/L Final   02/28/2014 11:45 AM 17 15 - 41 U/L Final     Comment:     [facility], [address] [place] [id number]     ALT   Date/Time Value Ref Range Status   04/20/2025 05:47 AM 7 (L) 10 - 49 U/L Final   02/28/2014 11:45 AM 23 14 - 54 U/L Final     Comment:     [facility], [address] [place] [id number]     BUN   Date/Time Value Ref Range Status   04/24/2025 07:09 AM <5 (L) 9 - 23 mg/dL Final   05/24/2013 01:15 PM 17 8 - 20 mg/dL Final     4/23 flex sig: Findings and Therapeutics:  Colon: The bowel prep was inadequate for mass and polyp detection. The colon and rectum were distended and no fecal impaction was present. The scope was advanced to the descending colon without insufflation and suction was applied with decompression of the lumen. No signs of inflammation, ulceration or erosions. There were diverticula noted throughout the entire visualized colon.   Rectum: not performed due to large amount of stool in the rectum.         Assessment   Ms Joshua is a 79 year old female with a history of dementia, HTN, HLD, and DM who presented with constipation, now s/p  golytely cleanout and enemas with resolution of constipation. She likely has chronic [place]'s, and underwent endoscopic decompression on 4/23; however, her AXR on 4/24 shows that she has reaccumulated air and has continued colonic distension.         Plan   -clear liquid diet for now  -surgery consult given chronic [place]'s and failed endoscopic decompression; discussed with daughter/poa, she is agreeable to consultation  -avoid narcotics/anti-motility agents  -up and OOB, correct lytes if abnormal       Librado Main MD  11:49 AM  4/24/2025  [facility]  [phone]

## 2025-04-24 NOTE — PLAN OF CARE
Pt a/o x1. VSS on RA. No c/o pain. Meds per MAR. IVF per order. Incontinent needs met. Qshift tap water enema. Large watery output. Plan for XR abdomen.     Fall risk protocol followed, call light within reach.

## 2025-04-24 NOTE — CONSULTS
[facility]  Report of Consultation    Kierra Joshua Patient Status:  Inpatient    1945 MRN [id number]   Location [facility] 4NW-A Attending Kulwant Geller, DO   Hosp Day # 3 PCP Tate Tovar     Requesting Physician:  Dr. Main    Reason for Consultation:  Recurrent Dennis's    Chief Complaint:  Constipation, poor oral intake.     Patient is poor historian, history obtained from chart review and daughter who is at bedside.   History of Present Illness:  Kierra Joshua is a 79 year old female with a past medical history significant for dementia, DM2, HTN, Bipolar disorder, Olgivie's s/p multiple endoscopic decompressions who presented to [facility] on 2025 from [facility] with concerns of constipation and decreased oral intake. In the emergency department, KUB revealed  large stool burden in rectum and distal sigmoid colon. CT scan of the abdomen and pelvis revealed extensive distal stool volume with rectal stool impaction, the rectum distended to caliber of 11 cm. Please see daily progress notes for full review of admission. In brief, patient was given extensive bowel regimen including enemas and laxatives resulting in multiple bowel movements. She had serial abdominal x-rays which showed improvement in stool impaction but continued gaseous distention. She underwent urgent flexible sigmoidoscopy with decompression on 2025. She had a repeat abdominal x-ray that showed persistent gaseous distention. General surgery was consulted today for further evaluation.     Upon general surgery evaluation, she reports continued abdominal bloating. She reports abdominal pain. She denies nausea or vomiting with eating. She had watery bowel movement after tap water enema. Per patient's daughter, patient has a longstanding issue with constipation and has required endoscopic decompression approximately every 6- 8 months over the last several years. She reports following decompression  in the past, her abdominal distention improved. She reports she continues to be distended today.     She has a past medical history significant for dementia, DM2, HTN, Bipolar disorder, Olgivie's s/p multiple endoscopic decompressions. She has no previous abdominal surgeries. She is not on any blood thinning medication.     History:  Past Medical History[1]  Past Surgical History[2]  Family History[3]   reports that she quit smoking about 24 years ago. Her smoking use included cigarettes. She has never used smokeless tobacco. She reports that she does not drink alcohol and does not use drugs.    Allergies:  Allergies[4]    Medications:  Prescriptions Prior to Admission[5]    Current Hospital Medications[6]    Review of Systems:    Allergic/Immuno:  Review of patient's allergies performed.  Cardiovascular:  Negative for cool extremity and irregular heartbeat/palpitations  Constitutional:  Negative for decreased activity, fever, irritability and lethargy  Endocrine:  Negative for abnormal sleep patterns, increased activity, polydipsia and polyphagia  ENMT:  Negative for ear drainage, hearing loss and nasal drainage  Eyes:  Negative for eye discharge and vision loss  Gastrointestinal:  Positive for abdominal pain, constipation, decreased appetite, diarrhea   Genitourinary:  Negative for dysuria and hematuria  Hema/Lymph:  Negative for easy bleeding and easy bruising  Integumentary:  Negative for pruritus and rash  Musculoskeletal:  Negative for bone/joint symptoms  Neurological:  Negative for gait disturbance  Psychiatric:  Negative for inappropriate interaction and psychiatric symptoms  Respiratory:  Negative for cough, dyspnea and wheezing     Physical Exam:  /48 (BP Location: Right arm)   Pulse 63   Temp 97.4 °F (36.3 °C) (Oral)   Resp 20   Wt 133 lb (60.3 kg)   SpO2 99%   BMI 22.13 kg/m²     General: Awake, Confused.   No apparent distress.  HEENT: Exam is unremarkable.  Without scleral icterus.     Neck:  Full range of motion to flexion and extension, lateral rotation and lateral flexion of cervical spine.  No JVD. Supple.   Lungs: No respiratory distress, effort normal  Abdomen:  Soft, distended, tympanic to percussion, patient reports no tenderness to palpation, with no rebound or guarding.      Extremities:  No lower extremity edema noted.  Without clubbing or cyanosis.    Skin: Normal texture and turgor.  Lymphatic:  No palpable cervical lymphadenopathy.  Neurologic: Cranial nerves are grossly intact.  Motor strength and sensory examination is grossly normal.  No focal neurologic deficit.    Laboratory Data:  Recent Labs   Lab 04/19/25  1512 04/21/25  0458 04/24/25  0709   RBC 5.14 4.24 3.92   HGB 14.6 12.4 11.4*   HCT 46.4 38.8 35.8   MCV 90.3 91.5 91.3   MCH 28.4 29.2 29.1   MCHC 31.5 32.0 31.8   RDW 13.6 13.4 13.0   NEPRELIM 7.45 2.60  --    WBC 9.5 4.3 4.6   .0 212.0 166.0       Recent Labs   Lab 04/19/25  1512 04/20/25  0547 04/21/25  0458 04/24/25  0709   * 114* 103* 94   BUN 13 13 7* <5*   CREATSERUM 0.61 0.64 0.59 0.48*   CA 10.7* 9.5 8.9 9.0   ALB 4.5 4.2  --   --     142 144 142   K 4.3 3.3* 3.9  3.9 3.4*    103 109 110   CO2 23.0 28.0 23.0 21.0   ALKPHO 76 69  --   --    AST 22 15  --   --    ALT 8* 7*  --   --    BILT 0.3 0.4  --   --    TP 7.0 6.3  --   --          No results for input(s): \"PTP\", \"INR\", \"PTT\" in the last 168 hours.      XR ABDOMEN (1 VIEW) (CPT=74018)  Result Date: 4/24/2025  CONCLUSION:  See above.   LOCATION:  [place]   Dictated by (CST): Stromberg, LeRoy, MD on 4/24/2025 at 11:31 AM     Finalized by (CST): Stromberg, LeRoy, MD on 4/24/2025 at 11:33 AM               Medical Decision Making         Impression:  Problem List[7]    Olgilvie's  S/p Endoscopic decompression on 4/23/2025    Plan:  The patient was discussed with Dr. Pedroza who recommends no acute surgical intervention at this time. She will reach out with GI team to further  discuss possible repeat endoscopic decompression  Can continue clear liquid diet as tolerated  Analgesics and antiemetics as needed  Encourage ambulation and up to chair  Medical management per primary  DVT prophyalxis with lovenox    Janice Soriano PA-C  4/24/2025  1:39 PM    Is this a shared or split note between Advanced Practice Provider and Physician? Yes     The patient was independently examined.  I agree with the PAs assessment and plan.      This is an 79-year-old woman with recurrent [place]'s    CT and x-ray images were reviewed personally by me  CT shows dilated colon mainly sigmoid  X-ray also shows a dilated colon  On physical exam, patient is soft and does not appear to be in discomfort with palpation  Patient has been in the hospital multiple times with recurrent colonic distention requiring decompression  GI took patient for flex sigmoidoscopy yesterday with decompression but patient reaccumulated on x-ray this morning    No acute surgical intervention at this time  Recommend continue conservative management at this time  Recommend repeat flexible sigmoidoscopy with rectal tube placement as sigmoid colon is involved in the dilation  If patient does require surgery, surgery would involve end colostomy creation  Recommend goals of care discussion with family  I attempted to contact patient's guardian, Makayla, to update  Will be available to update when family is available    Diet per GI  Surgery will continue to follow  Rest of care per primary    More than 50% of clinical time and 100% MDM was performed by me    Thank you for letting me participate in the care of this patient      Licha Pedroza MD  [facility] General Surgery  4/24/2025  6:19 PM        [1]   Past Medical History:   Alzheimer's dementia with behavioral disturbance (HCC)    Anxiety state    Bipolar affective (HCC)    Cataract    Dementia (HCC)    Diabetes (HCC)    Elevated fasting lipid profile    Essential hypertension    Essential  hypertension, benign    Falls    Gallstones and inflammation of gallbladder without obstruction    High blood pressure    High cholesterol    Muscle weakness    Neuropathy    Osteoarthritis    Psychosis, unspecified psychosis type (HCC)    Shortness of breath   [2]   Past Surgical History:  Procedure Laterality Date    Biopsy of breast, incisional      left breast bx during surgery    Colonoscopy N/A 12/29/2023    Procedure: UNPREPPED  COLONOSCOPY;  Surgeon: Fabrice Trejo MD;  Location:  ENDOSCOPY    Colonoscopy N/A 2/25/2024    Procedure: COLONOSCOPY WITH DECOMPRESSION;  Surgeon: Trudi Albright MD;  Location:  ENDOSCOPY    Other surgical history  2001    aneurysm clipped x2   [3] No family history on file.  [4] No Known Allergies  [5]   Medications Prior to Admission   Medication Sig    Calcium Carbonate-Vitamin D 600-5 MG-MCG Oral Cap Take 1 tablet by mouth in the morning and 1 tablet before bedtime.    Potassium Chloride ER 10 MEQ Oral Tab CR Take 1 tablet (10 mEq total) by mouth 3 (three) times a week. MWF    cyanocobalamin 500 MCG Oral Tab Take 2 tablets (1,000 mcg total) by mouth in the morning.    ascorbic acid 500 MG Oral Tab Take 1 tablet (500 mg total) by mouth in the morning.    acetaminophen 500 MG Oral Tab Take 1 tablet (500 mg total) by mouth every 4 (four) hours as needed.    lisinopril 10 MG Oral Tab Take 1 tablet (10 mg total) by mouth in the morning. Hold for SBP < 130.    traMADol 50 MG Oral Tab Take 1 tablet (50 mg total) by mouth 2 (two) times daily as needed for Pain.    Cranberry 450 MG Oral Cap Take 1 tablet by mouth in the morning.    Melatonin 5 MG Oral Tab Take 1 tablet (5 mg total) by mouth at bedtime.    magnesium hydroxide (MILK OF MAGNESIA) 400 MG/5ML Oral Suspension Take 5 mL by mouth daily as needed for constipation.    Multiple Vitamins-Minerals (MULTI-VITAMIN/MINERALS) Oral Tab Take 1 tablet by mouth in the morning.    fluticasone propionate 50 MCG/ACT Nasal  Suspension 2 sprays by Each Nare route in the morning.    polyethylene glycol, PEG 3350, 17 g Oral Powd Pack Take 17 g by mouth daily.    risperiDONE 0.25 MG Oral Tab Take 2 tablets (0.5 mg total) by mouth Noon.    furosemide 20 MG Oral Tab Take 1 tablet (20 mg total) by mouth Every Monday, Wednesday, and Friday.    glipiZIDE 5 MG Oral Tab Take 1 tablet (5 mg total) by mouth every morning before breakfast.    risperiDONE 0.25 MG Oral Tab Take 3 tablets (0.75 mg total) by mouth in the morning and 3 tablets (0.75 mg total) before bedtime.    FLEET ENEMA 7-19 GM/118ML Rectal Enema Place 1 enema (118 mL total) rectally daily as needed (CONSTIPTAION).    bismuth subsalicylate (PEPTO-BISMOL) 262 MG/15ML Oral Suspension Take 30 mL (524 mg total) by mouth every 6 (six) hours as needed for Indigestion.    meclizine 25 MG Oral Tab Take 1 tablet (25 mg total) by mouth every 8 (eight) hours as needed. (Patient not taking: Reported on 4/19/2025)   [6]   Current Facility-Administered Medications:     potassium chloride 40 mEq in 250mL sodium chloride 0.9% IVPB premix, 40 mEq, Intravenous, Once    simethicone (Mylicon) chewable tab 80 mg, 80 mg, Oral, TID CC and HS    sodium chloride 0.9% infusion, , Intravenous, Continuous    enoxaparin (Lovenox) 40 MG/0.4ML SUBQ injection 40 mg, 40 mg, Subcutaneous, Daily    polyethylene glycol (PEG 3350) (Miralax) 17 g oral packet 17 g, 17 g, Oral, Daily PRN    sennosides (Senokot) tab 17.2 mg, 17.2 mg, Oral, Nightly PRN    bisacodyl (Dulcolax) 10 MG rectal suppository 10 mg, 10 mg, Rectal, Daily PRN    fleet enema (Fleet) rectal enema 133 mL, 1 enema, Rectal, Once PRN    benzonatate (Tessalon) cap 200 mg, 200 mg, Oral, TID PRN    ondansetron (Zofran) 4 MG/2ML injection 4 mg, 4 mg, Intravenous, Q6H PRN    sennosides (Senokot) tab 8.6 mg, 8.6 mg, Oral, BID    bismuth subsalicylate (PEPTO-BISMOL) chew tab CHEW 262 mg, 1 tablet, Oral, Q6H PRN    cyanocobalamin (Vitamin B12) tab 1,000 mcg, 1,000  mcg, Oral, Daily    fluticasone propionate (Flonase) 50 MCG/ACT nasal suspension 2 spray, 2 spray, Each Nare, Daily    melatonin cap/tab 5 mg, 5 mg, Oral, Nightly PRN    risperiDONE (RisperDAL) tab 0.75 mg, 0.75 mg, Oral, BID    risperiDONE (RisperDAL) tab 0.5 mg, 0.5 mg, Oral, Noon    calcium carbonate-vitamin D (Oyster Shell-D) 250-3.125 MG-MCG per tab 2 tablet, 2 tablet, Oral, BID with meals  [7]   Patient Active Problem List  Diagnosis    Uncontrolled type 2 diabetes mellitus with hyperglycemia (HCC)    Pure hypercholesterolemia    Essential hypertension, benign    Osteopenia of necks of both femurs    Psychosis (HCC)    Alzheimer's dementia with behavioral disturbance ([facility])    Gallstones    Dilated cbd, acquired    COVID-19    COVID-19 virus infection    Closed fracture of right hip, initial encounter ([facility])    Pseudoobstruction of colon    Weakness    Acute cystitis without hematuria    C. difficile diarrhea    Constipation, unspecified constipation type

## 2025-04-24 NOTE — CONSULTS
[facility]   part of [facility]  Palliative Care Initial Consult Note    Kierra Joshua Patient Status:  Inpatient    1945 MRN [id number]   Location [facility] 4NW-A Attending Kulwant Geller, DO   Hosp Day # 3 PCP Tate Tovar     Date of Consult: 2025  Patient seen at: [facility]    Reason for Consultation: Consult ordered by:: Dr. Kulwant Geller for evaluation of Palliative Care needs and Goals of care discussion    Subjective     History of Present Illness: Kierra Joshua is a 79 year old female with history of Alzheimer's dementia, HTN, HLD, DM2, bipolar diasease, chronic constipation, C-diff who was admitted on 2025 from the [place] with decreased PO intake abdominal bloating/distention and constipation. Work up in our hospital revealed XR abd with large stool burden in rectum and sigmoid colon. She was given multiple laxatives and enemas and had multiple BM's.  RRT called due to patient \"choking\".  GI consult and flex sig with decompression performed.  f/u Abd XR with continued/re-accumulated gas and distention. Surgical consult has been placed. History was obtained from Epic.    Today is day #3 of hospitalization.     When I entered the room, the patient was awake & alert and sitting in chair. No family present at bedside.      Review of Systems:   Unable to assess due to AMS    Last BM:   Bowel Movement         2025  0546             Stool Count Calculated for I/O: 1            Palliative Care Social History:   Marital Status:   Children: Yes, 2 daughters and one son listed in contacts  Living Situation Prior to Admit: LTC  Does Patient Live Alone: No  Is Patient Confused: Yes    Substance History:   reports that she quit smoking about 24 years ago. Her smoking use included cigarettes. She has never used smokeless tobacco.  reports no history of alcohol use.  reports no history of drug use.    Spiritual Assessment:   No  Episcopal Indicated/Given    Past Medical History/Past Surgical History:   This is the 2nd hospitalization in the past 6 months.    Medical History: obtained from [facility]  Past Medical History[1]  Past Surgical History[2]    Family History: obtained from [facility]  Family History[3]    Allergies:  Allergies[4]    Medications:   Current Hospital Medications[5]    Functional Status History:  ADLs: bathing or showering, dressing, getting in and out of bed or a chair, walking, using the toilet, and eating  - HIGH  5+ performance deficits   IADLs: use the phone, shop for groceries, meal preparation, manage medicines, clean living area, use transportation by self, manage money  - HIGH  5+ performance deficits     Palliative Performance Scale:   Prior to admission: (pt/family reported)  BONNIE %  Observed during hospitalization: 50 %  % Ambulation Activity Level Self-Care Intake Consciousness   100 Full  Normal  No Disease Full Normal Full   90 Full  Normal  Some Disease Full Normal Full   80 Full  Normal w/effort  Some Disease Full Normal or reduced Full   70 Reduced  Can't Perform Job  Some Disease Full Normal or reduced Full   60 Reduced  Can't Perform Hobby   Significant Disease Occ Assist Normal or reduced Full or confused   50 Mainly sit/lie Can't do any work  Extensive Disease Partial Assist Normal or reduced Full or confused   40 Mainly in bed Can't do any work  Extensive Disease Mainly Assist Normal or reduced Full or confused   30 Bed Bound Can't do any work  Extensive Disease Max Assist  Total Care Reduced  Drowsy/confused   20 Bed Bound Can't do any work  Extensive Disease Max Assist  Total Care Minimal  Drowsy/confused   10 Bed Bound Can't do any work  Extensive Disease Max Assist  Total Care Mouth Care  Drowsy/confused   0 Death        Objective      Vital Signs:  Blood pressure 146/48, pulse 63, temperature 97.4 °F (36.3 °C), temperature source Oral, resp. rate 20, weight 133 lb (60.3 kg), SpO2 99%, not currently  breastfeeding.  Body mass index is 22.13 kg/m².    Physical Exam:  General: Alert & Awake. In no apparent respiratory distress. Body habitus BMI WNL   HEENT: AT/NC. No gross focal deficits. Dry MM   Cardiac: RRR per monitor  Lungs: Normal effort on RA  Abdomen: Soft, tender, distended  Neurologic: Alert, unable to assess orientation, refusing to answer questions. When if she knew what her daughter's name was she sat upright (in a defensive way) and said, \"yes, why do you have to know?\" and refused to answer the question.   Psychiatric: Mood  uncooperative        Hematology:  Lab Results   Component Value Date    WBC 4.6 04/24/2025    HGB 11.4 (L) 04/24/2025    HCT 35.8 04/24/2025    .0 04/24/2025       Coags:  Lab Results   Component Value Date    INR 1.06 02/20/2024    PTT 23.9 02/20/2024       Chemistry:  Lab Results   Component Value Date    CREATSERUM 0.48 (L) 04/24/2025    BUN <5 (L) 04/24/2025     04/24/2025    K 3.4 (L) 04/24/2025     04/24/2025    CO2 21.0 04/24/2025    GLU 94 04/24/2025    CA 9.0 04/24/2025    ALB 4.2 04/20/2025    ALKPHO 69 04/20/2025    BILT 0.4 04/20/2025    TP 6.3 04/20/2025    AST 15 04/20/2025    ALT 7 (L) 04/20/2025    DDIMER 0.69 03/15/2023    MG 2.2 04/21/2025       Imaging:  XR ABDOMEN (1 VIEW) (CPT=74018)  Result Date: 4/24/2025  CONCLUSION:  See above.   LOCATION:  [place]   Dictated by (CST): Stromberg, LeRoy, MD on 4/24/2025 at 11:31 AM     Finalized by (CST): Stromberg, LeRoy, MD on 4/24/2025 at 11:33 AM       XR CHEST AP PORTABLE  (CPT=71045)  Result Date: 4/22/2025  CONCLUSION:  Minimal bibasilar atelectasis.   LOCATION:  [place]      Dictated by (CST): [facility] Syam, MD on 4/22/2025 at 8:20 PM     Finalized by (CST): Christiana Kerns MD on 4/22/2025 at 8:20 PM         Summary of Discussion      VM left @ 1408 for Makayla Hughes (daughter, listed as \"Guardian\" ) on contact information with out return call today as of 1756    Advance Care Planning  counseling and discussion:   HCPOA:  Document on file Yes [] No [x]. Requested for file []  Healthcare Agent Appointed: Yes  Healthcare Agent's Name: Makayla  Healthcare Agent's Phone Number: [phone]  Code Status:  DNAR/Selective Treatment  POLST: not on file, noted DNAR/Selective code status on 11/2024 as well    Problem List:  Principal Problem:    Constipation, unspecified constipation type  Active Problems:    Palliative care encounter    Goals of care, counseling/discussion      Assessment and Recommendations      Goals of care:    Per chart review, surgery consult has been placed with daughter's permission.     Advanced Care Planning:  Code Status: DNAR/Selective Treatment  POLST: not on file, noted DNAR/Selective code status on 11/2024 as well  HCPOA:  Healthcare Agent's Name: Makayla Hughes (daughter, listed as \"Guardian\" but no guardianship papers on file)  Symptoms: confused    Discussed today's visit with  RN.    Palliative Care Follow Up: Palliative care team will  attempt to reach out to family again tomorrow.     Thank you for allowing Palliative Care services to participate in the care of Kierra Joshua.    No charges were entered for this visit today.     Cindi Watts, APRN  4/24/2025  2:13 PM  Palliative Care Services    The 21st Century Cures Act makes medical notes like these available to patients in the interest of transparency. Please be advised this is a medical document. Medical documents are intended to carry relevant information, facts as evident, and the clinical opinion of the practitioner. The medical note is intended as peer to peer communication and may appear blunt or direct. It is written in medical language and may contain abbreviations or verbiage that are unfamiliar.          [1]   Past Medical History:   Alzheimer's dementia with behavioral disturbance (HCC)    Anxiety state    Bipolar affective (HCC)    Cataract    Dementia (HCC)    Diabetes (HCC)    Elevated fasting lipid  profile    Essential hypertension    Essential hypertension, benign    Falls    Gallstones and inflammation of gallbladder without obstruction    High blood pressure    High cholesterol    Muscle weakness    Neuropathy    Osteoarthritis    Psychosis, unspecified psychosis type (HCC)    Shortness of breath   [2]   Past Surgical History:  Procedure Laterality Date    Biopsy of breast, incisional      left breast bx during surgery    Colonoscopy N/A 12/29/2023    Procedure: UNPREPPED  COLONOSCOPY;  Surgeon: Fabrice Trejo MD;  Location:  ENDOSCOPY    Colonoscopy N/A 2/25/2024    Procedure: COLONOSCOPY WITH DECOMPRESSION;  Surgeon: Trudi Albright MD;  Location:  ENDOSCOPY    Other surgical history  2001    aneurysm clipped x2   [3] No family history on file.  [4] No Known Allergies  [5]   Current Facility-Administered Medications:     potassium chloride 40 mEq in 250mL sodium chloride 0.9% IVPB premix, 40 mEq, Intravenous, Once    simethicone (Mylicon) chewable tab 80 mg, 80 mg, Oral, TID CC and HS    sodium chloride 0.9% infusion, , Intravenous, Continuous    enoxaparin (Lovenox) 40 MG/0.4ML SUBQ injection 40 mg, 40 mg, Subcutaneous, Daily    polyethylene glycol (PEG 3350) (Miralax) 17 g oral packet 17 g, 17 g, Oral, Daily PRN    sennosides (Senokot) tab 17.2 mg, 17.2 mg, Oral, Nightly PRN    bisacodyl (Dulcolax) 10 MG rectal suppository 10 mg, 10 mg, Rectal, Daily PRN    fleet enema (Fleet) rectal enema 133 mL, 1 enema, Rectal, Once PRN    benzonatate (Tessalon) cap 200 mg, 200 mg, Oral, TID PRN    ondansetron (Zofran) 4 MG/2ML injection 4 mg, 4 mg, Intravenous, Q6H PRN    sennosides (Senokot) tab 8.6 mg, 8.6 mg, Oral, BID    bismuth subsalicylate (PEPTO-BISMOL) chew tab CHEW 262 mg, 1 tablet, Oral, Q6H PRN    cyanocobalamin (Vitamin B12) tab 1,000 mcg, 1,000 mcg, Oral, Daily    fluticasone propionate (Flonase) 50 MCG/ACT nasal suspension 2 spray, 2 spray, Each Nare, Daily    melatonin cap/tab 5 mg, 5  mg, Oral, Nightly PRN    risperiDONE (RisperDAL) tab 0.75 mg, 0.75 mg, Oral, BID    risperiDONE (RisperDAL) tab 0.5 mg, 0.5 mg, Oral, Noon    calcium carbonate-vitamin D (Oyster Shell-D) 250-3.125 MG-MCG per tab 2 tablet, 2 tablet, Oral, BID with meals

## 2025-04-24 NOTE — PLAN OF CARE
Problem: Diabetes/Glucose Control  Goal: Glucose maintained within prescribed range  Description: INTERVENTIONS:- Monitor Blood Glucose as ordered- Assess for signs and symptoms of hyperglycemia and hypoglycemia- Administer ordered medications to maintain glucose within target range- Assess barriers to adequate nutritional intake and initiate nutrition consult as needed- Instruct patient on self management of diabetes  Outcome: Not Progressing     Problem: GASTROINTESTINAL - ADULT  Goal: Minimal or absence of nausea and vomiting  Description: INTERVENTIONS:- Maintain adequate hydration with IV or PO as ordered and tolerated- Nasogastric tube to low intermittent suction as ordered- Evaluate effectiveness of ordered antiemetic medications- Provide nonpharmacologic comfort measures as appropriate- Advance diet as tolerated, if ordered- Obtain nutritional consult as needed- Evaluate fluid balance  Outcome: Not Progressing  Goal: Maintains or returns to baseline bowel function  Description: INTERVENTIONS:- Assess bowel function- Maintain adequate hydration with IV or PO as ordered and tolerated- Evaluate effectiveness of GI medications- Encourage mobilization and activity- Obtain nutritional consult as needed- Establish a toileting routine/schedule- Consider collaborating with pharmacy to review patient's medication profile  Outcome: Not Progressing  Goal: Maintains adequate nutritional intake (undernourished)  Description: INTERVENTIONS:- Monitor percentage of each meal consumed- Identify factors contributing to decreased intake, treat as appropriate- Assist with meals as needed- Monitor I&O, WT and lab values- Obtain nutritional consult as needed- Optimize oral hygiene and moisture- Encourage food from home; allow for food preferences- Enhance eating environment  Outcome: Not Progressing  Goal: Achieves appropriate nutritional intake (bariatric)  Description: INTERVENTIONS:- Monitor for over-consumption- Identify  factors contributing to increased intake, treat as appropriate- Monitor I&O, WT and lab values- Obtain nutritional consult as needed- Evaluate psychosocial factors contributing to over-consumption  Outcome: Not Progressing

## 2025-04-24 NOTE — PROGRESS NOTES
[facility]   part of [facility]     Hospitalist Progress Note     Kierra Joshua Patient Status:  Observation    1945 MRN [id number]   Location [facility] 4NW-A Attending Marleny Marina MD   Hosp Day # 3 PCP Tate Tovar     Chief Complaint: Constipation     Subjective:     No acute events overnight. Pt denies any pain.       Objective:    Review of Systems:   A comprehensive review of systems was completed; pertinent positive and negatives stated in subjective.    Vital signs:  Temp:  [97.6 °F (36.4 °C)-99.5 °F (37.5 °C)] 97.7 °F (36.5 °C)  Pulse:  [51-71] 56  Resp:  [14-18] 18  BP: ()/(34-61) 142/61  SpO2:  [94 %-99 %] 96 %    Physical Exam:    General: No acute distress  Respiratory: No wheezes, no rhonchi  Cardiovascular: S1, S2, regular rate and rhythm  Abdomen: Soft, Non-tender, non-distended, positive bowel sounds  Neuro: No new focal deficits.   Extremities: No edema      Diagnostic Data:    Labs:  Recent Labs   Lab 25  1512 25  0458 25  0709   WBC 9.5 4.3 4.6   HGB 14.6 12.4 11.4*   MCV 90.3 91.5 91.3   .0 212.0 166.0       Recent Labs   Lab 25  1512 25  0547 25  0458 25  0709   * 114* 103* 94   BUN 13 13 7* <5*   CREATSERUM 0.61 0.64 0.59 0.48*   CA 10.7* 9.5 8.9 9.0   ALB 4.5 4.2  --   --     142 144 142   K 4.3 3.3* 3.9  3.9 3.4*    103 109 110   CO2 23.0 28.0 23.0 21.0   ALKPHO 76 69  --   --    AST 22 15  --   --    ALT 8* 7*  --   --    BILT 0.3 0.4  --   --    TP 7.0 6.3  --   --        Estimated Glomerular Filtration Rate: 96 mL/min/1.73m2 (result from lab).    No results for input(s): \"TROP\", \"TROPHS\", \"CK\" in the last 168 hours.    No results for input(s): \"PTP\", \"INR\" in the last 168 hours.               Microbiology    Hospital Encounter on 25   1. Urine Culture, Routine     Status: None    Collection Time: 25  6:44 PM    Specimen: Urine, clean catch   Result Value Ref Range    Urine  Culture  N/A     10-50,000 cfu/ml Three or more species of Gram negative bacilli; none predominant. No further workup performed.         Imaging: Reviewed in Epic.    Medications: Scheduled Medications[1]    Assessment & Plan:      #Dysphagia, transient   -ST to see today, unable to see yesterday 2/2 npo status for procedure     #Constipation with high stool burden  CT A/P noted   2 L Golyetly, enema, senna BID  GI on CS- Abdominal Xray on 4/20 with ? Ileus, repeat Xray on 4/21 with ? Tear, CT A/P no tear- gaseous distension noted  S/p flexible sigmoidoscopy w/ decompression on 4/23  Repeat Abdominal Xray on 4/24 pending; pending results may need gen sx results; discussed plan w/ Dr. Main      #h/o dementia with behavioral disturbances      #HTN     #HLD      #DM type 2  A1c 5.7 as of nov 2024, repeat pending  ISS     #UTI ruled out      Kulwant Geller, DO    Supplementary Documentation:     Quality:  DVT Mechanical Prophylaxis:   SCDs,    DVT Pharmacologic Prophylaxis   Medication    enoxaparin (Lovenox) 40 MG/0.4ML SUBQ injection 40 mg                Code Status: DNAR/Selective Treatment  Smith: No urinary catheter in place  Smith Duration (in days):   Central line:    CONRADO:     Discharge is dependent on: clinical   At this point Ms. Joshua is expected to be discharge to: home     Called POA 10: 34 AM on 4/20 - no answer left VM    The 21st Century Cures Act makes medical notes like these available to patients in the interest of transparency. Please be advised this is a medical document. Medical documents are intended to carry relevant information, facts as evident, and the clinical opinion of the practitioner. The medical note is intended as peer to peer communication and may appear blunt or direct. It is written in medical language and may contain abbreviations or verbiage that are unfamiliar.                          [1]    potassium chloride  40 mEq Intravenous Once    simethicone  80 mg Oral TID CC and HS     enoxaparin  40 mg Subcutaneous Daily    sennosides  8.6 mg Oral BID    cyanocobalamin  1,000 mcg Oral Daily    fluticasone propionate  2 spray Each Nare Daily    risperiDONE  0.75 mg Oral BID    risperiDONE  0.5 mg Oral Noon    calcium carbonate-vitamin D  2 tablet Oral BID with meals

## 2025-04-24 NOTE — SLP NOTE
ADULT BEDSIDE SWALLOWING EVALUATION    ASSESSMENT    ASSESSMENT/OVERALL IMPRESSION:  Order received for BSE due to coughing on liquids. Chart reviewed. Patient presented from facility with constipation. Workup ongoing with heavy stool burden noted. Medical history includes Alzheimer's, Bipolar, DM, chronic constipation, h/o c-diff.  Per RN report, patient was on puree diet with thin liquids at SNF.  Per recent BSE during previous inpatient admit, baseline diet was mechanical soft chopped with pureed sides, 1:1 feeding.      Patient received awake, alert, and seated in upright bedside chair.  Patient responded to immediate yes/no with delay.  Patient did not follow commands for oral motor exam; noted quick agitation.  Patient with clear vocal quality.  Patient assisted with PO trials.     No immediate or overt clinical s/s aspiration observed or suspected on baseline diet textures; limited PO trials accepted by patient.  Recommend con't puree diet with thin liquids with 1:1 feeding supervision for safety awareness. SLP to follow-up x1 for diet texture analysis.  D/W RN.          RECOMMENDATIONS   Diet Recommendations - Solids: Puree  Diet Recommendations - Liquids: Thin Liquids  Aspiration Precautions: Upright position, 1:1 feeding, Slow rate, Small bites, Small sips  Medication Administration Recommendations: One pill at a time, Crushed in puree  Treatment Plan/Recommendations: Aspiration precautions    HISTORY   MEDICAL HISTORY  Reason for Referral: R/O aspiration    Problem List  Principal Problem:    Constipation, unspecified constipation type      Past Medical History  Past Medical History[1]    Prior Living Situation: Skilled nursing facility  Diet Prior to Admission: Puree, Thin liquids       Patient/Family Goals: \"I don't want anymore\"    SWALLOWING HISTORY  Current Diet Consistency: Puree, Thin liquids  Dysphagia History: as per above  Imaging Results: see EMR        OBJECTIVE   ORAL MOTOR  EXAMINATION  Dentition: Functional  Symmetry: Within Functional Limits  Strength: Unable to assess  Tone: Unable to assess  Range of Motion: Unable to assess  Rate of Motion: Unable to assess    Voice Quality: Clear  Respiratory Status: Unlabored  Consistencies Trialed: Thin liquids, Puree  Method of Presentation: Staff/Clinician assistance  Patient Positioned: Upright, Midline, Bedside chair    Oral Phase of Swallow: Within Functional Limits        Pharyngeal Phase of Swallow: Within Functional Limits           (Please note: Silent aspiration cannot be evaluated clinically. Videofluoroscopic Swallow Study is required to rule-out silent aspiration.)    Esophageal Phase of Swallow: No complaints consistent with possible esophageal involvement            GOALS  Goal #1 The patient will tolerate puree consistency and thin liquids without overt signs or symptoms of aspiration with 80 % accuracy over 1-2 session(s).  In Progress   Goal #2 The patient/family/caregiver will demonstrate understanding and implementation of aspiration precautions and swallow strategies independently over 1-2 session(s).    In Progress     FOLLOW UP  Treatment Plan/Recommendations: Aspiration precautions     Follow Up Needed (Documentation Required): Yes  SLP Follow-up Date: 04/25/25    Thank you for your referral.   If you have any questions, please contact JUAN Brar MS CCC-SLP/L  Speech-Language Pathologist  Spectra-Link [id number]         [1]   Past Medical History:   Alzheimer's dementia with behavioral disturbance (HCC)    Anxiety state    Bipolar affective (HCC)    Cataract    Dementia (HCC)    Diabetes (HCC)    Elevated fasting lipid profile    Essential hypertension    Essential hypertension, benign    Falls    Gallstones and inflammation of gallbladder without obstruction    High blood pressure    High cholesterol    Muscle weakness    Neuropathy    Osteoarthritis    Psychosis, unspecified psychosis type (HCC)     Shortness of breath

## 2025-04-25 ENCOUNTER — ANESTHESIA (OUTPATIENT)
Dept: ENDOSCOPY | Facility: HOSPITAL | Age: 80
End: 2025-04-25
Payer: MEDICARE

## 2025-04-25 ENCOUNTER — ANESTHESIA EVENT (OUTPATIENT)
Dept: ENDOSCOPY | Facility: HOSPITAL | Age: 80
End: 2025-04-25
Payer: MEDICARE

## 2025-04-25 RX ORDER — SODIUM CHLORIDE, SODIUM LACTATE, POTASSIUM CHLORIDE, CALCIUM CHLORIDE 600; 310; 30; 20 MG/100ML; MG/100ML; MG/100ML; MG/100ML
INJECTION, SOLUTION INTRAVENOUS CONTINUOUS PRN
Status: DISCONTINUED | OUTPATIENT
Start: 2025-04-25 | End: 2025-04-25 | Stop reason: SURG

## 2025-04-25 RX ORDER — LIDOCAINE HYDROCHLORIDE 10 MG/ML
INJECTION, SOLUTION EPIDURAL; INFILTRATION; INTRACAUDAL; PERINEURAL AS NEEDED
Status: DISCONTINUED | OUTPATIENT
Start: 2025-04-25 | End: 2025-04-25 | Stop reason: SURG

## 2025-04-25 RX ADMIN — SODIUM CHLORIDE, SODIUM LACTATE, POTASSIUM CHLORIDE, CALCIUM CHLORIDE: 600; 310; 30; 20 INJECTION, SOLUTION INTRAVENOUS at 14:48:00

## 2025-04-25 RX ADMIN — LIDOCAINE HYDROCHLORIDE 50 MG: 10 INJECTION, SOLUTION EPIDURAL; INFILTRATION; INTRACAUDAL; PERINEURAL at 14:52:00

## 2025-04-25 NOTE — PLAN OF CARE
Problem: Diabetes/Glucose Control  Goal: Glucose maintained within prescribed range  Description: INTERVENTIONS:- Monitor Blood Glucose as ordered- Assess for signs and symptoms of hyperglycemia and hypoglycemia- Administer ordered medications to maintain glucose within target range- Assess barriers to adequate nutritional intake and initiate nutrition consult as needed- Instruct patient on self management of diabetes  Outcome: Progressing     Problem: GASTROINTESTINAL - ADULT  Goal: Minimal or absence of nausea and vomiting  Description: INTERVENTIONS:- Maintain adequate hydration with IV or PO as ordered and tolerated- Nasogastric tube to low intermittent suction as ordered- Evaluate effectiveness of ordered antiemetic medications- Provide nonpharmacologic comfort measures as appropriate- Advance diet as tolerated, if ordered- Obtain nutritional consult as needed- Evaluate fluid balance  Outcome: Progressing  Goal: Maintains or returns to baseline bowel function  Description: INTERVENTIONS:- Assess bowel function- Maintain adequate hydration with IV or PO as ordered and tolerated- Evaluate effectiveness of GI medications- Encourage mobilization and activity- Obtain nutritional consult as needed- Establish a toileting routine/schedule- Consider collaborating with pharmacy to review patient's medication profile  Outcome: Progressing  Goal: Maintains adequate nutritional intake (undernourished)  Description: INTERVENTIONS:- Monitor percentage of each meal consumed- Identify factors contributing to decreased intake, treat as appropriate- Assist with meals as needed- Monitor I&O, WT and lab values- Obtain nutritional consult as needed- Optimize oral hygiene and moisture- Encourage food from home; allow for food preferences- Enhance eating environment  Outcome: Progressing  Goal: Achieves appropriate nutritional intake (bariatric)  Description: INTERVENTIONS:- Monitor for over-consumption- Identify factors  contributing to increased intake, treat as appropriate- Monitor I&O, WT and lab values- Obtain nutritional consult as needed- Evaluate psychosocial factors contributing to over-consumption  Outcome: Progressing

## 2025-04-25 NOTE — SIGNIFICANT EVENT
I have recommended a flexible sigmoidoscopy with decompression and possible rectal tube placement with the assistance of MAC anesthesia for further evaluation-the risks, benefits and alternatives were discussed, including the risk anesthesia and the risk of perforation and bleeding with the procedure itself. The risks of not proceeding were also discussed, including the risks of missing lesions including polyps or masses. The patient's daughter Makayla expresses an understanding and agrees to proceed as planned     Librado Main MD  [facility]

## 2025-04-25 NOTE — SLP NOTE
Attempted follow-up as per dysphagia plan of care however patient currently NPO for possible procedure.  Will defer and f/u at another time, as able.   Vania Huerta MS CCC-SLP/L  Speech-Language Pathologist  Spectra-Link [phone]

## 2025-04-25 NOTE — CM/SW NOTE
[facility] sent copy of POLST ( DNR/Select)  Copy to be put in her chart.    Marie Lackey, LCSW  /Discharge Planner

## 2025-04-25 NOTE — PROGRESS NOTES
[facility]  General Surgery Progress Note    Kierra Joshua Patient Status:  Inpatient    1945 MRN [id number]   Location [facility] ENDOS[facility] Attending Kulwant Geller,    Hosp Day # 4 PCP Tate Tovar     Subjective:  No acute events overnight.  Patient continues to be distended.  Having bowel function over night.  Denies any nausea or vomiting.    Objective/Physical Exam:      Intake/Output Summary (Last 24 hours) at 2025 1321  Last data filed at 2025 0600  Gross per 24 hour   Intake 1718 ml   Output --   Net 1718 ml       Vital Signs:  Blood pressure 151/61, pulse 88, temperature 98.5 °F (36.9 °C), temperature source Axillary, resp. rate 16, weight 133 lb (60.3 kg), SpO2 96%, not currently breastfeeding.    General: Alert, orientated x3.  Cooperative.  No apparent distress.  HEENT: Exam is unremarkable.  Without scleral icterus.  Mucous membranes are moist. Oropharynx is clear.  Neck: No JVD. Supple.   Lungs: Non labored breathing, equal chest rise  Cardiac: Regular rate and rhythm. No murmur.  Abdomen:  Soft, distended but does not appear to have pain with palpation  Extremities:  No lower extremity edema noted.  Without clubbing or cyanosis.  2+ pulses x4, motor and sensation grossly intact      Labs:  Lab Results   Component Value Date    WBC 4.3 2025    RBC 4.07 2025    HGB 11.8 2025    HCT 35.5 2025    MCV 87.2 2025    MCH 29.0 2025    MCHC 33.2 2025    RDW 13.0 2025    .0 2025     Lab Results   Component Value Date     2025    K 3.2 2025    K 3.2 2025     2025    CO2 26.0 2025    BUN <5 2025    CREATSERUM 0.52 2025     2025    CA 9.2 2025          Images:  XR ABDOMEN (1 VIEW) (CPT=74018)  Result Date: 2025  CONCLUSION:  There is persistent gaseous distension of the majority of the colon, most pronounced in the region of the rectum  measuring up to 11.3 cm in transverse dimension as compared to 12 cm on the previous exam.  While this could be due to ileus, a low colonic obstruction is not excluded.  Clinical correlation and continued follow-up is suggested.  No free air.    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 General Surgery  4/25/2025  1:21 PM         [1]   Patient Active Problem List  Diagnosis    Uncontrolled type 2 diabetes mellitus with hyperglycemia (HCC)    Pure hypercholesterolemia    Essential hypertension, benign    Osteopenia of necks of both femurs    Psychosis (HCC)    Alzheimer's dementia with behavioral disturbance (HCC)    Gallstones    Dilated cbd, acquired    COVID-19    COVID-19 virus infection    Closed fracture of right hip, initial encounter ([facility])    Pseudoobstruction of colon    Weakness    Acute cystitis without hematuria    C. difficile diarrhea    Constipation, unspecified constipation type    Palliative care encounter    Goals of care, counseling/discussion    [place] syndrome

## 2025-04-25 NOTE — OPERATIVE REPORT
Flexible Sigmoidoscopy  Operative Report  Patient Name: Kierra Joshua  YOB: 1945  MRN: [id number]  Procedure: flexible sigmoidoscopy with rectal tube placement  Pre-operative Diagnosis & Indication: Corinna's syndrome  Post-operative Diagnosis:   Dilated colon, s/p rectal tube placement  Attending Endoscopist: Librado Main MD  Informed Consent: The planned procedure(s), the explanation of the procedure, its expected benefits, the potential complications and risks and possible alternatives and their benefits and risks were discussed with the patient or the patient's surrogate. The discussion of risks, not limited to but including bleeding, infection, perforation, adverse effects from anesthesia, need for emergency surgery, medication effects, cardiac arrhythmias, missed polyps, and aspiration and death, were discussed with patient.  Pt and/or surrogate understood the proposed procedure(s), its risks, benefits and alternatives and wish to proceed with procedure(s). All questions answered in full.  After all questions were answered to their satisfaction, a signed, informed, and witnessed consent was obtained.  Physical Exam: Heart: regular rate and rhythm. No rubs, murmurs, or gallops. Lungs: Clear to auscultation bilaterally. Abdomen: Soft, non-tender, non distended. Positive bowel sounds. No rebound tenderness, no guarding.   A TIME OUT WAS COMPLETED prior to the procedure to confirm the patient, procedure and complete endoscopy safety procedure.   Sedation: Monitored Anesthesia Care; ASA class per anesthesiology team   Monitoring: Pulsoximetry, pulse, respirations, and blood pressure, vitals were monitored throughout the entire procedure under monitored anesthesia care.   Preparation Quality:  [place] Bowel Prep Score: inadequate for mass/polyp detection    Procedure: After achieving adequate sedation, and placing the patient in the left lateral decubitus position, a digital rectal examination was  performed.  The lubricated tip of the adult gastroscope was then introduced into the rectum and advanced to the sigmoid colon.  The endoscope was then carefully withdrawn from the patient with careful visualization of the colonic mucosa to the best of my ability, with bowel preparation quality as noted above.  Air was suctioned to the best of my ability, during withdrawal of the endoscope.   The endoscope was then removed from the patient.  The patient tolerated the procedure without apparent procedural complications.  The patient left the procedure room in stable condition for recovery.  Toleration: Patient tolerated procedure well   Complications: No immediate complications   Technical Difficulty:  The procedure was not technically difficult   Estimated Blood Loss: Minimal, less than 5mL of estimated blood loss.   Withdrawal time: Withdrawal of endoscope was  minutes  Findings and Therapeutics:  Colon and rectum: copious stool throughout. Dilated colon, with normal appearing mucosa. The scope was advanced to the sigmoid colon, and the colon and rectum were then decompressed as the scope was withdrawn. A red rubber catheter was inserted into the rectum/colon after the scope was withdrawn.  Rectum: retroflexion not performed due to copious stool.  Recommendations:    Post Colonoscopy/polypectomy precautions, watch for bleeding, infection, perforation, adverse drug reactions.   Clear liquid diet  AXR tomorrow morning  Leave red rubber catheter in for now  Appreciate surgery consult    Librado Main MD  4/25/2025  3:01 PM

## 2025-04-25 NOTE — DIETARY NOTE
[facility]   part of [facility]   CLINICAL NUTRITION    [facility]     Admitting diagnosis:  Constipation, unspecified constipation type [K59.00]    Ht:  165.1 cm   Wt: 60.3 kg (133 lb).   Body mass index is 22.13 kg/m².  IBW: 56.8kg    Wt Readings from Last 6 Encounters:   04/21/25 60.3 kg (133 lb)   11/05/24 59.9 kg (132 lb)   02/20/24 64.9 kg (143 lb)   12/27/23 65.8 kg (145 lb)   06/12/23 66 kg (145 lb 8.1 oz)   03/12/23 66 kg (145 lb 8.1 oz)        Labs/Meds reviewed    Diet:       Procedures    NPO     Percent Meals Eaten (last 3 days)       Date/Time Percent Meals Eaten (%)    04/22/25 1200 5 %    04/23/25 0900 0 %     Percent Meals Eaten (%): NPO at 04/23/25 0900    04/23/25 1200 0 %     Percent Meals Eaten (%): NPO at 04/23/25 1200    04/24/25 0754 100 %          79 year old female admitted with constipation, pt with Corinna's syndrome diluted colon s/p rectal tube placement    Pt chart reviewed d/t LOS. Pt currently npo for procedure and out of room at time of RD visit.     Nursing notes reports Percent Meals Eaten (%): 100 % intake for last meal.  Tolerating po diet without diarrhea, emesis, or constipation.   No significant weight changes noted.     Patient is at low nutrition risk at this time.    Please consult if patient status changes or nutrition issues arise.    Josselin Peters RD,LDN  Clinical Dietitian  [id number]

## 2025-04-25 NOTE — ANESTHESIA PREPROCEDURE EVALUATION
PRE-OP EVALUATION    Patient Name: Kierra Joshua    Admit Diagnosis: Constipation, unspecified constipation type [K59.00]    Pre-op Diagnosis: inpt    FLEXIBLE SIGMOIDOSCOPY    Anesthesia Procedure: FLEXIBLE SIGMOIDOSCOPY    Surgeons and Role:     * Librado Main MD - Primary    Pre-op vitals reviewed.  Temp: 98.5 °F (36.9 °C)  Pulse: 88  Resp: 16  BP: 151/61  SpO2: 96 %  Body mass index is 22.13 kg/m².    Current medications reviewed.  Hospital Medications:  Current Medications[1]    Outpatient Medications:   Prescriptions Prior to Admission[2]    Allergies: Patient has no known allergies.      Anesthesia Evaluation    Patient summary reviewed.    Anesthetic Complications  (-) history of anesthetic complications         GI/Hepatic/Renal    Negative GI/hepatic/renal ROS.                             Cardiovascular        Exercise tolerance: good     MET: >4      (+) hypertension   (+) hyperlipidemia                                  Endo/Other      (+) diabetes and poorly controlled, type 2,                          Pulmonary               (+) shortness of breath            Neuro/Psych  Comment: Alzheimer's dementia                                    Past Surgical History[3]  Social Hx on file[4]  History   Drug Use No     Available pre-op labs reviewed.  Lab Results   Component Value Date    WBC 4.3 04/25/2025    RBC 4.07 04/25/2025    HGB 11.8 (L) 04/25/2025    HCT 35.5 04/25/2025    MCV 87.2 04/25/2025    MCH 29.0 04/25/2025    MCHC 33.2 04/25/2025    RDW 13.0 04/25/2025    .0 04/25/2025     Lab Results   Component Value Date     04/25/2025    K 3.2 (L) 04/25/2025    K 3.2 (L) 04/25/2025     04/25/2025    CO2 26.0 04/25/2025    BUN <5 (L) 04/25/2025    CREATSERUM 0.52 (L) 04/25/2025     (H) 04/25/2025    CA 9.2 04/25/2025            Airway      Mallampati: II  Mouth opening: >3 FB  TM distance: > 6 cm  Neck ROM: full Cardiovascular    Cardiovascular exam normal.  Rhythm:  regular  Rate: normal     Dental    Dentition appears grossly intact         Pulmonary    Pulmonary exam normal.  Breath sounds clear to auscultation bilaterally.               Other findings              ASA: 2   Plan: MAC  NPO status verified and patient meets guidelines.  Patient has not taken beta blockers in last 24 hours.  Post-procedure pain management plan discussed with surgeon and patient.      Plan/risks discussed with: patient and healthcare power of                 Present on Admission:  **None**             [1]    [COMPLETED] potassium chloride 40 mEq in 250mL sodium chloride 0.9% IVPB premix  40 mEq Intravenous Once    [COMPLETED] potassium chloride 40 mEq in 250mL sodium chloride 0.9% IVPB premix  40 mEq Intravenous Once    simethicone (Mylicon) chewable tab 80 mg  80 mg Oral TID CC and HS    [COMPLETED] potassium chloride 40 mEq in 250mL sodium chloride 0.9% IVPB premix  40 mEq Intravenous Once    [COMPLETED] magnesium sulfate in sterile water for injection 2 g/50mL IVPB premix 2 g  2 g Intravenous Once    sodium chloride 0.9% infusion   Intravenous Continuous    enoxaparin (Lovenox) 40 MG/0.4ML SUBQ injection 40 mg  40 mg Subcutaneous Daily    polyethylene glycol (PEG 3350) (Miralax) 17 g oral packet 17 g  17 g Oral Daily PRN    sennosides (Senokot) tab 17.2 mg  17.2 mg Oral Nightly PRN    bisacodyl (Dulcolax) 10 MG rectal suppository 10 mg  10 mg Rectal Daily PRN    fleet enema (Fleet) rectal enema 133 mL  1 enema Rectal Once PRN    benzonatate (Tessalon) cap 200 mg  200 mg Oral TID PRN    ondansetron (Zofran) 4 MG/2ML injection 4 mg  4 mg Intravenous Q6H PRN    sennosides (Senokot) tab 8.6 mg  8.6 mg Oral BID    [COMPLETED] polyethylene glycol-electrolyte (Golytely) 236 g oral solution 2,000 mL  2,000 mL Oral Once    [COMPLETED] iopamidol 76% (ISOVUE-370) injection for power injector  80 mL Intravenous ONCE PRN    bismuth subsalicylate (PEPTO-BISMOL) chew tab CHEW 262 mg  1 tablet Oral  Q6H PRN    cyanocobalamin (Vitamin B12) tab 1,000 mcg  1,000 mcg Oral Daily    fluticasone propionate (Flonase) 50 MCG/ACT nasal suspension 2 spray  2 spray Each Nare Daily    melatonin cap/tab 5 mg  5 mg Oral Nightly PRN    risperiDONE (RisperDAL) tab 0.75 mg  0.75 mg Oral BID    risperiDONE (RisperDAL) tab 0.5 mg  0.5 mg Oral Noon    calcium carbonate-vitamin D (Oyster Shell-D) 250-3.125 MG-MCG per tab 2 tablet  2 tablet Oral BID with meals   [2]   Medications Prior to Admission   Medication Sig Dispense Refill Last Dose/Taking    Calcium Carbonate-Vitamin D 600-5 MG-MCG Oral Cap Take 1 tablet by mouth in the morning and 1 tablet before bedtime.   4/18/2025    Potassium Chloride ER 10 MEQ Oral Tab CR Take 1 tablet (10 mEq total) by mouth 3 (three) times a week. MWF   Past Week    cyanocobalamin 500 MCG Oral Tab Take 2 tablets (1,000 mcg total) by mouth in the morning.   Taking    ascorbic acid 500 MG Oral Tab Take 1 tablet (500 mg total) by mouth in the morning.   Taking    acetaminophen 500 MG Oral Tab Take 1 tablet (500 mg total) by mouth every 4 (four) hours as needed. 90 tablet 0 Taking As Needed    lisinopril 10 MG Oral Tab Take 1 tablet (10 mg total) by mouth in the morning. Hold for SBP < 130.   4/19/2025    traMADol 50 MG Oral Tab Take 1 tablet (50 mg total) by mouth 2 (two) times daily as needed for Pain. 30 tablet 0 4/18/2025    Cranberry 450 MG Oral Cap Take 1 tablet by mouth in the morning.   4/18/2025    Melatonin 5 MG Oral Tab Take 1 tablet (5 mg total) by mouth at bedtime.   4/18/2025    magnesium hydroxide (MILK OF MAGNESIA) 400 MG/5ML Oral Suspension Take 5 mL by mouth daily as needed for constipation.   4/18/2025    Multiple Vitamins-Minerals (MULTI-VITAMIN/MINERALS) Oral Tab Take 1 tablet by mouth in the morning.   4/18/2025    fluticasone propionate 50 MCG/ACT Nasal Suspension 2 sprays by Each Nare route in the morning.   Taking    polyethylene glycol, PEG 3350, 17 g Oral Powd Pack Take 17 g by  mouth daily. 30 each 0 2025    risperiDONE 0.25 MG Oral Tab Take 2 tablets (0.5 mg total) by mouth Noon.   2025    furosemide 20 MG Oral Tab Take 1 tablet (20 mg total) by mouth Every Monday, Wednesday, and Friday.   2025    glipiZIDE 5 MG Oral Tab Take 1 tablet (5 mg total) by mouth every morning before breakfast.   2025    risperiDONE 0.25 MG Oral Tab Take 3 tablets (0.75 mg total) by mouth in the morning and 3 tablets (0.75 mg total) before bedtime.   2025    FLEET ENEMA 7-19 GM/118ML Rectal Enema Place 1 enema (118 mL total) rectally daily as needed (CONSTIPTAION).   Unknown    bismuth subsalicylate (PEPTO-BISMOL) 262 MG/15ML Oral Suspension Take 30 mL (524 mg total) by mouth every 6 (six) hours as needed for Indigestion.   Unknown    meclizine 25 MG Oral Tab Take 1 tablet (25 mg total) by mouth every 8 (eight) hours as needed. (Patient not taking: Reported on 2025)   Not Taking   [3]   Past Surgical History:  Procedure Laterality Date    Biopsy of breast, incisional      left breast bx during surgery    Colonoscopy N/A 2023    Procedure: UNPREPPED  COLONOSCOPY;  Surgeon: Fabrice Trejo MD;  Location:  ENDOSCOPY    Colonoscopy N/A 2024    Procedure: COLONOSCOPY WITH DECOMPRESSION;  Surgeon: Trudi Albright MD;  Location:  ENDOSCOPY    Other surgical history      aneurysm clipped x2   [4]   Social History  Socioeconomic History    Marital status:    Tobacco Use    Smoking status: Former     Current packs/day: 0.00     Types: Cigarettes     Quit date:      Years since quittin.3    Smokeless tobacco: Never   Vaping Use    Vaping status: Never Used   Substance and Sexual Activity    Alcohol use: No     Alcohol/week: 0.0 standard drinks of alcohol    Drug use: No   Other Topics Concern    Caffeine Concern Yes    Exercise No

## 2025-04-25 NOTE — PROGRESS NOTES
[facility]   part of [facility]     Hospitalist Progress Note     Kierra Joshua Patient Status:  Observation    1945 MRN [id number]   Location [facility] 4NW-A Attending Marleny Marina MD   Hosp Day # 4 PCP Tate Tovar     Chief Complaint: Constipation     Subjective:     Patient continues to issues of constipation.  Plan for flex sigmoidoscopy later today.    Objective:    Review of Systems:   A comprehensive review of systems was completed; pertinent positive and negatives stated in subjective.    Vital signs:  Temp:  [97.4 °F (36.3 °C)-98.9 °F (37.2 °C)] 98.5 °F (36.9 °C)  Pulse:  [63-89] 88  Resp:  [16-20] 16  BP: (144-163)/(48-97) 151/61  SpO2:  [96 %-100 %] 96 %    Physical Exam:    General: No acute distress  Respiratory: No wheezes, no rhonchi  Cardiovascular: S1, S2, regular rate and rhythm  Abdomen: Soft, Non-tender, non-distended, positive bowel sounds  Neuro: No new focal deficits.   Extremities: No edema      Diagnostic Data:    Labs:  Recent Labs   Lab 25  1512 25  0458 25  0709 25  0608   WBC 9.5 4.3 4.6 4.3   HGB 14.6 12.4 11.4* 11.8*   MCV 90.3 91.5 91.3 87.2   .0 212.0 166.0 208.0       Recent Labs   Lab 25  1512 25  0547 25  0458 25  0709 25  0608   * 114* 103* 94 164*   BUN 13 13 7* <5* <5*   CREATSERUM 0.61 0.64 0.59 0.48* 0.52*   CA 10.7* 9.5 8.9 9.0 9.2   ALB 4.5 4.2  --   --   --     142 144 142 143   K 4.3 3.3* 3.9  3.9 3.4* 3.2*  3.2*    103 109 110 108   CO2 23.0 28.0 23.0 21.0 26.0   ALKPHO 76 69  --   --   --    AST 22 15  --   --   --    ALT 8* 7*  --   --   --    BILT 0.3 0.4  --   --   --    TP 7.0 6.3  --   --   --        Estimated Glomerular Filtration Rate: 94 mL/min/1.73m2 (result from lab).    No results for input(s): \"TROP\", \"TROPHS\", \"CK\" in the last 168 hours.    No results for input(s): \"PTP\", \"INR\" in the last 168 hours.               [facility]  Encounter on 04/19/25   1. Urine Culture, Routine     Status: None    Collection Time: 04/19/25  6:44 PM    Specimen: Urine, clean catch   Result Value Ref Range    Urine Culture  N/A     10-50,000 cfu/ml Three or more species of Gram negative bacilli; none predominant. No further workup performed.         Imaging: Reviewed in Epic.    Medications: Scheduled Medications[1]    Assessment & Plan:      #Constipation with high stool burden  CT A/P noted   Continue bowel regiment  GI following  S/p flexible sigmoidoscopy w/ decompression on 4/23; plan to repeat later today  Repeat Abdominal Xray on 4/25 reviewed, gen sx consulted on 4/24, no plans for surgical intervention    #Dysphagia, transient; resolved     #h/o dementia with behavioral disturbances      #HTN     #HLD      #DM type 2  A1c 5.7 as of nov 2024, repeat pending  ISS     #UTI ruled out      Kulwant Geller,     Supplementary Documentation:     Quality:  DVT Mechanical Prophylaxis:   SCDs,    DVT Pharmacologic Prophylaxis   Medication    enoxaparin (Lovenox) 40 MG/0.4ML SUBQ injection 40 mg                Code Status: DNAR/Selective Treatment  Smith: No urinary catheter in place  Smith Duration (in days):   Central line:    CONRADO:     Discharge is dependent on: clinical   At this point Ms. Joshua is expected to be discharge to: home     Called POA 10: 34 AM on 4/20 - no answer left VM    The 21st Century Cures Act makes medical notes like these available to patients in the interest of transparency. Please be advised this is a medical document. Medical documents are intended to carry relevant information, facts as evident, and the clinical opinion of the practitioner. The medical note is intended as peer to peer communication and may appear blunt or direct. It is written in medical language and may contain abbreviations or verbiage that are unfamiliar.                          [1]    potassium chloride  40 mEq Intravenous Once    simethicone  80 mg Oral TID  CC and HS    enoxaparin  40 mg Subcutaneous Daily    sennosides  8.6 mg Oral BID    cyanocobalamin  1,000 mcg Oral Daily    fluticasone propionate  2 spray Each Nare Daily    risperiDONE  0.75 mg Oral BID    risperiDONE  0.5 mg Oral Noon    calcium carbonate-vitamin D  2 tablet Oral BID with meals

## 2025-04-25 NOTE — ANESTHESIA POSTPROCEDURE EVALUATION
[facility] Patient Status:  Inpatient   Age/Gender 79 year old female MRN [id number]   Location [facility] ENDOSCOPY PAIN CENTER Attending Kulwant Geller DO   Hosp Day # 4 PCP Tate Tovar       Anesthesia Post-op Note    FLEXIBLE SIGMOIDOSCOPY    Procedure Summary       Date: 04/25/25 Room / Location:  ENDOSCOPY 03 / [facility] ENDOSCOPY    Anesthesia Start: 1448 Anesthesia Stop: 1509    Procedure: FLEXIBLE SIGMOIDOSCOPY Diagnosis: (inpt)    Surgeons: Librado Main MD Anesthesiologist: Seferino Garcia MD    Anesthesia Type: MAC ASA Status: 2            Anesthesia Type: MAC    Vitals Value Taken Time   BP 96/36 04/25/25 15:09        Pulse 72 04/25/25 15:09   Resp 23 04/25/25 15:09   SpO2 96 % 04/25/25 15:09   Vitals shown include unfiled device data.        Patient Location: Endoscopy    Anesthesia Type: MAC    Airway Patency: patent    Postop Pain Control: adequate    Mental Status: mildly sedated but able to meaningfully participate in the post-anesthesia evaluation    Nausea/Vomiting: none    Cardiopulmonary/Hydration status: stable euvolemic    Complications: no apparent anesthesia related complications    Postop vital signs: stable    Dental Exam: Unchanged from Preop    Patient to be discharged from PACU when criteria met.

## 2025-04-25 NOTE — PLAN OF CARE
A/o x1. O2 sat 97% room air. Denies nausea/ vomiting or abdominal pain. Abdomen distended. Meds given per MAR. Tap water enema given with extra large amount watery stool. Repositioned with pillow support in bed. IVF continues.   Problem: GASTROINTESTINAL - ADULT  Goal: Minimal or absence of nausea and vomiting  Description: INTERVENTIONS:- Maintain adequate hydration with IV or PO as ordered and tolerated- Nasogastric tube to low intermittent suction as ordered- Evaluate effectiveness of ordered antiemetic medications- Provide nonpharmacologic comfort measures as appropriate- Advance diet as tolerated, if ordered- Obtain nutritional consult as needed- Evaluate fluid balance  Outcome: Progressing      Hand dermatitis.   -I have prescribed a steroid ointment called clobetasol 0.05% ointment to use twice daily until resolved and then as needed for flares.    -at night, soak hands in water, apply steroid ointment to rashy areas, and a thick layer of petroleum jelly/Vaseline to the rest of the hands. Cover with cotton gloves overnight during sleep.   -reapply another time during the day when you can keep on for 1+ hours without washing hands  -wear gloves when washing dishes (currently cotton lined gloves or use your cotton gloves underneath your rubber gloves as a barrier), using cleaning supplies, or when hands would otherwise be immersed in soapy water.   -each time you wash your hands you should apply a moisturizing cream immediately afterwards. Examples include Cetaphil cream, Cera Ve Cream, Vanicream or vaseline  -Use a very mild soap such as dove bar soap or even better would be vanicream bar soap. Cut a bar into slices so you can have one in each area that your wash your hands so that she did not to come in contact with any harsh soaps that may be very drying to the hands or cause an allergy.  -The very mindful about anything this coming in contact with your hands that may cause an allergy.  Do not put any moisturizers or chemicals or products on your hands other than things listed above in case you are allergic to something that you are putting on your hands.  Try to avoid contact with rubbers and latex in case this is a problem as well.

## 2025-04-25 NOTE — PROGRESS NOTES
Palliative care:  I attempted to visit pt 2x, but she was off the floor for procedure.  I spoke with pt's daughter Makayla, and left a VM that we received POLST form from [facility].  I had POLST uploaded into EMR- signed by a provider at [facility], but was not dated.  I explained we will accept the form at this time, however it should be completed with date.  If pt still here on Monday will attempt to re-do POLST.      Ruthy Graham, IVORY, 04/25/25, 3:59 PM  No charge this encounter

## 2025-04-26 NOTE — PROGRESS NOTES
[facility]   part of [facility]     Hospitalist Progress Note     Kierra Joshua Patient Status:  Observation    1945 MRN [id number]   Location [facility] 4NW-A Attending Marleny Marina MD   Hosp Day # 5 PCP Tate Tovar     Chief Complaint: Constipation     Subjective:     No acute events overnight.  No family at the bedside.    Objective:    Review of Systems:   A comprehensive review of systems was completed; pertinent positive and negatives stated in subjective.    Vital signs:  Temp:  [97.4 °F (36.3 °C)-100.3 °F (37.9 °C)] 97.7 °F (36.5 °C)  Pulse:  [58-80] 65  Resp:  [14-23] 20  BP: ()/(36-88) 151/56  SpO2:  [94 %-100 %] 97 %    Physical Exam:    General: No acute distress  Respiratory: No wheezes, no rhonchi  Cardiovascular: S1, S2, regular rate and rhythm  Abdomen: Soft, Non-tender, non-distended, positive bowel sounds  Neuro: No new focal deficits.   Extremities: No edema      Diagnostic Data:    Labs:  Recent Labs   Lab 25  1512 25  0458 25  0709 25  0608   WBC 9.5 4.3 4.6 4.3   HGB 14.6 12.4 11.4* 11.8*   MCV 90.3 91.5 91.3 87.2   .0 212.0 166.0 208.0       Recent Labs   Lab 25  1512 25  0547 25  0458 25  0709 25  0608 25  0541   * 114* 103* 94 164*  --    BUN 13 13 7* <5* <5*  --    CREATSERUM 0.61 0.64 0.59 0.48* 0.52*  --    CA 10.7* 9.5 8.9 9.0 9.2  --    ALB 4.5 4.2  --   --   --   --     142 144 142 143  --    K 4.3 3.3* 3.9  3.9 3.4* 3.2*  3.2* 3.2*    103 109 110 108  --    CO2 23.0 28.0 23.0 21.0 26.0  --    ALKPHO 76 69  --   --   --   --    AST 22 15  --   --   --   --    ALT 8* 7*  --   --   --   --    BILT 0.3 0.4  --   --   --   --    TP 7.0 6.3  --   --   --   --        Estimated Glomerular Filtration Rate: 94 mL/min/1.73m2 (result from lab).    No results for input(s): \"TROP\", \"TROPHS\", \"CK\" in the last 168 hours.    No results for input(s): \"PTP\", \"INR\" in the last 168  hours.               Microbiology    Hospital Encounter on 04/19/25   1. Urine Culture, Routine     Status: None    Collection Time: 04/19/25  6:44 PM    Specimen: Urine, clean catch   Result Value Ref Range    Urine Culture  N/A     10-50,000 cfu/ml Three or more species of Gram negative bacilli; none predominant. No further workup performed.         Imaging: Reviewed in Epic.    Medications: Scheduled Medications[1]    Assessment & Plan:      #Constipation with high stool burden  #Whittier syndrome  Continue bowel regiment  Currently on clear liquid diet  GI following  S/p flexible sigmoidoscopy w/ decompression on 4/23; s/p flexible sigmoidoscopy with rectal tube placement on 4/25  Repeat Abdominal Xray on 4/26 reviewed, gen sx consulted, eval noted from 4/26, have since signed off    #Dysphagia, transient; resolved     #h/o dementia with behavioral disturbances      #HTN     #HLD      #DM type 2  A1c 5.7 as of nov 2024, repeat pending  ISS     #UTI ruled out      Kulwant Geller, DO    Supplementary Documentation:     Quality:  DVT Mechanical Prophylaxis:   SCDs,    DVT Pharmacologic Prophylaxis   Medication    enoxaparin (Lovenox) 40 MG/0.4ML SUBQ injection 40 mg                Code Status: DNAR/Selective Treatment  Smith: External urinary catheter in place  Smith Duration (in days):   Central line:    CONRADO:     Discharge is dependent on: clinical   At this point Ms. Joshua is expected to be discharge to: home     Called POA 10: 34 AM on 4/20 - no answer left VM    The 21st Century Cures Act makes medical notes like these available to patients in the interest of transparency. Please be advised this is a medical document. Medical documents are intended to carry relevant information, facts as evident, and the clinical opinion of the practitioner. The medical note is intended as peer to peer communication and may appear blunt or direct. It is written in medical language and may contain abbreviations or verbiage that  are unfamiliar.                          [1]    simethicone  80 mg Oral TID CC and HS    enoxaparin  40 mg Subcutaneous Daily    sennosides  8.6 mg Oral BID    cyanocobalamin  1,000 mcg Oral Daily    fluticasone propionate  2 spray Each Nare Daily    risperiDONE  0.75 mg Oral BID    risperiDONE  0.5 mg Oral Noon    calcium carbonate-vitamin D  2 tablet Oral BID with meals

## 2025-04-26 NOTE — PROGRESS NOTES
Drowsy, arousable.  Cooperative.  Rectal tube intact.  Tolerated clear lilquids.  Good amounts of clear yellow urine.  Daughter called for update.

## 2025-04-26 NOTE — PROGRESS NOTES
Inpatient Follow up Note    Kierramoiz Joshua Patient Status:  Inpatient    1945 MRN [id number]   [facility] 4NW-A Attending Kulwant Geller, DO   Hosp Day # 5 PCP Tate Tovar     Reason for Consultation   Constipation, recurrent colonic pseudo-obstruction     Subjective   Denies abdominal pain and swelling.             Objective:     /77   Pulse 65   Temp 97.8 °F (36.6 °C) (Axillary)   Resp 18   Wt 133 lb (60.3 kg)   SpO2 98%   BMI 22.13 kg/m²   Gen: NAD  Abd: (+)BS, soft, non-tender, non-distended; no rebound or guarding  Ext: No edema or cyanosis  Skin: Warm and dry     Labs/Imaging     LABRCNTIP[PGLU:5@  No results for input(s): \"INR\" in the last 168 hours.      Recent Labs   Lab 25  1512 25  0458 25  0709 25  0608   WBC 9.5 4.3 4.6 4.3   HGB 14.6 12.4 11.4* 11.8*   .0 212.0 166.0 208.0       Recent Labs   Lab 25  1512 25  0547 25  0458 25  0709 25  0608 25  0541    142 144 142 143  --    K 4.3 3.3* 3.9  3.9 3.4* 3.2*  3.2* 3.2*    103 109 110 108  --    CO2 23.0 28.0 23.0 21.0 26.0  --    BUN 13 13 7* <5* <5*  --    CREATSERUM 0.61 0.64 0.59 0.48* 0.52*  --        Recent Labs   Lab 25  1512 25  0547   ALT 8* 7*   AST 22 15     XR ABDOMEN (1 VIEW) (CPT=74018)  Result Date: 2025  CONCLUSION:  There is persistent gaseous distension of the majority of the colon, most pronounced in the region of the rectum measuring up to 11.3 cm in transverse dimension as compared to 12 cm on the previous exam.  While this could be due to ileus, a low colonic obstruction is not excluded.  Clinical correlation and continued follow-up is suggested.  No free air.    LOCATION:  [facility]   Dictated by (CST): José Manuel Kuhn MD on 2025 at 8:30 AM     Finalized by (CST): José Manuel Kuhn MD on 2025 at 8:32 AM       XR ABDOMEN (1 VIEW) (CPT=74018)  Result Date: 2025  CONCLUSION:  See above.    LOCATION:  [facility]   Dictated by (CST): Stromberg, LeRoy, MD on 4/24/2025 at 11:31 AM     Finalized by (CST): Stromberg, LeRoy, MD on 4/24/2025 at 11:33 AM       AST   Date/Time Value Ref Range Status   04/20/2025 05:47 AM 15 <34 U/L Final   02/28/2014 11:45 AM 17 15 - 41 U/L Final     Comment:     [facility], [address] [place] [id number]     ALT   Date/Time Value Ref Range Status   04/20/2025 05:47 AM 7 (L) 10 - 49 U/L Final   02/28/2014 11:45 AM 23 14 - 54 U/L Final     Comment:     [facility], [address] [place] [id number]     BUN   Date/Time Value Ref Range Status   04/25/2025 06:08 AM <5 (L) 9 - 23 mg/dL Final   05/24/2013 01:15 PM 17 8 - 20 mg/dL Final     4/23 flex sig: Findings and Therapeutics:  Colon: The bowel prep was inadequate for mass and polyp detection. The colon and rectum were distended and no fecal impaction was present. The scope was advanced to the descending colon without insufflation and suction was applied with decompression of the lumen. No signs of inflammation, ulceration or erosions. There were diverticula noted throughout the entire visualized colon.   Rectum: not performed due to large amount of stool in the rectum.         Assessment   Ms Joshua is a 79 year old female with a history of dementia, HTN, HLD, and DM who presented with constipation, now s/p golytely cleanout and enemas with resolution of constipation. 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 Repeat colonic decompression with red rubber rectal tube placement 4/25. AXR pending this am. Soft abd at this time with no pain.          Plan   -clear liquid diet for now  -AXR pending   -surgery consulted given chronic Corinna's and failed endoscopic decompression x 1; now s/p 2nd attempt at decompression; if worsening distention at this  time, would likely need to consider surgical management given multiple attempts at endoscopic decompression   -avoid narcotics/anti-motility agents  -up and OOB, correct lytes if abnormal       Harvinderjolene Hooks MD, 04/26/25, 7:54 AM  [facility]  [phone]

## 2025-04-26 NOTE — PROGRESS NOTES
[facility]  Progress Note    Kierra Joshua Patient Status:  Inpatient    1945 MRN [id number]   Location [facility] 4NW-A Attending Kulwant Geller,    Hosp Day # 5 PCP Tate Tovar     Subjective:  The patient was seen and examined with her daughter at bedside.    Objective/Physical Exam:  /56 (BP Location: Right arm)   Pulse 65   Temp 97.7 °F (36.5 °C) (Oral)   Resp 20   Wt 133 lb (60.3 kg)   SpO2 97%   BMI 22.13 kg/m²     Intake/Output Summary (Last 24 hours) at 2025 1308  Last data filed at 2025 0520  Gross per 24 hour   Intake 800 ml   Output 700 ml   Net 100 ml         General: Somnolent. No acute distress.  HEENT: Normocephalic, atraumatic. No scleral icterus.  Pulmonary: No respiratory distress, effort normal.   Abdomen: Mildly-distended, with tympany to percussion but improved from yesterdays exam. Soft, non-tender to palpation. No rebound or guarding. No peritoneal signs.   Extremities: No lower extremity edema. No clubbing or cyanosis.   Skin: Warm, dry. No jaundice.       Labs:      Lab Results   Component Value Date    INR 1.06 2024     Lab Results   Component Value Date    K 3.2 2025          Assessment:  Problem List[1]    PPD 1 flex sig with rectal tube placement  Repeat abdominal xray with improvement in colon distention    Plan:  No plan for acute general surgical intervention  The patient may have a diet as tolerated from a surgical standpoint, but will defer management of diet to GI  Rectal tube management per GI  Antiemetics and analgesics as needed  Activity as tolerated  DVT prophylaxis with lovenox  General surgery will sign off at this time. Please reconsult if needed      The patient was discussed with Dr. Hobbs , and he is in agreement with the assessment and plan. My total face time with this patient was 25 minutes. Greater than half of the visit was spent in counseling the patient on the above listed diagnoses and treatment options.      Yani Vera PA-C  4/26/2025  1:08 PM    This note was initiated by Yani Vera PA-C.  The PA saw the patient in conjunction with me. The PA performed a history, exam, and developed the assessment and plan. I agree with the mentioned assessment and plan and have provided further documentation of my own, if necessary.    Patient has advanced dementia and has a history of recurrent colonic pseudoobstruction (Corinna syndrome).  This has been previously managed with endoscopic decompression.  Episodes happen around every 8-12 months recently per daughter.  Daughter states patient does well between episodes on her bowel regimen of MiraLAX and senna.  Patient has felt much better since endoscopic decompression yesterday and abdomen has been much less distended.      Patient's daughter would be quite reluctant to consider surgical intervention unless endoscopic decompression is unsuccessful or episodes become increasingly frequent.  Patient seems to be responding well to endoscopic decompression for this episode.  Therefore, surgery to sign off.  Will defer management of diet and rectal tube to the GI service.  I appreciate their help.  Please contact surgery with any further questions or concerns.      Omar Hobbs MD  [facility]           [1]   Patient Active Problem List  Diagnosis    Uncontrolled type 2 diabetes mellitus with hyperglycemia (HCC)    Pure hypercholesterolemia    Essential hypertension, benign    Osteopenia of necks of both femurs    Psychosis (HCC)    Alzheimer's dementia with behavioral disturbance (HCC)    Gallstones    Dilated cbd, acquired    COVID-19    COVID-19 virus infection    Closed fracture of right hip, initial encounter ([facility])    Pseudoobstruction of colon    Weakness    Acute cystitis without hematuria    C. difficile diarrhea    Constipation, unspecified constipation type    Palliative care encounter    Goals of care, counseling/discussion     Sesser syndrome

## 2025-04-27 NOTE — PROGRESS NOTES
[facility]   part of [facility]     Hospitalist Progress Note     Kierra Joshua Patient Status:  Observation    1945 MRN [id number]   Location [facility] 4NW-A Attending Marleny Marina MD   Hosp Day # 6 PCP Tate Tovar     Chief Complaint: Constipation     Subjective:     Patient denies abdominal pain.  No family at the bedside.  Continue to advance diet as tolerated.    Objective:    Review of Systems:   A comprehensive review of systems was completed; pertinent positive and negatives stated in subjective.    Vital signs:  Temp:  [97.5 °F (36.4 °C)-98.8 °F (37.1 °C)] 97.5 °F (36.4 °C)  Pulse:  [66-80] 68  Resp:  [16-20] 19  BP: (128-156)/(50-74) 153/50  SpO2:  [97 %-99 %] 99 %    Physical Exam:    General: No acute distress  Respiratory: No wheezes, no rhonchi  Cardiovascular: S1, S2, regular rate and rhythm  Abdomen: Soft, Non-tender, non-distended, positive bowel sounds  Neuro: No new focal deficits.   Extremities: No edema      Diagnostic Data:    Labs:  Recent Labs   Lab 25  0458 25  0709 25  0608 25  0612   WBC 4.3 4.6 4.3 4.7   HGB 12.4 11.4* 11.8* 11.6*   MCV 91.5 91.3 87.2 87.7   .0 166.0 208.0 217.0       Recent Labs   Lab 25  0709 25  0608 25  0541 25  0612   GLU 94 164*  --  132*   BUN <5* <5*  --  <5*   CREATSERUM 0.48* 0.52*  --  0.51*   CA 9.0 9.2  --  9.6   ALB  --   --   --  3.8    143  --  143   K 3.4* 3.2*  3.2* 3.2* 3.6  3.6    108  --  106   CO2 21.0 26.0  --  30.0   ALKPHO  --   --   --  64   AST  --   --   --  11   ALT  --   --   --  <7*   BILT  --   --   --  0.3   TP  --   --   --  5.7       Estimated Glomerular Filtration Rate: 94 mL/min/1.73m2 (result from lab).    No results for input(s): \"TROP\", \"TROPHS\", \"CK\" in the last 168 hours.    No results for input(s): \"PTP\", \"INR\" in the last 168 hours.               Microbiology    Hospital Encounter on 25   1. Urine Culture, Routine      Status: None    Collection Time: 04/19/25  6:44 PM    Specimen: Urine, clean catch   Result Value Ref Range    Urine Culture  N/A     10-50,000 cfu/ml Three or more species of Gram negative bacilli; none predominant. No further workup performed.         Imaging: Reviewed in Epic.    Medications: Scheduled Medications[1]    Assessment & Plan:      #Constipation with high stool burden  #Rebuck syndrome  Continue bowel regiment  Currently on clear liquid diet  GI signed off as of 4/27  S/p flexible sigmoidoscopy w/ decompression on 4/23; s/p flexible sigmoidoscopy with rectal tube placement on 4/25, removed  Repeat Abdominal Xray on 4/26 reviewed, gen sx consulted, eval noted from 4/26, have since signed off    #Dysphagia, transient; resolved     #h/o dementia with behavioral disturbances      #HTN     #HLD      #DM type 2  A1c 5.7 as of nov 2024, repeat pending  ISS     #UTI ruled out      Kulwant Geller, DO    Supplementary Documentation:     Quality:  DVT Mechanical Prophylaxis:   SCDs,    DVT Pharmacologic Prophylaxis   Medication    enoxaparin (Lovenox) 40 MG/0.4ML SUBQ injection 40 mg                Code Status: DNAR/Selective Treatment  Smith: External urinary catheter in place  Smith Duration (in days):   Central line:    CONRADO:     Discharge is dependent on: clinical   At this point Ms. Joshua is expected to be discharge to: home     Called POA 10: 34 AM on 4/20 - no answer left VM    The 21st Century Cures Act makes medical notes like these available to patients in the interest of transparency. Please be advised this is a medical document. Medical documents are intended to carry relevant information, facts as evident, and the clinical opinion of the practitioner. The medical note is intended as peer to peer communication and may appear blunt or direct. It is written in medical language and may contain abbreviations or verbiage that are unfamiliar.                          [1]    simethicone  80 mg Oral TID CC  and HS    enoxaparin  40 mg Subcutaneous Daily    sennosides  8.6 mg Oral BID    cyanocobalamin  1,000 mcg Oral Daily    fluticasone propionate  2 spray Each Nare Daily    risperiDONE  0.75 mg Oral BID    risperiDONE  0.5 mg Oral Noon    calcium carbonate-vitamin D  2 tablet Oral BID with meals

## 2025-04-27 NOTE — PLAN OF CARE
Problem: Diabetes/Glucose Control  Goal: Glucose maintained within prescribed range  Description: INTERVENTIONS:- Monitor Blood Glucose as ordered- Assess for signs and symptoms of hyperglycemia and hypoglycemia- Administer ordered medications to maintain glucose within target range- Assess barriers to adequate nutritional intake and initiate nutrition consult as needed- Instruct patient on self management of diabetes  Outcome: Progressing     Problem: GASTROINTESTINAL - ADULT  Goal: Minimal or absence of nausea and vomiting  Description: INTERVENTIONS:- Maintain adequate hydration with IV or PO as ordered and tolerated- Nasogastric tube to low intermittent suction as ordered- Evaluate effectiveness of ordered antiemetic medications- Provide nonpharmacologic comfort measures as appropriate- Advance diet as tolerated, if ordered- Obtain nutritional consult as needed- Evaluate fluid balance  Outcome: Progressing  Goal: Maintains or returns to baseline bowel function  Description: INTERVENTIONS:- Assess bowel function- Maintain adequate hydration with IV or PO as ordered and tolerated- Evaluate effectiveness of GI medications- Encourage mobilization and activity- Obtain nutritional consult as needed- Establish a toileting routine/schedule- Consider collaborating with pharmacy to review patient's medication profile  Outcome: Progressing  Goal: Maintains adequate nutritional intake (undernourished)  Description: INTERVENTIONS:- Monitor percentage of each meal consumed- Identify factors contributing to decreased intake, treat as appropriate- Assist with meals as needed- Monitor I&O, WT and lab values- Obtain nutritional consult as needed- Optimize oral hygiene and moisture- Encourage food from home; allow for food preferences- Enhance eating environment  Outcome: Progressing  Goal: Achieves appropriate nutritional intake (bariatric)  Description: INTERVENTIONS:- Monitor for over-consumption- Identify factors  contributing to increased intake, treat as appropriate- Monitor I&O, WT and lab values- Obtain nutritional consult as needed- Evaluate psychosocial factors contributing to over-consumption  Outcome: Progressing   Patient alert x2, some confusion noted. VSS. Afebrile. All meds given with apple sauce. Tolerated well. Rectal tube still in place. Little to no output. Repositioned. Bed alarm on. Call light within reach,

## 2025-04-27 NOTE — PLAN OF CARE
Problem: GASTROINTESTINAL - ADULT  Goal: Minimal or absence of nausea and vomiting  Description: INTERVENTIONS:- Maintain adequate hydration with IV or PO as ordered and tolerated- Nasogastric tube to low intermittent suction as ordered- Evaluate effectiveness of ordered antiemetic medications- Provide nonpharmacologic comfort measures as appropriate- Advance diet as tolerated, if ordered- Obtain nutritional consult as needed- Evaluate fluid balance  Outcome: Progressing     Problem: GASTROINTESTINAL - ADULT  Goal: Minimal or absence of nausea and vomiting  Description: INTERVENTIONS:- Maintain adequate hydration with IV or PO as ordered and tolerated- Nasogastric tube to low intermittent suction as ordered- Evaluate effectiveness of ordered antiemetic medications- Provide nonpharmacologic comfort measures as appropriate- Advance diet as tolerated, if ordered- Obtain nutritional consult as needed- Evaluate fluid balance  Outcome: Progressing   Patient alert to self, on room air, on telemetry sinus rhythm rate 80's, patient was evaluated by speech therapy and diet was changed to regular thinliquid pureed diet   With aspiation precautions, no output in decompression tube, patient took meds with jello.

## 2025-04-27 NOTE — SLP NOTE
SPEECH DAILY NOTE - INPATIENT    ASSESSMENT & PLAN   ASSESSMENT  Pt seen for dysphagia tx to assess tolerance with recommended diet, ensure proper utilization of aspiration precautions and provide pt/family education.  Requested by MD to assess for diet texture recommendations.  Patient cleared for diet advancement.  Baseline diet texture is puree with thin liquids.  Patient received awake, alert, flat affect.  Patient responsive to simple questions.  Patient accepted PO trials by supportive 1:1 feeding assist.  Pinched straw sips of thin liquid and multiple trials of puree texture (baseline) rendered by SLP.  Patient tolerated PO intake with no overt clinical s/s aspiration observed or suspected.  Delayed initiation of pharyngeal swallow response suspected however suspect baseline and c/w advanced dementia.  Respirations remained non-labored and vocal quality clear.  Suspect patient at baseline swallow function with no further skilled SLP needs identified at this time.  D/W RN.       Diet Recommendations - Solids: Puree  Diet Recommendations - Liquids: Thin Liquids  Compensatory Strategies Recommended: Pinched straw sips  Aspiration Precautions: 1:1 feeding, Upright position, Slow rate, Small bites, Small sips  Medication Administration Recommendations: One pill at a time, Crushed in puree    Patient Experiencing Pain: No                Treatment Plan  Treatment Plan/Recommendations: No further inpatient SLP service warranted    Interdisciplinary Communication: Discussed with RN            GOALS  Goal #1 The patient will tolerate puree consistency and thin liquids without overt signs or symptoms of aspiration with 80 % accuracy over 1-2 session(s). Met   Goal #2 The patient/family/caregiver will demonstrate understanding and implementation of aspiration precautions and swallow strategies independently over 1-2 session(s).    Met       FOLLOW UP  Follow Up Needed (Documentation Required): No         Session: 1 of  1    If you have any questions, please contact Vania Huerta, JUAN Huerta, MS CCC-SLP/L  Speech-Language Pathologist  Spectra-Link [phone]

## 2025-04-27 NOTE — PLAN OF CARE
Patient  total feed,ate all of bluberry yogurt, and a pureed strawberry shortcake, had three applejuice and tolerated well. MD notified that according to daughter patient choked on coffee, not witnessed, no change in condition.

## 2025-04-27 NOTE — PROGRESS NOTES
Inpatient Follow up Note    Kierra Joshua Patient Status:  Inpatient    1945 MRN [id number]   Location [facility] 4NW-A Attending Kulwant Geller DO   Hosp Day # 6 PCP Tate Tovar     Reason for Consultation   Constipation, recurrent colonic pseudo-obstruction     Subjective   Denies abdominal pain and swelling.  Passing gas           Objective:     /50 (BP Location: Right arm)   Pulse 68   Temp 97.5 °F (36.4 °C) (Axillary)   Resp 19   Wt 133 lb (60.3 kg)   SpO2 99%   BMI 22.13 kg/m²   Gen: NAD  Abd: (+)BS, soft, non-tender, non-distended; no rebound or guarding  Ext: No edema or cyanosis  Skin: Warm and dry     Labs/Imaging     LABRCNTIP[PGLU:5@  No results for input(s): \"INR\" in the last 168 hours.      Recent Labs   Lab 25  0458 25  0709 25  0608 25  0612   WBC 4.3 4.6 4.3 4.7   HGB 12.4 11.4* 11.8* 11.6*   .0 166.0 208.0 217.0       Recent Labs   Lab 25  0458 25  0709 25  0608 25  0541 25  0612    142 143  --  143   K 3.9  3.9 3.4* 3.2*  3.2* 3.2* 3.6  3.6    110 108  --  106   CO2 23.0 21.0 26.0  --  30.0   BUN 7* <5* <5*  --  <5*   CREATSERUM 0.59 0.48* 0.52*  --  0.51*       Recent Labs   Lab 25  0612   ALT <7*   AST 11     XR ABDOMEN (1 VIEW) (CPT=74018)  Result Date: 2025  CONCLUSION:   Rectal tube now present, with considerable improvement, now with:  No longer appearing dilated, with moderate stool in the colon and small bowel.  No free air.  Mild stool in the rectum. LOCATION:  [place]   Dictated by (CST): Gavin Haas MD on 2025 at 10:46 AM     Finalized by (CST): Gavin Haas MD on 2025 at 10:46 AM       XR ABDOMEN (1 VIEW) (CPT=74018)  Result Date: 2025  CONCLUSION:  There is persistent gaseous distension of the majority of the colon, most pronounced in the region of the rectum measuring up to 11.3 cm in transverse dimension as compared to 12 cm on the  previous exam.  While this could be due to ileus, a low colonic obstruction is not excluded.  Clinical correlation and continued follow-up is suggested.  No free air.    LOCATION:  [place]   Dictated by (CST): José Manuel Kuhn MD on 4/25/2025 at 8:30 AM     Finalized by (CST): José Manuel Kuhn MD on 4/25/2025 at 8:32 AM       AST   Date/Time Value Ref Range Status   04/27/2025 06:12 AM 11 <34 U/L Final   02/28/2014 11:45 AM 17 15 - 41 U/L Final     Comment:     [facility], [address] [place] [address],,[place], [place],[address]     ALT   Date/Time Value Ref Range Status   04/27/2025 06:12 AM <7 (L) 10 - 49 U/L Final   02/28/2014 11:45 AM 23 14 - 54 U/L Final     Comment:     [facility], [address] [place] [id number]     BUN   Date/Time Value Ref Range Status   04/27/2025 06:12 AM <5 (L) 9 - 23 mg/dL Final   05/24/2013 01:15 PM 17 8 - 20 mg/dL Final     4/23 flex sig: Findings and Therapeutics:  Colon: The bowel prep was inadequate for mass and polyp detection. The colon and rectum were distended and no fecal impaction was present. The scope was advanced to the descending colon without insufflation and suction was applied with decompression of the lumen. No signs of inflammation, ulceration or erosions. There were diverticula noted throughout the entire visualized colon.   Rectum: not performed due to large amount of stool in the rectum.         Assessment   Ms Joshua is a 79 year old female with a history of dementia, HTN, HLD, and DM who presented with constipation, now s/p golytely cleanout and enemas with resolution of constipation. 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 Repeat colonic decompression with red rubber rectal tube placement 4/25. AXR pending this am. Soft abd at this time with no pain.          Plan   -clear liquid diet for now; can ADAT to  FLD   -AXR 4/27 with considerable improvement with no dilation of colon per radiology   -surgery consulted given chronic Corinna's and failed endoscopic decompression x 2; if worsening distention at this time, would likely need to consider surgical management given multiple attempts at endoscopic decompression   -avoid narcotics/anti-motility agents  -up and OOB, correct lytes if abnormal  - OV in 2-4 weeks    GI will sign off at this time. Please call back with any questions or concerns.          Harvinder Hooks MD, 04/27/25, 11:45 AM  [facility]  [phone]

## 2025-04-27 NOTE — PLAN OF CARE
Patient alert to self, on room air, patient on telemetry sinus rhythm rate 70's. Patient a total feed, on clear liquid diet. Decompression tube in place, call light in reach, fallen precautions in place.

## 2025-04-28 NOTE — OCCUPATIONAL THERAPY NOTE
OT order received. Chart reviewed. Pt is LTC resident at [facility]. Per RN, pt is a totalA for all A/IDLs and requires use of radha lift for any mobility. Pt has no skilled IP OT needs at this time. Will dc current order. RN aware

## 2025-04-28 NOTE — CONSULTS
[facility]   part of [facility]  Palliative Care Initial Consult Note    Kierra Joshua Patient Status:  Inpatient    1945 MRN [id number]   [facility] 4NW-A Attending Kulwant Geller, DO   Hosp Day # 7 PCP Tate Tovar     Date of Consult: 2025, Initial attempt to completed consult on 25 (see note) however unable to connect directly with daughter/guardian until today.     Patient seen at: [facility]    Reason for Consultation: Consult ordered by:: Dr. Kulwant Geller for evaluation of Palliative Care needs and Goals of care discussion.     Subjective     History of Present Illness: Kierra Joshua is a 79 year old female with history of  Alzheimer's dementia, HTN, HLD, DM2, bipolar diasease, chronic constipation, C-diff who was admitted on 2025 from the [facility] with decreased PO intake abdominal bloating/distention and constipation. Work up in our hospital revealed XR abd with large stool burden in rectum and sigmoid colon. She was given multiple laxatives and enemas and had multiple BM's.  RRT called due to patient \"choking\".  GI consult and flex sig with decompression performed.  f/u Abd XR with continued/re-accumulated gas and distention, repeat flex/sig with rectal tube placement 25, AXR  showing \"considerable improvement\" no longer appearing dilated with mod stool in colon and small bowel, mild stool in rectum\". Rectal tube removed .  Surgical consult was placed 25 (she was determined to not be a surgical candidate).  As of  both GI and surgical services have signed off. She has had multiple SLP evaluations with most recent  with continued recs for pureed/thin. History was obtained from Epic and the daughter Makayla.     Today is day #7 of hospitalization.     When I entered the room earlier this morning, the patient was awake & alert and sitting in chair. At present 1400, daughter here and she is in  bed awake but has been dozing     Review of Systems:      Dyspnea: none noted  Cough: intermittent, appears to be clearing her throat  Nausea: BONNIE  Appetite: daughter reports she usually has a \"very good appetite and eats all her meals\"  Pain: no non-verbal s/s  Constipation: yes, long hx of per daughter, \"she goes through this every 8 months or so\"  Last BM:   Bowel Movement         4/25/2025 2010             Stool Count Calculated for I/O: 1            Palliative Care Social History:   Marital Status:   Children: Yes  Living Situation Prior to Admit: LTC  Does Patient Live Alone: No  Is Patient Confused: Yes    Substance History:   reports that she quit smoking about 24 years ago. Her smoking use included cigarettes. She has never used smokeless tobacco.  reports no history of alcohol use.  reports no history of drug use.    Spiritual Assessment:   No Mandaeism Indicated/Given    Past Medical History/Past Surgical History:   This is the 2nd hospitalization in the past 6 months.    Medical History: obtained from [facility]  Past Medical History[1]  Past Surgical History[2]    Family History: obtained from [employer] [facility]  Family History[3]    Allergies:  Allergies[4]    Medications:   Current Hospital Medications[5]    Functional Status History:  ADLs: bathing or showering, dressing, getting in and out of bed or a chair, walking, using the toilet, and eating  - HIGH  5+ performance deficits   IADLs: use the phone, shop for groceries, meal preparation, manage medicines, clean living area, use transportation by self, manage money  - HIGH  5+ performance deficits   DME: Walker and Hospital bed, daughter reports patient walks for \"5 minutes every day in the [facility] at the [facility]\".     Palliative Performance Scale:   Prior to admission: (pt/family reported) 60 %  Observed during hospitalization: 50 %  % Ambulation Activity Level Self-Care Intake Consciousness   100 Full  Normal  No Disease Full Normal Full   90 Full  Normal  Some  Disease Full Normal Full   80 Full  Normal w/effort  Some Disease Full Normal or reduced Full   70 Reduced  Can't Perform Job  Some Disease Full Normal or reduced Full   60 Reduced  Can't Perform Hobby   Significant Disease Occ Assist Normal or reduced Full or confused   50 Mainly sit/lie Can't do any work  Extensive Disease Partial Assist Normal or reduced Full or confused   40 Mainly in bed Can't do any work  Extensive Disease Mainly Assist Normal or reduced Full or confused   30 Bed Bound Can't do any work  Extensive Disease Max Assist  Total Care Reduced  Drowsy/confused   20 Bed Bound Can't do any work  Extensive Disease Max Assist  Total Care Minimal  Drowsy/confused   10 Bed Bound Can't do any work  Extensive Disease Max Assist  Total Care Mouth Care  Drowsy/confused   0 Death        Objective      Vital Signs:  Blood pressure 140/58, pulse 91, temperature 98 °F (36.7 °C), temperature source Oral, resp. rate 20, weight 133 lb (60.3 kg), SpO2 96%, not currently breastfeeding.  Body mass index is 22.13 kg/m².    Physical Exam:  General: Alert & Awake. In no apparent respiratory distress. Body habitus BMI WNL   HEENT: AT/NC. No gross focal deficits. Dry MM   Lungs: Normal effort on RA  Abdomen: Soft, non-tender, non-distended  Extremities: No LE edema present  Neurologic: Alert and oriented to person daughter states she was able to state her    Psychiatric: Mood pleasant mood   Skin: Warm and dry.    Hematology:  Lab Results   Component Value Date    WBC 4.7 2025    HGB 11.6 (L) 2025    HCT 35.7 2025    .0 2025       Coags:  Lab Results   Component Value Date    INR 1.06 2024    PTT 23.9 2024       Chemistry:  Lab Results   Component Value Date    CREATSERUM 0.51 (L) 2025    BUN <5 (L) 2025     2025    K 3.6 2025    K 3.6 2025     2025    CO2 30.0 2025     (H) 2025    CA 9.6 2025    ALB 3.8  04/27/2025    ALKPHO 64 04/27/2025    BILT 0.3 04/27/2025    TP 5.7 04/27/2025    AST 11 04/27/2025    ALT <7 (L) 04/27/2025    DDIMER 0.69 03/15/2023    MG 2.2 04/21/2025       Imaging:  No results found.    Summary of Discussion      I discussed reason for palliative care consultation with Daughter    I discussed the benefits of palliative care to include assistance with  an extra layer of support, to ensure GOC are respected throughout healthcare continuum, I offered to place an order for [facility] and Makayla declined sharing that she thought she did have a PC NP at one point and would f/u at the [facility] again.     Prognostic awareness/understanding: Fair.  Makayla has been caring for her mom for several years and [facility]. She is aware that her dementia is contributing to both her chronic state and acute issues such as the Corinna syndrome and her waxing and waning of her ability to tolerate a diet.   We discussed the disease trajectory of  dementia with associated symptoms and decline over time.     Hopes/goals:   Makayla is hopeful that Kierra will be able to resume her prior level of function (\"walking 5 minutes/day with walker\") but aware that due to her acute hospital stay her new baseline may be lower.   Makayla is hopeful that Kierra will resume her usual \"good appetite, eating 3 meals a day\". She notes she tolerates her meds, especially the Risperdal better with food and has requested a particular schedule with the RN.   We briefly discussed if obstructions continued and interventions no longer indicated or effective, consideration for hospice/comfort focused care may be considered.     Fears/concerns:   Makayla had concerns about Kierra's decline since admission.   She had questions regarding repeat abd XR since rectal tube removed yesterday.   She had questions regarding potential surgery if her obstruction recurred although does not feel she would put her mom  through that. She does not recall having discussions with the surgical team (recs for no surgical intervention).   Provided emotional support to  daughter .    Advance Care Planning counseling and discussion:   HCPOA:  Makayla stated she does have Guardian paperwork, she feels she has \"brought it it before\". Informed it is not visible at this time. She offered to bring in again tomorrow when she visits. Requested she notify palliative care to scan.   Healthcare Agent Appointed: Yes  Healthcare Agent's Name: Makayla  Healthcare Agent's Phone Number: [phone]  Code Status:  DNAR/Selective Treatment  POLST: On file not dated. New POLST form was completed indicating wishes for DNAR/selective treatment/section regarding Medically administered means of nutrition deferred, signed and scanned to EMR, readily available by clicking on code status on EMR header. Original and copies provided to Makayla. Old document voided.      Problem List:  Principal Problem:    Constipation, unspecified constipation type  Active Problems:    Palliative care encounter    Goals of care, counseling/discussion    Chicora syndrome      Assessment and Recommendations      Goals of care: established and treatment focused   Makayla is hopeful that Kierra will be able to resume her prior level of function (\"walking 5 minutes/day with walker\") but aware that due to her acute hospital stay her new baseline may be lower.   Makayla is hopeful that Kierra will resume her usual \"good appetite, eating 3 meals a day\". She notes she tolerates her meds, especially the Risperdal better with food and has requested a particular schedule with the RN.   We briefly discussed if obstructions continued and interventions no longer indicated or effective, consideration for hospice/comfort focused care may be considered.   She had questions regarding repeat abd XR since rectal tube removed yesterday.   She had questions regarding potential surgery if her  obstruction recurred although does not feel she would put her mom through that. She does not recall having discussions with the surgical team (recs for no surgical intervention).     Advanced Care Planning:  Code Status: DNAR/Selective Treatment  POLST: On file not dated. New POLST form was completed indicating wishes for DNAR/selective treatment/section regarding Medically administered means of nutrition deferred, signed and scanned to EMR, readily available by clicking on code status on EMR header. Original and copies provided to Makayla. Old document voided.  HCPOA: Healthcare Agent's Name: Makayla Benavides stated she does have Guardian paperwork, she feels she has \"brought it it before\". Informed it is not visible at this time. She offered to bring in again tomorrow when she visits. Requested she notify palliative care to scan.     Discussed today's visit with  RN, SW/CM, and hospitalist.    Palliative Care Follow Up: Palliative care team will follow peripherally and see patient as needed. Please contact provider with any questions, concerns, or if a care conference is to be held.  Palliative care follow up at discharge:  Daughter requested to f/u upon return to [facility] .    Thank you for allowing Palliative Care services to participate in the care of Kierra Joshua.    A total of 55 minutes were spent on this consult, which included all of the following: chart review, direct face to face contact, history taking, physical examination, counseling and coordinating care, and documentation       IVORY Luciano  4/28/2025  2:21 PM  Palliative Care Services    The 21st Century Cures Act makes medical notes like these available to patients in the interest of transparency. Please be advised this is a medical document. Medical documents are intended to carry relevant information, facts as evident, and the clinical opinion of the practitioner. The medical note is intended as peer to peer communication and may appear blunt  or direct. It is written in medical language and may contain abbreviations or verbiage that are unfamiliar.          [1]   Past Medical History:   Alzheimer's dementia with behavioral disturbance (HCC)    Anxiety state    Bipolar affective (HCC)    Cataract    Dementia (HCC)    Diabetes (HCC)    Elevated fasting lipid profile    Essential hypertension    Essential hypertension, benign    Falls    Gallstones and inflammation of gallbladder without obstruction    High blood pressure    High cholesterol    Muscle weakness    Neuropathy    Osteoarthritis    Psychosis, unspecified psychosis type (HCC)    Shortness of breath   [2]   Past Surgical History:  Procedure Laterality Date    Biopsy of breast, incisional      left breast bx during surgery    Colonoscopy N/A 12/29/2023    Procedure: UNPREPPED  COLONOSCOPY;  Surgeon: Fabrice Trejo MD;  Location:  ENDOSCOPY    Colonoscopy N/A 2/25/2024    Procedure: COLONOSCOPY WITH DECOMPRESSION;  Surgeon: Trudi Albright MD;  Location:  ENDOSCOPY    Other surgical history  2001    aneurysm clipped x2   [3] No family history on file.  [4] No Known Allergies  [5]   Current Facility-Administered Medications:     simethicone (Mylicon) chewable tab 80 mg, 80 mg, Oral, TID CC and HS    enoxaparin (Lovenox) 40 MG/0.4ML SUBQ injection 40 mg, 40 mg, Subcutaneous, Daily    polyethylene glycol (PEG 3350) (Miralax) 17 g oral packet 17 g, 17 g, Oral, Daily PRN    sennosides (Senokot) tab 17.2 mg, 17.2 mg, Oral, Nightly PRN    bisacodyl (Dulcolax) 10 MG rectal suppository 10 mg, 10 mg, Rectal, Daily PRN    fleet enema (Fleet) rectal enema 133 mL, 1 enema, Rectal, Once PRN    benzonatate (Tessalon) cap 200 mg, 200 mg, Oral, TID PRN    ondansetron (Zofran) 4 MG/2ML injection 4 mg, 4 mg, Intravenous, Q6H PRN    sennosides (Senokot) tab 8.6 mg, 8.6 mg, Oral, BID    bismuth subsalicylate (PEPTO-BISMOL) chew tab CHEW 262 mg, 1 tablet, Oral, Q6H PRN    cyanocobalamin (Vitamin B12) tab  1,000 mcg, 1,000 mcg, Oral, Daily    fluticasone propionate (Flonase) 50 MCG/ACT nasal suspension 2 spray, 2 spray, Each Nare, Daily    melatonin cap/tab 5 mg, 5 mg, Oral, Nightly PRN    risperiDONE (RisperDAL) tab 0.75 mg, 0.75 mg, Oral, BID    risperiDONE (RisperDAL) tab 0.5 mg, 0.5 mg, Oral, Noon    calcium carbonate-vitamin D (Oyster Shell-D) 250-3.125 MG-MCG per tab 2 tablet, 2 tablet, Oral, BID with meals

## 2025-04-28 NOTE — PLAN OF CARE
Pt a/o x1. Drowsy but arousable. No c/o pain. Swallowed some meds, but was unable to completely swallow the whole portion. Keeping NPO. Rectal tube in place. Little to low output.     Fall risk protocol followed, call light within reach.

## 2025-04-28 NOTE — SLP NOTE
ADULT SWALLOWING EVALUATION    ASSESSMENT    ASSESSMENT/OVERALL IMPRESSION:  Patient is an 80 y/o female known to this service for prior dysphagia assessment and management. Patient discharged from Slp service 4/27 as she exhibited good tolerance of baseline diet. However, new SLP order received today to re-evaluate oropharyngeal swallow. RN reported patient tolerated breakfast without difficulty, but had choking incident approximately one hour after completing her meal. Patient received alert in bed and agreeable to PO trials.    PO trials of pureed solids and thin liquids presented with aspiration precautions previously recommended. Bolus acceptance was adequate without evidence of anterior bolus loss. No oral bolus holding observed during today's visit. Patient with thorough and efficient AP bolus transit. No oral residue observed. Pharyngeal swallow initiation appeared timely and hyolaryngeal excursion was adequate per palpation.  No overt s/s of aspiration observed throughout. Respiratory status and vocal quality remained at baseline.    Patient appeared safe to continue a pureed diet and thin liquids with precautions listed below. Patient's ability to safely consume PO intake appears to fluctuate as chart review indicated occasional bolus holding leading to coughing incidents. Recommend providing PO intake only when patient able to actively engage in task and is not exhibit oral bolus holding. SLP will continue to follow to monitor diet tolerance and adjust as appropriate. Education provided re: results and recommendations.          RECOMMENDATIONS   Diet Recommendations - Solids: Puree  Diet Recommendations - Liquids: Thin Liquids                        Compensatory Strategies Recommended: Pinched straw sips  Aspiration Precautions: 1:1 feeding, Upright position, Slow rate, Small bites, Small sips  Medication Administration Recommendations: One pill at a time, Crushed in puree  Treatment Plan/Recommendations:  Aspiration precautions    HISTORY   MEDICAL HISTORY  Reason for Referral: R/O aspiration    Problem List  Principal Problem:    Constipation, unspecified constipation type  Active Problems:    Palliative care encounter    Goals of care, counseling/discussion    Palisade syndrome      Past Medical History  Past Medical History[1]    Prior Living Situation: Skilled nursing facility  Diet Prior to Admission: Puree, Thin liquids       Patient/Family Goals: none stated    SWALLOWING HISTORY  Current Diet Consistency: Puree, Thin liquids  Dysphagia History: as above  Imaging Results:   CXR 4/22/25  CONCLUSION:  Minimal bibasilar atelectasis.         LOCATION:  [facility]                  Dictated by (CST): Christiana Kerns MD on 4/22/2025 at 8:20 PM       Finalized by (CST): Christiana Kerns MD on 4/22/2025 at 8:20 PM     SUBJECTIVE       OBJECTIVE   ORAL MOTOR EXAMINATION  Dentition: Functional  Symmetry: Within Functional Limits  Strength: Unable to assess  Tone: Unable to assess  Range of Motion: Unable to assess  Rate of Motion: Unable to assess    Voice Quality: Clear  Respiratory Status: Unlabored  Consistencies Trialed: Thin liquids, Puree  Method of Presentation: Staff/Clinician assistance  Patient Positioned: Upright, Midline    Oral Phase of Swallow: Within Functional Limits                      Pharyngeal Phase of Swallow: Within Functional Limits           (Please note: Silent aspiration cannot be evaluated clinically. Videofluoroscopic Swallow Study is required to rule-out silent aspiration.)    Esophageal Phase of Swallow: No complaints consistent with possible esophageal involvement  Comments: d/w RN              GOALS  Goal #1 The patient will tolerate puree consistency and thin liquids without overt signs or symptoms of aspiration with 90 % accuracy over 1-2 session(s).  In Progress   Goal #2 The patient/family/caregiver will demonstrate understanding and implementation of aspiration precautions and swallow  strategies independently over 1-2 session(s).    In Progress   Goal #3     Goal #4     Goal #5     Goal #6     Goal #7     Goal #8       FOLLOW UP  Treatment Plan/Recommendations: Aspiration precautions     Follow Up Needed (Documentation Required): Yes  SLP Follow-up Date: 04/29/25    Thank you for your referral.   If you have any questions, please contact JUAN Reyes       [1]   Past Medical History:   Alzheimer's dementia with behavioral disturbance (HCC)    Anxiety state    Bipolar affective (HCC)    Cataract    Dementia (HCC)    Diabetes (HCC)    Elevated fasting lipid profile    Essential hypertension    Essential hypertension, benign    Falls    Gallstones and inflammation of gallbladder without obstruction    High blood pressure    High cholesterol    Muscle weakness    Neuropathy    Osteoarthritis    Psychosis, unspecified psychosis type (HCC)    Shortness of breath

## 2025-04-28 NOTE — PHYSICAL THERAPY NOTE
PT order received. Chart reviewed. Pt is LTC resident at [facility]. Per RN, pt is a totalA for all A/IDLs and requires use of radha lift for any mobility. Pt has no skilled IP PT needs at this time. Will dc current order. RN aware.

## 2025-04-28 NOTE — PLAN OF CARE
Daughter was feeding patient mashed potatoes, and patient wouldn't swallow,so food was just resting on tongue, patient was suctioned with yankauer catheter and tolerated well.Daughter requests patient to receive morning dose of risperdal at 1000 instead of 0900, and then skip 1200 dose, night nurse notified. Left message with hospitalist that daughter would like iv fluids restarted, night nurse notified of above.Purewick draining nicki urine, call light in reach, fall precations in place, patient repositioned during shift

## 2025-04-28 NOTE — PLAN OF CARE
Patient alert and orientated to self. VSS, remained afebrile. Meds given per MAR. Per daughter to hold Respiradel AM dose for tomorrow AM and only give noon dose, endorsed to night RN. Removed rectal tube per GI. Had 1 large soft BM. Incontinent x2. Up in chair with max assist stand and pivot. Attempted to walk but patient unable to follow commands. PT/OT consulted to eval patient. Had to suction patient this morning after finishing breakfast. Fall precautions in place. Call light within reach.     Problem: GASTROINTESTINAL - ADULT  Goal: Minimal or absence of nausea and vomiting  Description: INTERVENTIONS:- Maintain adequate hydration with IV or PO as ordered and tolerated- Nasogastric tube to low intermittent suction as ordered- Evaluate effectiveness of ordered antiemetic medications- Provide nonpharmacologic comfort measures as appropriate- Advance diet as tolerated, if ordered- Obtain nutritional consult as needed- Evaluate fluid balance  Outcome: Progressing  Goal: Maintains or returns to baseline bowel function  Description: INTERVENTIONS:- Assess bowel function- Maintain adequate hydration with IV or PO as ordered and tolerated- Evaluate effectiveness of GI medications- Encourage mobilization and activity- Obtain nutritional consult as needed- Establish a toileting routine/schedule- Consider collaborating with pharmacy to review patient's medication profile  Outcome: Progressing  Goal: Maintains adequate nutritional intake (undernourished)  Description: INTERVENTIONS:- Monitor percentage of each meal consumed- Identify factors contributing to decreased intake, treat as appropriate- Assist with meals as needed- Monitor I&O, WT and lab values- Obtain nutritional consult as needed- Optimize oral hygiene and moisture- Encourage food from home; allow for food preferences- Enhance eating environment  Outcome: Progressing  Goal: Achieves appropriate nutritional intake (bariatric)  Description: INTERVENTIONS:-  Monitor for over-consumption- Identify factors contributing to increased intake, treat as appropriate- Monitor I&O, WT and lab values- Obtain nutritional consult as needed- Evaluate psychosocial factors contributing to over-consumption  Outcome: Progressing

## 2025-04-28 NOTE — PROGRESS NOTES
[facility]   part of [facility]     Hospitalist Progress Note     Kierra Joshua Patient Status:  Observation    1945 MRN [id number]   Location [facility] 4NW-A Attending Marleny Marina MD   Hosp Day # 7 PCP Tate Tovar     Chief Complaint: Constipation     Subjective:     Pt has dementia. Unable to get any hpi. Visibly looks okay. No signs of discomfort. No fam at the bedside. ST cleared pt to eat.     Objective:    Review of Systems:   A comprehensive review of systems was completed; pertinent positive and negatives stated in subjective.    Vital signs:  Temp:  [97.5 °F (36.4 °C)-98.5 °F (36.9 °C)] 97.5 °F (36.4 °C)  Pulse:  [68-95] 95  Resp:  [18-22] 18  BP: (117-153)/(50-83) 149/69  SpO2:  [94 %-99 %] 95 %    Physical Exam:    General: No acute distress  Respiratory: No wheezes, no rhonchi  Cardiovascular: S1, S2, regular rate and rhythm  Abdomen: Soft, Non-tender, non-distended, positive bowel sounds  Neuro: No new focal deficits.   Extremities: No edema      Diagnostic Data:    Labs:  Recent Labs   Lab 25  0709 25  0608 25  0612   WBC 4.6 4.3 4.7   HGB 11.4* 11.8* 11.6*   MCV 91.3 87.2 87.7   .0 208.0 217.0       Recent Labs   Lab 25  0709 25  0608 25  0541 25  0612   GLU 94 164*  --  132*   BUN <5* <5*  --  <5*   CREATSERUM 0.48* 0.52*  --  0.51*   CA 9.0 9.2  --  9.6   ALB  --   --   --  3.8    143  --  143   K 3.4* 3.2*  3.2* 3.2* 3.6  3.6    108  --  106   CO2 21.0 26.0  --  30.0   ALKPHO  --   --   --  64   AST  --   --   --  11   ALT  --   --   --  <7*   BILT  --   --   --  0.3   TP  --   --   --  5.7       Estimated Glomerular Filtration Rate: 94 mL/min/1.73m2 (result from lab).    No results for input(s): \"TROP\", \"TROPHS\", \"CK\" in the last 168 hours.    No results for input(s): \"PTP\", \"INR\" in the last 168 hours.               Microbiology    Hospital Encounter on 25   1. Urine Culture, Routine     Status:  None    Collection Time: 04/19/25  6:44 PM    Specimen: Urine, clean catch   Result Value Ref Range    Urine Culture  N/A     10-50,000 cfu/ml Three or more species of Gram negative bacilli; none predominant. No further workup performed.         Imaging: Reviewed in Epic.    Medications: Scheduled Medications[1]    Assessment & Plan:      #Constipation with high stool burden  #[place] syndrome  Continue bowel regiment  Diet, as tolerated  GI signed off as of 4/27  S/p flexible sigmoidoscopy w/ decompression on 4/23; s/p flexible sigmoidoscopy with rectal tube placement on 4/25, removed  Repeat Abdominal Xray on 4/26 reviewed, gen sx consulted, eval noted from 4/26, have since signed off    #Dysphagia, transient; resolved     #h/o dementia with behavioral disturbances      #HTN     #HLD      #DM type 2  A1c 5.7 as of this admission   ISS     #UTI ruled out      Kulwant Geller, DO    Supplementary Documentation:     Quality:  DVT Mechanical Prophylaxis:   SCDs,    DVT Pharmacologic Prophylaxis   Medication    enoxaparin (Lovenox) 40 MG/0.4ML SUBQ injection 40 mg                Code Status: DNAR/Selective Treatment  Smith: External urinary catheter in place  Smith Duration (in days):   Central line:    CONRADO: 4/28/2025    Discharge is dependent on: clinical   At this point Ms. Joshua is expected to be discharge to: home     Called POA 10: 34 AM on 4/20 - no answer left VM    The 21st Century Cures Act makes medical notes like these available to patients in the interest of transparency. Please be advised this is a medical document. Medical documents are intended to carry relevant information, facts as evident, and the clinical opinion of the practitioner. The medical note is intended as peer to peer communication and may appear blunt or direct. It is written in medical language and may contain abbreviations or verbiage that are unfamiliar.                          [1]    simethicone  80 mg Oral TID CC and HS     enoxaparin  40 mg Subcutaneous Daily    sennosides  8.6 mg Oral BID    cyanocobalamin  1,000 mcg Oral Daily    fluticasone propionate  2 spray Each Nare Daily    risperiDONE  0.75 mg Oral BID    risperiDONE  0.5 mg Oral Noon    calcium carbonate-vitamin D  2 tablet Oral BID with meals

## 2025-04-28 NOTE — PLAN OF CARE
Problem: SAFETY ADULT - FALL  Goal: Free from fall injury  Description: INTERVENTIONS:- Assess pt frequently for physical needs- Identify cognitive and physical deficits and behaviors that affect risk of falls.- [place] fall precautions as indicated by assessment.- Educate pt/family on patient safety including physical limitations- Instruct pt to call for assistance with activity based on assessment- Modify environment to reduce risk of injury- Provide assistive devices as appropriate- Consider OT/PT consult to assist with strengthening/mobility- Encourage toileting schedule  Outcome: Progressing

## 2025-04-29 PROBLEM — J96.02 ACUTE HYPERCAPNIC RESPIRATORY FAILURE (HCC): Status: ACTIVE | Noted: 2023-12-27

## 2025-04-29 PROBLEM — J69.0 ASPIRATION PNEUMONIA OF BOTH LOWER LOBES DUE TO GASTRIC SECRETIONS (HCC): Status: ACTIVE | Noted: 2025-01-01

## 2025-04-29 NOTE — SIGNIFICANT EVENT
[facility]     Kierra Joshua Patient Status:  Inpatient    1945 MRN [id number]   Location [facility] 4NW-A Attending Kulwant Geller, DO   Hosp Day # 7 PCP Tate Tovar     Reason for RRT:  Rapid response called for worsening hypoxia. Patient seen and examined at bedside. She had increased oxygen requirements throughout the day after a suspected aspiration event in the morning. ABG and chest xray were ordered prior to rapid response showing severe respiratory acidosis and BiPAP was ordered. Patient was not able to provide much history and was lethargic.    Physical Exam:    BP (!) 168/53   Pulse 89   Temp 99.5 °F (37.5 °C) (Axillary)   Resp 24   Wt 133 lb (60.3 kg)   SpO2 97%   BMI 22.13 kg/m²   General: lethargic, tachypneic  Respiratory: globally decreased breath sounds, on BiPAP  Cardiovascular: rate regular, +S1/2  Abdomen: soft, non-tender to palpation  Neurologic: moving all extremities, limited 2/2 mental status  Extremities: no edema    Diagnostic Data:      Labs: none    Imaging: none    ASSESSMENT / PLAN:     Acute hypoxic and hypercapnic respiratory failure 2/2 aspiration PNA vs pneumonitis  -xray reviewed and showing bilateral lower lobe consolidations  -2 amps bicarb given since ph <7.2  -nebs ordered  -Unasyn ordered  -plan for ABG in 1 hour after BiPAP  -wean oxygen as able with goal SpO2 >94%  -I called and spoke with Makayla to give a status update    Upon my evaluation, this patient had a high probability of imminent or life-threatening deterioration due to critical findings of laboratory tests and hypoxemia, which required my direct attention, intervention, and personal management.    I have personally provided 35 minutes of critical care time, exclusive of time spent on separately billable procedures.  Time includes review of all pertinent laboratory/radiology results, discussion with consultants, and monitoring for potential decompensation.   Performed interventions included oxygen and bicarb with NIPPV .      Maggie Goldstein DO  4/28/2025

## 2025-04-29 NOTE — PLAN OF CARE
Patient is alert and oriented x1, on room air. Meds held d/t patient unable to swallow. VSS.   Patient HR and O2 dropped per tele. Assessed patient and MD notified. ABGs collected, patient placed on BiPAP. Patient not tolerating BiPAP. RRT was called, see MAR for details. Repeat ABGs collected, patient remains on BiPAP. MD and family notified.    Problem: Diabetes/Glucose Control  Goal: Glucose maintained within prescribed range  Description: INTERVENTIONS:- Monitor Blood Glucose as ordered- Assess for signs and symptoms of hyperglycemia and hypoglycemia- Administer ordered medications to maintain glucose within target range- Assess barriers to adequate nutritional intake and initiate nutrition consult as needed- Instruct patient on self management of diabetes  Outcome: Progressing     Problem: GASTROINTESTINAL - ADULT  Goal: Minimal or absence of nausea and vomiting  Description: INTERVENTIONS:- Maintain adequate hydration with IV or PO as ordered and tolerated- Nasogastric tube to low intermittent suction as ordered- Evaluate effectiveness of ordered antiemetic medications- Provide nonpharmacologic comfort measures as appropriate- Advance diet as tolerated, if ordered- Obtain nutritional consult as needed- Evaluate fluid balance  Outcome: Progressing  Goal: Maintains or returns to baseline bowel function  Description: INTERVENTIONS:- Assess bowel function- Maintain adequate hydration with IV or PO as ordered and tolerated- Evaluate effectiveness of GI medications- Encourage mobilization and activity- Obtain nutritional consult as needed- Establish a toileting routine/schedule- Consider collaborating with pharmacy to review patient's medication profile  Outcome: Progressing  Goal: Maintains adequate nutritional intake (undernourished)  Description: INTERVENTIONS:- Monitor percentage of each meal consumed- Identify factors contributing to decreased intake, treat as appropriate- Assist with meals as needed- Monitor  I&O, WT and lab values- Obtain nutritional consult as needed- Optimize oral hygiene and moisture- Encourage food from home; allow for food preferences- Enhance eating environment  Outcome: Progressing  Goal: Achieves appropriate nutritional intake (bariatric)  Description: INTERVENTIONS:- Monitor for over-consumption- Identify factors contributing to increased intake, treat as appropriate- Monitor I&O, WT and lab values- Obtain nutritional consult as needed- Evaluate psychosocial factors contributing to over-consumption  Outcome: Progressing     Problem: SAFETY ADULT - FALL  Goal: Free from fall injury  Description: INTERVENTIONS:- Assess pt frequently for physical needs- Identify cognitive and physical deficits and behaviors that affect risk of falls.- [place] fall precautions as indicated by assessment.- Educate pt/family on patient safety including physical limitations- Instruct pt to call for assistance with activity based on assessment- Modify environment to reduce risk of injury- Provide assistive devices as appropriate- Consider OT/PT consult to assist with strengthening/mobility- Encourage toileting schedule  Outcome: Progressing

## 2025-04-29 NOTE — PROGRESS NOTES
[facility]   part of [facility]     Hospitalist Progress Note     Kierra Joshua Patient Status:  Observation    1945 MRN [id number]   Location [facility] 4NW-A Attending Marleny Marina MD   Hosp Day # 8 PCP Tate Tovar     Chief Complaint: Constipation     Subjective:     Pt had episodes of hypercapenia/hyxpoemia last night. Improved. It appears pt may have aspirated.   No fam at the bedside,.   I actually spoke to patient's daughter for over 30 mins on  evening discussing patient's underlying issues and how it unfortunately has a lot to do w/ her dementia.     Objective:    Review of Systems:   A comprehensive review of systems was completed; pertinent positive and negatives stated in subjective.    Vital signs:  Temp:  [97.6 °F (36.4 °C)-99.5 °F (37.5 °C)] 97.8 °F (36.6 °C)  Pulse:  [] 75  Resp:  [18-24] 22  BP: (117-168)/(47-83) 117/52  SpO2:  [82 %-100 %] 100 %  FiO2 (%):  [40 %-100 %] 40 %    Physical Exam:    General: mild acute distress  Respiratory: No wheezes, no rhonchi  Cardiovascular: S1, S2, regular rate and rhythm  Abdomen: Soft, Non-tender, non-distended, positive bowel sounds  Neuro: No new focal deficits.   Extremities: No edema      Diagnostic Data:    Labs:  Recent Labs   Lab 25  0709 25  0608 25  0612 25  0613   WBC 4.6 4.3 4.7 11.5*   HGB 11.4* 11.8* 11.6* 13.1   MCV 91.3 87.2 87.7 89.6   .0 208.0 217.0 220.0       Recent Labs   Lab 25  0608 25  0541 25  0612 25  0613   *  --  132* 226*   BUN <5*  --  <5* 10   CREATSERUM 0.52*  --  0.51* 0.69   CA 9.2  --  9.6 9.7   ALB  --   --  3.8  --      --  143 143   K 3.2*  3.2* 3.2* 3.6  3.6 3.7     --  106 104   CO2 26.0  --  30.0 32.0   ALKPHO  --   --  64  --    AST  --   --  11  --    ALT  --   --  <7*  --    BILT  --   --  0.3  --    TP  --   --  5.7  --        Estimated Glomerular Filtration Rate: 88 mL/min/1.73m2 (result from  lab).    No results for input(s): \"TROP\", \"TROPHS\", \"CK\" in the last 168 hours.    No results for input(s): \"PTP\", \"INR\" in the last 168 hours.               Microbiology    Hospital Encounter on 04/19/25   1. Urine Culture, Routine     Status: None    Collection Time: 04/19/25  6:44 PM    Specimen: Urine, clean catch   Result Value Ref Range    Urine Culture  N/A     10-50,000 cfu/ml Three or more species of Gram negative bacilli; none predominant. No further workup performed.         Imaging: Reviewed in Epic.    Medications: Scheduled Medications[1]    Assessment & Plan:      #Aspiration pneumonia, gram neg  #Acute hypercapnic/hypoxemic resp failure  -pulm to see  -on bipap, will wean as tolerated  -iv unasyn (4/28-)  -s/p Rapid response called 4/28 night  -palliative care team involved  -prognosis guarded     #Constipation with high stool burden  #[place] syndrome  Continue bowel regiment  Diet, as tolerated  GI signed off as of 4/27  S/p flexible sigmoidoscopy w/ decompression on 4/23; s/p flexible sigmoidoscopy with rectal tube placement on 4/25, since removed  Gen sx consulted, eval noted from 4/26, have since signed off  KUB (4/29) showing return of abd distention, may need to call back gen sx and GI to have convo w/ daughter that there is not much more that can be done    #Dysphagia, transient; resolved     #h/o dementia with behavioral disturbances      #HTN     #HLD      #DM type 2  A1c 5.7 as of this admission   ISS     #UTI ruled out      Kulwant Geller, DO    Supplementary Documentation:     Quality:  DVT Mechanical Prophylaxis:   SCDs,    DVT Pharmacologic Prophylaxis   Medication    enoxaparin (Lovenox) 40 MG/0.4ML SUBQ injection 40 mg                Code Status: DNAR/Selective Treatment  Smith: External urinary catheter in place  Smith Duration (in days):   Central line:    CONRADO:     Discharge is dependent on: clinical   At this point Ms. Joshua is expected to be discharge to: home     Called POA  10: 34 AM on 4/20 - no answer left VM    The 21st Century Cures Act makes medical notes like these available to patients in the interest of transparency. Please be advised this is a medical document. Medical documents are intended to carry relevant information, facts as evident, and the clinical opinion of the practitioner. The medical note is intended as peer to peer communication and may appear blunt or direct. It is written in medical language and may contain abbreviations or verbiage that are unfamiliar.                          [1]    ampicillin-sulbactam  3 g Intravenous Q6H    simethicone  80 mg Oral TID CC and HS    enoxaparin  40 mg Subcutaneous Daily    sennosides  8.6 mg Oral BID    cyanocobalamin  1,000 mcg Oral Daily    fluticasone propionate  2 spray Each Nare Daily    risperiDONE  0.75 mg Oral BID    risperiDONE  0.5 mg Oral Noon    calcium carbonate-vitamin D  2 tablet Oral BID with meals

## 2025-04-29 NOTE — PHYSICAL THERAPY NOTE
PT order received. Chart reviewed. Pt on BIPAP s/p RRT over night for worsening hypoxia. Will hold today and continue to follow as medically appropriate. RN aware.     RN reached out to PT/OT in regards to daughter's concerns. Per daughter, pt is NOT radha lift dependent. She is a one person stand pivot transfer. Tolerates up to chair 9 am-1 PM and 3 PM- 730 PM. Daughter is very involved.

## 2025-04-29 NOTE — PLAN OF CARE
Pt received lethargic-throughout the day more arousal. Opens eyes to voices. VSS-on AVAPS. Afebrile. No s/s of pain. Meds held-pt not tolerating/NPO. NPO per speech while on AVAPS. Will re-eval when off. XR abd complete, see results. Incontinence care provided. Pulm consulted. Social work consulted for information on hospice care. Family updated on POC. Fall precautions maintained. Call light within reach.    Problem: Diabetes/Glucose Control  Goal: Glucose maintained within prescribed range  Description: INTERVENTIONS:- Monitor Blood Glucose as ordered- Assess for signs and symptoms of hyperglycemia and hypoglycemia- Administer ordered medications to maintain glucose within target range- Assess barriers to adequate nutritional intake and initiate nutrition consult as needed- Instruct patient on self management of diabetes  Outcome: Progressing     Problem: GASTROINTESTINAL - ADULT  Goal: Minimal or absence of nausea and vomiting  Description: INTERVENTIONS:- Maintain adequate hydration with IV or PO as ordered and tolerated- Nasogastric tube to low intermittent suction as ordered- Evaluate effectiveness of ordered antiemetic medications- Provide nonpharmacologic comfort measures as appropriate- Advance diet as tolerated, if ordered- Obtain nutritional consult as needed- Evaluate fluid balance  Outcome: Progressing  Goal: Maintains or returns to baseline bowel function  Description: INTERVENTIONS:- Assess bowel function- Maintain adequate hydration with IV or PO as ordered and tolerated- Evaluate effectiveness of GI medications- Encourage mobilization and activity- Obtain nutritional consult as needed- Establish a toileting routine/schedule- Consider collaborating with pharmacy to review patient's medication profile  Outcome: Progressing  Goal: Maintains adequate nutritional intake (undernourished)  Description: INTERVENTIONS:- Monitor percentage of each meal consumed- Identify factors contributing to decreased  intake, treat as appropriate- Assist with meals as needed- Monitor I&O, WT and lab values- Obtain nutritional consult as needed- Optimize oral hygiene and moisture- Encourage food from home; allow for food preferences- Enhance eating environment  Outcome: Progressing  Goal: Achieves appropriate nutritional intake (bariatric)  Description: INTERVENTIONS:- Monitor for over-consumption- Identify factors contributing to increased intake, treat as appropriate- Monitor I&O, WT and lab values- Obtain nutritional consult as needed- Evaluate psychosocial factors contributing to over-consumption  Outcome: Progressing     Problem: SAFETY ADULT - FALL  Goal: Free from fall injury  Description: INTERVENTIONS:- Assess pt frequently for physical needs- Identify cognitive and physical deficits and behaviors that affect risk of falls.- [place] fall precautions as indicated by assessment.- Educate pt/family on patient safety including physical limitations- Instruct pt to call for assistance with activity based on assessment- Modify environment to reduce risk of injury- Provide assistive devices as appropriate- Consider OT/PT consult to assist with strengthening/mobility- Encourage toileting schedule  Outcome: Progressing

## 2025-04-29 NOTE — CONSULTS
[facility]  Report of Consultation    Kierra Joshua Patient Status:  Inpatient    1945 MRN [id number]   Location [facility] 4NW-A Attending Kulwant Geller, DO   Hosp Day # 8 PCP Tate Tovar     Reason for Consultation:  Hypercapnic and hypoxic respiratory failure    History of Present Illness:  Kierra Joshua is a a(n) 79 year old female.   Remote smoker-history of dementia/bipolar diabetes hypertension recent diagnosis of C. difficile colitis-admitted on  with failure to thrive and constipation from the [facility].  She was treated for constipation and had multiple BMs-ultimately required flex sig with decompression and rectal tube placement.-Noted to have aspiration tendencies with rapid called for episode of choking earlier on admission.  Last p.m. patient had increasing O2 requirements throughout the day suspected aspiration event morning.  ABG at that time  7.17 with a PCO2 of 89 patient responded to BiPAP.  Remains minimally responsive nursing reported prior  saying occasional words not following commands  Currently opens eyes at times remains on BiPAP    History:  Past Medical History[1]   Alzheimer's dementia with behavioral disturbance (HCC)    Anxiety state    Bipolar affective (HCC)    Cataract    Dementia (HCC)    Diabetes (HCC)    Elevated fasting lipid profile    Essential hypertension    Essential hypertension, benign    Falls    Gallstones and inflammation of gallbladder without obstruction    High blood pressure    High cholesterol    Muscle weakness    Neuropathy    Osteoarthritis    Psychosis, unspecified psychosis type (HCC)    Shortness of breath     Family History[2]   reports that she quit smoking about 24 years ago. Her smoking use included cigarettes. She has never used smokeless tobacco. She reports that she does not drink alcohol and does not use drugs.    Allergies:  Allergies[3]    Medications:  Prescriptions Prior to Admission[4]    Review of  Systems:  Currently unavailable as no family available  Constitutional:    Eyes: No obvious issues  Ears, nose, mouth, throat, and face: Nursing reports choking episodes on his saliva as patient has been n.p.o.  Respiratory: As above  Cardiovascular: Unknown  Gastrointestinal: No witnessed nausea vomiting diarrhea  Musculoskeletal: Unknown  Neurological: Marked dementia     All other review of systems are negative.No apparent history of OSAVital signs in last 24 hours:  Patient Vitals for the past 24 hrs:   BP Temp Temp src Pulse Resp SpO2   04/29/25 1150 121/48 98.4 °F (36.9 °C) Oral 50 22 99 %   04/29/25 0916 112/45 100 °F (37.8 °C) Axillary -- 24 --   04/29/25 0712 -- -- -- 75 -- 100 %   04/29/25 0620 117/52 97.8 °F (36.6 °C) Axillary 79 22 100 %   04/29/25 0438 -- -- -- 83 22 100 %   04/29/25 0245 125/64 -- -- 62 -- 95 %   04/29/25 0120 -- -- -- -- -- 100 %   04/29/25 0100 131/47 97.6 °F (36.4 °C) Oral 70 22 100 %   04/29/25 0052 -- -- -- 75 23 100 %   04/28/25 2320 -- -- -- 96 22 100 %   04/28/25 2300 -- -- -- -- -- 95 %   04/28/25 2234 (!) 168/53 -- -- 89 -- 97 %   04/28/25 2200 -- -- -- 82 -- (!) 82 %   04/28/25 2135 -- -- -- 108 -- 90 %   04/28/25 1933 153/78 99.5 °F (37.5 °C) Axillary 106 24 90 %   04/28/25 1618 (!) 162/83 98.7 °F (37.1 °C) Oral 101 18 96 %   04/28/25 1200 140/58 98 °F (36.7 °C) Oral 91 20 96 %         Physical Exam:   General: Opens eyes to tactile nonverbal comfortable on BiPAP ad: Normocephalic, without obvious abnormality, atraumatic.   Eyes/Nose: Pupils seem equal   Throat: Lips, mucosa, and tongue normal.  No thrush noted.  On limited exam   Neck: trachea midline, no adenopathy, no thyromegaly. No JVD.  No subcu air   Lungs: Bibasilar rales slight rhonchi   Chest wall: No tenderness or deformity.   Heart-seems regular rate and rhythm distant tones   Abdomen: soft, non-distended, no masses, no guarding, no     Rebound.  Nontender.   Extremity: Trace edema bilaterally   Skin: No rashes  or lesions.   Neurological: Opens eyes to tactile no spontaneous movement no attempts at speech    Lab Data Review:  Lab Results   Component Value Date    WBC 11.5 04/29/2025    HGB 13.1 04/29/2025    HCT 41.3 04/29/2025    .0 04/29/2025    CREATSERUM 0.69 04/29/2025    BUN 10 04/29/2025     04/29/2025    K 3.7 04/29/2025     04/29/2025    CO2 32.0 04/29/2025     04/29/2025    CA 9.7 04/29/2025    PGLU 267 04/28/2025     Recent Labs   Lab 04/29/25  0100   ABGPHT 7.41   OUETJD2D 55*   LPCHO8D 122*   ABGHCO3 31.6*   ABGBE 8.5*   TEMP 98.6   LIBRA Positive   SITE Left Radial   DEV Bi-PAP   THGB 12.7           Cultures:   Urine culture noted  Radiology:  XR ABDOMEN (1 VIEW) (CPT=74018)  Result Date: 4/29/2025  CONCLUSION:   Rectal tube has been removed, and now there is dilated loop of colon probably a redundant loop of sigmoid colon in the central abdomen.  Some increase in dilation of the transverse colon and right colon.  Increase in stool right colon.  Mild dilation also present increasing involving the small bowel.  No signs of free air on this supine radiograph. If the patient has acute abdomen symptoms, or other abdominal/pelvic imaging requirements further than plain x-ray of the abdomen, consider CT scan of the abdomen and pelvis with intravenous and oral contrast for further assessment.  LOCATION:  [facility]   Dictated by (CST): Gavin Haas MD on 4/29/2025 at 9:34 AM     Finalized by (CST): Gavin Haas MD on 4/29/2025 at 9:36 AM       XR CHEST AP/PA (1 VIEW) (CPT=71045)  Result Date: 4/28/2025  CONCLUSION:  There is new atelectasis/consolidation at the lung bases that is concerning for multifocal pneumonia, possibly related to aspiration.  Clinical correlation recommended along with follow-up until complete resolution.    LOCATION:  [facility]      Dictated by (CST): José Manuel Kuhn MD on 4/28/2025 at 10:59 PM     Finalized by (CST): José Manuel Kuhn MD on 4/28/2025 at 11:00 PM        Abdominal film reviewed dilated colon  Chest x-rays reviewed when compared to the film from 4/22 remains with hyperinflation but-new bibasilar infiltrates versus atelectasis no obvious effusion    Assessment and Plan:  Problem List[5]    Assessment:  Hypercapnic and hypoxic respiratory failure with respiratory acidosis-suspected component of aspiration with possible hypercapnia related to hyperoxia-not entirely clear-improved on BiPAP  Aspiration pneumonia/ongoing aspiration risk/NPO  Advanced dementia  Constipation/[place] syndrome-status post GI procedures including rectal tube now removed and some evidence of recurrence    Plan:  -Continue BiPAP until discussion with patient's daughter  May consider weaning pending her wishes on behalf of her mom  To complete course of antibiotic therapy for possible component of aspiration pneumonia  GI issues per hospitalist/GI    Patience Jacobs MD  4/29/2025  11:52 AM         [1]   Past Medical History:   Alzheimer's dementia with behavioral disturbance (HCC)    Anxiety state    Bipolar affective (HCC)    Cataract    Dementia (HCC)    Diabetes (HCC)    Elevated fasting lipid profile    Essential hypertension    Essential hypertension, benign    Falls    Gallstones and inflammation of gallbladder without obstruction    High blood pressure    High cholesterol    Muscle weakness    Neuropathy    Osteoarthritis    Psychosis, unspecified psychosis type (HCC)    Shortness of breath   [2] No family history on file.  [3] No Known Allergies  [4]   Medications Prior to Admission   Medication Sig Dispense Refill Last Dose/Taking    Calcium Carbonate-Vitamin D 600-5 MG-MCG Oral Cap Take 1 tablet by mouth in the morning and 1 tablet before bedtime.   4/18/2025    Potassium Chloride ER 10 MEQ Oral Tab CR Take 1 tablet (10 mEq total) by mouth 3 (three) times a week. MWF   Past Week    cyanocobalamin 500 MCG Oral Tab Take 2 tablets (1,000 mcg total) by mouth in the morning.   Taking     ascorbic acid 500 MG Oral Tab Take 1 tablet (500 mg total) by mouth in the morning.   Taking    acetaminophen 500 MG Oral Tab Take 1 tablet (500 mg total) by mouth every 4 (four) hours as needed. 90 tablet 0 Taking As Needed    lisinopril 10 MG Oral Tab Take 1 tablet (10 mg total) by mouth in the morning. Hold for SBP < 130.   4/19/2025    traMADol 50 MG Oral Tab Take 1 tablet (50 mg total) by mouth 2 (two) times daily as needed for Pain. 30 tablet 0 4/18/2025    Cranberry 450 MG Oral Cap Take 1 tablet by mouth in the morning.   4/18/2025    Melatonin 5 MG Oral Tab Take 1 tablet (5 mg total) by mouth at bedtime.   4/18/2025    magnesium hydroxide (MILK OF MAGNESIA) 400 MG/5ML Oral Suspension Take 5 mL by mouth daily as needed for constipation.   4/18/2025    Multiple Vitamins-Minerals (MULTI-VITAMIN/MINERALS) Oral Tab Take 1 tablet by mouth in the morning.   4/18/2025    fluticasone propionate 50 MCG/ACT Nasal Suspension 2 sprays by Each Nare route in the morning.   Taking    polyethylene glycol, PEG 3350, 17 g Oral Powd Pack Take 17 g by mouth daily. 30 each 0 4/18/2025    risperiDONE 0.25 MG Oral Tab Take 2 tablets (0.5 mg total) by mouth Noon.   4/19/2025    furosemide 20 MG Oral Tab Take 1 tablet (20 mg total) by mouth Every Monday, Wednesday, and Friday.   4/18/2025    glipiZIDE 5 MG Oral Tab Take 1 tablet (5 mg total) by mouth every morning before breakfast.   4/19/2025    risperiDONE 0.25 MG Oral Tab Take 3 tablets (0.75 mg total) by mouth in the morning and 3 tablets (0.75 mg total) before bedtime.   4/19/2025    FLEET ENEMA 7-19 GM/118ML Rectal Enema Place 1 enema (118 mL total) rectally daily as needed (CONSTIPTAION).   Unknown    bismuth subsalicylate (PEPTO-BISMOL) 262 MG/15ML Oral Suspension Take 30 mL (524 mg total) by mouth every 6 (six) hours as needed for Indigestion.   Unknown    meclizine 25 MG Oral Tab Take 1 tablet (25 mg total) by mouth every 8 (eight) hours as needed. (Patient not taking:  Reported on 4/19/2025)   Not Taking   [5]   Patient Active Problem List  Diagnosis    Uncontrolled type 2 diabetes mellitus with hyperglycemia (HCC)    Pure hypercholesterolemia    Essential hypertension, benign    Osteopenia of necks of both femurs    Psychosis ([facility])    Alzheimer's dementia with behavioral disturbance ([facility])    Gallstones    Dilated cbd, acquired    COVID-19    COVID-19 virus infection    Closed fracture of right hip, initial encounter ([facility])    Pseudoobstruction of colon    Weakness    Acute cystitis without hematuria    C. difficile diarrhea    Constipation, unspecified constipation type    Palliative care encounter    Goals of care, counseling/discussion    Morse syndrome

## 2025-04-29 NOTE — PHYSICAL THERAPY NOTE
Duplicate PT order received. Chart reviewed. Pt is LTC resident at [facility]. Per RN, pt is a totalA for all A/IDLs and requires use of radha lift for any mobility. Pt has no skilled IP PT needs at this time. Will dc current order. RN aware.

## 2025-04-29 NOTE — OCCUPATIONAL THERAPY NOTE
Attempted to see patient for OT evaluation, however patient on BIPAP s/p RRT over noc for worsening hypoxia.  Will hold today and continue to follow, resuming services as clinically appropriate.

## 2025-04-29 NOTE — SLP NOTE
Attempted to see patient for follow-up however currently on bipap.   Recent events and CXR along with XR Abd results noted.  Patient with waxing/waning ability in PO tolerance and dependent on feeder.  D/W RN.  Recommend NPO at this time while on bipap.   SLP will re-evaluate when patient respiratory status stable AND cleared for PO, per MD.    Vania Huerta, MS CCC-SLP/L  Speech-Language Pathologist  [employer]Link [phone]

## 2025-04-30 PROBLEM — I67.2 CEREBRAL ATHEROSCLEROSIS: Status: ACTIVE | Noted: 2025-01-01

## 2025-04-30 NOTE — PLAN OF CARE
Patient arousable to verbal stimuli, intermittently follows commands. Patient is alert to self, able to answer questions.  Patient denies pain, no nonverbal signs of pain observed. On AVAPS. Electrolytes replaced per protocol. Hygiene and repositioning completed. ECHO at bedside. EKG completed per order.

## 2025-04-30 NOTE — PLAN OF CARE
Pt received lethargic and oriented to self. Opens eyes to voice. Able to verbalize that that she had no pain. O2 on 100% on AVAPS. HR fluctuating in 40s, Dr. Staples notified. See orders. EKG and ABG obtained. Cardiology consulted. K replaced per protocol. Afebrile. BP stable. Incontinence care provided. Frequent rounding and repositioning. Call light within reach. Safety precautions in place.    Problem: Diabetes/Glucose Control  Goal: Glucose maintained within prescribed range  Description: INTERVENTIONS:- Monitor Blood Glucose as ordered- Assess for signs and symptoms of hyperglycemia and hypoglycemia- Administer ordered medications to maintain glucose within target range- Assess barriers to adequate nutritional intake and initiate nutrition consult as needed- Instruct patient on self management of diabetes  Outcome: Progressing     Problem: GASTROINTESTINAL - ADULT  Goal: Minimal or absence of nausea and vomiting  Description: INTERVENTIONS:- Maintain adequate hydration with IV or PO as ordered and tolerated- Nasogastric tube to low intermittent suction as ordered- Evaluate effectiveness of ordered antiemetic medications- Provide nonpharmacologic comfort measures as appropriate- Advance diet as tolerated, if ordered- Obtain nutritional consult as needed- Evaluate fluid balance  Outcome: Progressing  Goal: Maintains or returns to baseline bowel function  Description: INTERVENTIONS:- Assess bowel function- Maintain adequate hydration with IV or PO as ordered and tolerated- Evaluate effectiveness of GI medications- Encourage mobilization and activity- Obtain nutritional consult as needed- Establish a toileting routine/schedule- Consider collaborating with pharmacy to review patient's medication profile  Outcome: Progressing  Goal: Maintains adequate nutritional intake (undernourished)  Description: INTERVENTIONS:- Monitor percentage of each meal consumed- Identify factors contributing to decreased intake, treat as  appropriate- Assist with meals as needed- Monitor I&O, WT and lab values- Obtain nutritional consult as needed- Optimize oral hygiene and moisture- Encourage food from home; allow for food preferences- Enhance eating environment  Outcome: Progressing  Goal: Achieves appropriate nutritional intake (bariatric)  Description: INTERVENTIONS:- Monitor for over-consumption- Identify factors contributing to increased intake, treat as appropriate- Monitor I&O, WT and lab values- Obtain nutritional consult as needed- Evaluate psychosocial factors contributing to over-consumption  Outcome: Progressing     Problem: SAFETY ADULT - FALL  Goal: Free from fall injury  Description: INTERVENTIONS:- Assess pt frequently for physical needs- Identify cognitive and physical deficits and behaviors that affect risk of falls.- [place] fall precautions as indicated by assessment.- Educate pt/family on patient safety including physical limitations- Instruct pt to call for assistance with activity based on assessment- Modify environment to reduce risk of injury- Provide assistive devices as appropriate- Consider OT/PT consult to assist with strengthening/mobility- Encourage toileting schedule  Outcome: Progressing

## 2025-04-30 NOTE — HOSPICE RN NOTE
Met with daughter Makayla and extended family members at bedside. Makayla expressed concern for weaning bipap off tonight. This RN explained that the respiratory therapist would wean slowly and use oxygen per nasal cannula once bipap off. Explained the nursing staff would given Ativan IVP or Morphine IVP if needed for dyspnea, anxiety or pain if needed. Makayla verbalized understanding. She asked that staff call her this evening if there is any decline or change in condition. RH will remain available to support patient and family. Encouraged family to call [phone] with questions or concerns.    Gay Theodore RN BSN  [facility] RN Liaison  [phone] (After-hours)

## 2025-04-30 NOTE — PLAN OF CARE
[place]:  [place]    1945 MRN [id number]   Location [facility] 4NW-A Attending Marleny Marina MD   Hosp Day # 9 PCP Tate Tovar       Cardiology Nocturnal APN Note    Page Received: Primary RN    HPI:     Patient is a 79 year old female with PMH of Alzheimer's disease, HTN, HLD, DM II and anxiety who was admitted on  for constipation. Pt is s/p flexible sigmoidoscopy with decompression on  and s/p  flexible sigmoidoscopy with rectal tube placement on  which has been removed. Pt became bradycardic on . EKG completed showed sinus bradycardia with 1st degree AV block. Per RN, pt asymptomatic. [facility] consulted for bradycardia. No previous cardiac testing available for review. HPI obtained from chart review and information provided by ED physician.         Assessment/Plan:    - 2D echocardiogram pending  - Continue to monitor overnight on telemetry  - Formal cardiology consult to follow in AM.       IVORY Freeman  [facility]  2025  3:14 AM

## 2025-04-30 NOTE — OCCUPATIONAL THERAPY NOTE
Pt chart reviewed. Per discussion with RN, pt on bipap at this time, also hospice meeting with family in progress at this time. OT will continue to follow, will re-attempt as able and as appropriate.

## 2025-04-30 NOTE — PROGRESS NOTES
Long discussion took place in early evening between myself and patient's daughter Makayla. She is also patient's POA. We discussed ongoing decline in patient's medical condition. We reviewed that patient's dementia has been progressively getting worse. We reviewed aspiration can happen despite not eating. We discussed patient's prognosis is poor. The hope is she will of course improve but we opened the conversation about meeting w/ hospice. Pt's daughter was agreeable. I updated bedside RN on plan. PT is confirmed DNR. We also reviewed 16 mins spent face to face w/ patient and patient's family. This was a voluntary conversation. We reviewed terms like cardiac arrest and the use of acls/cpr. Order updated on epic.     Kulwant Geller, DO

## 2025-04-30 NOTE — PROGRESS NOTES
[facility]   part of [facility]  Palliative Care Progress Note    Kierra Joshua Patient Status:  Inpatient    1945 MRN [id number]   Location [facility] 4NW-A Attending Marleny Marina MD   Hosp Day # 9 PCP Tate Tovar     History/Other:      Kierra Joshua is a 79 year old female with history of  Alzheimer's dementia, HTN, HLD, DM2, bipolar diasease, chronic constipation, C-diff who was admitted on 2025 from the [place] with decreased PO intake abdominal bloating/distention and constipation. Work up in our hospital revealed XR abd with large stool burden in rectum and sigmoid colon. She was given multiple laxatives and enemas and had multiple BM's.  RRT called due to patient \"choking\".  GI consult and flex sig with decompression performed.  f/u Abd XR with continued/re-accumulated gas and distention, repeat flex/sig with rectal tube placement 25, AXR  showing \"considerable improvement\" no longer appearing dilated with mod stool in colon and small bowel, mild stool in rectum\". Rectal tube removed .  Surgical consult was placed 25 (she was determined to not be a surgical candidate).  As of  both GI and surgical services have signed off. She has had multiple SLP evaluations with most recent  with continued recs for pureed/thin. History was obtained from Epic and the daughter Makayla.     Consult ordered by:: Dr. Kulwant Geller for evaluation of Palliative Care needs and Goals of care discussion.    Allergies:  Allergies[1]  Medications:   Current Hospital Medications[2]    Subjective      Interval events:  rapid response  PM for hypoxia. Found to has aspiration Pna requiring bipap. Dr. Geller had in depth conversation yesterday evening and hospice was consulted. Cardiology consulted for bradycardia.     AM attempted visit, pt sleeping on NIPPV.   When I entered the room, the patient was sleeping but easy to wake, lying in bed, and NIPPV . Daughter  present at bedside.   Daughter requested we speak outside of the room. See summary of discussion below.     Review of Systems:  Unable to obtain   Bowel Movement         4/30/2025  0730             Stool Count Calculated for I/O: 1            Objective:     Vital Signs:  Blood pressure 151/56, pulse 80, temperature 99.5 °F (37.5 °C), temperature source Axillary, resp. rate 16, weight 133 lb (60.3 kg), SpO2 98%, not currently breastfeeding.  Body mass index is 22.13 kg/m².  Current Palliative performance scale PPSv2 (%): 40    Physical Exam:  Pt appeared comfortable on NIPPV.. Full exam deferred per request.         Results:     Hematology:  Lab Results   Component Value Date    WBC 6.2 04/30/2025    HGB 10.5 (L) 04/30/2025    HCT 33.0 (L) 04/30/2025    .0 04/30/2025     Coags:  Lab Results   Component Value Date    INR 1.06 02/20/2024    PTT 23.9 02/20/2024     Chemistry:  Lab Results   Component Value Date    CREATSERUM 0.52 (L) 04/30/2025    BUN 13 04/30/2025     (H) 04/30/2025    K 2.9 (LL) 04/30/2025    K 2.9 (LL) 04/30/2025     (H) 04/30/2025    CO2 26.0 04/30/2025     (H) 04/30/2025    CA 8.8 04/30/2025    ALB 3.3 04/30/2025    ALKPHO 60 04/30/2025    BILT 0.3 04/30/2025    TP 5.2 (L) 04/30/2025    AST 11 04/30/2025    ALT <7 (L) 04/30/2025    DDIMER 0.69 03/15/2023    MG 2.2 04/21/2025     Imaging:  XR CHEST AP PORTABLE  (CPT=71045)  Result Date: 4/30/2025  CONCLUSION:  1. Bilateral basilar opacities, left greater than right, representing atelectasis versus infiltrates. 2. Hyperexpansion of the lungs. 3. Possible trace left-sided pleural effusion.    LOCATION:  [id number]      Dictated by (CST): Christiana Kerns MD on 4/30/2025 at 6:36 AM     Finalized by (CST): Christiana Kerns MD on 4/30/2025 at 6:36 AM       XR ABDOMEN (1 VIEW) (CPT=74018)  Result Date: 4/29/2025  CONCLUSION:   Rectal tube has been removed, and now there is dilated loop of colon probably a redundant loop of sigmoid  colon in the central abdomen.  Some increase in dilation of the transverse colon and right colon.  Increase in stool right colon.  Mild dilation also present increasing involving the small bowel.  No signs of free air on this supine radiograph. If the patient has acute abdomen symptoms, or other abdominal/pelvic imaging requirements further than plain x-ray of the abdomen, consider CT scan of the abdomen and pelvis with intravenous and oral contrast for further assessment.  LOCATION:  [facility]   Dictated by (CST): Gavin Haas MD on 4/29/2025 at 9:34 AM     Finalized by (CST): Gavin Haas MD on 4/29/2025 at 9:36 AM       XR CHEST AP/PA (1 VIEW) (CPT=71045)  Result Date: 4/28/2025  CONCLUSION:  There is new atelectasis/consolidation at the lung bases that is concerning for multifocal pneumonia, possibly related to aspiration.  Clinical correlation recommended along with follow-up until complete resolution.    LOCATION:  [facility]      Dictated by (CST): José Manuel Kuhn MD on 4/28/2025 at 10:59 PM     Finalized by (CST): José Manuel Kuhn MD on 4/28/2025 at 11:00 PM            Summary of Discussion     Spoke with Makayla outside of the room, per request. She feels that pt's increased level of alertness is improving and is now more optimistic than yesterday during her talk with Dr. Geller. She is still agreeable to meet with hospice. She does not want her mom to hear these discussions at this time. She had no further questions r concerns for me today. I called hospice to inform them of the above.     Advance Care Planning counseling and discussion:   HC POA Documentation Reports completion but not in Epic (family requested to bring in). Healthcare Agent's Name: Makayla DOVE FORM Completed--Document in Epic  DNAR/Selective Treatment    Assessment and Recommendations       Principal Problem:    Constipation, unspecified constipation type  Active Problems:    Acute hypercapnic respiratory failure (HCC)    Palliative care  encounter    Goals of care, counseling/discussion    Corinna syndrome    Aspiration pneumonia of both lower lobes due to gastric secretions (HCC)    Goals of care: ongoing   Meeting with hospice   Family deciding next steps. They know we are available, if requested.   Code Status: DNAR/Selective Treatment  Healthcare Agent's Name: Makayla    Discussed today's visit with hospice liaison, Family, RN, and SW/CM.    Palliative Care Follow Up: Palliative care team will follow peripherally and see patient as needed. Please contact provider with any questions, concerns, or if a care conference is to be held.  Palliative care follow up outpatient: is indicated and community palliative care ordered.    Thank you for allowing Palliative Care services to participate in the care of Kierra Joshua.    A total of 25 minutes were spent on this encounter, which included all of the following: chart review, direct face to face contact, history taking, physical examination, counseling and coordinating care, and documentation.     Tori Dietz MD  4/30/2025  12:03 PM  Palliative Care Services    The 21st Century Cures Act makes medical notes like these available to patients in the interest of transparency. Please be advised this is a medical document. Medical documents are intended to carry relevant information, facts as evident, and the clinical opinion of the practitioner. The medical note is intended as peer to peer communication and may appear blunt or direct. It is written in medical language and may contain abbreviations or verbiage that are unfamiliar.         [1] No Known Allergies  [2]   Current Facility-Administered Medications:     potassium chloride 40 mEq in 250mL sodium chloride 0.9% IVPB premix, 40 mEq, Intravenous, Once **FOLLOWED BY** potassium chloride 20 mEq/100mL IVPB premix 20 mEq, 20 mEq, Intravenous, Once    sodium chloride 0.9% infusion, , Intravenous, Continuous    albuterol (Ventolin) (2.5 MG/3ML) 0.083%  nebulizer solution 2.5 mg, 2.5 mg, Nebulization, Q4H PRN    ampicillin-sulbactam (Unasyn) 3 g in sodium chloride 0.9% 100mL IVPB-ADD, 3 g, Intravenous, Q6H    simethicone (Mylicon) chewable tab 80 mg, 80 mg, Oral, TID CC and HS    enoxaparin (Lovenox) 40 MG/0.4ML SUBQ injection 40 mg, 40 mg, Subcutaneous, Daily    polyethylene glycol (PEG 3350) (Miralax) 17 g oral packet 17 g, 17 g, Oral, Daily PRN    sennosides (Senokot) tab 17.2 mg, 17.2 mg, Oral, Nightly PRN    bisacodyl (Dulcolax) 10 MG rectal suppository 10 mg, 10 mg, Rectal, Daily PRN    fleet enema (Fleet) rectal enema 133 mL, 1 enema, Rectal, Once PRN    benzonatate (Tessalon) cap 200 mg, 200 mg, Oral, TID PRN    ondansetron (Zofran) 4 MG/2ML injection 4 mg, 4 mg, Intravenous, Q6H PRN    sennosides (Senokot) tab 8.6 mg, 8.6 mg, Oral, BID    bismuth subsalicylate (PEPTO-BISMOL) chew tab CHEW 262 mg, 1 tablet, Oral, Q6H PRN    cyanocobalamin (Vitamin B12) tab 1,000 mcg, 1,000 mcg, Oral, Daily    fluticasone propionate (Flonase) 50 MCG/ACT nasal suspension 2 spray, 2 spray, Each Nare, Daily    melatonin cap/tab 5 mg, 5 mg, Oral, Nightly PRN    risperiDONE (RisperDAL) tab 0.75 mg, 0.75 mg, Oral, BID    risperiDONE (RisperDAL) tab 0.5 mg, 0.5 mg, Oral, Noon    calcium carbonate-vitamin D (Oyster Shell-D) 250-3.125 MG-MCG per tab 2 tablet, 2 tablet, Oral, BID with meals

## 2025-04-30 NOTE — PROGRESS NOTES
[facility]  Progress Note    Kierra Joshua Patient Status:  Inpatient    1945 MRN [id number]   [facility] 4NW-A Attending Marleny Marina MD   Hosp Day # 9 PCP Tate Tovar     Subjective:  Kierra Joshua is a(n) 79 year old female low-grade fever overnight 100 now afebrile    Objective:  /58   Pulse 64   Temp 98.6 °F (37 °C) (Skin)   Resp 23   Wt 133 lb (60.3 kg)   SpO2 100%   BMI 22.13 kg/m² ***      Temp (24hrs), Av.4 °F (36.9 °C), Min:97.6 °F (36.4 °C), Max:98.6 °F (37 °C)      Intake/Output:    Intake/Output Summary (Last 24 hours) at 2025 1058  Last data filed at 2025 0809  Gross per 24 hour   Intake 3100 ml   Output --   Net 3100 ml       Physical Exam:   General: alert, cooperative, oriented.  No respiratory distress.   Head: Normocephalic, without obvious abnormality, atraumatic.   Throat: Lips, mucosa, and tongue normal.  No thrush noted.   Neck: trachea midline, no adenopathy, no thyromegaly. No JVD.   Lungs: ***   Chest wall: No tenderness or deformity.   Heart: ***   Abdomen: soft, non-distended, no masses, no guarding, no     Rebound.***   Extremity: ***   Skin: No rashes or lesions.   Neurological: Alert, interactive, no focal deficits    Lab Data Review:  Recent Labs     25  0613 25  0613   WBC 11.5* 6.2   HGB 13.1 10.5*   .0 185.0     Recent Labs     25  0613 25  0613    148*   K 3.7 2.9*  2.9*    113*   CO2 32.0 26.0   BUN 10 13   CREATSERUM 0.69 0.52*     No results for input(s): \"PTP\", \"INR\", \"PTT\" in the last 168 hours.    Cultures: Negative urine culture    Radiology:  XR CHEST AP PORTABLE  (CPT=71045)  Result Date: 2025  CONCLUSION:  1. Bilateral basilar opacities, left greater than right, representing atelectasis versus infiltrates. 2. Hyperexpansion of the lungs. 3. Possible trace left-sided pleural effusion.    LOCATION:  [id number]      Dictated by (CST): Christiana Kerns MD on  4/30/2025 at 6:36 AM     Finalized by (CST): Christiana Kerns MD on 4/30/2025 at 6:36 AM       Right base now seems essentially clear left with possible small effusion and atelectasis no significant evolving infiltrate      Medications reviewed     Assessment and Plan:   Problem List[1]    Assessment:     Assessment:  Hypercapnic and hypoxic respiratory failure with respiratory acidosis-suspected component of aspiration with possible hypercapnia related to hyperoxia-not entirely clear-improved on BiPAP  Aspiration pneumonia/ongoing aspiration risk/NPO  Advanced dementia  Constipation/Millbrook syndrome-status post GI procedures including rectal tube now removed and some evidence of recurrence     Plan:  -Continue BiPAP until discussion with patient's daughter  May consider weaning pending her wishes on behalf of her mom  To complete course of antibiotic therapy for possible component of aspiration pneumonia  GI issues per hospitalist/GI       Plan:  ***    CC     Patience Jacobs MD  4/30/2025  10:58 AM       [1]   Patient Active Problem List  Diagnosis    Uncontrolled type 2 diabetes mellitus with hyperglycemia (HCC)    Pure hypercholesterolemia    Essential hypertension, benign    Osteopenia of necks of both femurs    Psychosis (HCC)    Alzheimer's dementia with behavioral disturbance (HCC)    Gallstones    Dilated cbd, acquired    COVID-19    COVID-19 virus infection    Acute hypercapnic respiratory failure (HCC)    Closed fracture of right hip, initial encounter ([facility])    Pseudoobstruction of colon    Weakness    Acute cystitis without hematuria    C. difficile diarrhea    Constipation, unspecified constipation type    Palliative care encounter    Goals of care, counseling/discussion    Corinna syndrome    Aspiration pneumonia of both lower lobes due to gastric secretions (HCC)

## 2025-04-30 NOTE — PROGRESS NOTES
[facility]   part of [facility]     Hospitalist Progress Note     Kierra Joshua Patient Status:  Observation    1945 MRN [id number]   Location [facility] 4NW-A Attending Marleny Marina MD   Hosp Day # 9 PCP Tate Tovar     Chief Complaint: Constipation     Subjective:     Pt on bipap.     Objective:    Review of Systems:   A comprehensive review of systems was  NOT completed; pertinent positive and negatives stated in subjective.    Vital signs:  Temp:  [97.6 °F (36.4 °C)-99.5 °F (37.5 °C)] 99.5 °F (37.5 °C)  Pulse:  [40-80] 80  Resp:  [16-23] 16  BP: (122-156)/(35-73) 151/56  SpO2:  [95 %-100 %] 98 %  FiO2 (%):  [30 %-35 %] 30 %    Physical Exam:    General: mild acute distress  Respiratory: No wheezes, no rhonchi  Cardiovascular: S1, S2, regular rate and rhythm  Abdomen: Soft, Non-tender, non-distended, positive bowel sounds  Neuro: No new focal deficits.   Extremities: No edema      Diagnostic Data:    Labs:  Recent Labs   Lab 25  0709 25  0608 25  0612 25  0613 25  0613   WBC 4.6 4.3 4.7 11.5* 6.2   HGB 11.4* 11.8* 11.6* 13.1 10.5*   MCV 91.3 87.2 87.7 89.6 90.2   .0 208.0 217.0 220.0 185.0       Recent Labs   Lab 25  0612 25  0613 25  0613   * 226* 129*   BUN <5* 10 13   CREATSERUM 0.51* 0.69 0.52*   CA 9.6 9.7 8.8   ALB 3.8  --  3.3    143 148*   K 3.6  3.6 3.7 2.9*  2.9*    104 113*   CO2 30.0 32.0 26.0   ALKPHO 64  --  60   AST 11  --  11   ALT <7*  --  <7*   BILT 0.3  --  0.3   TP 5.7  --  5.2*       Estimated Glomerular Filtration Rate: 94 mL/min/1.73m2 (result from lab).    No results for input(s): \"TROP\", \"TROPHS\", \"CK\" in the last 168 hours.    No results for input(s): \"PTP\", \"INR\" in the last 168 hours.               Microbiology    Hospital Encounter on 25   1. Urine Culture, Routine     Status: None    Collection Time: 25  6:44 PM    Specimen: Urine, clean catch   Result Value Ref Range     Urine Culture  N/A     10-50,000 cfu/ml Three or more species of Gram negative bacilli; none predominant. No further workup performed.         Imaging: Reviewed in Epic.    Medications: Scheduled Medications[1]    Assessment & Plan:      #Aspiration pneumonia, gram neg  #Acute hypercapnic/hypoxemic resp failure  -pulm on Cs: Bipap   -iv unasyn (4/28-)  -s/p Rapid response called 4/28 night  -palliative care team involved  -prognosis guarded - plan for meeting with hospice 4/30    #Constipation with high stool burden  #Corinna syndrome  Continue bowel regimen  Diet, as tolerated  GI signed off as of 4/27  S/p flexible sigmoidoscopy w/ decompression on 4/23; s/p flexible sigmoidoscopy with rectal tube placement on 4/25, since removed  Gen sx consulted, eval noted from 4/26, have since signed off  KUB (4/29) showing return of abd distention, may need to call back gen sx and GI to have convo w/ daughter that there is not much more that can be done    #Dysphagia, transient; resolved     #h/o dementia with behavioral disturbances      #HTN     #HLD      #DM type 2  A1c 5.7 as of this admission   ISS     #UTI ruled out      Marleny Marina MD    Supplementary Documentation:     Quality:  DVT Mechanical Prophylaxis:   SCDs,    DVT Pharmacologic Prophylaxis   Medication    enoxaparin (Lovenox) 40 MG/0.4ML SUBQ injection 40 mg                Code Status: DNAR/Selective Treatment  Smith: External urinary catheter in place  Smith Duration (in days):   Central line:    CONRADO:     Discharge is dependent on: clinical   At this point Ms. Joshua is expected to be discharge to: home     Called POA 10: 34 AM on 4/20 - no answer left VM    The 21st Century Cures Act makes medical notes like these available to patients in the interest of transparency. Please be advised this is a medical document. Medical documents are intended to carry relevant information, facts as evident, and the clinical opinion of the practitioner. The medical  note is intended as peer to peer communication and may appear blunt or direct. It is written in medical language and may contain abbreviations or verbiage that are unfamiliar.                          [1]    potassium chloride  40 mEq Intravenous Once    Followed by    potassium chloride  20 mEq Intravenous Once    ampicillin-sulbactam  3 g Intravenous Q6H    simethicone  80 mg Oral TID CC and HS    enoxaparin  40 mg Subcutaneous Daily    sennosides  8.6 mg Oral BID    cyanocobalamin  1,000 mcg Oral Daily    fluticasone propionate  2 spray Each Nare Daily    risperiDONE  0.75 mg Oral BID    risperiDONE  0.5 mg Oral Noon    calcium carbonate-vitamin D  2 tablet Oral BID with meals

## 2025-04-30 NOTE — SIGNIFICANT EVENT
I was notified by RN that patient has developed new onset bradycardia in the 40s.   She is on AVAPs currently. She is being treated for aspiration PNA, has underlying dementia and a poor historian.     Per RN she was sleeping however while getting her vials she was awake and her HR was still in the 40s.   Her ECG with sinus bradycardia.     Will order   - TFT  - echo   - cardiology eval   - meds reviewed   - DNAR/select    Nina Staples MD

## 2025-04-30 NOTE — PROGRESS NOTES
[facility] lia[facility] received referral, will follow up today with pt/family.    [facility]  [phone]

## 2025-04-30 NOTE — CONSULTS
[facility]  Cardiology Consultation    Kierra Joshua Patient Status:  Inpatient    1945 MRN [id number]   Location [facility] 4NW-A Attending Marleny Marina MD   Hosp Day # 9 PCP Tate Tovar     Consults      Reason for Consultation     Bradycardia        History of Present Illness     Kierra Joshua is a a(n) 79 year old female here with constipation, aspiration and resp failure.    Last night noted to be in Sinus porfirio.    EF normal on echo. No significant valve abnormalities.    K 2.9      History:     Past Medical History[1]  Past Surgical History[2]  Family History[3]   reports that she quit smoking about 24 years ago. Her smoking use included cigarettes. She has never used smokeless tobacco. She reports that she does not drink alcohol and does not use drugs.      Allergies:     Allergies[4]      Medications     Current Hospital Medications[5]      Review of Systems     10 point ROS was negative except        Telemetry:         Telemetry: Sinus 90s      Physical Exam     Physical Exam   Blood pressure 151/56, pulse 80, temperature 99.5 °F (37.5 °C), temperature source Axillary, resp. rate 16, weight 133 lb (60.3 kg), SpO2 98%, not currently breastfeeding.  Temp (24hrs), Av.5 °F (36.9 °C), Min:97.6 °F (36.4 °C), Max:99.5 °F (37.5 °C)    Wt Readings from Last 3 Encounters:   25 133 lb (60.3 kg)   24 132 lb (59.9 kg)   24 143 lb (64.9 kg)       NAD  PERRLA/EOMI  Neck veins not elevated  Carotids- no bruits  CTA bilaterally  Cardiac- RRR  Abdomen- Soft,Nontender, normal BS  Extremities- pulses normal  Edema-   Mood /Affect Congruent  Skin- no lesions            Lab/Radiology Results     Recent Labs   Lab 25  0612 25  0613 25  0613   * 226* 129*   BUN <5* 10 13   CREATSERUM 0.51* 0.69 0.52*   EGFRCR 94 88 94   CA 9.6 9.7 8.8    143 148*   K 3.6  3.6 3.7 2.9*  2.9*    104 113*   CO2 30.0 32.0 26.0     Recent Labs   Lab  04/27/25  0612 04/29/25  0613 04/30/25  0613   RBC 4.07 4.61 3.66*   HGB 11.6* 13.1 10.5*   HCT 35.7 41.3 33.0*   MCV 87.7 89.6 90.2   MCH 28.5 28.4 28.7   MCHC 32.5 31.7 31.8   RDW 13.1 13.2 13.3   NEPRELIM  --   --  4.20   WBC 4.7 11.5* 6.2   .0 220.0 185.0         [unfilled]  No results for input(s): \"BNP\" in the last 168 hours.  Lab Results   Component Value Date    INR 1.06 02/20/2024     No results found for: \"TROP\"      XR CHEST AP PORTABLE  (CPT=71045)  Result Date: 4/30/2025  CONCLUSION:  1. Bilateral basilar opacities, left greater than right, representing atelectasis versus infiltrates. 2. Hyperexpansion of the lungs. 3. Possible trace left-sided pleural effusion.    LOCATION:  [id number]      Dictated by (CST): Christiana Kerns MD on 4/30/2025 at 6:36 AM     Finalized by (CST): Christiana Kerns MD on 4/30/2025 at 6:36 AM        EKG 12 Lead  Result Date: 4/30/2025  Sinus rhythm with 1st degree A-V block Otherwise normal ECG When compared with ECG of 29-APR-2025 19:57, No significant change was found    EKG 12 Lead  Result Date: 4/30/2025  Marked sinus bradycardia with 1st degree A-V block Abnormal ECG When compared with ECG of 05-NOV-2024 13:12, Vent. rate has decreased BY  31 BPM        Problem List   Problem List[6]    Diagnostic Testing     ECG 4/29/25       TTE 4/30/25  Conclusions:     1. Left ventricle: The cavity size was normal. Wall thickness was normal.      Systolic function was normal. The estimated ejection fraction was 60-65%,      by visual assessment. No diagnostic evidence for regional wall motion      abnormalities. Left ventricular diastolic function parameters were normal      for the patient's age.   2. Right ventricle: The cavity size was normal. Systolic function was      normal.   3. Left atrium: The left atrial volume was normal.   4. Pulmonary arteries: Systolic pressure could not be accurately estimated.   Impressions:  No previous study was available for comparison.   *         Assessment     Sinus Bradycardia- intermittent, with First degree AV block  Aspiration Pneumonia  Resp Failure  Constipation  Dysphagia  Dementia  HTN  HL  T2DM  Hypokalemia      Plan     No Treatment needed. Asymptomatic.   Likely a vagal mechanism from resp failure and/or GI vagal effects from colonic distention  Possibly exacerbated by hypokalemia- replete         C5      Maira Feliciano MD    Cardiac Electrophysiology  [facility]  4/30/2025  3:26 PM         [1]   Past Medical History:   Alzheimer's dementia with behavioral disturbance (HCC)    Anxiety state    Bipolar affective (HCC)    Cataract    Dementia (HCC)    Diabetes (HCC)    Elevated fasting lipid profile    Essential hypertension    Essential hypertension, benign    Falls    Gallstones and inflammation of gallbladder without obstruction    High blood pressure    High cholesterol    Muscle weakness    Neuropathy    Osteoarthritis    Psychosis, unspecified psychosis type (HCC)    Shortness of breath   [2]   Past Surgical History:  Procedure Laterality Date    Biopsy of breast, incisional      left breast bx during surgery    Colonoscopy N/A 12/29/2023    Procedure: UNPREPPED  COLONOSCOPY;  Surgeon: Fabrice Trejo MD;  Location:  ENDOSCOPY    Colonoscopy N/A 2/25/2024    Procedure: COLONOSCOPY WITH DECOMPRESSION;  Surgeon: Trudi Albright MD;  Location:  ENDOSCOPY    Other surgical history  2001    aneurysm clipped x2   [3] No family history on file.  [4] No Known Allergies  [5]   Current Facility-Administered Medications:     sodium chloride 0.9% infusion, , Intravenous, Continuous    albuterol (Ventolin) (2.5 MG/3ML) 0.083% nebulizer solution 2.5 mg, 2.5 mg, Nebulization, Q4H PRN    ampicillin-sulbactam (Unasyn) 3 g in sodium chloride 0.9% 100mL IVPB-ADD, 3 g, Intravenous, Q6H    simethicone (Mylicon) chewable tab 80 mg, 80 mg, Oral, TID CC and HS    enoxaparin (Lovenox) 40 MG/0.4ML SUBQ injection 40 mg, 40  mg, Subcutaneous, Daily    polyethylene glycol (PEG 3350) (Miralax) 17 g oral packet 17 g, 17 g, Oral, Daily PRN    sennosides (Senokot) tab 17.2 mg, 17.2 mg, Oral, Nightly PRN    bisacodyl (Dulcolax) 10 MG rectal suppository 10 mg, 10 mg, Rectal, Daily PRN    fleet enema (Fleet) rectal enema 133 mL, 1 enema, Rectal, Once PRN    benzonatate (Tessalon) cap 200 mg, 200 mg, Oral, TID PRN    ondansetron (Zofran) 4 MG/2ML injection 4 mg, 4 mg, Intravenous, Q6H PRN    sennosides (Senokot) tab 8.6 mg, 8.6 mg, Oral, BID    bismuth subsalicylate (PEPTO-BISMOL) chew tab CHEW 262 mg, 1 tablet, Oral, Q6H PRN    cyanocobalamin (Vitamin B12) tab 1,000 mcg, 1,000 mcg, Oral, Daily    fluticasone propionate (Flonase) 50 MCG/ACT nasal suspension 2 spray, 2 spray, Each Nare, Daily    melatonin cap/tab 5 mg, 5 mg, Oral, Nightly PRN    risperiDONE (RisperDAL) tab 0.75 mg, 0.75 mg, Oral, BID    risperiDONE (RisperDAL) tab 0.5 mg, 0.5 mg, Oral, Noon    calcium carbonate-vitamin D (Oyster Shell-D) 250-3.125 MG-MCG per tab 2 tablet, 2 tablet, Oral, BID with meals  [6]   Patient Active Problem List  Diagnosis    Uncontrolled type 2 diabetes mellitus with hyperglycemia (HCC)    Pure hypercholesterolemia    Essential hypertension, benign    Osteopenia of necks of both femurs    Psychosis (HCC)    Alzheimer's dementia with behavioral disturbance ([facility])    Gallstones    Dilated cbd, acquired    COVID-19    COVID-19 virus infection    Acute hypercapnic respiratory failure ([facility])    Closed fracture of right hip, initial encounter ([facility])    Pseudoobstruction of colon    Weakness    Acute cystitis without hematuria    C. difficile diarrhea    Constipation, unspecified constipation type    Palliative care encounter    Goals of care, counseling/discussion    Corinna syndrome    Aspiration pneumonia of both lower lobes due to gastric secretions ([facility])

## 2025-04-30 NOTE — PHYSICAL THERAPY NOTE
Reviewed pt's chart and discuss pt with RN. At this time Pt continues to be on BiPAP as well as pt having meeting with hospice. PT will continue to follow pt and will initiate Eval when appropriate.

## 2025-04-30 NOTE — CM/SW NOTE
SW noted hospice order. Sent referral to [facility] via [facility]. Called and left message for [facility] as well (P:[phone]).    SW/CM to remain available for dc planning, and/or additional need for support.    CLAYTON RubioW  Discharge Planner  [phone]

## 2025-05-01 NOTE — PLAN OF CARE
Patient lethargic but arousable, nods yes or no appropriately. On AVAPS. Vitals stable, no fever overnight. Scheduled rectal Tylenol given per MAR. Patient changed and repositioned. No signs of pain or discomfort noted. Plan for weaning AVAPS today. Safety precautions in place, call light within reach, plan of care ongoing.     Problem: SAFETY ADULT - FALL  Goal: Free from fall injury  Description: INTERVENTIONS:- Assess pt frequently for physical needs- Identify cognitive and physical deficits and behaviors that affect risk of falls.- [place] fall precautions as indicated by assessment.- Educate pt/family on patient safety including physical limitations- Instruct pt to call for assistance with activity based on assessment- Modify environment to reduce risk of injury- Provide assistive devices as appropriate- Consider OT/PT consult to assist with strengthening/mobility- Encourage toileting schedule  Outcome: Progressing

## 2025-05-01 NOTE — HOSPICE RN NOTE
Patient lying in bed, unresponsive to voice or touch. Spoke to respiratory therapist and Nallely RN about weaning off Bipap/AVAPS. Attempted to call Makayla 3 times to notify her of change in condition. Makayla returned my call and is on her way. Explained that patient's level of consciousness has changed since last evening. She is now unresponsive. Last night patient was able to squeeze my hands and answer yes and no to simple questions. She verbalized understanding, but is tearful. Makayla and Marie are on their way to the hospital to see patient. This RN explained that patient will receive Morphine for comfort before removing Bipap mask. Jacklyn MARCANO and this RN will remain at bedside to support family and staff.     Gay Theodore RN BSN  [facility] RN Liaison  [phone] (After-hours)

## 2025-05-01 NOTE — SPIRITUAL CARE NOTE
Spiritual Care Visit Note    Patient Name: Kierra Joshua Date of Spiritual Care Visit: 25   : 1945 Primary Dx: <principal problem not specified>       Referred By: Referral From: Hospice    Spiritual Care Taxonomy:    Intended Effects: Journeying with someone in the grief process    Methods: Offer spiritual/Baptism support, Offer emotional support    Interventions: Active listening, Ask guided questions, Pleasant Unity, Provide sacred reading(s), Explain  role    Visit Type/Summary:     - Spiritual Care: Responded to a request via the on call phone Consulted with RN prior to visit. Offered empathic listening and emotional support. Patient's family expressed their gratitude for the  visit. expressed appreciation for  visit. Provided information regarding how to contact Spiritual Care and left a Spiritual Care information card.  remains available as needed for follow up.  responded to a patient death.  provided grief support to the family. Also  read a scripture and provided prayer for the family,as well as a grief packet to the family.     Spiritual Care support can be requested via an Epic consult. For urgent/immediate needs, please contact the On Call  at: Edward: ext [phone]    Chaplain Resident Tiffany Smith MA

## 2025-05-01 NOTE — SPIRITUAL CARE NOTE
Spiritual Care Visit Note    Patient Name: Kierra Joshua Date of Spiritual Care Visit: 25   : 1945 Primary Dx: <principal problem not specified>       Referred By: Referral From: Hospice    Spiritual Care Taxonomy:    Intended Effects: Preserve dignity and respect    Methods: Offer support         Visit Type/Summary:     - Spiritual Care: Consulted with RN prior to visit. Kierra names/describes describes she did not need anything when   offered spiritual care services.   left his card with a note to family (they had just left, probably for the day, according to RN) and that he had stopped by, with services offered by Spiritual Care.  remains available as needed for follow up.    Spiritual Care support can be requested via an Epic consult. For urgent/immediate needs, please contact the On Call  at: Edward: [phone]

## 2025-05-01 NOTE — DISCHARGE SUMMARY
[place] HOSPITALIST  DISCHARGE SUMMARY     Kierra Joshua Patient Status:  Inpatient    1945 MRN [id number]   Location [facility] 4NW-A Attending Marleny Marina MD   Hosp Day # 1 PCP Tate Tovar     Date of Admission: 2025  Date of Discharge:   2025     Discharge Disposition: [facility]    Discharge Diagnosis:  Hospice   Ileus  Stool impaction  Dementia   Bradycardia   Aspiration pneumonia - GNR  Ogilivies syndrome   Dysphagia   HLD  HTN  DM type 2  UTI ruled out     History of Present Illness:   Kierra Joshua is a 79 year old female with h/o dementia, HTN, HLD, DM type 2, Bipolar, h/o C Diff. Pt is presenting with decreased PO intake, bloating and possibly constipation. History limited due to pt dementia.     Brief Synopsis:   Pt admitted, managed for ogilivies conservatively. Hospital stay c/b aspiraotin PNA and  ongoing weakness. Pt was transitioned to  hospice on  and  on 25.     Lace+ Score: 57  59-90 High Risk  29-58 Medium Risk  0-28   Low Risk       TCM Follow-Up Recommendation:  LACE > 58: High Risk of readmission after discharge from the hospital.      Procedures during hospitalization:   Flex sig     Incidental or significant findings and recommendations (brief descriptions):  no    Lab/Test results pending at Discharge:   no    Consultants:  GI    Discharge Medication List:     Discharge Medications        ASK your doctor about these medications        Instructions Prescription details   acetaminophen 500 MG Tabs  Commonly known as: Tylenol Extra Strength      Take 1 tablet (500 mg total) by mouth every 4 (four) hours as needed.   Quantity: 90 tablet  Refills: 0     ascorbic acid 500 MG Tabs  Commonly known as: Vitamin C      Take 1 tablet (500 mg total) by mouth in the morning.   Refills: 0     Calcium Carbonate-Vitamin D 600-5 MG-MCG Caps      Take 1 tablet by mouth in the morning and 1 tablet before bedtime.   Refills: 0     Cranberry 450 MG  Caps      Take 1 tablet by mouth in the morning.   Refills: 0     cyanocobalamin 500 MCG Tabs  Commonly known as: Vitamin B12      Take 2 tablets (1,000 mcg total) by mouth in the morning.   Refills: 0     FLEET ENEMA 7-19 GM/118ML Enem  Commonly known as: FLEET      Place 1 enema (118 mL total) rectally daily as needed (CONSTIPTAION).   Refills: 0     fluticasone propionate 50 MCG/ACT Susp  Commonly known as: Flonase      2 sprays by Each Nare route in the morning.   Refills: 0     furosemide 20 MG Tabs  Commonly known as: Lasix      Take 1 tablet (20 mg total) by mouth Every Monday, Wednesday, and Friday.   Refills: 0     glipiZIDE 5 MG Tabs  Commonly known as: Glucotrol      Take 1 tablet (5 mg total) by mouth every morning before breakfast.   Refills: 0     lisinopril 10 MG Tabs  Commonly known as: Zestril      Take 1 tablet (10 mg total) by mouth in the morning. Hold for SBP < 130.   Refills: 0     meclizine 25 MG Tabs  Commonly known as: Antivert      Take 1 tablet (25 mg total) by mouth every 8 (eight) hours as needed.   Refills: 0     Melatonin 5 MG Tabs      Take 1 tablet (5 mg total) by mouth at bedtime.   Refills: 0     Milk of Magnesia 400 MG/5ML Susp  Generic drug: magnesium hydroxide      Take 5 mL by mouth daily as needed for constipation.   Refills: 0     Multi-Vitamin/Minerals Tabs      Take 1 tablet by mouth in the morning.   Refills: 0     Pepto-Bismol 262 MG/15ML Susp  Generic drug: bismuth subsalicylate      Take 30 mL (524 mg total) by mouth every 6 (six) hours as needed for Indigestion.   Refills: 0     polyethylene glycol (PEG 3350) 17 g Pack  Commonly known as: Miralax      Take 17 g by mouth daily.   Quantity: 30 each  Refills: 0     polyethylene glycol (PEG 3350-KCl-NaBcb-NaCl-NaSulf) 236 g Solr  Commonly known as: Golytely  Ask about: Should I take this medication?      Take 4,000 mL by mouth once for 1 dose.   Stop taking on: April 19, 2025  Quantity: 4000 mL  Refills: 0     Potassium  Chloride ER 10 MEQ Tbcr      Take 1 tablet (10 mEq total) by mouth 3 (three) times a week. MWF   Refills: 0     risperiDONE 0.25 MG Tabs  Commonly known as: RisperDAL      Take 3 tablets (0.75 mg total) by mouth in the morning and 3 tablets (0.75 mg total) before bedtime.   Refills: 0     risperiDONE 0.25 MG Tabs  Commonly known as: RisperDAL      Take 2 tablets (0.5 mg total) by mouth Noon.   Refills: 0     traMADol 50 MG Tabs  Commonly known as: Ultram      Take 1 tablet (50 mg total) by mouth 2 (two) times daily as needed for Pain.   Quantity: 30 tablet  Refills: 0              [place] reviewed[place] n/a     Follow-up appointment:   No follow-up provider specified.  Appointments for Next 30 Days 2025 - 2025      None            Vital signs:  Temp:  [98.2 °F (36.8 °C)-99.1 °F (37.3 °C)] 98.4 °F (36.9 °C)  Pulse:  [81-96] 81  Resp:  [18-20] 20  BP: (148-153)/(67-72) 148/67  SpO2:  [99 %-100 %] 100 %  FiO2 (%):  [30 %] 30 %    Physical Exam:         -----------------------------------------------------------------------------------------------  PATIENT DISCHARGE INSTRUCTIONS: See electronic chart    Marleny Marina MD    Total time spent on discharge plannin minutes     The  Cent[place] Cures Act makes medical notes like these available to patients in the interest of transparency. Please be advised this is a medical document. Medical documents are intended to carry relevant information, facts as evident, and the clinical opinion of the practitioner. The medical note is intended as peer to peer communication and may appear blunt or direct. It is written in medical language and may contain abbreviations or verbiage that are unfamiliar.

## 2025-05-01 NOTE — DISCHARGE SUMMARY
[place] HOSPITALIST  DISCHARGE SUMMARY     Kierra Joshua Patient Status:  Inpatient    1945 MRN [id number]   Location [facility] 4NW-A Attending No att. providers found   Hosp Day # 9 PCP Tate Tovar     Date of Admission: 2025  Date of Discharge:  2025     Discharge Disposition: [facility]    Discharge Diagnosis:  DC IP hospice     History of Present Illness:   Kierra Joshua is a 79 year old female with h/o dementia, HTN, HLD, DM type 2, Bipolar, h/o C Diff. Pt is presenting with decreased PO intake, bloating and possibly constipation. History limited due to pt dementia.     Brief Synopsis:   Pt being admitted to IP hospice.     Lace+ Score: 74  59-90 High Risk  29-58 Medium Risk  0-28   Low Risk       TCM Follow-Up Recommendation:  LACE > 58: High Risk of readmission after discharge from the hospital.      Procedures during hospitalization:   Flex Sig     Incidental or significant findings and recommendations (brief descriptions):  no    Lab/Test results pending at Discharge:   no    Consultants:  Cards, GI, Hospice     Discharge Medication List:     Discharge Medications        ASK your doctor about these medications        Instructions Prescription details   acetaminophen 500 MG Tabs  Commonly known as: Tylenol Extra Strength      Take 1 tablet (500 mg total) by mouth every 4 (four) hours as needed.   Quantity: 90 tablet  Refills: 0     ascorbic acid 500 MG Tabs  Commonly known as: Vitamin C      Take 1 tablet (500 mg total) by mouth in the morning.   Refills: 0     Calcium Carbonate-Vitamin D 600-5 MG-MCG Caps      Take 1 tablet by mouth in the morning and 1 tablet before bedtime.   Refills: 0     Cranberry 450 MG Caps      Take 1 tablet by mouth in the morning.   Refills: 0     cyanocobalamin 500 MCG Tabs  Commonly known as: Vitamin B12      Take 2 tablets (1,000 mcg total) by mouth in the morning.   Refills: 0     FLEET ENEMA 7-19 GM/118ML Enem  Commonly known as:  FLEET      Place 1 enema (118 mL total) rectally daily as needed (CONSTIPTAION).   Refills: 0     fluticasone propionate 50 MCG/ACT Susp  Commonly known as: Flonase      2 sprays by Each Nare route in the morning.   Refills: 0     furosemide 20 MG Tabs  Commonly known as: Lasix      Take 1 tablet (20 mg total) by mouth Every Monday, Wednesday, and Friday.   Refills: 0     glipiZIDE 5 MG Tabs  Commonly known as: Glucotrol      Take 1 tablet (5 mg total) by mouth every morning before breakfast.   Refills: 0     lisinopril 10 MG Tabs  Commonly known as: Zestril      Take 1 tablet (10 mg total) by mouth in the morning. Hold for SBP < 130.   Refills: 0     meclizine 25 MG Tabs  Commonly known as: Antivert      Take 1 tablet (25 mg total) by mouth every 8 (eight) hours as needed.   Refills: 0     Melatonin 5 MG Tabs      Take 1 tablet (5 mg total) by mouth at bedtime.   Refills: 0     Milk of Magnesia 400 MG/5ML Susp  Generic drug: magnesium hydroxide      Take 5 mL by mouth daily as needed for constipation.   Refills: 0     Multi-Vitamin/Minerals Tabs      Take 1 tablet by mouth in the morning.   Refills: 0     Pepto-Bismol 262 MG/15ML Susp  Generic drug: bismuth subsalicylate      Take 30 mL (524 mg total) by mouth every 6 (six) hours as needed for Indigestion.   Refills: 0     polyethylene glycol (PEG 3350) 17 g Pack  Commonly known as: Miralax      Take 17 g by mouth daily.   Quantity: 30 each  Refills: 0     polyethylene glycol (PEG 3350-KCl-NaBcb-NaCl-NaSulf) 236 g Solr  Commonly known as: Golytely  Ask about: Should I take this medication?      Take 4,000 mL by mouth once for 1 dose.   Stop taking on: April 19, 2025  Quantity: 4000 mL  Refills: 0     Potassium Chloride ER 10 MEQ Tbcr      Take 1 tablet (10 mEq total) by mouth 3 (three) times a week. MWF   Refills: 0     risperiDONE 0.25 MG Tabs  Commonly known as: RisperDAL      Take 3 tablets (0.75 mg total) by mouth in the morning and 3 tablets (0.75 mg total)  before bedtime.   Refills: 0     risperiDONE 0.25 MG Tabs  Commonly known as: RisperDAL      Take 2 tablets (0.5 mg total) by mouth Noon.   Refills: 0     traMADol 50 MG Tabs  Commonly known as: Ultram      Take 1 tablet (50 mg total) by mouth 2 (two) times daily as needed for Pain.   Quantity: 30 tablet  Refills: 0               Where to Get Your Medications        These medications were sent to [facility] - [place], [place] - [address] [facility] [phone], [phone]  [address] [facility] [address]      Phone: [phone]   polyethylene glycol (PEG 3350-KCl-NaBcb-NaCl-NaSulf) 236 g Solr         [place]MP reviewed: n[place]     Follow-up appointment:   [facility]  [address] [place] [id number]  Schedule an appointment as soon as possible for a visit in 1 week(s)      Appointments for Next 30 Days 5/1/2025 - 5/31/2025      None            Vital signs:  Temp:  [98.2 °F (36.8 °C)-99.1 °F (37.3 °C)] 98.4 °F (36.9 °C)  Pulse:  [81-96] 81  Resp:  [18-20] 20  BP: (148-153)/(67-72) 148/67  SpO2:  [99 %-100 %] 100 %  FiO2 (%):  [30 %] 30 %    Physical Exam:    General: No acute distress   Lungs: clear to auscultation  Cardiovascular: S1, S2  Abdomen: Soft      -----------------------------------------------------------------------------------------------  PATIENT DISCHARGE INSTRUCTIONS: See electronic chart    Marleny Marina MD    Total time spent on discharge planning:  < 30 minutes     The 21st Century Cures Act makes medical notes like these available to patients in the interest of transparency. Please be advised this is a medical document. Medical documents are intended to carry relevant information, facts as evident, and the clinical opinion of the practitioner. The medical note is intended as peer to peer communication and may appear blunt or direct. It is written in medical language and may contain abbreviations or verbiage that are unfamiliar.

## 2025-05-01 NOTE — PROGRESS NOTES
Patient  at 1155 no signs of life no resp no heart rate no b/p. Patient family at bedside offered comfort to family nichole called to help support family.

## 2025-05-01 NOTE — H&P
[facility]     Kierra Joshua Patient Status:  Inpatient    1945 MRN [id number]   Location [facility] 4NW-A Attending Marleny Marina MD   Hosp Day # 1 PCP Tate Tovar     Chief Complaint: hospice     Subjective:    History of Present Illness:     Kierra Joshua is a 79 year old female whos being admitted for hospice.     History/Other:    Past Medical History:  Past Medical History[1]  Past Surgical History:   Past Surgical History[2]   Family History:   Family History[3]  Social History:    reports that she quit smoking about 24 years ago. Her smoking use included cigarettes. She has never used smokeless tobacco. She reports that she does not drink alcohol and does not use drugs.     Allergies: Allergies[4]    Medications:  Medications Ordered Prior to Encounter[5]    Review of Systems:   A comprehensive review of systems was completed.    Pertinent positives and negatives noted in the HPI.    Objective:   Physical Exam:    /67 (BP Location: Right arm)   Pulse 81   Temp 98.4 °F (36.9 °C) (Axillary)   Resp 20   SpO2 100%   General: No acute distress, Alert  Respiratory: No rhonchi, no wheezes  Cardiovascular: S1, S2. Regular rate and rhythm  Abdomen: Soft, Non-tender, non-distended, positive bowel sounds  Neuro: No new focal deficits  Extremities: No edema      Results:    Labs:      Labs Last 24 Hours:    Recent Labs   Lab 25  0612 25  0613 25  0613   RBC 4.07 4.61 3.66*   HGB 11.6* 13.1 10.5*   HCT 35.7 41.3 33.0*   MCV 87.7 89.6 90.2   MCH 28.5 28.4 28.7   MCHC 32.5 31.7 31.8   RDW 13.1 13.2 13.3   NEPRELIM  --   --  4.20   WBC 4.7 11.5* 6.2   .0 220.0 185.0       Recent Labs   Lab 25  0612 25  0613 25  0613 25  1756   * 226* 129*  --    BUN <5* 10 13  --    CREATSERUM 0.51* 0.69 0.52*  --    EGFRCR 94 88 94  --    CA 9.6 9.7 8.8  --    ALB 3.8  --  3.3  --     143 148*  --    K 3.6  3.6  3.7 2.9*  2.9* 3.5    104 113*  --    CO2 30.0 32.0 26.0  --    ALKPHO 64  --  60  --    AST 11  --  11  --    ALT <7*  --  <7*  --    BILT 0.3  --  0.3  --    TP 5.7  --  5.2*  --        Estimated Glomerular Filtration Rate: 94 mL/min/1.73m2 (result from lab).    Lab Results   Component Value Date    INR 1.06 02/20/2024       No results for input(s): \"TROP\", \"TROPHS\", \"CK\" in the last 168 hours.    No results for input(s): \"TROP\", \"PBNP\" in the last 168 hours.    No results for input(s): \"PCT\" in the last 168 hours.    Imaging: Imaging data reviewed in Epic.    Assessment & Plan:      #Aspiration pneumonia, gram neg  #Acute hypercapnic/hypoxemic resp failure  -pulm on Cs: Bipap   -iv unasyn (4/28-)  -s/p Rapid response called 4/28 night  -palliative care team involved  -prognosis guarded - plan for meeting with hospice 4/30     #Constipation with high stool burden  #[place] syndrome  Continue bowel regimen  Diet, as tolerated  GI signed off as of 4/27  S/p flexible sigmoidoscopy w/ decompression on 4/23; s/p flexible sigmoidoscopy with rectal tube placement on 4/25, since removed  Gen sx consulted, eval noted from 4/26, have since signed off  KUB (4/29) showing return of abd distention, may need to call back gen sx and GI to have convo w/ daughter that there is not much more that can be done     #Dysphagia, transient; resolved     #h/o dementia with behavioral disturbances      #HTN     #HLD      #DM type 2  A1c 5.7 as of this admission   ISS      #UTI ruled out         Plan of care discussed with hospice     Marleny Marina MD    Supplementary Documentation:     The 21st Century Cures Act makes medical notes like these available to patients in the interest of transparency. Please be advised this is a medical document. Medical documents are intended to carry relevant information, facts as evident, and the clinical opinion of the practitioner. The medical note is intended as peer to peer communication and may  appear blunt or direct. It is written in medical language and may contain abbreviations or verbiage that are unfamiliar.               **Certification      PHYSICIAN Certification of Need for Inpatient Hospitalization - Initial Certification    Patient will require inpatient services that will reasonably be expected to span two midnight's based on the clinical documentation in H+P.   Based on patients current state of illness, I anticipate that, after discharge, patient will require TBD.                      [1]   Past Medical History:   Alzheimer's dementia with behavioral disturbance (HCC)    Anxiety state    Bipolar affective (HCC)    Cataract    Dementia (HCC)    Diabetes (HCC)    Elevated fasting lipid profile    Essential hypertension    Essential hypertension, benign    Falls    Gallstones and inflammation of gallbladder without obstruction    High blood pressure    High cholesterol    Muscle weakness    Neuropathy    Osteoarthritis    Psychosis, unspecified psychosis type (HCC)    Shortness of breath   [2]   Past Surgical History:  Procedure Laterality Date    Biopsy of breast, incisional      left breast bx during surgery    Colonoscopy N/A 12/29/2023    Procedure: UNPREPPED  COLONOSCOPY;  Surgeon: Fabrice Trejo MD;  Location:  ENDOSCOPY    Colonoscopy N/A 2/25/2024    Procedure: COLONOSCOPY WITH DECOMPRESSION;  Surgeon: Trudi Albright MD;  Location:  ENDOSCOPY    Other surgical history  2001    aneurysm clipped x2   [3] No family history on file.  [4] No Known Allergies  [5]   Current Facility-Administered Medications on File Prior to Encounter   Medication Dose Route Frequency Provider Last Rate Last Admin    [COMPLETED] potassium chloride 40 mEq in 250mL sodium chloride 0.9% IVPB premix  40 mEq Intravenous Once Marleny Marina MD 62.5 mL/hr at 04/30/25 0810 40 mEq at 04/30/25 0810    Followed by    [COMPLETED] potassium chloride 20 mEq/100mL IVPB premix 20 mEq  20 mEq Intravenous Once  Marleny Marina MD 50 mL/hr at 25 1220 20 mEq at 25 1220    [COMPLETED] potassium chloride 20 mEq/100mL IVPB premix 20 mEq  20 mEq Intravenous Once Darion Don MD 50 mL/hr at 25 2134 20 mEq at 25 2134    [COMPLETED] sodium bicarbonate injection 100 mEq  100 mEq Intravenous Once Maggie Goldstein DO   100 mEq at 25 2313    [COMPLETED] albuterol (Ventolin) (2.5 MG/3ML) 0.083% nebulizer solution        2.5 mg at 25 2310    [COMPLETED] potassium chloride (Klor-Con M20) tab 40 mEq  40 mEq Oral Once Kulwant Geller DO   40 mEq at 25 0808    [COMPLETED] potassium chloride 40 mEq in 250mL sodium chloride 0.9% IVPB premix  40 mEq Intravenous Once Kulwant Geller DO 62.5 mL/hr at 25 0933 40 mEq at 25 0933    [COMPLETED] potassium chloride 40 mEq in 250mL sodium chloride 0.9% IVPB premix  40 mEq Intravenous Once Kulwant Geller DO 62.5 mL/hr at 25 1150 40 mEq at 25 1150    [COMPLETED] potassium chloride 40 mEq in 250mL sodium chloride 0.9% IVPB premix  40 mEq Intravenous Once Marleny Marina MD 62.5 mL/hr at 25 0912 40 mEq at 25 0912    [COMPLETED] magnesium sulfate in sterile water for injection 2 g/50mL IVPB premix 2 g  2 g Intravenous Once Marleny Marina MD 50 mL/hr at 25 1726 2 g at 25 1726    [COMPLETED] polyethylene glycol-electrolyte (Golytely) 236 g oral solution 2,000 mL  2,000 mL Oral Once Marleny Marina MD   2,000 mL at 25    [COMPLETED] iopamidol 76% (ISOVUE-370) injection for power injector  80 mL Intravenous ONCE PRN Librado Appiah MD   80 mL at 25 1801     Current Outpatient Medications on File Prior to Encounter   Medication Sig Dispense Refill    [] polyethylene glycol, PEG 3350-KCl-NaBcb-NaCl-NaSulf, 236 g Oral Recon Soln Take 4,000 mL by mouth once for 1 dose. 4000 mL 0    Calcium Carbonate-Vitamin D 600-5 MG-MCG Oral Cap Take 1 tablet by mouth in the morning and 1 tablet before bedtime.       Potassium Chloride ER 10 MEQ Oral Tab CR Take 1 tablet (10 mEq total) by mouth 3 (three) times a week. MWF      cyanocobalamin 500 MCG Oral Tab Take 2 tablets (1,000 mcg total) by mouth in the morning.      ascorbic acid 500 MG Oral Tab Take 1 tablet (500 mg total) by mouth in the morning.      acetaminophen 500 MG Oral Tab Take 1 tablet (500 mg total) by mouth every 4 (four) hours as needed. 90 tablet 0    lisinopril 10 MG Oral Tab Take 1 tablet (10 mg total) by mouth in the morning. Hold for SBP < 130.      traMADol 50 MG Oral Tab Take 1 tablet (50 mg total) by mouth 2 (two) times daily as needed for Pain. 30 tablet 0    FLEET ENEMA 7-19 GM/118ML Rectal Enema Place 1 enema (118 mL total) rectally daily as needed (CONSTIPTAION).      Cranberry 450 MG Oral Cap Take 1 tablet by mouth in the morning.      Melatonin 5 MG Oral Tab Take 1 tablet (5 mg total) by mouth at bedtime.      magnesium hydroxide (MILK OF MAGNESIA) 400 MG/5ML Oral Suspension Take 5 mL by mouth daily as needed for constipation.      Multiple Vitamins-Minerals (MULTI-VITAMIN/MINERALS) Oral Tab Take 1 tablet by mouth in the morning.      fluticasone propionate 50 MCG/ACT Nasal Suspension 2 sprays by Each Nare route in the morning.      bismuth subsalicylate (PEPTO-BISMOL) 262 MG/15ML Oral Suspension Take 30 mL (524 mg total) by mouth every 6 (six) hours as needed for Indigestion.      polyethylene glycol, PEG 3350, 17 g Oral Powd Pack Take 17 g by mouth daily. 30 each 0    meclizine 25 MG Oral Tab Take 1 tablet (25 mg total) by mouth every 8 (eight) hours as needed. (Patient not taking: Reported on 4/19/2025)      risperiDONE 0.25 MG Oral Tab Take 2 tablets (0.5 mg total) by mouth Noon.      furosemide 20 MG Oral Tab Take 1 tablet (20 mg total) by mouth Every Monday, Wednesday, and Friday.      glipiZIDE 5 MG Oral Tab Take 1 tablet (5 mg total) by mouth every morning before breakfast.      risperiDONE 0.25 MG Oral Tab Take 3 tablets (0.75 mg total)  by mouth in the morning and 3 tablets (0.75 mg total) before bedtime.

## 2025-05-01 NOTE — HOSPICE RN NOTE
[facility] RN admitted the patient to general inpatient hospice per Dr. Marina and Dr. Melo. Patient is eligible for general inpatient hospice care for symptom management of severe dyspnea requiring frequent nursing assessments and interventions including titration of IV medications.    Comfort order set placed.    Regular diet with pleasure feeding.    Fall and aspiration precautions in place.    PPS: 10%    POC discussed with daughter Makayla and Terrence RN, all in agreement. [facility] to follow daily to support the patient and family.     Gay Theodore RN BSN  [facility] RN Liaison  [phone] (After-hours)

## 2025-05-01 NOTE — CM/SW NOTE
[facility] and Hospice RN Gay were at bedside with the patient's family when she peacefully passed at 1155.  Care and comfort provided to the family.  We will contact the  home to provide pertinent information.  MD notified.    Jacklyn Kramer [facility]  [phone]

## 2025-05-01 NOTE — HOSPICE RN NOTE
[facility] RN at bedside at time of patient's natural death. TOD 1155 am. Hospice RN supported family (daughter Marie Benavides and son Jonathan) at bedside to offer condolences and bereavement resources/services. Family stated understanding. This RN called  [facility] to pray with family per their request. [facility] team will remain available for family support and further resource education. Family chose [facility] in [place] for services.  will notify them of patient's passing.    Gay Theodore RN BSN  [facility] RN Liaison  [phone] (After-hours)

## 2025-07-20 NOTE — PLAN OF CARE
Patient arousable to verbal stimuli, intermittently follows commands. Patient is alert to self, able to answer questions.  Patient denies pain, no nonverbal signs of pain observed. On AVAPS. Electrolytes replaced per protocol. Hygiene and repositioning completed. ECHO at bedside. EKG completed per order. Patient c/o nausea - PRN per MAR. Patient c/o BLE pain - family declined PRN pain medication, asked if patient could be repositioned to help with pain. Patient and family updated on plan of care.     1818: RT contacted about weaning AVAPs with family goal of getting her off the mask tomorrow.    Yes - the patient is able to be screened

## (undated) DEVICE — SYRINGE BLB 60CC CATH TIP FOR IRRIG AND

## (undated) DEVICE — SUTURE COAT VCRL 2-0 27IN ABSRB UD 36MM CP-1

## (undated) DEVICE — KIT CUSTOM ENDOPROCEDURE STERIS

## (undated) DEVICE — SUTURE COAT VCRL 0 27IN ABSRB UD 36MM CP-1

## (undated) DEVICE — SURGICAL GLOVE PI ORTHO 7.5

## (undated) DEVICE — DRAPE EQP 72X42IN CARM POLY

## (undated) DEVICE — V2 SPECIMEN COLLECTION MANIFOLD KIT: Brand: NEPTUNE

## (undated) DEVICE — PIN FX 30.5CM 3MM NTR NL

## (undated) DEVICE — SYRINGE MED 30ML STD CLR PLAS LL TIP N CTRL

## (undated) DEVICE — 3M™ RED DOT™ MONITORING ELECTRODE WITH FOAM TAPE AND STICKY GEL, 50/BAG, 20/CASE, 72/PLT 2570: Brand: RED DOT™

## (undated) DEVICE — TRAP,MUCUS SPECIMEN, 80CC: Brand: MEDLINE

## (undated) DEVICE — SOLUTION IRRIG 1000ML 0.9% NACL USP BTL

## (undated) DEVICE — SLEEVE COMPR M KNEE LEN SGL USE KENDALL SCD

## (undated) DEVICE — KIT VLV 5 PC AIR H2O SUCT BX ENDOGATOR CONN

## (undated) DEVICE — STAPLER SKIN ETHICON GUN PXW35

## (undated) DEVICE — GLOVE SUR 7 SENSICARE PI PIP CRM PWD F

## (undated) DEVICE — 10FT COMBINED O2 DELIVERY/CO2 MONITORING. FILTER WITH MICROSTREAM TYPE LUER: Brand: DUAL ADULT NASAL CANNULA

## (undated) DEVICE — Device

## (undated) DEVICE — 3M™ RED DOT™ MONITORING ELECTRODE WITH FOAM TAPE AND STICKY GEL 2570-3, 3/BAG, 200/CASE, 54/PLT: Brand: RED DOT™

## (undated) DEVICE — CANISTER SUCT 1200CC LID BLU HRD ADPT AUTO

## (undated) DEVICE — COVER LT HNDL RIG FOR SUR CAM DISP

## (undated) DEVICE — NEEDLE SPNL 18GA L3.5IN PNK HUB SS RW FIT

## (undated) DEVICE — SHEET DRAPE 40X58 STER

## (undated) DEVICE — PACK PBDS HIP PINNING

## (undated) DEVICE — DRAPE C ARM TRNSPAR POLY PROTCT BARR FOR UNIV

## (undated) DEVICE — CONTAINER SPEC 4OZ POLYPR GRAD LEAK RESIST

## (undated) DEVICE — STERIS KITS

## (undated) DEVICE — DRESSING ANTIMIC 3.5 X 4 IN AQCEL AG ADVNTG

## (undated) DEVICE — ELECTRODE EKG W3.5XL4CM ABRAD W1.25XL1.5IN

## (undated) DEVICE — CANNULA NSL AD W/ FLTR 14FT O2/CO2

## (undated) DEVICE — DRESSING ANTIMIC 3.5 X 6 IN AQCEL AG ADVNTG

## (undated) DEVICE — GUIDEWIRE ORTH 100CM 3MM TIB

## (undated) DEVICE — 1200CC GUARDIAN II: Brand: GUARDIAN

## (undated) NOTE — LETTER
12/29/18        Jam [address] [address]      Dear Anthony Ornelas records indicate that you have outstanding lab work and or testing that was ordered for you and has not yet been completed: Bone Density - Please call

## (undated) NOTE — LETTER
[address]  [address]  Authorization for Surgical Operation and Procedure     Date:___________                                                                                                         Time:__________  I hereby authorize Surgeon(s):  Librado Main MD, my physician and his/her assistants (if applicable), which may include medical students, residents, and/or fellows, to perform the following surgical operation/ procedure and administer such anesthesia as may be determined necessary by my physician:  Operation/Procedure name (s) Procedure(s):  FLEXIBLE SIGMOIDOSCOPY on Kierra A Gombash   2.   I recognize that during the surgical operation/procedure, unforeseen conditions may necessitate additional or different procedures than those listed above.  I, therefore, further authorize and request that the above-named surgeon, assistants, or designees perform such procedures as are, in their judgment, necessary and desirable.    3.   My surgeon/physician has discussed prior to my surgery the potential benefits, risks and side effects of this procedure; the likelihood of achieving goals; and potential problems that might occur during recuperation.  They also discussed reasonable alternatives to the procedure, including risks, benefits, and side effects related to the alternatives and risks related to not receiving this procedure.  I have had all my questions answered and I acknowledge that no guarantee has been made as to the result that may be obtained.    4.   Should the need arise during my operation/procedure, which includes change of level of care prior to discharge, I also consent to the administration of blood and/or blood products.  Further, I understand that despite careful testing and screening of blood or blood products by collecting agencies, I may still be subject to ill effects as a result of receiving a blood transfusion and/or blood products.  The following  are some, but not all, of the potential risks that can occur: fever and allergic reactions, hemolytic reactions, transmission of diseases such as Hepatitis, AIDS and Cytomegalovirus (CMV) and fluid overload.  In the event that I wish to have an autologous transfusion of my own blood, or a directed donor transfusion, I will discuss this with my physician.  Check only if Refusing Blood or Blood Products  I understand refusal of blood or blood products as deemed necessary by my physician may have serious consequences to my condition to include possible death. I hereby assume responsibility for my refusal and release the hospital, its personnel, and my physicians from any responsibility for the consequences of my refusal.          o  Refuse      5.   I authorize the use of any specimen, organs, tissues, body parts or foreign objects that may be removed from my body during the operation/procedure for diagnosis, research or teaching purposes and their subsequent disposal by hospital authorities.  I also authorize the release of specimen test results and/or written reports to my treating physician on the hospital medical staff or other referring or consulting physicians involved in my care, at the discretion of the Pathologist or my treating physician.    6.   I consent to the photographing or videotaping of the operations or procedures to be performed, including appropriate portions of my body for medical, scientific, or educational purposes, provided my identity is not revealed by the pictures or by descriptive texts accompanying them.  If the procedure has been photographed/videotaped, the surgeon will obtain the original picture, image, videotape or CD.  The hospital will not be responsible for storage, release or maintenance of the picture, image, tape or CD.    7.   I consent to the presence of a  or observers in the operating room as deemed necessary by my physician or their designees.    8.   I  recognize that in the event my procedure results in extended X-Ray/fluoroscopy time, I may develop a skin reaction.    9. If I have a Do Not Attempt Resuscitation (DNAR) order in place, that status will be suspended while in the operating room, procedural suite, and during the recovery period unless otherwise explicitly stated by me (or a person authorized to consent on my behalf). The surgeon or my attending physician will determine when the applicable recovery period ends for purposes of reinstating the DNAR order.  10. Patients having a sterilization procedure: I understand that if the procedure is successful the results will be permanent and it will therefore be impossible for me to inseminate, conceive, or bear children.  I also understand that the procedure is intended to result in sterility, although the result has not been guaranteed.   11. I acknowledge that my physician has explained sedation/analgesia administration to me including the risk and benefits I consent to the administration of sedation/analgesia as may be necessary or desirable in the judgment of my physician.    I CERTIFY THAT I HAVE READ AND FULLY UNDERSTAND THE ABOVE CONSENT TO OPERATION and/or OTHER PROCEDURE.    _________________________________________  __________________________________  Signature of Patient     Signature of Responsible Person         ___________________________________         Printed Name of Responsible Person           _________________________________                 Relationship to Patient  _________________________________________  ______________________________  Signature of Witness          Date  Time      Patient Name: Kierra Joshua     : 1945                 Printed: 2025     Medical Record #: [id number]                     Page [address] of [address] [place], [place] [address], [place]  [address]    Consent for Anesthesia    I, Kierra Joshua agree to be  cared for by an anesthesiologist, who is specially trained to monitor me and give me medicine to put me to sleep or keep me comfortable during my procedure    I understand that my anesthesiologist is not an employee or agent of [facility] or [employer]. He or she works for [employer].    As the patient asking for anesthesia services, I agree to:  Allow the anesthesiologist (anesthesia doctor) to give me medicine and do additional procedures as necessary. Some examples are: Starting or using an “IV” to give me medicine, fluids or blood during my procedure, and having a breathing tube placed to help me breathe when I’m asleep (intubation). In the event that my heart stops working properly, I understand that my anesthesiologist will make every effort to sustain my life, unless otherwise directed by [facility] Do Not Resuscitate documents.  Tell my anesthesia doctor before my procedure:  If I am pregnant.  The last time that I ate or drank.  All of the medicines I take (including prescriptions, herbal supplements, and pills I can buy without a prescription (including street drugs/illegal medications). Failure to inform my anesthesiologist about these medicines may increase my risk of anesthetic complications.  If I am allergic to anything or have had a reaction to anesthesia before.  I understand how the anesthesia medicine will help me (benefits).  I understand that with any type of anesthesia medicine there are risks:  The most common risks are: nausea, vomiting, sore throat, muscle soreness, damage to my eyes, mouth, or teeth (from breathing tube placement).  Rare risks include: remembering what happened during my procedure, allergic reactions to medications, injury to my airway, heart, lungs, vision, nerves, or muscles and in extremely rare instances death.  My doctor has explained to me other choices available to me for my care (alternatives).  Pregnant Patients  (“epidural”):  I understand that the risks of having an epidural (medicine given into my back to help control pain during labor), include itching, low blood pressure, difficulty urinating, headache or slowing of the baby’s heart. Very rare risks include infection, bleeding, seizure, irregular heart rhythms and nerve injury.  Regional Anesthesia (“spinal”, “epidural”, & “nerve blocks”):  I understand that rare but potential complications include headache, bleeding, infection, seizure, irregular heart rhythms, and nerve injury.    I can change my mind about having anesthesia services at any time before I get the medicine.    _____________________________________________________________________________  Patient (or Representative) Signature/Relationship to Patient  Date   Time    _____________________________________________________________________________   Name (if used)    Language/Organization   Time    _____________________________________________________________________________  Anesthesiologist Signature     Date   Time  I have discussed the procedure and information above with the patient (or patient’s representative) and answered their questions. The patient or their representative has agreed to have anesthesia services.    _____________________________________________________________________________  Witness        Date   Time  I have verified that the signature is that of the patient or patient’s representative, and that it was signed before the procedure  Patient Name: Kierra Joshua     : 1945                 Printed: 2025     Medical Record #: [id number]                     Page 2 of 2

## (undated) NOTE — MR AVS SNAPSHOT
[place]  [address] [facility] [address]  [address] [facility] [address] [phone]               Thank you for choosing us for your health care visit with Miquel Welch MD.  We are glad to serve you and happy to provide you with this sum Clostridium difficile(toxigenic)PCR [E]    Complete by:  Jan 18, 2017 (Approximate)    Assoc Dx:  Diarrhea, unspecified type [R19.7]                 [email or web address]     Call the [facility] for assistance with your inactive Ping Communication account    If you have question Visit [facility] online at  [email or web address]

## (undated) NOTE — LETTER
[facility] 3SW-A  [address] [address]  [phone]    Blood Transfusion Consent    In the course of your treatment, it may become necessary to administer a transfusion of blood or blood components. This form provides basic information concerning this procedure and, if signed by you, authorizes its administration. By signing this form, you agree that all of your questions about the administration of blood or blood products have been answered by the ordering medical professional or designee.    Description of Procedure  Blood is introduced into one of your veins, commonly in the arm, using a sterilized disposable needle. The amount of blood transfused, and whether the transfusion will be of blood or blood components is a judgement the physician will make based on your particular needs.    Risks  The transfusion is a common procedure of low risk.  MINOR AND TEMPORARY REACTIONS ARE NOT UNCOMMON, including a slight bruise, swelling or local reaction in the area where the needle pierces your skin, or a nonserious reaction to the transfused material itself, including headache, fever or mild skin reaction, such as rash.  Serious reactions are possible, though very unlikely, and include severe allergic reaction (shock) and destruction (hemolysis) of transfused blood cells.  Infectious diseases which are known to be transmitted by blood transfusion include certain types of viral Hepatitis(liver infection from a virus), Human Immunodeficiency Virus (HIV-1,2) infection, a viral infection known to cause Acquired Immunodeficiency Syndrome (AIDS), as well as certain other bacterial, viral, and parasitic diseases. While a minimal risk of acquiring an infectious disease from transfused blood exists, in accordance with the Federal and State law, all due care has been taken in donor selection and testing to avoid transmission of disease.    Alternatives  If loss of blood poses serious threats during your  treatment, THERE IS NO EFFECTIVE ALTERNATIVE TO BLOOD TRANSFUSION. However, if you have any further questions on this matter, your provider will fully explain the alternatives to you if it has not already been done.    I, ______________________________, have read/had read to me the above. I understand the matters bearing on the decision whether or not to authorize a transfusion of blood or blood components. I have no questions which have not been answered to my full satisfaction. I hereby consent to such transfusion as my physician may deem necessary or advisable in the course of my treatment.    ______________________________________________                    ___________________________  (Signature of Patient or Responsible party in case of minor,                 (Printed Name of Patient or incompetent, or unconscious patient)              Responsible Party)    ___________________________               _____________________  (Relationship to Patient if not self)                                    (Date and Time)    __________________________                                                           ______________________              (Signature of Witness)               (Printed Name of Witness)     Language line ()    Telephone/Verbal/Video Consent    __________________________                     ____________________  (Signature of 2nd Witness           (Printed Name of 2nd  Telephone/Verbal/Video Consent)           Witness)    Patient Name: Kierra Joshua     : 1945                 Printed: 2024     Medical Record #: [id number]      Rev: 2023

## (undated) NOTE — LETTER
[address]  [address]  Authorization for Surgical Operation and Procedure     Date:___________                                                                                                         Time:__________  I hereby authorize Surgeon(s):  Brian Guidry MD, my physician and his/her assistants (if applicable), which may include medical students, residents, and/or fellows, to perform the following surgical operation/ procedure and administer such anesthesia as may be determined necessary by my physician:  Operation/Procedure name (s) Procedure(s):  RIGHT HIP INTRAMEDULLARY NAIL on Kierra A Adismbash   2.   I recognize that during the surgical operation/procedure, unforeseen conditions may necessitate additional or different procedures than those listed above.  I, therefore, further authorize and request that the above-named surgeon, assistants, or designees perform such procedures as are, in their judgment, necessary and desirable.    3.   My surgeon/physician has discussed prior to my surgery the potential benefits, risks and side effects of this procedure; the likelihood of achieving goals; and potential problems that might occur during recuperation.  They also discussed reasonable alternatives to the procedure, including risks, benefits, and side effects related to the alternatives and risks related to not receiving this procedure.  I have had all my questions answered and I acknowledge that no guarantee has been made as to the result that may be obtained.    4.   Should the need arise during my operation/procedure, which includes change of level of care prior to discharge, I also consent to the administration of blood and/or blood products.  Further, I understand that despite careful testing and screening of blood or blood products by collecting agencies, I may still be subject to ill effects as a result of receiving a blood transfusion and/or blood products.  The  following are some, but not all, of the potential risks that can occur: fever and allergic reactions, hemolytic reactions, transmission of diseases such as Hepatitis, AIDS and Cytomegalovirus (CMV) and fluid overload.  In the event that I wish to have an autologous transfusion of my own blood, or a directed donor transfusion, I will discuss this with my physician.  Check only if Refusing Blood or Blood Products  I understand refusal of blood or blood products as deemed necessary by my physician may have serious consequences to my condition to include possible death. I hereby assume responsibility for my refusal and release the hospital, its personnel, and my physicians from any responsibility for the consequences of my refusal.          o  Refuse      5.   I authorize the use of any specimen, organs, tissues, body parts or foreign objects that may be removed from my body during the operation/procedure for diagnosis, research or teaching purposes and their subsequent disposal by hospital authorities.  I also authorize the release of specimen test results and/or written reports to my treating physician on the hospital medical staff or other referring or consulting physicians involved in my care, at the discretion of the Pathologist or my treating physician.    6.   I consent to the photographing or videotaping of the operations or procedures to be performed, including appropriate portions of my body for medical, scientific, or educational purposes, provided my identity is not revealed by the pictures or by descriptive texts accompanying them.  If the procedure has been photographed/videotaped, the surgeon will obtain the original picture, image, videotape or CD.  The hospital will not be responsible for storage, release or maintenance of the picture, image, tape or CD.    7.   I consent to the presence of a  or observers in the operating room as deemed necessary by my physician or their designees.     8.   I recognize that in the event my procedure results in extended X-Ray/fluoroscopy time, I may develop a skin reaction.    9. If I have a Do Not Attempt Resuscitation (DNAR) order in place, that status will be suspended while in the operating room, procedural suite, and during the recovery period unless otherwise explicitly stated by me (or a person authorized to consent on my behalf). The surgeon or my attending physician will determine when the applicable recovery period ends for purposes of reinstating the DNAR order.  10. Patients having a sterilization procedure: I understand that if the procedure is successful the results will be permanent and it will therefore be impossible for me to inseminate, conceive, or bear children.  I also understand that the procedure is intended to result in sterility, although the result has not been guaranteed.   11. I acknowledge that my physician has explained sedation/analgesia administration to me including the risk and benefits I consent to the administration of sedation/analgesia as may be necessary or desirable in the judgment of my physician.    I CERTIFY THAT I HAVE READ AND FULLY UNDERSTAND THE ABOVE CONSENT TO OPERATION and/or OTHER PROCEDURE.    _________________________________________  __________________________________  Signature of Patient     Signature of Responsible Person         ___________________________________         Printed Name of Responsible Person           _________________________________                 Relationship to Patient  _________________________________________  ______________________________  Signature of Witness          Date  Time      Patient Name: Kierra Joshua     : 1945                 Printed: 2024     Medical Record #: [id number]                     Page 1 of [address] [place] [address] [place]  [address]    Consent for Anesthesia    I, Kierra Jsohua  agree to be cared for by an anesthesiologist, who is specially trained to monitor me and give me medicine to put me to sleep or keep me comfortable during my procedure    I understand that my anesthesiologist is not an employee or agent of [facility] or [employer]. He or she works for [employer].    As the patient asking for anesthesia services, I agree to:  Allow the anesthesiologist (anesthesia doctor) to give me medicine and do additional procedures as necessary. Some examples are: Starting or using an “IV” to give me medicine, fluids or blood during my procedure, and having a breathing tube placed to help me breathe when I’m asleep (intubation). In the event that my heart stops working properly, I understand that my anesthesiologist will make every effort to sustain my life, unless otherwise directed by [facility] Do Not Resuscitate documents.  Tell my anesthesia doctor before my procedure:  If I am pregnant.  The last time that I ate or drank.  All of the medicines I take (including prescriptions, herbal supplements, and pills I can buy without a prescription (including street drugs/illegal medications). Failure to inform my anesthesiologist about these medicines may increase my risk of anesthetic complications.  If I am allergic to anything or have had a reaction to anesthesia before.  I understand how the anesthesia medicine will help me (benefits).  I understand that with any type of anesthesia medicine there are risks:  The most common risks are: nausea, vomiting, sore throat, muscle soreness, damage to my eyes, mouth, or teeth (from breathing tube placement).  Rare risks include: remembering what happened during my procedure, allergic reactions to medications, injury to my airway, heart, lungs, vision, nerves, or muscles and in extremely rare instances death.  My doctor has explained to me other choices available to me for my care (alternatives).  Pregnant Patients  (“epidural”):  I understand that the risks of having an epidural (medicine given into my back to help control pain during labor), include itching, low blood pressure, difficulty urinating, headache or slowing of the baby’s heart. Very rare risks include infection, bleeding, seizure, irregular heart rhythms and nerve injury.  Regional Anesthesia (“spinal”, “epidural”, & “nerve blocks”):  I understand that rare but potential complications include headache, bleeding, infection, seizure, irregular heart rhythms, and nerve injury.    I can change my mind about having anesthesia services at any time before I get the medicine.    _____________________________________________________________________________  Patient (or Representative) Signature/Relationship to Patient  Date   Time    _____________________________________________________________________________   Name (if used)    Language/Organization   Time    _____________________________________________________________________________  Anesthesiologist Signature     Date   Time  I have discussed the procedure and information above with the patient (or patient’s representative) and answered their questions. The patient or their representative has agreed to have anesthesia services.    _____________________________________________________________________________  Witness        Date   Time  I have verified that the signature is that of the patient or patient’s representative, and that it was signed before the procedure  Patient Name: Kierra Joshua     : 1945                 Printed: 2024     Medical Record #: [id number]                     Page 2 of 2

## (undated) NOTE — IP AVS SNAPSHOT
[address]            (For Outpatient Use Only) Initial Admit Date: 2023   Inpt/Obs Admit Date: Inpt: 23 / Obs: 23   Discharge Date:    Hospital Acct:  [de-identified]   MRN: [de-identified]   CSN: [id number]   CEID: [id number]        ENCOUNTER  Patient Class: Observation Admitting Provider: Camryn Garcia MD Unit: [place] [address] Service: Medical Attending Provider: Desi Black DO   Bed: 306-A   Visit Type:   Referring Physician: No ref. provider found Billing Flag:    Admit Diagnosis: Gallstones [K80.20]      PATIENT  Legal Name:   Delicia Langford   Legal Sex: Female  Gender ID:              Pref Name:    PCP:  Gini Loss: [phone]   Address:  [address] [place] [address] : 1945 (77 yrs) Mobile: [phone]         City/State/[facility]: [facility] [address] Marital:  Language: [address] [place] [address] Drive: Will SSN4: [id number] Synagogue: None     Race: White Ethnicity: Non  Or  O*   EMERGENCY CONTACT   Name Relationship Legal Guardian? Home Phone Work Phone Mobile Phone   1. CLARIBEL MELCHOR  2.  Jonathan Joshua Daughter  Son          [phone]     GUARANTOR  Guarantor: Suzanna Tim : 1945 Home Phone: [phone]   Address: [address]  Sex: Female Work Phone:    City[facility]   Rel. to Patient: Self Guarantor ID: [id number]   GUARANTOR EMPLOYER   Employer:  Status: RETIRED     COVERAGE  PRIMARY INSURANCE   Payor: MEDICARE Plan: MEDICARE PART A&B   Group Number:  Insurance Type: INDEMNITY   Subscriber Name: Abhishek Cardenas : 1945   Subscriber ID: [id number] Pt Rel to Subscriber: Self   SECONDARY INSURANCE   Payor: AARDEDRICK Plan: [address]   Group Number:  Insurance Type: INDEMNITY   Subscriber Name: Abhishek Cardenas : 1945   Subscriber ID: [id number] Pt Rel to Subscriber: SELF   TERTIARY INSURANCE   Payor:  Plan:    Group Number:  Insurance Type:    Subscriber Name:  Marv Elias :    Subscriber ID:  Pt Rel to Subscriber:    Hospital Account Financial Class: Medicare    January 3, 2023

## (undated) NOTE — LETTER
[address]  [address]  Authorization for Surgical Operation and Procedure     Date:___________                                                                                                         Time:__________  I hereby authorize Trudi Albright MD , my physician and his/her assistants (if applicable), which may include medical students, residents, and/or fellows, to perform the following surgical operation/ procedure and administer such anesthesia as may be determined necessary by my physician:  Operation/Procedure name (s) Colonoscopy with possible biopsy, polypectomy, control of bleed  on Kierra A Gombash   2.   I recognize that during the surgical operation/procedure, unforeseen conditions may necessitate additional or different procedures than those listed above.  I, therefore, further authorize and request that the above-named surgeon, assistants, or designees perform such procedures as are, in their judgment, necessary and desirable.    3.   My surgeon/physician has discussed prior to my surgery the potential benefits, risks and side effects of this procedure; the likelihood of achieving goals; and potential problems that might occur during recuperation.  They also discussed reasonable alternatives to the procedure, including risks, benefits, and side effects related to the alternatives and risks related to not receiving this procedure.  I have had all my questions answered and I acknowledge that no guarantee has been made as to the result that may be obtained.    4.   Should the need arise during my operation/procedure, which includes change of level of care prior to discharge, I also consent to the administration of blood and/or blood products.  Further, I understand that despite careful testing and screening of blood or blood products by collecting agencies, I may still be subject to ill effects as a result of receiving a blood transfusion and/or blood  products.  The following are some, but not all, of the potential risks that can occur: fever and allergic reactions, hemolytic reactions, transmission of diseases such as Hepatitis, AIDS and Cytomegalovirus (CMV) and fluid overload.  In the event that I wish to have an autologous transfusion of my own blood, or a directed donor transfusion, I will discuss this with my physician.  Check only if Refusing Blood or Blood Products  I understand refusal of blood or blood products as deemed necessary by my physician may have serious consequences to my condition to include possible death. I hereby assume responsibility for my refusal and release the hospital, its personnel, and my physicians from any responsibility for the consequences of my refusal.          o  Refuse      5.   I authorize the use of any specimen, organs, tissues, body parts or foreign objects that may be removed from my body during the operation/procedure for diagnosis, research or teaching purposes and their subsequent disposal by hospital authorities.  I also authorize the release of specimen test results and/or written reports to my treating physician on the hospital medical staff or other referring or consulting physicians involved in my care, at the discretion of the Pathologist or my treating physician.    6.   I consent to the photographing or videotaping of the operations or procedures to be performed, including appropriate portions of my body for medical, scientific, or educational purposes, provided my identity is not revealed by the pictures or by descriptive texts accompanying them.  If the procedure has been photographed/videotaped, the surgeon will obtain the original picture, image, videotape or CD.  The hospital will not be responsible for storage, release or maintenance of the picture, image, tape or CD.    7.   I consent to the presence of a  or observers in the operating room as deemed necessary by my physician or  their designees.    8.   I recognize that in the event my procedure results in extended X-Ray/fluoroscopy time, I may develop a skin reaction.    9. If I have a Do Not Attempt Resuscitation (DNAR) order in place, that status will be suspended while in the operating room, procedural suite, and during the recovery period unless otherwise explicitly stated by me (or a person authorized to consent on my behalf). The surgeon or my attending physician will determine when the applicable recovery period ends for purposes of reinstating the DNAR order.  10. Patients having a sterilization procedure: I understand that if the procedure is successful the results will be permanent and it will therefore be impossible for me to inseminate, conceive, or bear children.  I also understand that the procedure is intended to result in sterility, although the result has not been guaranteed.   11. I acknowledge that my physician has explained sedation/analgesia administration to me including the risk and benefits I consent to the administration of sedation/analgesia as may be necessary or desirable in the judgment of my physician.    I CERTIFY THAT I HAVE READ AND FULLY UNDERSTAND THE ABOVE CONSENT TO OPERATION and/or OTHER PROCEDURE.    _________________________________________  __________________________________  Signature of Patient     Signature of Responsible Person         ___________________________________         Printed Name of Responsible Person           _________________________________                 Relationship to Patient  _________________________________________  ______________________________  Signature of Witness          Date  Time      Patient Name: Kierra Arceosharonda     : 1945                 Printed: 2024     Medical Record #: [id number]                     Page [address] of [address] [place] [address]  [address]    Consent for Anesthesia    I,  Kierra Joshua agree to be cared for by an anesthesiologist, who is specially trained to monitor me and give me medicine to put me to sleep or keep me comfortable during my procedure    I understand that my anesthesiologist is not an employee or agent of [facility] or [facility]. He or she works for [employer].    As the patient asking for anesthesia services, I agree to:  Allow the anesthesiologist (anesthesia doctor) to give me medicine and do additional procedures as necessary. Some examples are: Starting or using an “IV” to give me medicine, fluids or blood during my procedure, and having a breathing tube placed to help me breathe when I’m asleep (intubation). In the event that my heart stops working properly, I understand that my anesthesiologist will make every effort to sustain my life, unless otherwise directed by [facility] Do Not Resuscitate documents.  Tell my anesthesia doctor before my procedure:  If I am pregnant.  The last time that I ate or drank.  All of the medicines I take (including prescriptions, herbal supplements, and pills I can buy without a prescription (including street drugs/illegal medications). Failure to inform my anesthesiologist about these medicines may increase my risk of anesthetic complications.  If I am allergic to anything or have had a reaction to anesthesia before.  I understand how the anesthesia medicine will help me (benefits).  I understand that with any type of anesthesia medicine there are risks:  The most common risks are: nausea, vomiting, sore throat, muscle soreness, damage to my eyes, mouth, or teeth (from breathing tube placement).  Rare risks include: remembering what happened during my procedure, allergic reactions to medications, injury to my airway, heart, lungs, vision, nerves, or muscles and in extremely rare instances death.  My doctor has explained to me other choices available to me for my care  (alternatives).  Pregnant Patients (“epidural”):  I understand that the risks of having an epidural (medicine given into my back to help control pain during labor), include itching, low blood pressure, difficulty urinating, headache or slowing of the baby’s heart. Very rare risks include infection, bleeding, seizure, irregular heart rhythms and nerve injury.  Regional Anesthesia (“spinal”, “epidural”, & “nerve blocks”):  I understand that rare but potential complications include headache, bleeding, infection, seizure, irregular heart rhythms, and nerve injury.    I can change my mind about having anesthesia services at any time before I get the medicine.    _____________________________________________________________________________  Patient (or Representative) Signature/Relationship to Patient  Date   Time    _____________________________________________________________________________   Name (if used)    Language/Organization   Time    _____________________________________________________________________________  Anesthesiologist Signature     Date   Time  I have discussed the procedure and information above with the patient (or patient’s representative) and answered their questions. The patient or their representative has agreed to have anesthesia services.    _____________________________________________________________________________  Witness        Date   Time  I have verified that the signature is that of the patient or patient’s representative, and that it was signed before the procedure  Patient Name: Kierra Joshua     : 1945                 Printed: 2024     Medical Record #: [id number]                     Page 2 of 2

## (undated) NOTE — LETTER
[address]  [address]  Authorization for Surgical Operation and Procedure     Date:___________                                                                                                         Time:__________  I hereby authorize Surgeon(s):  Brian Guidry MD, my physician and his/her assistants (if applicable), which may include medical students, residents, and/or fellows, to perform the following surgical operation/ procedure and administer such anesthesia as may be determined necessary by my physician:  Operation/Procedure name (s) Procedure(s):  RIGHT HIP INTRAMEDULLARY NAIL on Kierra A Adismbash   2.   I recognize that during the surgical operation/procedure, unforeseen conditions may necessitate additional or different procedures than those listed above.  I, therefore, further authorize and request that the above-named surgeon, assistants, or designees perform such procedures as are, in their judgment, necessary and desirable.    3.   My surgeon/physician has discussed prior to my surgery the potential benefits, risks and side effects of this procedure; the likelihood of achieving goals; and potential problems that might occur during recuperation.  They also discussed reasonable alternatives to the procedure, including risks, benefits, and side effects related to the alternatives and risks related to not receiving this procedure.  I have had all my questions answered and I acknowledge that no guarantee has been made as to the result that may be obtained.    4.   Should the need arise during my operation/procedure, which includes change of level of care prior to discharge, I also consent to the administration of blood and/or blood products.  Further, I understand that despite careful testing and screening of blood or blood products by collecting agencies, I may still be subject to ill effects as a result of receiving a blood transfusion and/or blood products.  The  following are some, but not all, of the potential risks that can occur: fever and allergic reactions, hemolytic reactions, transmission of diseases such as Hepatitis, AIDS and Cytomegalovirus (CMV) and fluid overload.  In the event that I wish to have an autologous transfusion of my own blood, or a directed donor transfusion, I will discuss this with my physician.  Check only if Refusing Blood or Blood Products  I understand refusal of blood or blood products as deemed necessary by my physician may have serious consequences to my condition to include possible death. I hereby assume responsibility for my refusal and release the hospital, its personnel, and my physicians from any responsibility for the consequences of my refusal.          o  Refuse      5.   I authorize the use of any specimen, organs, tissues, body parts or foreign objects that may be removed from my body during the operation/procedure for diagnosis, research or teaching purposes and their subsequent disposal by hospital authorities.  I also authorize the release of specimen test results and/or written reports to my treating physician on the hospital medical staff or other referring or consulting physicians involved in my care, at the discretion of the Pathologist or my treating physician.    6.   I consent to the photographing or videotaping of the operations or procedures to be performed, including appropriate portions of my body for medical, scientific, or educational purposes, provided my identity is not revealed by the pictures or by descriptive texts accompanying them.  If the procedure has been photographed/videotaped, the surgeon will obtain the original picture, image, videotape or CD.  The hospital will not be responsible for storage, release or maintenance of the picture, image, tape or CD.    7.   I consent to the presence of a  or observers in the operating room as deemed necessary by my physician or their designees.     8.   I recognize that in the event my procedure results in extended X-Ray/fluoroscopy time, I may develop a skin reaction.    9. If I have a Do Not Attempt Resuscitation (DNAR) order in place, that status will be suspended while in the operating room, procedural suite, and during the recovery period unless otherwise explicitly stated by me (or a person authorized to consent on my behalf). The surgeon or my attending physician will determine when the applicable recovery period ends for purposes of reinstating the DNAR order.  10. Patients having a sterilization procedure: I understand that if the procedure is successful the results will be permanent and it will therefore be impossible for me to inseminate, conceive, or bear children.  I also understand that the procedure is intended to result in sterility, although the result has not been guaranteed.   11. I acknowledge that my physician has explained sedation/analgesia administration to me including the risk and benefits I consent to the administration of sedation/analgesia as may be necessary or desirable in the judgment of my physician.    I CERTIFY THAT I HAVE READ AND FULLY UNDERSTAND THE ABOVE CONSENT TO OPERATION and/or OTHER PROCEDURE.    _________________________________________  __________________________________  Signature of Patient     Signature of Responsible Person         ___________________________________         Printed Name of Responsible Person           _________________________________                 Relationship to Patient  _________________________________________  ______________________________  Signature of Witness          Date  Time      Patient Name: Kierra Joshua     : 1945                 Printed: 2024     Medical Record #: [id number]                     Page 1 of [address] [place] [address] [place]  [address]    Consent for Anesthesia    I, Kierra Joshua  agree to be cared for by an anesthesiologist, who is specially trained to monitor me and give me medicine to put me to sleep or keep me comfortable during my procedure    I understand that my anesthesiologist is not an employee or agent of [facility] or [employer]. He or she works for [employer].    As the patient asking for anesthesia services, I agree to:  Allow the anesthesiologist (anesthesia doctor) to give me medicine and do additional procedures as necessary. Some examples are: Starting or using an “IV” to give me medicine, fluids or blood during my procedure, and having a breathing tube placed to help me breathe when I’m asleep (intubation). In the event that my heart stops working properly, I understand that my anesthesiologist will make every effort to sustain my life, unless otherwise directed by [facility] Do Not Resuscitate documents.  Tell my anesthesia doctor before my procedure:  If I am pregnant.  The last time that I ate or drank.  All of the medicines I take (including prescriptions, herbal supplements, and pills I can buy without a prescription (including street drugs/illegal medications). Failure to inform my anesthesiologist about these medicines may increase my risk of anesthetic complications.  If I am allergic to anything or have had a reaction to anesthesia before.  I understand how the anesthesia medicine will help me (benefits).  I understand that with any type of anesthesia medicine there are risks:  The most common risks are: nausea, vomiting, sore throat, muscle soreness, damage to my eyes, mouth, or teeth (from breathing tube placement).  Rare risks include: remembering what happened during my procedure, allergic reactions to medications, injury to my airway, heart, lungs, vision, nerves, or muscles and in extremely rare instances death.  My doctor has explained to me other choices available to me for my care (alternatives).  Pregnant Patients  (“epidural”):  I understand that the risks of having an epidural (medicine given into my back to help control pain during labor), include itching, low blood pressure, difficulty urinating, headache or slowing of the baby’s heart. Very rare risks include infection, bleeding, seizure, irregular heart rhythms and nerve injury.  Regional Anesthesia (“spinal”, “epidural”, & “nerve blocks”):  I understand that rare but potential complications include headache, bleeding, infection, seizure, irregular heart rhythms, and nerve injury.    I can change my mind about having anesthesia services at any time before I get the medicine.    _____________________________________________________________________________  Patient (or Representative) Signature/Relationship to Patient  Date   Time    _____________________________________________________________________________   Name (if used)    Language/Organization   Time    _____________________________________________________________________________  Anesthesiologist Signature     Date   Time  I have discussed the procedure and information above with the patient (or patient’s representative) and answered their questions. The patient or their representative has agreed to have anesthesia services.    _____________________________________________________________________________  Witness        Date   Time  I have verified that the signature is that of the patient or patient’s representative, and that it was signed before the procedure  Patient Name: Kierra Joshua     : 1945                 Printed: 2024     Medical Record #: [id number]                     Page 2 of 2

## (undated) NOTE — LETTER
09/15/20        Gelacio Michigamme  [address] [place] [id number]      Dear Yasmani Ramirez records indicate that you have outstanding lab work and or testing that was ordered for you and has not yet been completed: Fasting lab work - (at [facility]

## (undated) NOTE — IP AVS SNAPSHOT
Patient Demographics     Address  [address] [facility] [address] Phone  [phone] (Home) *Preferred*  [phone] (Mobile) E-mail Address  [email or web address]      Patient Contacts     Name Relation Home Work Mobile    CLARIBEL MELCHOR   [phone]    Jonathan Joshua Son   [phone]    CRYSTAL SAUER Daughter   [phone]      Allergies as of 2/28/2024  Review status set to In Progress on 2/25/2024   No Active Allergies      Noted Reaction Type Reactions    DELETED: Ace Inhibitors 02/09/2012        DELETED: Eggs Or Egg-derived Products 12/18/2019    UNKNOWN      Code Status Information     Code Status    Not on file        Patient Instructions       Hip Fracture Discharge Instructions    Activity    Weight bearing restrictions:  WBAT  Non-weight bearing (NWB) ? you may NOT put any weight at all on your leg. You will need to use a walker, crutches, or          wheelchair to get around.  Toe-touch weight bearing (TTWB) or touch-down weight bearing(TDWB) ? you may only put your toes down lightly. This is for balance only. This often means you may not put more than 10 percent of your body weight on your leg. You will need to use a walker, crutches, or wheelchair to get around.  Partial weight bearing (PWB) ? you may only put about half of your weight on your leg. You will need a walker or crutches to get around.  Weight bearing as tolerated (WBAT) ? you may put as much of your weight on your leg as you can tolerate. This means you may use a walker, crutches, cane- or no device at all to get around.  Full weight bearing (FWB) ? you may put full weight on your leg. You may not need any device at all to help with walking.     Bathing  No tub baths, pools, or saunas until cleared by surgeon (about 4-6 weeks because it takes that long for the incision(s) on the skin to heal and be a barrier to prevent infection.)    When allowed to shower:  AQUACEL dressing is waterproof and does not require being covered  before showering.  Pat dressing(s) and surrounding skin dry after shower.   There may be one incision or a few that have a dressing.                                      AQUACEL           MEDIPORE/COVERLET           DRY STERILE GAUZE                                                                                                                         MEDIPORE/COVERLET dressing and dry sterile gauze are NOT waterproof and REQUIRE being covered with a waterproof barrier to keep the dressing and incision dry.  SARAN WRAP, GLAD WRAP, PRESS N SEAL WORK REALLY WELL BUT ANY PLASTIC WRAP WILL DO.  Do not wash incision.   Remove entire wrapping and old dressing (if Medipore/coverlet or gauze) after showering. Pat dry with a CLEAN TOWEL if necessary and cover incision with new Medipore/coverlet or gauze.    Incision care/Dressing changes  Wash hands before and after dressing changes.  There may be one or a few dressings on the hip/leg    FOR MEDIPORE/COVERLET DRESSINGS:  Change dressing daily using Medipore/coverlet once Aquacel (waterproof) dressing is removed (which is about 7 days after surgery). Patient should be standing or lying flat so dressing goes on smoothly.  (This dressing needed for hip patients because of location of incision-don’t want contamination from bathroom use)  FOR DRY STERILE GAUZE DRESSINGS:   Change dressing daily using dry sterile gauze and paper tape.  Watch ALL incision for any redness, drainage, increased warmth or opening of the incision.   Call surgeon if you notice any of these.  No lotions or ointments on or near incision(s).                             DRY STERILE GAUZE                         MEDIPORE /COVERLET    Continue dressing changes until your first visit with your surgeon’s office.  There could be a small amount of redness around the staples or incision; this is normal.  Watch for increased redness, warmth, any odor, increased drainage or opening of the incision. A little clear  yellow or blood tinged drainage is normal up to 2 weeks after surgery but it should be less every day until it stops.  Call physician if you notice any concerning changes.  Sutures/staples will be removed at first office visit (10 days- 3 weeks).       Driving  Do not drive until cleared by surgeon. Possibly six weeks after surgery. Discuss this at follow-up office visit.   Not allowed while taking narcotic pain medication or muscle relaxants.    Sex  Usually allowed after six weeks - check with surgeon at your office visit.    Return to work  Usually allowed after six weeks. Discuss specific work activities with your surgeon.    Restrictions  Follow instructions provided by physical therapy    No smoking  Avoid smoking. It is known to cause breathing problems and can decrease the rate of healing.                      Medication  Anticoagulants = blood thinners (Xarelto, Eliquis, Lovenox, Coumadin or Aspirin)  Pill or shot form depending on what your physician orders.   IF placed on Coumadin, you may also need lab work done for monitoring.  You will bleed easier and bruise easier while on these medications.   Usually you will be on a blood thinner for about 4-5 weeks after surgery.  Contact your physician if you have signs of bruising, nose bleeds or blood in your urine. Use electric razors and soft toothbrushes only.  Do not take NSAIDS (non-steroidal anti-inflammatory medications) while taking blood thinners unless ordered by your surgeon.  Review anticoagulant education information sheet provided.    Discomfort  Surgical discomfort is normal for one to two months.  Have realistic goals and keep a positive outlook.  Keep pain manageable; pain should not disrupt your sleep or activities like getting out of bed or walking.  You may need pain medication regularly (every 4-6 hours) the first 2 weeks and then begin to decrease how often you are taking it.  Take pain medication as prescribed with food, especially before  therapy, allowing 30-60 minutes to take effect.  Do not drink alcohol while on pain medication.  As you have less discomfort, decrease the amount of pain medication you take. Use plain Tylenol (acetaminophen) for less severe pain.  Some pain medications have Tylenol (acetaminophen) in them such as Norco and Percocet. Do NOT take Tylenol (acetaminophen) within 4 hours of a dose of these medications.  Apply ice or cold therapy to surgical site for 20 minutes at least four times a day, especially after therapy. Be sure there is a thin cloth barrier between skin and ice or cold therapy.  Change position at least every 45 minutes while awake to avoid stiffness or increased discomfort.  Deep breathing and relaxation techniques and distractions can help!  If you focus on something else, you do not experience the pain the same. Take advantage of everything available to your to help control you discomfort.  Contact physician if discomfort does not respond to pain medication.    Body changes  Constipation is common with the use of narcotics.  Eat fiber rich foods and drink plenty of fluids, at least 4-6 glasses of water a day, unless restricted.  To prevent constipation, use stool softeners such as Colace and laxatives such as Miralax, Senakot or Milk of Magnesia while taking laxatives.   An enema or suppository may be needed if above measures do not work.    Prevention of infection and promotion of healing  Good hand washing is important. Everyone should wash their hands or use hand  as soon as they walk in your house-whether they live there or are visiting.  Keep bed linen/clothing freshly laundered.  Do not allow others or pets to touch your incision.  Avoid people that have colds or the flu.  Your surgeon may recommend that you take antibiotics before you undergo any dental or other invasive surgical procedures after your hip fracture surgery. Speak with your surgeon about this at your post-op office  visit.  Continue using incentive spirometry because narcotics make you sleepy so you may not take good deep breaths. We do not want you to get pneumonia.     Post op Office visits  Schedule 10 days to 3 weeks after surgery WITH SURGEON’s office.  Additional visits may need to be scheduled. Your physician will discuss this at first post-op office visit.  Schedule outpatient physical therapy per your surgeon’s orders.  Schedule one week follow up after surgery WITH PRIMARY CARE PHYSICIAN; review your medications over last 6 months.  Your body gets stressed by surgery and that stress can affect all your other health issues (such as high blood pressure, diabetes, CHF, afib, and asthma just to name a few).  It is much better to catch developing problems and prevent them from becoming larger ones.  ANJELICA HOSE - IF ordered by your surgeon, wear these during the day and off at night.  Surgeon will tell you when you don’t need them anymore.    Notify your surgeon if you notice any of the following signs  Separation of incision line.  Increased redness, swelling, or warmth of skin around incision.  Increased or foul smelling drainage from incision  Red streaks on skin near incision.  Temperature >100.4F.  Increased pain at incision not relieved by pain medication.    Signs of Possible Hip Dislocation IF Total Hip Replacement or Radu-arthroplasty Done  Increased severe leg or groin pain  Turning in or out of surgical leg that is new  Increased numbness, tingling to leg  Inability to walk or put weight on your surgical leg  Signs of blood clot  Pain, excessive tenderness, redness, or swelling in leg or calf (other than incision site).  Call physician and await their directions.    Go directly to the ER or CALL 911 if you:  become short of breath  have chest pain  cough up blood  have unexplained anxiety with breathing     Traveling and Handicapped parking  Check with you surgeon when you are allowed to travel so you don’t set  yourself up for greater chance of complications.  If traveling by car, get out to stretch every 2 hours.  This helps prevent stiffness.  You may need to do this any time you travel for the first year after surgery.  If traveling by plane, BEFORE you get into a security line, let them know that you had your hip replaced, as you will most likely set off the metal detector. The doctors no longer provide an identification card for this as they are easily copied. ALSO request a wheelchair the first year to board and get off a plane…this aids in priority seating and you should sit on the aisle or at the bulkhead where you can easily stretch your legs and get up to walk up and down the aisles…this helps prevent blood clots and stiffness.  TEMPORARY HANDICAP PARKING APPLICATION  (good for 3-6 months)  - At Surgeon or PCP visit, request they fill out their part of the form. You fill our your portion, then go to Department of Motor Vehicles. Some [facility] offices provide the same service. ([facility] and [facility] have this service; if you live in another [facility], you may check with them as well). You need space to open car doors to position yourself properly with walker to get in and out of your car safely; some parking spaces are  practically on top of each other and do not give you enough room.          Follow-up Information     Nanette Barrientos MD. Schedule an appointment as soon as possible for a visit in 2 week(s).    Specialties: SURGERY, ORTHOPEDIC, Surgery, Orthopaedic  Contact information:  [address] [facility] [address]  [facility] [address] [phone]             Tate Tovar MD Follow up in 1 week(s).    Specialties: Internal Medicine, IP Consult to Primary Care  Contact information:  [address] [facility] [id number] [phone]                        Your Home Meds List      TAKE these medications       Instructions Authorizing Provider Morning Afternoon Evening As Needed   acetaminophen 500 MG  Tabs  Commonly known as: Tylenol Extra Strength      Take 1 tablet (500 mg total) by mouth every 4 (four) hours as needed.   Shree Tailor         aspirin 81 MG Tbec  Start taking on: March 6, 2024      Take 1 tablet (81 mg total) by mouth 2 (two) times daily for 28 days.  Stop taking on: April 3, 2024   Bruce Casey         bisacodyl 10 MG Supp  Commonly known as: Dulcolax      Place 1 suppository (10 mg total) rectally Q24H PRN.          Cranberry 425 MG Caps      Take by mouth.          enoxaparin 40 MG/0.4ML Sosy  Commonly known as: Lovenox  Start taking on: February 29, 2024      Inject 0.4 mL (40 mg total) into the skin daily for 6 days.  Stop taking on: March 6, 2024   Bruce Casey         FLEET ENEMA 7-19 GM/118ML Enem  Commonly known as: FLEET              fluticasone propionate 50 MCG/ACT Susp  Commonly known as: Flonase      2 sprays by Each Nare route daily.          furosemide 20 MG Tabs  Commonly known as: Lasix      Take 1 tablet (20 mg total) by mouth Every Monday, Wednesday, and Friday.          glipiZIDE 5 MG Tabs  Commonly known as: Glucotrol      Take 0.5 tablets (2.5 mg total) by mouth daily with lunch.          glipiZIDE 5 MG Tabs  Commonly known as: Glucotrol      Take 1 tablet (5 mg total) by mouth every morning before breakfast.          lisinopril 10 MG Tabs  Commonly known as: Zestril      Take 1 tablet (10 mg total) by mouth daily. Hold for SBP < 130   Shree Tailor         meclizine 25 MG Tabs  Commonly known as: Antivert      Take 1 tablet (25 mg total) by mouth every 8 (eight) hours as needed.          Melatonin 3 MG Caps      Take by mouth.          Milk of Magnesia 400 MG/5ML Susp  Generic drug: magnesium hydroxide      Take by mouth daily as needed for constipation.          Multi-Vitamin/Minerals Tabs      Take 1 tablet by mouth daily.          Pepto-Bismol 262 MG/15ML Susp  Generic drug: bismuth subsalicylate      Take 30 mL (524 mg total) by mouth every 6 (six) hours as needed for  Indigestion.          polyethylene glycol (PEG 3350) 17 g Pack  Commonly known as: Miralax      Take 17 g by mouth daily.   Nathaniel Cardenas         Potassium Chloride ER 10 MEQ Tbcr  Commonly known as: K-DUR      Take 1 tablet (10 mEq total) by mouth Every Monday, Wednesday, and Friday.          risperiDONE 0.25 MG Tabs  Commonly known as: RisperDAL      Take 3 tablets (0.75 mg total) by mouth 2 (two) times daily.          risperiDONE 0.25 MG Tabs  Commonly known as: RisperDAL      Take 2 tablets (0.5 mg total) by mouth Noon.          Sennosides 17.2 MG Tabs      Take 1 tablet (17.2 mg total) by mouth daily.   Jesica Injam         traMADol 50 MG Tabs  Commonly known as: Ultram      Take 1 tablet (50 mg total) by mouth 2 (two) times daily as needed for Pain.   Shree Tailor               Where to Get Your Medications      These medications were sent to [facility] [address] [phone], [phone]  [address] [facility] [address]    Phone: [phone]   acetaminophen 500 MG Tabs  aspirin 81 MG Tbec  enoxaparin 40 MG/0.4ML Sosy     Please  your prescriptions at the location directed by your doctor or nurse    Bring a paper prescription for each of these medications  traMADol 50 MG Tabs           [id number] - MAR ACTION REPORT  (last 48 hrs)    ** SITE UNKNOWN **     Order ID Medication Name Action Time Action Reason Comments    [id number] acetaminophen (Tylenol Extra Strength) tab 1,000 mg 02/28/24 0811 Given      [id number] acetaminophen (Tylenol Extra Strength) tab 1,000 mg 02/28/24 1144 Given      [id number] docusate sodium (Colace) cap 100 mg 02/27/24 1002 Given      [id number] docusate sodium (Colace) cap 100 mg 02/27/24 2038 Given      [id number] docusate sodium (Colace) cap 100 mg 02/28/24 0810 Given      [id number] lisinopril (Zestril) tab 10 mg 02/27/24 1002 Given      [id number] lisinopril (Zestril) tab 10 mg 02/28/24 0811 Given      [id number]  melatonin tab 3 mg 02/26/24 2300 Given      [id number] melatonin tab 3 mg 02/27/24 2038 Given      [id number] multivitamin (Centrum) chewable tab (Adult) 1 tablet 02/28/24 0810 Given      [id number] polyethylene glycol (PEG 3350) (Miralax) 17 g oral packet 17 g 02/27/24 1002 Given      [id number] polyethylene glycol (PEG 3350) (Miralax) 17 g oral packet 17 g 02/28/24 0810 Given      [id number] risperiDONE (RisperDAL) tab 0.25 mg 02/26/24 1308 Given      [id number] risperiDONE (RisperDAL) tab 0.25 mg 02/27/24 1229 Given      [id number] risperiDONE (RisperDAL) tab 0.25 mg 02/28/24 1144 Given      [id number] risperiDONE (RisperDAL) tab 0.5 mg 02/26/24 2300 Given      [id number] risperiDONE (RisperDAL) tab 0.5 mg 02/27/24 1002 Given      [id number] risperiDONE (RisperDAL) tab 0.5 mg 02/27/24 2038 Given      [id number] risperiDONE (RisperDAL) tab 0.5 mg 02/28/24 0810 Given            LEFT LOWER ABDOMEN     Order ID Medication Name Action Time Action Reason Comments    [id number] enoxaparin (Lovenox) 40 MG/0.4ML SUBQ injection 40 mg 02/28/24 0810 Given      [id number] insulin aspart (NovoLOG) 100 Units/mL FlexPen 1-10 Units 02/27/24 2148 Given            LEFT UPPER ABDOMEN     Order ID Medication Name Action Time Action Reason Comments    [id number] enoxaparin (Lovenox) 40 MG/0.4ML SUBQ injection 40 mg 02/27/24 1002 Given            LEFT UPPER ARM     Order ID Medication Name Action Time Action Reason Comments    [id number] insulin aspart (NovoLOG) 100 Units/mL FlexPen 1-10 Units 02/26/24 1237 Given      [id number] insulin aspart (NovoLOG) 100 Units/mL FlexPen 1-10 Units 02/27/24 1230 Given      [id number] insulin aspart (NovoLOG) 100 Units/mL FlexPen 1-10 Units 02/27/24 1729 Given      [id number] insulin aspart (NovoLOG) 100 Units/mL FlexPen 1-10 Units 02/28/24 1145 Given      [id number] insulin aspart (NovoLOG) 100 Units/mL FlexPen 1-68 Units 02/27/24 1729 Given              Recent Vital Signs    Flowsheet Row Most Recent Value   BP  140/70 Filed at 2024 1202   Pulse 89 Filed at 2024 1202   Resp 18 Filed at 2024 1202   Temp 98.6 °F (37 °C) Filed at 2024 1202   SpO2 97 % Filed at 2024 1202      Patient's Most Recent Weight    Flowsheet Row Most Recent Value   Patient Weight 64.9 kg (143 lb)         Lab Results Last 24 Hours      Hemoglobin & Hematocrit [[id number]] (Abnormal)  Resulted: 24 09, Result status: Final result   Ordering provider: Bruce Casey MD  24 07 Resulting lab: [facility] ([facility])    Specimen Information    Type Source Collected On   Blood — 24 0846          Components    Component Value Reference Range Flag Lab   HGB 8.6 12.0 - 16.0 g/dL [facility] ([facility])   HCT 27.6 35.0 - 48.0 % L [facility])            Testing Performed By     Lab - Abbreviation Name Director Address Valid Date Range    139 - [facility] ([facility]) [facility] ([facility]) Goldberg, Cathryn A. MD [address] [place] [address] 20 1441 - Present            Microbiology Results (All)     Procedure Component Value Units Date/Time    MRSA/SA Scrn by PCR:[facility] only [[id number]]     Order Status: Sent Lab Status: No result     Specimen: Other from Nar          H&P - H&P Note      H&P signed by Nathaniel Cardenas DO at 2024 10:50 PM  Version 1 of 1    Author: Nathaniel Cardenas DO Service: Hospitalist Author Type: Physician    Filed: 2024 10:50 PM Date of Service: 2024 10:39 PM Status: Signed    : Nathaniel Cardenas DO (Physician)         [place] [facility]PITAL[facility]  History and Physical     Kierramoiz Joshua Patient Status:  Inpatient    1945 MRN [id number]   Location [facility] 3SW-A Attending Nathaniel Cardenas DO   Hosp Day # 0 PCP Tate Tovar     Chief Complaint: Right hip pain    Subjective:    History of Present Illness:   Kierra A Gombash is a 78 year old female with PMHx Alzheimer's dementia with behavioral disturbances,  anxiety, hypertension, diabetes mellitus type 2, dyslipidemia who presents to the hospital for evaluation of right hip pain.  Patient lives at the [facility] and previously was at memory care assisted living.  Daughter is at bedside assisting with history and visits the patient 7 times a week and is with her approximately 30 hours every week.  She walked the patient Sunday night as well as Monday night and she was at her baseline.  No fever, chills, chest pain, shortness of breath, nausea, vomiting, diarrhea, dysuria or abdominal pain.  Today patient complained of pain in her right hip.  Daughter talked to the overnight tech and there were no issues.  Her leg was noted to be shorter and externally rotated compared to her left.  There was no witnessed fall or no idea how this came to be.  Daughter reports an investigation is ongoing at the nursing home.  X-ray in the ER shows complete impacted fracture of the right proximal femoral shaft.  She received nerve block in the ER and is now more comfortable.    History/Other:    Past Medical History:  Past Medical History:   Diagnosis Date    Alzheimer's dementia with behavioral disturbance (HCC)     Anxiety state     Bipolar affective (HCC)     Cataract     Dementia (HCC)     Diabetes (HCC)     Elevated fasting lipid profile 02/09/2012    Essential hypertension     Essential hypertension, benign 05/24/2013    Falls     Gallstones and inflammation of gallbladder without obstruction     High blood pressure     High cholesterol     Muscle weakness     Neuropathy     Osteoarthritis     Psychosis, unspecified psychosis type (HCC)     Shortness of breath      Past Surgical History:   Past Surgical History:   Procedure Laterality Date    BIOPSY OF BREAST, INCISIONAL      left breast bx during surgery    COLONOSCOPY N/A 12/29/2023    Procedure: UNPREPPED  COLONOSCOPY;  Surgeon: Fabrice Trejo MD;  Location:  ENDOSCOPY    OTHER SURGICAL HISTORY  2001    aneurysm  clipped x2      Family History:   No family history on file.  Social History:    reports that she quit smoking about 23 years ago. Her smoking use included cigarettes. She smoked an average of 1 pack per day. She has never used smokeless tobacco. She reports that she does not drink alcohol and does not use drugs.     Allergies: No Active Allergies    Medications:    Current Facility-Administered Medications on File Prior to Encounter   Medication Dose Route Frequency Provider Last Rate Last Admin    [COMPLETED] iopamidol 76% (ISOVUE-370) injection for power injector  80 mL Intravenous ONCE PRN Jesica Dahl MD   80 mL at 12/29/23 1814    [COMPLETED] sodium chloride 0.9 % IV bolus 1,000 mL  1,000 mL Intravenous Once Heike Monique MD 1,000 mL/hr at 12/27/23 1345 1,000 mL at 12/27/23 1345    [COMPLETED] potassium chloride 40 mEq in 250mL sodium chloride 0.9% IVPB premix  40 mEq Intravenous Once Heike Monique MD 62.5 mL/hr at 12/27/23 1403 40 mEq at 12/27/23 1403    [COMPLETED] remdesivir (Veklury) 200 mg in sodium chloride 0.9% 100 mL IVPB  200 mg Intravenous Once Duglas Cardenas  mL/hr at 12/27/23 1921 200 mg at 12/27/23 1921     Current Outpatient Medications on File Prior to Encounter   Medication Sig Dispense Refill    FLEET ENEMA 7-19 GM/118ML Rectal Enema       Cranberry 425 MG Oral Cap Take by mouth.      Melatonin 3 MG Oral Cap Take by mouth.      magnesium hydroxide (MILK OF MAGNESIA) 400 MG/5ML Oral Suspension Take by mouth daily as needed for constipation.      Multiple Vitamins-Minerals (MULTI-VITAMIN/MINERALS) Oral Tab Take 1 tablet by mouth daily.      sennosides 17.2 MG Oral Tab Take 1 tablet (17.2 mg total) by mouth daily. 30 tablet 0    traMADol 50 MG Oral Tab Take 1 tablet (50 mg total) by mouth in the morning and 1 tablet (50 mg total) before bedtime. 15 tablet 0    bisacodyl 10 MG Rectal Suppos Place 1 suppository (10 mg total) rectally Q24H PRN.      polyethylene glycol, PEG 3350, 17 g Oral  Powd Pack Take 17 g by mouth daily. 30 each 0    glipiZIDE 5 MG Oral Tab Take 0.5 tablets (2.5 mg total) by mouth daily with lunch.      Potassium Chloride ER 10 MEQ Oral Tab CR Take 1 tablet (10 mEq total) by mouth Every Monday, Wednesday, and Friday.      risperiDONE 0.25 MG Oral Tab Take 2 tablets (0.5 mg total) by mouth Noon.      furosemide 20 MG Oral Tab Take 1 tablet (20 mg total) by mouth Every Monday, Wednesday, and Friday.      glipiZIDE 5 MG Oral Tab Take 1 tablet (5 mg total) by mouth every morning before breakfast.      lisinopril 10 MG Oral Tab Take 1 tablet (10 mg total) by mouth daily.      risperiDONE 0.25 MG Oral Tab Take 3 tablets (0.75 mg total) by mouth 2 (two) times daily.      fluticasone propionate 50 MCG/ACT Nasal Suspension 2 sprays by Each Nare route daily.      bismuth subsalicylate (PEPTO-BISMOL) 262 MG/15ML Oral Suspension Take 30 mL (524 mg total) by mouth every 6 (six) hours as needed for Indigestion.      meclizine 25 MG Oral Tab Take 1 tablet (25 mg total) by mouth every 8 (eight) hours as needed.       Review of Systems:   A comprehensive review of systems was completed.    Pertinent positives and negatives noted in the HPI.    Objective:   Physical Exam:    /49   Pulse 87   Temp 99.6 °F (37.6 °C) (Temporal)   Resp 17   Wt 143 lb 4.8 oz (65 kg)   SpO2 100%   BMI 23.31 kg/m²   General: No acute distress, awake and alert but with underlying cognitive impairment  Respiratory: No rhonchi, no wheezes  Cardiovascular: S1, S2. Regular rate and rhythm  Abdomen: Soft, Non-tender, non-distended  Extremities: No LE pitting edema.  RLE shorter and externally rotated.    Results:    Labs:      Labs Last 24 Hours:    Recent Labs   Lab 02/20/24 1930   RBC 3.77*   HGB 10.7*   HCT 34.0*   MCV 90.2   MCH 28.4   MCHC 31.5   RDW 13.7   NEPRELIM 9.43*   WBC 10.5   .0     Recent Labs   Lab 02/20/24 1930   *   BUN 23   CREATSERUM 0.68   EGFRCR 89   CA 9.0   ALB 3.0*       K 4.4      CO2 24.0   ALKPHO 60   AST 16   ALT 14   BILT 0.3   TP 5.7*     Lab Results   Component Value Date    INR 1.06 02/20/2024     No results for input(s): \"TROP\", \"TROPHS\", \"CK\" in the last 168 hours.    No results for input(s): \"TROP\", \"PBNP\" in the last 168 hours.    No results for input(s): \"PCT\" in the last 168 hours.    Imaging: Imaging data reviewed in Epic.    Assessment & Plan:      #Right proximal femoral shaft fracture  -Nerve block done in ER  -Pain control  -Orthopedic surgery consult  -PT/OT    #Hypertension  -Hold lasix and lisinopril    #Diabetes mellitus type 2 with A1c 5.6  -ISS    #Dyslipidemia  -Not on medications    #Alzheimer's dementia with behavioral disturbances  #Anxiety  -Continue risperidone    #Chronic normocytic anemia  -Near baseline      Plan of care discussed with patient and daughter.    Nathaniel Cardenas DO    Supplementary Documentation:     The 21st Century Cures Act makes medical notes like these available to patients in the interest of transparency. Please be advised this is a medical document. Medical documents are intended to carry relevant information, facts as evident, and the clinical opinion of the practitioner. The medical note is intended as peer to peer communication and may appear blunt or direct. It is written in medical language and may contain abbreviations or verbiage that are unfamiliar.               **Certification      PHYSICIAN Certification of Need for Inpatient Hospitalization - Initial Certification    Patient will require inpatient services that will reasonably be expected to span two midnight's based on the clinical documentation in H+P.   Based on patients current state of illness, I anticipate that, after discharge, patient will require TBD.    Electronically signed by Nathaniel Cardenas DO on 2/20/2024 10:50 PM              Consults - MD Consult Notes      Consults signed by Trudi Albright MD at 2/24/2024  6:30 PM     Author: Jose Ramon  MD Trudi Service: Gastroenterology Author Type: Physician    Filed: 2024  6:30 PM Date of Service: 2024  6:25 PM Status: Signed    : Trudi Albright MD (Physician)     Consult Orders    1. Consult to Gastroenterology [[id number]] ordered by Shree Bowman MD at 24 1433                        Consultation Note        Kierra Joshua Patient Status:  Inpatient    1945 MRN [id number]   Location [facility] 3SW-A Attending Shree Bowman MD   Hosp Day # 4 PCP Tate Tovar       Reason for Consultation   Colonic distension       History of Present Illness   Patient is a 77 y/o female with a h/o Alzheimer's dementia, chronic constipation, colonic ileus requiring decompression in the past who initially presented with a femur fracture post fall  -During admission, daughter noted abdomen to be distended, and firm  -Patient herself unable to provide history, but was able to eat two meals (50% of each) today, and having liquid loose BM's  -No nausea, vomiting, fever, chills  -Per daughter, has chronic constipation, takes Miralax and 2 senna daily at NH         PMH/MEDS/ALLERGIES/SH/FH:     Past Medical History:   Diagnosis Date    Alzheimer's dementia with behavioral disturbance (HCC)     Anxiety state     Bipolar affective (HCC)     Cataract     Dementia (HCC)     Diabetes (HCC)     Elevated fasting lipid profile 2012    Essential hypertension     Essential hypertension, benign 2013    Falls     Gallstones and inflammation of gallbladder without obstruction     High blood pressure     High cholesterol     Muscle weakness     Neuropathy     Osteoarthritis     Psychosis, unspecified psychosis type (HCC)     Shortness of breath       Past Surgical History:   Procedure Laterality Date    BIOPSY OF BREAST, INCISIONAL      left breast bx during surgery    COLONOSCOPY N/A 2023    Procedure: UNPREPPED  COLONOSCOPY;  Surgeon: Fabrice Trejo MD;  Location:   ENDOSCOPY    OTHER SURGICAL HISTORY  2001    aneurysm clipped x2        No recently discontinued medications to reconcile   No current facility-administered medications on file prior to encounter.     Current Outpatient Medications on File Prior to Encounter   Medication Sig Dispense Refill    lisinopril 10 MG Oral Tab Take 1 tablet (10 mg total) by mouth daily. Hold for SBP < 130      FLEET ENEMA 7-19 GM/118ML Rectal Enema       Cranberry 425 MG Oral Cap Take by mouth.      Melatonin 3 MG Oral Cap Take by mouth.      magnesium hydroxide (MILK OF MAGNESIA) 400 MG/5ML Oral Suspension Take by mouth daily as needed for constipation.      Multiple Vitamins-Minerals (MULTI-VITAMIN/MINERALS) Oral Tab Take 1 tablet by mouth daily.      sennosides 17.2 MG Oral Tab Take 1 tablet (17.2 mg total) by mouth daily. 30 tablet 0    traMADol 50 MG Oral Tab Take 1 tablet (50 mg total) by mouth in the morning and 1 tablet (50 mg total) before bedtime. 15 tablet 0    bisacodyl 10 MG Rectal Suppos Place 1 suppository (10 mg total) rectally Q24H PRN.      polyethylene glycol, PEG 3350, 17 g Oral Powd Pack Take 17 g by mouth daily. 30 each 0    glipiZIDE 5 MG Oral Tab Take 0.5 tablets (2.5 mg total) by mouth daily with lunch.      Potassium Chloride ER 10 MEQ Oral Tab CR Take 1 tablet (10 mEq total) by mouth Every Monday, Wednesday, and Friday.      risperiDONE 0.25 MG Oral Tab Take 2 tablets (0.5 mg total) by mouth Noon.      furosemide 20 MG Oral Tab Take 1 tablet (20 mg total) by mouth Every Monday, Wednesday, and Friday.      glipiZIDE 5 MG Oral Tab Take 1 tablet (5 mg total) by mouth every morning before breakfast.      risperiDONE 0.25 MG Oral Tab Take 3 tablets (0.75 mg total) by mouth 2 (two) times daily.      fluticasone propionate 50 MCG/ACT Nasal Suspension 2 sprays by Each Nare route daily.      bismuth subsalicylate (PEPTO-BISMOL) 262 MG/15ML Oral Suspension Take 30 mL (524 mg total) by mouth every 6 (six) hours as needed for  Indigestion.      meclizine 25 MG Oral Tab Take 1 tablet (25 mg total) by mouth every 8 (eight) hours as needed.         Current Allergies: No Active Allergies    Social History     Occupational History    Not on file   Tobacco Use    Smoking status: Former     Packs/day: 1     Types: Cigarettes     Quit date:      Years since quittin.1    Smokeless tobacco: Never   Vaping Use    Vaping Use: Never used   Substance and Sexual Activity    Alcohol use: No     Alcohol/week: 0.0 standard drinks of alcohol    Drug use: No    Sexual activity: Not on file       Marital Status:    Lives alone?:    Occupation:   Taking fiber supplements?:    Tattoos?:   Body piercing?:   High risk sexual behavior?:    Advance Directive:          No family history on file.    Colon operative report  Patient Name: Kierra Joshua  Procedure: Colonoscopy with decompression  Indication: pseudo-obstruction  Post-Op Dx: pseudo-obstruction  Attending: Fabrice Trejo M.D.  Consent: The risks, benefits, and alternatives were discussed with the patient / POA.  Risks included, but were not limited to, bleeding, perforation, medication effects, cardiac arrhythmias, missed polyps, and aspiration.  After all questions were answered to their satisfaction, a signed, informed, and witnessed consent was obtained.  Sedation: general anesthesia   Monitoring: Pulsoximetry, pulse, respirations, and blood pressure were monitored throughout the entire procedure     Preparation Quality: unprepped.    Procedure: After achieving adequate sedation, and placing the patient in the left lateral decubitus position, a digital rectal examination was performed.  The lubricated tip of the pediatric colonoscope was then introduced into the rectum and advanced to the right colon.  Decompression performed (see below).  The endoscope was then removed from the patient.  The patient tolerated the procedure without apparent procedural complications.  The patient left the  procedure room in stable condition for recovery.  Findings:    Dilated rectum with copious amounts of solid stool scattered throughout.  Suction performed to decompress and the scope was advanced further.  Solid stool was noted in the sigmoid colon and the scope was carefully advanced into the descending colon, where more distention of the colonic lumen was identified.  The air was suctioned and removed and the scope was advanced further into the right colon, though no clear landmark (IC valve or appendiceal orifice) were seen.  The air was suctioned to the point that only solid stool was encountered.  The scope was then removed from the colon.    Impression: Findings as above.    Recommendations:   NPO  Repeat XR abdomen obs series in AM  Up to chair and ambulate around the room with assistance as much as possible  Call GI service with any clinical updates or worsening of symptoms          MEDICATIONS      ketorolac (Toradol) 15 MG/ML injection 15 mg  15 mg Intravenous Q6H PRN    acetaminophen (Tylenol Extra Strength) tab 1,000 mg  1,000 mg Oral q6h    traMADol (Ultram) tab 50 mg  50 mg Oral Q12H PRN    [COMPLETED] polyethylene glycol-electrolyte (Golytely) 236 g oral solution 2,000 mL  2,000 mL Oral Once    [START ON 2/25/2024] polyethylene glycol-electrolyte (Golytely) 236 g oral solution 2,000 mL  2,000 mL Oral Once    [COMPLETED] sodium chloride 0.9% infusion   Intravenous Once    insulin aspart (NovoLOG) 100 Units/mL FlexPen 1-10 Units  1-10 Units Subcutaneous TID AC and HS    insulin aspart (NovoLOG) 100 Units/mL FlexPen 1-68 Units  1-68 Units Subcutaneous TID CC    [COMPLETED] sodium chloride 0.9% infusion   Intravenous Once    docusate sodium (Colace) cap 100 mg  100 mg Oral BID    polyethylene glycol (PEG 3350) (Miralax) 17 g oral packet 17 g  17 g Oral Daily    [COMPLETED] clonidine/epinephrine/ropivacaine/ketorolac in 0.9% NaCl 60 mL pain cocktail syringe for hip arthroplasty   Infiltration Once  (Intra-Op)    [] sodium chloride 0.9 % IV bolus 500 mL  500 mL Intravenous Once PRN    enoxaparin (Lovenox) 40 MG/0.4ML SUBQ injection 40 mg  40 mg Subcutaneous Daily    oxyCODONE immediate release tab 2.5 mg  2.5 mg Oral Q4H PRN    Or    oxyCODONE immediate release tab 5 mg  5 mg Oral Q4H PRN    HYDROmorphone (Dilaudid) 1 MG/ML injection 0.2 mg  0.2 mg Intravenous Q2H PRN    Or    HYDROmorphone (Dilaudid) 1 MG/ML injection 0.4 mg  0.4 mg Intravenous Q2H PRN    traMADol (Ultram) tab 50 mg  50 mg Oral Q6H PRN    [COMPLETED] ceFAZolin (Ancef) 2 g in 20mL IV syringe premix  2 g Intravenous Q8H    [COMPLETED] insulin aspart (NovoLOG) 100 Units/mL vial 2 Units  2 Units Subcutaneous Once    risperiDONE (RisperDAL) tab 0.5 mg  0.5 mg Oral Noon    risperiDONE (RisperDAL) tab 0.75 mg  0.75 mg Oral BID    morphINE PF 2 MG/ML injection 0.5 mg  0.5 mg Intravenous Q2H PRN    Or    morphINE PF 2 MG/ML injection 1 mg  1 mg Intravenous Q2H PRN    Or    morphINE PF 2 MG/ML injection 2 mg  2 mg Intravenous Q2H PRN    melatonin tab 3 mg  3 mg Oral Nightly PRN    sennosides (Senokot) tab 17.2 mg  17.2 mg Oral Nightly PRN    bisacodyl (Dulcolax) 10 MG rectal suppository 10 mg  10 mg Rectal Daily PRN    fleet enema (Fleet) 7-19 GM/118ML rectal enema 133 mL  1 enema Rectal Once PRN    ondansetron (Zofran) 4 MG/2ML injection 4 mg  4 mg Intravenous Q6H PRN    benzonatate (Tessalon) cap 200 mg  200 mg Oral TID PRN    guaiFENesin (Robitussin) 100 MG/5 ML oral liquid 200 mg  200 mg Oral Q4H PRN    glycerin-hypromellose- (Artifical Tears) 0.2-0.2-1 % ophthalmic solution 1 drop  1 drop Both Eyes QID PRN    sodium chloride (Saline Mist) 0.65 % nasal solution 1 spray  1 spray Each Nare Q3H PRN    glucose (Dex4) 15 GM/59ML oral liquid 15 g  15 g Oral Q15 Min PRN    Or    glucose (Glutose) 40% oral gel 15 g  15 g Oral Q15 Min PRN    Or    glucose-vitamin C (Dex-4) chewable tab 4 tablet  4 tablet Oral Q15 Min PRN    Or    dextrose 50%  injection 50 mL  50 mL Intravenous Q15 Min PRN    Or    glucose (Dex4) 15 GM/59ML oral liquid 30 g  30 g Oral Q15 Min PRN    Or    glucose (Glutose) 40% oral gel 30 g  30 g Oral Q15 Min PRN    Or    glucose-vitamin C (Dex-4) chewable tab 8 tablet  8 tablet Oral Q15 Min PRN              ROS:     A comprehensive 14 point review of systems was completed.  Pertinent positives and negatives noted in the the HPI.          Physical Exam     Vital signs:  /64 (BP Location: Right arm)   Pulse 88   Temp 97.8 °F (36.6 °C) (Oral)   Resp 18   Wt 143 lb (64.9 kg)   SpO2 100%   BMI 23.26 kg/m²         Physical Exam        General: Appears alert, oriented x 1 and in no acute distress.  HEENT: Normal. No scleral icterus.   NECK: Supple. No neck vein distention. Thyroid not enlarged. No lymphadenopathy.  CV: S1 and S2 normal. No murmurs or gallops.  LUNGS: Clear to percussion and auscultation.  ABDOMEN: Distended firm in right side of abdomen, tender to palpation, softer on left abdomen.   BACK: No CVA tenderness.  EXTREMITIES: No edema, cyanosis or clubbing.  SKIN: Warm and dry.         IMAGING/LABS       Labs:   Lab Results   Component Value Date    WBC 6.8 02/24/2024    HGB 9.0 02/24/2024    HCT 28.0 02/24/2024    .0 02/24/2024     Recent Labs   Lab 02/20/24  1930 02/21/24  0518 02/22/24  0535   * 159* 204*   BUN 23 23 23   CREATSERUM 0.68 0.97 0.96   CA 9.0 8.9 8.4*    138 136   K 4.4 4.5 4.9    108 108   CO2 24.0 24.0 25.0     Recent Labs   Lab 02/24/24  0527   RBC 3.24*   HGB 9.0*   HCT 28.0*   MCV 86.4   MCH 27.8   MCHC 32.1   RDW 16.5   NEPRELIM 5.13   WBC 6.8   .0*       Recent Labs   Lab 02/20/24 1930   ALT 14   AST 16       Imaging:   XR ABDOMEN (1 VIEW) (CPT=74018)  Narrative: PROCEDURE:  XR ABDOMEN (1 VIEW) (CPT=74018)     INDICATIONS:  ro ileus, obstruction     COMPARISON:  [facility] , XR, XR ABDOMEN (1 VIEW) (CPT=74018), 12/29/2023, 1:00 PM.     TECHNIQUE:  Supine AP view was  obtained.     PATIENT STATED HISTORY: (As transcribed by Technologist)  Patient offered no additional history at this time.          FINDINGS:    BOWEL GAS PATTERN:  Marked distension of colon most severe involving the right side of the colon measuring up to 10.3 cm. .  Moderate stool in the descending colon.  CALCIFICATIONS:  None significant.  OTHER:  No free air.                   Impression: CONCLUSION:  Gaseous distension of colon.  There is marked distension of right side of the colon.     CT may be helpful for further evaluation.        LOCATION:  [facility]        Dictated by (CST): Emilia Madison MD on 2/24/2024 at 2:26 PM       Finalized by (CST): Emilia Madison MD on 2/24/2024 at 2:29 PM               IMPRESSION:     79 y/o female with a h/o chronic constipation, colonic ileus admitted with a femur fracture, now with colonic distension-tolerated solid diet and having liquid stools-suspect chronic Ogilvies' exacerbated by fall and immobility           PLAN:   Trial of Golytely 2 l overnight and in am  Repeat KUB in am  NPO  If still remains distended colonoscopy with decompression in am  Plan of care d/w daughter and RN at bedside           Trudi Albright MD  6:25 PM  2/24/2024  [facility] Gastroenter[facility]  [phone]                Electronically signed by Trudi Albright MD on 2/24/2024  6:30 PM           D/C Summary    No notes of this type exist for this encounter.        Physical Therapy Notes (last 72 hours)      Physical Therapy Note signed by Sarah Christianson PTA at 2/28/2024  9:23 AM  Version 1 of 1    Author: Sarah Christianson PTA Service: Rehab Author Type: Physical Therapy Assistant    Filed: 2/28/2024  9:23 AM Date of Service: 2/28/2024  9:22 AM Status: Signed    : Sarah Christianson PTA (Physical Therapy Assistant)          PHYSICAL THERAPY TREATMENT NOTE - INPATIENT    Room Number: 381/381-A       Session: 3     Number of Visits to Meet Established Goals: 5    Presenting  Problem: R hip fracture  Co-Morbidities : DM, HTN, bipolar, Alzheimer's, CVA    History related to current admission: Patient is a 78 year old female admitted on 2/20/2024 from home at [facility] for increased R hip pain.  Pt diagnosed with Right subtrochanteric femur fracture. Now s/ IM nailing.   Daughter received call from LT on 2/20 about her mother complaining of R hip pain. Per dtr facility providing very limited information in regards to what happened- per facility there was no fall, unsure of how she could have fractured her hip. Pt's daughter expressing significant frustration about event.    Procedure Performed 2/21/24 Dr. Guidry:    RIGHT FEMUR INTRAMEDULLARY NAILING     Pt now s/p colonoscopy with decompression on 2/25/24 due to tortuous colon, significant looping s/p decompression  ASSESSMENT   Patient demonstrates limited progress this session, goals  remain in progress.    Patient continues to function below baseline with bed mobility, transfers, gait, stair negotiation, maintaining seated position, and standing prolonged periods.  Contributing factors to remaining limitations include decreased functional strength, decreased endurance/aerobic capacity, pain, impaired static and dynamic sitting and standing balance, impaired coordination, impaired motor planning, decreased muscular endurance, cognitive deficits (Alzheimer's, decr awareness of need for safety and assistance), limited RLE ROM, and decreased compliance/participation.  Next session anticipate patient to progress bed mobility and transfers.  Physical Therapy will continue to follow patient for duration of hospitalization.    Patient continues to benefit from continued skilled PT services: to promote return to prior level of function and safety with continuous assistance and gradual rehabilitative therapy .    PLAN  PT Treatment Plan: Bed mobility;Body mechanics;Coordination;Endurance;Energy conservation;Patient education;Family  education;Gait training;Neuromuscular re-educate;Range of motion;Strengthening;Transfer training;Balance training  Rehab Potential : Guarded  Frequency (Obs): 3-5x/week  CURRENT GOALS      Goal #1 Patient is able to demonstrate supine - sit EOB @ level: minimum assistance      Goal #2 Patient is able to demonstrate transfers Sit to/from Stand at assistance level: minimum assistance      Goal #3 Patient is able to ambulate 50 feet with assist device: walker - rolling at assistance level: supervision      Goal #4     Goal #5     Goal #6     Goal Comments: Goals established on 2/22/2024 2/26/2024 all goals ongoing    SUBJECTIVE  \"I want hillbilly music\" - writer offered to play music for mood stabilization during session.  Ie: Alonso Ruby    OBJECTIVE  Precautions: Bed/chair alarm;Rectal tube    WEIGHT BEARING RESTRICTION  Weight Bearing Restriction: R lower extremity        R Lower Extremity: Weight Bearing as Tolerated       PAIN ASSESSMENT   Rating: Unable to rate  Location: R hip  Management Techniques: Activity promotion;Body mechanics;Repositioning    BALANCE                                                                                                                       Static Sitting: Fair -  Dynamic Sitting: Poor           Static Standing: Poor  Dynamic Standing: Poor -    ACTIVITY TOLERANCE                         O2 WALK         AM-PAC '6-Clicks' INPATIENT SHORT FORM - BASIC MOBILITY  How much difficulty does the patient currently have...  Patient Difficulty: Turning over in bed (including adjusting bedclothes, sheets and blankets)?: A Lot   Patient Difficulty: Sitting down on and standing up from a chair with arms (e.g., wheelchair, bedside commode, etc.): Unable   Patient Difficulty: Moving from lying on back to sitting on the side of the bed?: A Lot   How much help from another person does the patient currently need...   Help from Another: Moving to and from a bed to a chair (including a wheelchair)?:  Total   Help from Another: Need to walk in hospital room?: Total   Help from Another: Climbing 3-5 steps with a railing?: Total       AM-PAC Score:  Raw Score: 8   Approx Degree of Impairment: 86.62%   Standardized Score (AM-PAC Scale): 28.58   CMS Modifier (G-Code): CM    FUNCTIONAL ABILITY STATUS  Gait Assessment   Functional Mobility/Gait Assessment  Gait Assistance: Not tested  Distance (ft): 0    Skilled Therapy Provided    Bed Mobility:  Rolling: NT   Supine<>Sit: w/ HOB significantly elevated maxA   Sit<>Supine: NT     Transfer Mobility:  Sit<>Stand: Max A x 2 (citlali steady)  Stand<>Sit: Max A   Gait: NT    Therapist's Comments:   Pt with less agitation this session - able to redirect with music     Patient End of Session: Up in chair;Needs met;Call light within reach;RN aware of session/findings;All patient questions and concerns addressed;Alarm set    PT Session Time: 15 minutes  Therapeutic Activity: 10 minutes           Physical Therapy Note signed by Yani Robledo PT at 2/26/2024 11:40 AM  Version 1 of 1    Author: Yani Robledo PT Service: Rehab Author Type: Physical Therapist    Filed: 2/26/2024 11:40 AM Date of Service: 2/26/2024 11:32 AM Status: Signed    : Yani Robledo PT (Physical Therapist)          PHYSICAL THERAPY TREATMENT NOTE - INPATIENT    Room Number: 381/381-A     Session: 2/5     Number of Visits to Meet Established Goals: 5    Presenting Problem: R hip fracture  Co-Morbidities : DM, HTN, bipolar, Alzheimer's, CVA    History related to current admission: Patient is a 78 year old female admitted on 2/20/2024 from home at [facility] for increased R hip pain.  Pt diagnosed with Right subtrochanteric femur fracture. Now s/ IM nailing.   Daughter received call from [facility] on 2/20 about her mother complaining of R hip pain. Per dtr facility providing very limited information in regards to what happened- per facility there was no fall, unsure of how she could have fractured  her hip. Pt's daughter expressing significant frustration about event.    Procedure Performed 2/21/24 Dr. Guidry:    RIGHT FEMUR INTRAMEDULLARY NAILING     Pt now s/p colonoscopy with decompression on 2/25/24 due to tortuous colon, significant looping s/p decompression  ASSESSMENT   Patient demonstrates limited progress this session, goals  remain in progress.    Patient continues to function below baseline with bed mobility, transfers, gait, stair negotiation, maintaining seated position, and standing prolonged periods.  Contributing factors to remaining limitations include decreased functional strength, decreased endurance/aerobic capacity, pain, impaired static and dynamic sitting and standing balance, impaired coordination, impaired motor planning, decreased muscular endurance, cognitive deficits (Alzheimer's, decr awareness of need for safety and assistance), limited RLE ROM, and decreased compliance/participation.  Next session anticipate patient to progress bed mobility and transfers.  Physical Therapy will continue to follow patient for duration of hospitalization.    Patient continues to benefit from continued skilled PT services: to promote return to prior level of function and safety with continuous assistance and gradual rehabilitative therapy .    PLAN  PT Treatment Plan: Bed mobility;Body mechanics;Coordination;Endurance;Energy conservation;Patient education;Family education;Gait training;Neuromuscular re-educate;Range of motion;Strengthening;Transfer training;Balance training  Rehab Potential : Guarded  Frequency (Obs): 3-5x/week  CURRENT GOALS      Goal #1 Patient is able to demonstrate supine - sit EOB @ level: minimum assistance      Goal #2 Patient is able to demonstrate transfers Sit to/from Stand at assistance level: minimum assistance      Goal #3 Patient is able to ambulate 50 feet with assist device: walker - rolling at assistance level: supervision      Goal #4     Goal #5     Goal #6      Goal Comments: Goals established on 2/22/2024 2/26/2024 all goals ongoing    SUBJECTIVE  \"Will you shut up!\"    OBJECTIVE  Precautions: Bed/chair alarm;Rectal tube    WEIGHT BEARING RESTRICTION  Weight Bearing Restriction: R lower extremity        R Lower Extremity: Weight Bearing as Tolerated       PAIN ASSESSMENT   Rating: Unable to rate  Location: R hip  Management Techniques: Activity promotion;Body mechanics;Repositioning    BALANCE                                                                                                                       Static Sitting: Fair -  Dynamic Sitting: Poor           Static Standing: Poor  Dynamic Standing: Poor -    ACTIVITY TOLERANCE                         O2 WALK         AM-PAC '6-Clicks' INPATIENT SHORT FORM - BASIC MOBILITY  How much difficulty does the patient currently have...  Patient Difficulty: Turning over in bed (including adjusting bedclothes, sheets and blankets)?: A Lot   Patient Difficulty: Sitting down on and standing up from a chair with arms (e.g., wheelchair, bedside commode, etc.): A Lot   Patient Difficulty: Moving from lying on back to sitting on the side of the bed?: A Lot   How much help from another person does the patient currently need...   Help from Another: Moving to and from a bed to a chair (including a wheelchair)?: Total   Help from Another: Need to walk in hospital room?: Total   Help from Another: Climbing 3-5 steps with a railing?: Total       AM-PAC Score:  Raw Score: 9   Approx Degree of Impairment: 81.38%   Standardized Score (AM-PAC Scale): 30.55   CMS Modifier (G-Code): CM    FUNCTIONAL ABILITY STATUS  Gait Assessment   Functional Mobility/Gait Assessment  Gait Assistance: Not tested    Skilled Therapy Provided    Bed Mobility:  Rolling: NT   Supine<>Sit: w/ HOB significantly elevated maxA   Sit<>Supine: NT     Transfer Mobility:  Sit<>Stand: two trials at maxA (modA x 2) - pulling via BUE on [place] steady   Stand<>Sit: maxA     Gait: NT    Therapist's Comments:   Patient presents sitting up in bed. Daughter present in room. Discussed role and goal of physical therapy in hospital setting. Pt with Alzheimer's dementia w/ intermittent agitation.   Bed mobility via maxA w/ HOB significantly elevated. Sit to stand maxA x 2 - two trials- pulling via BUE on citlali steady.   Citlali steady transfer to bedside chair- maxA. Up in chair with alarm on at end of session    Patient End of Session: Up in chair;Needs met;Call light within reach;RN aware of session/findings;All patient questions and concerns addressed;Alarm set;Family present;Discussed recommendations with /    PT Session Time: 28 minutes  Therapeutic Activity: 25 minutes                    Occupational Therapy Notes (last 72 hours)      Occupational Therapy Note signed by Jesse Koch OT at 2/26/2024  2:28 PM  Version 1 of 1    Author: Jesse Koch OT Service: Rehab Author Type: Occupational Therapist    Filed: 2/26/2024  2:28 PM Date of Service: 2/26/2024 11:30 AM Status: Signed    : Jesse Koch OT (Occupational Therapist)       OCCUPATIONAL THERAPY TREATMENT NOTE - INPATIENT     Room Number: 381/381-A  Session: 1   Number of Visits to Meet Established Goals: 5    Presenting Problem: RIGHT SUBTROCHANTERIC FEMUR FRACTURE, 2/21 s/p RIGHT FEMUR INTRAMEDULLARY NAILING  Prior to admission, patient's baseline is per daughter, min A toilet transfer using rw and grab bar, mod A LB dressing, max A showering, and1-person assistance to ambulate in hallway with RW.  Pt has been a long term resident at [facility] for about 16 months, per daughter. Daughter visits her daily and spends time with her.     ASSESSMENT   Patient demonstrates limited progress this session, goals remain in progress.     Patient continues to function below baseline with  ADL and mobility .   Contributing factors to remaining limitations include decreased functional strength, decreased  endurance, pain, impaired sitting/standing balance, cognitive deficits (chronic), decreased compliance/participation, decreased insight to deficits, decreased safety awareness, and volitional factors .  Next session anticipate patient to progress grooming, transfers, dynamic sitting balance, and functional standing tolerance.  Occupational Therapy will continue to follow patient for duration of hospitalization.    Patient continues to benefit from continued skilled OT services: to promote return to prior level of function and safety with continuous assistance and gradual rehabilitative therapy .          OT Device Recommendations: TBD    History: Patient is a 78 year old female admitted on 2/20/2024 with Presenting Problem: RIGHT SUBTROCHANTERIC FEMUR FRACTURE, 2/21 s/p RIGHT FEMUR INTRAMEDULLARY NAILING. Co-Morbidities : DM, HTN, bipolar, Alzheimer's, CVA. Pt was admitted from long-term care facility on 2/20 with R hip pain.  Per daughter, she received a call from the facility about pt's R hip pain. Per daughter, limited information from the facility about any event.  Admitted for R subtrochanteric femur fracture.  2/21 s/p R femur IM nailing.      Recent admissions:  12/27 to 12/31/23 for COVID -> back to long term care with in-house therapy.     Surgery 2/21/24  Preoperative Diagnosis:  RIGHT SUBTROCHANTERIC FEMUR FRACTURE  Postoperative Diagnosis:  RIGHT SUBTROCHANTERIC FEMUR FRACTURE  Procedure Performed:  RIGHT FEMUR INTRAMEDULLARY NAILING    02/25  PREOPERATIVE DIAGNOSIS/INDICATION:  Colonic ileus  POSTOPERTATIVE DIAGNOSIS:  Markedly tortuous colon, significant looping s/p decompression  Retained liquid stool throughout  Diverticulosis  PROCEDURE PERFORMED: COLONOSCOPY with decompression    WEIGHT BEARING RESTRICTION  Weight Bearing Restriction: R lower extremity        R Lower Extremity: Weight Bearing as Tolerated       Recommendations for nursing staff:   Transfers: total lift  Toileting location: bed  level    TREATMENT SESSION:  Patient Start of Session: semi supine in bed; daughter present  FUNCTIONAL TRANSFER ASSESSMENT  Sit to Stand: Edge of Bed  Edge of Bed: Maximum Assist (mod A x 2)    BED MOBILITY  Supine to Sit : Maximum Assist  Sit to Supine (OT): Not Tested  Scooting: Max A    BALANCE ASSESSMENT  Static Sitting: Stand-by Assist  Static Standing: Maximum Assist    FUNCTIONAL ADL ASSESSMENT  Grooming Seated: Not Tested  LB Dressing Seated: Not Tested      ACTIVITY TOLERANCE: WFL                         O2 SATURATIONS       EDUCATION PROVIDED  Patient: Role of Occupational Therapy; Plan of Care; Discharge Recommendations; Functional Transfer Techniques; Fall Prevention; Weight Bear Status; Posture/Positioning; Energy Conservation; Proper Body Mechanics  Patient's Response to Education: Demonstrates Poor Carry Over to Information  Family/Caregiver: -- (n/a)  Family/Caregiver's Response to Education: -- (n/a)      Therapist comments: Pt performs bed mobility at max A with HOB elevated and multimodal cues provided for hand placement, LE advancement, and sequencing. Pt intermittently agitated throughout session when cueing and educating pt on importance of mobility. Pt performs sit to stand transfers at mod A x 2 pull to stand from citlali stedy. Pt transfers to chair via citlali stedy. All needs met.  Patient End of Session: Up in chair;Needs met;Call light within reach;RN aware of session/findings;All patient questions and concerns addressed;Alarm set;Family present    SUBJECTIVE  \"Will you shut up!\"    PAIN ASSESSMENT  Rating: Unable to rate  Location: R LE  Management Techniques: Repositioning     OBJECTIVE  Precautions: Bed/chair alarm;Rectal tube    AM-PAC ‘6-Clicks’ Inpatient Daily Activity Short Form  -   Putting on and taking off regular lower body clothing?: Total  -   Bathing (including washing, rinsing, drying)?: Total  -   Toileting, which includes using toilet, bedpan or urinal? : Total  -   Putting  on and taking off regular upper body clothing?: Total  -   Taking care of personal grooming such as brushing teeth?: A Lot  -   Eating meals?: A Lot    AM-PAC Score:  Score: 8  Approx Degree of Impairment: 85.69%  Standardized Score (AM-PAC Scale): 22.86    PLAN  OT Treatment Plan: Balance activities;Energy conservation/work simplification techniques;ADL training;Functional transfer training;UE strengthening/ROM;Patient/Family education;Patient/Family training;Equipment eval/education  Rehab Potential : Guarded  Frequency: 3x/week    OT Goals:    All goals ongoing 02/26    ADL Goals   Patient will perform grooming: with setup  Patient will perform upper body dressing:  with setup  Patient will perform lower body dressing:  with mod assist  Patient will perform toileting: with mod assist     Functional Transfer Goals  Patient will transfer from supine to sit:  with mod assist  Patient will transfer to bedside commode:  with mod assist     UE Exercise Program Goal  Patient will be supervision with bilateral AROM HEP (home exercise program).    OT Session Time: 25 minutes  Therapeutic Activity: 25 minutes               Video Swallow Study Notes    No notes of this type exist for this encounter.        SLP Note - SLP Notes      SLP Note signed by Vonnie Camarena at 2/28/2024 11:31 AM  Version 1 of 1    Author: Vonnie Camarena Service: Rehab Author Type: SLP Student    Filed: 2/28/2024 11:31 AM Date of Service: 2/28/2024 11:17 AM Status: Signed    : Vonnie Camarena (SLP Student) Cosigner: Contreras Dos Santos at 2/28/2024 11:31 AM       SPEECH DAILY NOTE - INPATIENT    ASSESSMENT & PLAN   ASSESSMENT  Pt seen for dysphagia tx to assess tolerance with recommended diet, ensure proper utilization of aspiration precautions and provide pt/family education.  Patient alert in chair at time of evaluation. Patient's mental status much improved from SLP's initial visit. RN reported patient with good tolerance of soft and bite sized  diet since upgrade this morning.     Patient self-presented single sips of thin liquid by straw with clinician assistance. Patient with intact bolus retrieval and formation and no overt signs of aspiration were noted. Given that patient is tolerating baseline diet, no further inpatient services warranted at this time, will sign off.     Diet Recommendations - Solids: Mechanical soft chopped/ Soft & Bite Sized (1:1 feeding assist per previous order from SNF)  Diet Recommendations - Liquids: Thin Liquids    Compensatory Strategies Recommended: Slow rate;Small bites and sips;Alternate consistencies (1:1 feeding assist and encouragement per documentation from SNF)  Aspiration Precautions: Upright position;Slow rate;Small bites and sips  Medication Administration Recommendations: One pill at a time (as tolerated with thin liquid vs puree, whole)    Patient Experiencing Pain: No                Treatment Plan  Treatment Plan/Recommendations: Aspiration precautions;Dysphagia therapy    Interdisciplinary Communication: Discussed with RN            GOALS  Goal #1 The patient will tolerate soft and bite size solid consistency and thin liquids without overt signs or symptoms of aspiration with 95 % accuracy over 1-2 session(s).  Met   Goal #2 The patient/family/caregiver will demonstrate understanding and implementation of aspiration precautions and swallow strategies independently over 1-2 session(s).     Met     FOLLOW UP  Follow Up Needed (Documentation Required): No  SLP Follow-up Date: 02/28/24  Number of Visits to Meet Established Goals: 1    Session: 1 of 1    If you have any questions, please contact Vonnie Camarena    Clinician  Pager [phone]     Electronically signed by Contreras Dos Santos on 2/28/2024 11:31 AM           Immunizations     Name Date      Covid-19 Pfizer 10/27/21     Covid-19 Pfizer 02/06/21     Covid-19 Pfizer 01/16/21     Covid-19 Pfizer [facility] 10/20/22     INFLUENZA  defer-12/18/19     Deferral: Patient Refused     INFLUENZA defer-09/30/16     Deferral: Patient Refused     Pneumococcal (Prevnar 13) defer-12/18/19     Deferral: Patient Refused     Pneumococcal (Prevnar 13) defer-06/12/19     Deferral: Patient Refused     Pneumovax 23 defer-06/12/19     Deferral: Patient Refused     TDAP 12/08/22       Multidisciplinary Problems     Active Goals     Not on file

## (undated) NOTE — LETTER
[address]  [address]  Authorization for Surgical Operation and Procedure   Date:___________                                                                                                         Time:__________  I hereby authorizeLibrado Main MD, my physician and his/her assistants (if applicable), which may include medical students, residents, and/or fellows, to perform the following surgical operation/ procedure and administer such anesthesia as may be determined necessary by my physician:  Operation/Procedure name (s) Procedure(s):  FLEXIBLE SIGMOIDOSCOPY on Kierra A Gombash   2.   I recognize that during the surgical operation/procedure, unforeseen conditions may necessitate additional or different procedures than those listed above.  I, therefore, further authorize and request that the above-named surgeon, assistants, or designees perform such procedures as are, in their judgment, necessary and desirable.    3.   My surgeon/physician has discussed prior to my surgery the potential benefits, risks and side effects of this procedure; the likelihood of achieving goals; and potential problems that might occur during recuperation.  They also discussed reasonable alternatives to the procedure, including risks, benefits, and side effects related to the alternatives and risks related to not receiving this procedure.  I have had all my questions answered and I acknowledge that no guarantee has been made as to the result that may be obtained.    4.   Should the need arise during my operation or immediate post-operative period, I also consent to the administration of blood and/or blood products.  Further, I understand that despite careful testing and screening of blood or blood products by collecting agencies, I may still be subject to ill effects as a result  of receiving a blood transfusion and/or blood products.  The following are some, but not all, of the potential risks that can occur: fever and allergic reactions, hemolytic reactions, transmission of diseases such as Hepatitis, AIDS and Cytomegalovirus (CMV) and fluid overload.  In the event that I wish to have an autologous transfusion of my own blood, or a directed donor transfusion.  I will discuss this with my physician.   5.   I authorize the use of any specimen, organs, tissues, body parts or foreign objects that may be removed from my body during the operation/procedure for diagnosis, research or teaching purposes and their subsequent disposal by hospital authorities.  I also authorize the release of specimen test results and/or written reports to my treating physician on the hospital medical staff or other referring or consulting physicians involved in my care, at the discretion of the Pathologist or my treating physician.    6.   I consent to the photographing or videotaping of the operations or procedures to be performed, including appropriate portions of my body for medical, scientific, or educational purposes, provided my identity is not revealed by the pictures or by descriptive texts accompanying them.  If the procedure has been photographed/videotaped, the surgeon will obtain the original picture, image, videotape or CD.  The hospital will not be responsible for storage, release or maintenance of the picture, image, tape or CD.    7.   I consent to the presence of a  or observers in the operating room as deemed necessary by my physician or their designees.    8.   I recognize that in the event my procedure results in extended X-Ray/fluoroscopy time, I may develop a skin reaction.    9. If I have a Do Not Attempt Resuscitation (DNAR) order in place, that status will be suspended while in the operating room, procedural suite, and during the recovery period unless otherwise explicitly  stated by me (or a person authorized to consent on my behalf). The surgeon or my attending physician will determine when the applicable recovery period ends for purposes of reinstating the DNAR order.  10. Patients having a sterilization procedure: I understand that if the procedure is successful the results will be permanent and it will therefore be impossible for me to inseminate, conceive, or bear children.  I also understand that the procedure is intended to result in sterility, although the result has not been guaranteed.   11. I acknowledge that my physician has explained sedation/analgesia administration to me including the risk and benefits I consent to the administration of sedation/analgesia as may be necessary or desirable in the judgment of my physician.    I CERTIFY THAT I HAVE READ AND FULLY UNDERSTAND THE ABOVE CONSENT TO OPERATION and/or OTHER PROCEDURE.      _________________________________________  __________________________________  Signature of Patient     Signature of Responsible Person         ___________________________________         Printed Name of Responsible Person           _________________________________                 Relationship to Patient  _________________________________________  ______________________________  Signature of Witness          Date  Time    Patient Name: Kierra Joshua     : 1945                 Printed: 2025     Medical Record #: [id number]                     Page 1 of 1

## (undated) NOTE — LETTER
09/12/18        Elda Mcintosh  [address] [place] [id number]      Dear Mario [place] records indicate that you have outstanding lab work and or testing that was ordered for you and has not yet been completed:  Orders Placed This Encount